# Patient Record
Sex: FEMALE | Race: WHITE | NOT HISPANIC OR LATINO | Employment: FULL TIME | URBAN - METROPOLITAN AREA
[De-identification: names, ages, dates, MRNs, and addresses within clinical notes are randomized per-mention and may not be internally consistent; named-entity substitution may affect disease eponyms.]

---

## 2017-01-06 DIAGNOSIS — I50.42 CHRONIC COMBINED SYSTOLIC AND DIASTOLIC CONGESTIVE HEART FAILURE: Chronic | ICD-10-CM

## 2017-01-06 DIAGNOSIS — I49.5 SICK SINUS SYNDROME: ICD-10-CM

## 2017-01-06 DIAGNOSIS — N18.30 CKD (CHRONIC KIDNEY DISEASE) STAGE 3, GFR 30-59 ML/MIN: Chronic | ICD-10-CM

## 2017-01-06 DIAGNOSIS — J43.8 OTHER EMPHYSEMA: Chronic | ICD-10-CM

## 2017-01-06 DIAGNOSIS — E66.2 OBESITY HYPOVENTILATION SYNDROME: Chronic | ICD-10-CM

## 2017-01-06 DIAGNOSIS — J43.9 PULMONARY EMPHYSEMA, UNSPECIFIED EMPHYSEMA TYPE: Chronic | ICD-10-CM

## 2017-01-06 DIAGNOSIS — Z98.61 CAD S/P PERCUTANEOUS CORONARY ANGIOPLASTY: Chronic | ICD-10-CM

## 2017-01-06 DIAGNOSIS — I25.5 ISCHEMIC CARDIOMYOPATHY: Chronic | ICD-10-CM

## 2017-01-06 DIAGNOSIS — I25.10 CAD S/P PERCUTANEOUS CORONARY ANGIOPLASTY: Chronic | ICD-10-CM

## 2017-01-06 DIAGNOSIS — I10 ESSENTIAL HYPERTENSION: Chronic | ICD-10-CM

## 2017-01-06 DIAGNOSIS — Z95.0 PACEMAKER: Chronic | ICD-10-CM

## 2017-01-06 DIAGNOSIS — Z95.810 AUTOMATIC IMPLANTABLE CARDIOVERTER-DEFIBRILLATOR IN SITU: Chronic | ICD-10-CM

## 2017-01-06 DIAGNOSIS — I50.43 ACUTE ON CHRONIC COMBINED SYSTOLIC AND DIASTOLIC CONGESTIVE HEART FAILURE: ICD-10-CM

## 2017-01-06 DIAGNOSIS — I48.91 ATRIAL FIBRILLATION STATUS POST CARDIOVERSION: Chronic | ICD-10-CM

## 2017-01-06 RX ORDER — LOSARTAN POTASSIUM 25 MG/1
12.5 TABLET ORAL DAILY
Qty: 45 TABLET | Refills: 3 | Status: ON HOLD | OUTPATIENT
Start: 2017-01-06 | End: 2017-02-19

## 2017-01-06 RX ORDER — FLUTICASONE FUROATE AND VILANTEROL 100; 25 UG/1; UG/1
1 POWDER RESPIRATORY (INHALATION) DAILY
Qty: 28 EACH | Refills: 0 | OUTPATIENT
Start: 2017-01-06

## 2017-01-10 ENCOUNTER — TELEPHONE (OUTPATIENT)
Dept: ELECTROPHYSIOLOGY | Facility: CLINIC | Age: 72
End: 2017-01-10

## 2017-01-10 NOTE — TELEPHONE ENCOUNTER
Contacted patient in relation to rescheduling her ICD home monitor device check.  Patient stated her daughter still has not brought her remote monitor to her.  Informed the patient of the importance of remote monitoring and clinical follow up as it relates to her ICD being under the [employer].  Asked patient about scheduling an in clinic appointment to assess her ICD as well as re-establish care with Dr. Spring as to it has been over 3 years since she was in the clinic.  Patient stated she would prefer going to the [facility] as to it is closer for her.  Dr. Spring's MA will contact patient to schedule an appointment for an ICD check and to see Dr. Spring on 1/30/17.  Patient verbalized understanding to all of the above.

## 2017-01-17 ENCOUNTER — OFFICE VISIT (OUTPATIENT)
Dept: INTERNAL MEDICINE | Facility: CLINIC | Age: 72
End: 2017-01-17
Payer: COMMERCIAL

## 2017-01-17 VITALS
HEIGHT: 70 IN | RESPIRATION RATE: 16 BRPM | BODY MASS INDEX: 35.44 KG/M2 | WEIGHT: 247.56 LBS | OXYGEN SATURATION: 85 % | HEART RATE: 70 BPM | TEMPERATURE: 97 F

## 2017-01-17 DIAGNOSIS — J43.9 PULMONARY EMPHYSEMA, UNSPECIFIED EMPHYSEMA TYPE: Chronic | ICD-10-CM

## 2017-01-17 DIAGNOSIS — Z12.31 ENCOUNTER FOR SCREENING MAMMOGRAM FOR BREAST CANCER: ICD-10-CM

## 2017-01-17 DIAGNOSIS — Z91.199 HISTORY OF NONADHERENCE TO MEDICAL TREATMENT: ICD-10-CM

## 2017-01-17 DIAGNOSIS — R06.2 WHEEZING: Primary | ICD-10-CM

## 2017-01-17 PROCEDURE — 1160F RVW MEDS BY RX/DR IN RCRD: CPT | Mod: S$GLB,,, | Performed by: INTERNAL MEDICINE

## 2017-01-17 PROCEDURE — 1126F AMNT PAIN NOTED NONE PRSNT: CPT | Mod: S$GLB,,, | Performed by: INTERNAL MEDICINE

## 2017-01-17 PROCEDURE — 99214 OFFICE O/P EST MOD 30 MIN: CPT | Mod: 25,S$GLB,, | Performed by: INTERNAL MEDICINE

## 2017-01-17 PROCEDURE — 1157F ADVNC CARE PLAN IN RCRD: CPT | Mod: S$GLB,,, | Performed by: INTERNAL MEDICINE

## 2017-01-17 PROCEDURE — 94640 AIRWAY INHALATION TREATMENT: CPT | Mod: S$GLB,,, | Performed by: INTERNAL MEDICINE

## 2017-01-17 PROCEDURE — 1159F MED LIST DOCD IN RCRD: CPT | Mod: S$GLB,,, | Performed by: INTERNAL MEDICINE

## 2017-01-17 RX ORDER — POLYMYXIN B SULFATE AND TRIMETHOPRIM 1; 10000 MG/ML; [USP'U]/ML
1 SOLUTION OPHTHALMIC DAILY
Refills: 1 | COMMUNITY
Start: 2016-12-30 | End: 2017-03-01

## 2017-01-17 RX ORDER — IPRATROPIUM BROMIDE AND ALBUTEROL SULFATE 2.5; .5 MG/3ML; MG/3ML
SOLUTION RESPIRATORY (INHALATION)
Refills: 2 | COMMUNITY
Start: 2016-12-30 | End: 2017-03-13 | Stop reason: SDUPTHER

## 2017-01-17 RX ORDER — NEPAFENAC 3 MG/ML
1 SUSPENSION/ DROPS OPHTHALMIC DAILY
Refills: 0 | COMMUNITY
Start: 2016-12-30 | End: 2017-03-01

## 2017-01-17 RX ORDER — IPRATROPIUM BROMIDE AND ALBUTEROL SULFATE 2.5; .5 MG/3ML; MG/3ML
3 SOLUTION RESPIRATORY (INHALATION)
Status: COMPLETED | OUTPATIENT
Start: 2017-01-17 | End: 2017-01-17

## 2017-01-17 RX ORDER — FLUTICASONE FUROATE AND VILANTEROL 100; 25 UG/1; UG/1
1 POWDER RESPIRATORY (INHALATION) DAILY
Qty: 28 EACH | Refills: 5 | Status: SHIPPED | OUTPATIENT
Start: 2017-01-17

## 2017-01-17 RX ORDER — DUREZOL 0.5 MG/ML
2 EMULSION OPHTHALMIC DAILY
Refills: 1 | COMMUNITY
Start: 2016-11-23 | End: 2017-03-01

## 2017-01-17 RX ADMIN — IPRATROPIUM BROMIDE AND ALBUTEROL SULFATE 3 ML: 2.5; .5 SOLUTION RESPIRATORY (INHALATION) at 03:01

## 2017-01-17 NOTE — PROGRESS NOTES
"Subjective:      Patient ID: Jennifer Prescott is a 71 y.o. female.    Chief Complaint: Follow-up and Shortness of Breath    HPI: 71y/oWF, stopped smoking 1/1/17.  Known: COPD, on O2  At home , but is not on it now.               Has not been using her duoneb,"I didn't think it was working".               Ran out of her Breo too.               Also, has not had her ICD checked, because her daughter has not brought the moniter to her.               Has lost 8# since I last saw her.  She does not have a pulmonologist and does not want another doctor.    Review of Systems   Constitutional: Positive for fatigue. Negative for diaphoresis and fever. Activity change: less.   HENT: Negative.    Eyes: Negative.    Respiratory: Positive for chest tightness, shortness of breath and wheezing. Negative for choking and stridor.    Cardiovascular: Positive for leg swelling. Negative for chest pain and palpitations.   Gastrointestinal: Negative.    Endocrine: Negative.    Genitourinary: Negative.    Musculoskeletal: Negative.    Allergic/Immunologic: Negative.    Neurological: Negative.    Hematological: Negative.    Psychiatric/Behavioral: Negative.        Objective:     Visit Vitals    Pulse 70    Temp 97.2 °F (36.2 °C) (Oral)    Resp 16    Ht 5' 10" (1.778 m)    Wt 112.3 kg (247 lb 9.2 oz)    LMP  (LMP Unknown)    SpO2 (!) 85%    BMI 35.52 kg/m2       Physical Exam   Constitutional: She is oriented to person, place, and time. She appears well-developed and well-nourished. No distress.   HENT:   Head: Normocephalic and atraumatic.   Nose: Nose normal.   Mouth/Throat: Oropharynx is clear and moist.   cyanotic lips   Eyes: EOM are normal. Pupils are equal, round, and reactive to light.   Neck: Normal range of motion. Neck supple.   Cardiovascular: Normal rate, regular rhythm and normal heart sounds.    Pulmonary/Chest: Effort normal. She has wheezes.   Abdominal: Soft. Bowel sounds are normal.   Musculoskeletal: Normal range " of motion.   Neurological: She is alert and oriented to person, place, and time.   Skin: Skin is warm and dry. She is not diaphoretic.   Psychiatric: She has a normal mood and affect. Her behavior is normal.   Nursing note and vitals reviewed.      Assessment:     1. Wheezing    2. Pulmonary emphysema, unspecified emphysema type    3. Encounter for screening mammogram for breast cancer    4. History of nonadherence to medical treatment      Plan:     Wheezing  -     albuterol-ipratropium 2.5mg-0.5mg/3mL nebulizer solution 3 mL; Take 3 mLs by nebulization one time.  -     fluticasone-vilanterol (BREO) 100-25 mcg/dose diskus inhaler; Inhale 1 puff into the lungs once daily.  Dispense: 28 each; Refill: 5    Pulmonary emphysema, unspecified emphysema type  -     fluticasone-vilanterol (BREO) 100-25 mcg/dose diskus inhaler; Inhale 1 puff into the lungs once daily.  Dispense: 28 each; Refill: 5    Encounter for screening mammogram for breast cancer  -     Mammo Digital Screening Bilat with CAD; Future; Expected date: 1/17/17    History of nonadherence to medical treatment

## 2017-01-21 DIAGNOSIS — I42.8 OTHER CARDIOMYOPATHY: Primary | ICD-10-CM

## 2017-01-26 ENCOUNTER — TELEPHONE (OUTPATIENT)
Dept: INTERNAL MEDICINE | Facility: CLINIC | Age: 72
End: 2017-01-26

## 2017-01-27 DIAGNOSIS — I42.0 CONGESTIVE CARDIOMYOPATHY: ICD-10-CM

## 2017-01-27 DIAGNOSIS — Z95.810 S/P ICD (INTERNAL CARDIAC DEFIBRILLATOR) PROCEDURE: ICD-10-CM

## 2017-01-27 DIAGNOSIS — I25.10 CORONARY ARTERY DISEASE INVOLVING NATIVE CORONARY ARTERY WITHOUT ANGINA PECTORIS, UNSPECIFIED WHETHER NATIVE OR TRANSPLANTED HEART: ICD-10-CM

## 2017-01-27 DIAGNOSIS — Z95.0 PACEMAKER: ICD-10-CM

## 2017-01-27 DIAGNOSIS — J44.9 CHRONIC OBSTRUCTIVE PULMONARY DISEASE, UNSPECIFIED COPD TYPE: ICD-10-CM

## 2017-01-27 DIAGNOSIS — I25.5 ISCHEMIC CARDIOMYOPATHY: ICD-10-CM

## 2017-01-27 DIAGNOSIS — I50.9 ACUTE ON CHRONIC CONGESTIVE HEART FAILURE, UNSPECIFIED CONGESTIVE HEART FAILURE TYPE: ICD-10-CM

## 2017-01-27 DIAGNOSIS — M10.9 ACUTE GOUT OF FOOT, UNSPECIFIED CAUSE, UNSPECIFIED LATERALITY: ICD-10-CM

## 2017-01-27 DIAGNOSIS — M62.838 MUSCLE SPASMS OF BOTH LOWER EXTREMITIES: ICD-10-CM

## 2017-01-27 NOTE — TELEPHONE ENCOUNTER
Pt requesting medication refill.allopurinol 300 mg, pravastatin 40 mg,cyclobenzaprine 10 mg,[facility]. Vf/ma

## 2017-01-30 ENCOUNTER — TELEPHONE (OUTPATIENT)
Dept: CARDIOLOGY | Facility: HOSPITAL | Age: 72
End: 2017-01-30

## 2017-01-30 ENCOUNTER — OFFICE VISIT (OUTPATIENT)
Dept: CARDIOLOGY | Facility: CLINIC | Age: 72
End: 2017-01-30
Payer: COMMERCIAL

## 2017-01-30 VITALS
DIASTOLIC BLOOD PRESSURE: 67 MMHG | SYSTOLIC BLOOD PRESSURE: 85 MMHG | HEART RATE: 70 BPM | HEIGHT: 70 IN | WEIGHT: 241 LBS | BODY MASS INDEX: 34.5 KG/M2

## 2017-01-30 DIAGNOSIS — I42.8 OTHER CARDIOMYOPATHY: ICD-10-CM

## 2017-01-30 DIAGNOSIS — J43.9 PULMONARY EMPHYSEMA, UNSPECIFIED EMPHYSEMA TYPE: Chronic | ICD-10-CM

## 2017-01-30 DIAGNOSIS — I49.5 SICK SINUS SYNDROME: ICD-10-CM

## 2017-01-30 DIAGNOSIS — I25.10 CAD S/P PERCUTANEOUS CORONARY ANGIOPLASTY: Chronic | ICD-10-CM

## 2017-01-30 DIAGNOSIS — I25.5 ISCHEMIC CARDIOMYOPATHY: Primary | Chronic | ICD-10-CM

## 2017-01-30 DIAGNOSIS — I48.91 ATRIAL FIBRILLATION STATUS POST CARDIOVERSION: Chronic | ICD-10-CM

## 2017-01-30 DIAGNOSIS — Z98.61 CAD S/P PERCUTANEOUS CORONARY ANGIOPLASTY: Chronic | ICD-10-CM

## 2017-01-30 PROCEDURE — 1160F RVW MEDS BY RX/DR IN RCRD: CPT | Mod: S$GLB,,, | Performed by: INTERNAL MEDICINE

## 2017-01-30 PROCEDURE — 1126F AMNT PAIN NOTED NONE PRSNT: CPT | Mod: S$GLB,,, | Performed by: INTERNAL MEDICINE

## 2017-01-30 PROCEDURE — 99999 PR PBB SHADOW E&M-EST. PATIENT-LVL III: CPT | Mod: PBBFAC,,, | Performed by: INTERNAL MEDICINE

## 2017-01-30 PROCEDURE — 99205 OFFICE O/P NEW HI 60 MIN: CPT | Mod: S$GLB,,, | Performed by: INTERNAL MEDICINE

## 2017-01-30 PROCEDURE — 3074F SYST BP LT 130 MM HG: CPT | Mod: S$GLB,,, | Performed by: INTERNAL MEDICINE

## 2017-01-30 PROCEDURE — 3078F DIAST BP <80 MM HG: CPT | Mod: S$GLB,,, | Performed by: INTERNAL MEDICINE

## 2017-01-30 PROCEDURE — 93000 ELECTROCARDIOGRAM COMPLETE: CPT | Mod: S$GLB,,, | Performed by: INTERNAL MEDICINE

## 2017-01-30 PROCEDURE — 1159F MED LIST DOCD IN RCRD: CPT | Mod: S$GLB,,, | Performed by: INTERNAL MEDICINE

## 2017-01-30 PROCEDURE — 93284 PRGRMG EVAL IMPLANTABLE DFB: CPT | Mod: S$GLB,,, | Performed by: INTERNAL MEDICINE

## 2017-01-30 PROCEDURE — 1157F ADVNC CARE PLAN IN RCRD: CPT | Mod: S$GLB,,, | Performed by: INTERNAL MEDICINE

## 2017-01-30 RX ORDER — ALLOPURINOL 300 MG/1
300 TABLET ORAL DAILY
Qty: 90 TABLET | Refills: 3 | Status: SHIPPED | OUTPATIENT
Start: 2017-01-30

## 2017-01-30 RX ORDER — CYCLOBENZAPRINE HCL 10 MG
10 TABLET ORAL 3 TIMES DAILY PRN
Qty: 90 TABLET | Refills: 1 | Status: ON HOLD | OUTPATIENT
Start: 2017-01-30 | End: 2017-02-19 | Stop reason: HOSPADM

## 2017-01-30 RX ORDER — PRAVASTATIN SODIUM 40 MG/1
40 TABLET ORAL DAILY
Qty: 90 TABLET | Refills: 3 | Status: SHIPPED | OUTPATIENT
Start: 2017-01-30

## 2017-01-30 NOTE — MR AVS SNAPSHOT
[place] [facility]  [address], [address]  [place] [address]  Phone: [phone]                  Jennifer Prescott   2017 8:40 AM   Office Visit    Description:  Female : 1945   Provider:  Tien Spring MD   Department:  [place] [facility]           Reason for Visit     Pacemaker Check           Diagnoses this Visit        Comments    Ischemic cardiomyopathy    -  Primary     Other cardiomyopathy         Sick sinus syndrome         Pulmonary emphysema, unspecified emphysema type         Atrial fibrillation status post cardioversion         CAD S/P percutaneous coronary angioplasty                To Do List           Future Appointments        Provider Department Dept Phone    2/15/2017 3:20 PM Lianna Mistry MD [facility] [phone]      Goals (5 Years of Data)     None      Follow-Up and Disposition     Return in about 1 year (around 2018).      [facility]     [facility] On Call Nurse Care Line - / Assistance  Registered nurses in the [facility] On Call Center provide clinical advisement, health education, appointment booking, and other advisory services.  Call for this free service at [phone].             Medications           Message regarding Medications     Verify the changes and/or additions to your medication regime listed below are the same as discussed with your clinician today.  If any of these changes or additions are incorrect, please notify your healthcare provider.        STOP taking these medications     amiodarone (PACERONE) 200 MG Tab Take 1 tablet (200 mg total) by mouth once daily.           Verify that the below list of medications is an accurate representation of the medications you are currently taking.  If none reported, the list may be blank. If incorrect, please contact your healthcare provider. Carry this list with you in case of emergency.           Current Medications     albuterol 90 mcg/actuation inhaler Inhale  2 puffs into the lungs every 6 (six) hours as needed for Wheezing.    albuterol-ipratropium 2.5mg-0.5mg/3mL (DUO-NEB) 0.5 mg-3 mg(2.5 mg base)/3 mL nebulizer solution USE 1 VIAL VIA NEBULIZER EVERY 6 HOURS AS NEEDED    allopurinol (ZYLOPRIM) 300 MG tablet Take 1 tablet (300 mg total) by mouth once daily.    aspirin 81 MG Chew Take 81 mg by mouth every evening.     bumetanide (BUMEX) 1 MG tablet Take 1 tablet (1 mg total) by mouth 2 (two) times daily.    carvedilol (COREG) 3.125 MG tablet Take 1 tablet (3.125 mg total) by mouth 2 (two) times daily with meals.    clopidogrel (PLAVIX) 75 mg tablet Take 1 tablet (75 mg total) by mouth every evening.    cyclobenzaprine (FLEXERIL) 10 MG tablet Take 1 tablet (10 mg total) by mouth 3 (three) times daily as needed for Muscle spasms.    digoxin (LANOXIN) 125 mcg tablet Take 1 tablet (0.125 mg total) by mouth once daily.    DUREZOL 0.05 % Drop ophthalmic solution Place 2 drops into both eyes Daily.    ferrous sulfate 325 (65 FE) MG EC tablet Take 1 tablet (325 mg total) by mouth every evening.    fluticasone-vilanterol (BREO) 100-25 mcg/dose diskus inhaler Inhale 1 puff into the lungs once daily.    ILEVRO 0.3 % DrpS Place 1 drop into both eyes Daily.    isosorbide mononitrate (IMDUR) 60 MG 24 hr tablet Take 1 tablet (60 mg total) by mouth once daily.    losartan (COZAAR) 25 MG tablet Take 0.5 tablets (12.5 mg total) by mouth once daily.    polymyxin B sulf-trimethoprim (POLYTRIM) 10,000 unit- 1 mg/mL Drop Place 1 drop into both eyes Daily.    pravastatin (PRAVACHOL) 40 MG tablet Take 1 tablet (40 mg total) by mouth once daily.    tramadol (ULTRAM) 50 mg tablet Take 1 tablet (50 mg total) by mouth every 6 (six) hours as needed for Pain.    trazodone (DESYREL) 150 MG tablet TAKE 1 TABLET BY MOUTH AT BEDTIME           Clinical Reference Information           Vital Signs - Last Recorded  Most recent update: 1/30/2017  9:00 AM by Shadia Blunt MA    BP Pulse Ht Wt LMP BMI    (!)  "85/67 70 5' 10" (1.778 m) 109.3 kg (241 lb) (LMP Unknown) 34.58 kg/m2      Blood Pressure          Most Recent Value    BP  (!)  85/67      Allergies as of 1/30/2017     No Known Allergies      Immunizations Administered on Date of Encounter - 1/30/2017     None      Orders Placed During Today's Visit      Normal Orders This Visit    ICD programming     Rhythm strip     Future Labs/Procedures Expected by Expires    2D echo with color flow doppler  1/5/2018 1/30/2018      Smoking Cessation     If you would like to quit smoking:   You may be eligible for free services if you are a [place] resident and started smoking cigarettes before September 1, 1988.  Call the [employer]oking [employer]essation [employer] ([employer]) toll free at (121) 737-9233 or [phone].   Call 6-793-QUIT-NOW if you do not meet the above criteria.            "

## 2017-01-30 NOTE — PROGRESS NOTES
Subjective:    Patient ID:  Jennifer Prescott is a 71 y.o. female who presents for follow-up of Pacemaker Check      HPI Comments:       Atrial Fibrillation   Symptoms include shortness of breath. Symptoms are negative for palpitations and weakness. Past medical history includes atrial fibrillation.   71 y.o. F CAD  HTN  Permanent AF  PPM/SSS -> BiV ICD 10/9/12 due to ICM -> LV lead revision thereafter  MARY KATE  PAD, L CEA  COPD, home O2    Continues to have NYHA III sx.   Quit cigs x ~1 year, with recent relapse and requit.  O2 at home.    Taken off a/c earlier this year due to GI bleeding. No source found. Cleared for a/c by GI.    echo: 20% LVEF. 3-4+ MR  cath 6/15: nonobstructive CAD  ICD: 98% V paced    My interpretation of today's ECG is biV paced AF. 71 bpm.    Review of Systems   Constitution: Positive for malaise/fatigue. Negative for weakness.   HENT: Negative.    Eyes: Negative for blurred vision.   Cardiovascular: Positive for dyspnea on exertion. Negative for irregular heartbeat, near-syncope and palpitations.   Respiratory: Positive for shortness of breath.    Endocrine: Negative.    Hematologic/Lymphatic: Does not bruise/bleed easily.   Skin: Negative.    Musculoskeletal: Negative.    Gastrointestinal: Negative.  Negative for abdominal pain.   Genitourinary: Negative for frequency.   Neurological: Negative.    Psychiatric/Behavioral: Negative.    Allergic/Immunologic: Negative for environmental allergies.        Objective:    Physical Exam   Constitutional: She is oriented to person, place, and time. Vital signs are normal. She appears well-developed and well-nourished. She is active and cooperative.   HENT:   Head: Normocephalic and atraumatic.   Eyes: Conjunctivae and EOM are normal.   Neck: Normal range of motion. Carotid bruit is not present. No tracheal deviation and no edema present. No thyroid mass and no thyromegaly present.   Cardiovascular: Normal rate, normal heart sounds, intact distal pulses  and normal pulses.  An irregularly irregular rhythm present.  No extrasystoles are present. PMI is not displaced.  Exam reveals no gallop and no friction rub.    No murmur heard.  Pulmonary/Chest: Effort normal and breath sounds normal. No respiratory distress. She has no wheezes. She has no rales.   Abdominal: Soft. Normal appearance. She exhibits no distension. There is no hepatosplenomegaly.   Musculoskeletal: Normal range of motion.   Neurological: She is alert and oriented to person, place, and time. Coordination normal.   Skin: Skin is warm and dry. No rash noted.   Psychiatric: She has a normal mood and affect. Her speech is normal and behavior is normal. Thought content normal. Cognition and memory are normal.   Nursing note and vitals reviewed.        Assessment:       1. Ischemic cardiomyopathy    2. Other cardiomyopathy    3. Sick sinus syndrome    4. Pulmonary emphysema, unspecified emphysema type    5. Atrial fibrillation status post cardioversion 3/06/15    6. CAD S/P percutaneous coronary angioplasty         Plan:       ICD  Stable device function.    AF  Permanent AF despite amio.   d/c amio.    High risk for CVA. Hx of DVT/PE.  D/w pt that Plavix is inadequate for mitigation of these risks. If at all possible, she should be fully anticoagulated. Did have GI bleed while under ASA/plavix/xarelto. No source found.  Will d/w Dr Braga the possibility of d/c plavix and replace with oral anticoagulant -- e.g., warfarin or eliquis (not Pradaxa due to its higher risk for GI bleeding).    CHF  defer med Rx for HF to Dr Braga. Limited by hypotension.    Continue f/u in device clinic.  Return in 1 year with echo, or earlier prn.

## 2017-01-30 NOTE — LETTER
January 30, 2017      Lianna Mistry MD  [address]  [address]  [place] [address]           [place] [facility]  [address], [address]  [place] [facility] [address]  Phone: [phone]          Patient: Jennifer Prescott   MR Number: [id number]   YOB: 1945   Date of Visit: 1/30/2017       Dear Dr. Lianna Mistry:    Thank you for referring Jennifer Prescott to me for evaluation. Attached you will find relevant portions of my assessment and plan of care.    If you have questions, please do not hesitate to call me. I look forward to following Jennifer Prescott along with you.    Sincerely,    Tien Spring MD    Enclosure  CC:  No Recipients    If you would like to receive this communication electronically, please contact [email or web address] or [phone] to request more information on [employer] access.    For providers and/or their staff who would like to refer a patient to [facility], please contact us through our one-stop-shop provider referral line, [facility], at [phone].    If you feel you have received this communication in error or would no longer like to receive these types of communications, please e-mail [email or web address]

## 2017-01-31 NOTE — TELEPHONE ENCOUNTER
Thanks Williams          I agree with stopping plavix        We can go with Eliquis 5 mg po bid        She was non compliant with coumadin in the past        She can stay on aspirin 81 mg daily        We will follow closely         Thanks        My medical assistant will call her         ZN

## 2017-01-31 NOTE — TELEPHONE ENCOUNTER
----- Message from Tien Spring MD sent at 1/30/2017 12:27 PM CST -----  Riley,  See my EPIC note from today. Can we replace plavix with either warfarin or eliquis?  Dan

## 2017-02-15 ENCOUNTER — HOSPITAL ENCOUNTER (INPATIENT)
Facility: HOSPITAL | Age: 72
LOS: 5 days | Discharge: HOME | DRG: 378 | End: 2017-02-20
Attending: EMERGENCY MEDICINE | Admitting: INTERNAL MEDICINE
Payer: COMMERCIAL

## 2017-02-15 DIAGNOSIS — Z72.0 TOBACCO ABUSE DISORDER: ICD-10-CM

## 2017-02-15 DIAGNOSIS — I50.42 CHRONIC COMBINED SYSTOLIC AND DIASTOLIC CONGESTIVE HEART FAILURE: Chronic | ICD-10-CM

## 2017-02-15 DIAGNOSIS — Z98.61 CAD S/P PERCUTANEOUS CORONARY ANGIOPLASTY: Chronic | ICD-10-CM

## 2017-02-15 DIAGNOSIS — I25.10 CAD S/P PERCUTANEOUS CORONARY ANGIOPLASTY: Chronic | ICD-10-CM

## 2017-02-15 DIAGNOSIS — Z79.01 LONG TERM (CURRENT) USE OF ANTICOAGULANTS: ICD-10-CM

## 2017-02-15 DIAGNOSIS — J43.8 OTHER EMPHYSEMA: Chronic | ICD-10-CM

## 2017-02-15 DIAGNOSIS — J96.22 ACUTE ON CHRONIC RESPIRATORY FAILURE WITH HYPOXIA AND HYPERCAPNIA: ICD-10-CM

## 2017-02-15 DIAGNOSIS — I49.5 SICK SINUS SYNDROME: ICD-10-CM

## 2017-02-15 DIAGNOSIS — E66.2 OBESITY HYPOVENTILATION SYNDROME: Chronic | ICD-10-CM

## 2017-02-15 DIAGNOSIS — J90 BILATERAL PLEURAL EFFUSION: ICD-10-CM

## 2017-02-15 DIAGNOSIS — G57.93 NEUROPATHIC PAIN, LEG, BILATERAL: Chronic | ICD-10-CM

## 2017-02-15 DIAGNOSIS — I77.9 BILATERAL CAROTID ARTERY DISEASE: Chronic | ICD-10-CM

## 2017-02-15 DIAGNOSIS — D50.0 IRON DEFICIENCY ANEMIA DUE TO CHRONIC BLOOD LOSS: Chronic | ICD-10-CM

## 2017-02-15 DIAGNOSIS — N18.30 CKD (CHRONIC KIDNEY DISEASE) STAGE 3, GFR 30-59 ML/MIN: Chronic | ICD-10-CM

## 2017-02-15 DIAGNOSIS — Z95.810 AUTOMATIC IMPLANTABLE CARDIOVERTER-DEFIBRILLATOR IN SITU: Chronic | ICD-10-CM

## 2017-02-15 DIAGNOSIS — I25.5 ISCHEMIC CARDIOMYOPATHY: Chronic | ICD-10-CM

## 2017-02-15 DIAGNOSIS — J43.9 PULMONARY EMPHYSEMA, UNSPECIFIED EMPHYSEMA TYPE: Chronic | ICD-10-CM

## 2017-02-15 DIAGNOSIS — K55.1 CHRONIC ISCHEMIC COLITIS: Chronic | ICD-10-CM

## 2017-02-15 DIAGNOSIS — I10 ESSENTIAL HYPERTENSION: ICD-10-CM

## 2017-02-15 DIAGNOSIS — Z91.199 HISTORY OF NONADHERENCE TO MEDICAL TREATMENT: ICD-10-CM

## 2017-02-15 DIAGNOSIS — R94.39 ABNORMAL CARDIOVASCULAR STRESS TEST: ICD-10-CM

## 2017-02-15 DIAGNOSIS — Z79.01 ANTICOAGULATED: ICD-10-CM

## 2017-02-15 DIAGNOSIS — N17.9 AKI (ACUTE KIDNEY INJURY): ICD-10-CM

## 2017-02-15 DIAGNOSIS — I50.43 ACUTE ON CHRONIC COMBINED SYSTOLIC AND DIASTOLIC CONGESTIVE HEART FAILURE: ICD-10-CM

## 2017-02-15 DIAGNOSIS — K59.01 SLOW TRANSIT CONSTIPATION: ICD-10-CM

## 2017-02-15 DIAGNOSIS — Z95.0 PACEMAKER: Chronic | ICD-10-CM

## 2017-02-15 DIAGNOSIS — K92.2 GASTROINTESTINAL HEMORRHAGE, UNSPECIFIED GASTROINTESTINAL HEMORRHAGE TYPE: Primary | ICD-10-CM

## 2017-02-15 DIAGNOSIS — I48.91 ATRIAL FIBRILLATION STATUS POST CARDIOVERSION: Chronic | ICD-10-CM

## 2017-02-15 DIAGNOSIS — E87.6 HYPOKALEMIA: ICD-10-CM

## 2017-02-15 DIAGNOSIS — J96.21 ACUTE ON CHRONIC RESPIRATORY FAILURE WITH HYPOXIA AND HYPERCAPNIA: ICD-10-CM

## 2017-02-15 PROBLEM — K92.1 GASTROINTESTINAL HEMORRHAGE WITH MELENA: Status: ACTIVE | Noted: 2017-02-15

## 2017-02-15 LAB
ABO + RH BLD: NORMAL
ALBUMIN SERPL BCP-MCNC: 3.1 G/DL
ALP SERPL-CCNC: 109 U/L
ALT SERPL W/O P-5'-P-CCNC: 119 U/L
ANION GAP SERPL CALC-SCNC: 15 MMOL/L
ANISOCYTOSIS BLD QL SMEAR: SLIGHT
APTT BLDCRRT: 25.3 SEC
AST SERPL-CCNC: 173 U/L
BACTERIA #/AREA URNS AUTO: ABNORMAL /HPF
BASO STIPL BLD QL SMEAR: ABNORMAL
BASOPHILS # BLD AUTO: 0.02 K/UL
BASOPHILS NFR BLD: 0.2 %
BILIRUB SERPL-MCNC: 1.1 MG/DL
BILIRUB UR QL STRIP: NEGATIVE
BILIRUB UR QL STRIP: NEGATIVE
BLD GP AB SCN CELLS X3 SERPL QL: NORMAL
BLOOD GROUP ANTIBODIES SERPL: NORMAL
BNP SERPL-MCNC: 1064 PG/ML
BNP SERPL-MCNC: 1576 PG/ML
BUN SERPL-MCNC: 74 MG/DL
CALCIUM SERPL-MCNC: 8.5 MG/DL
CHLORIDE SERPL-SCNC: 90 MMOL/L
CLARITY UR REFRACT.AUTO: ABNORMAL
CLARITY UR REFRACT.AUTO: CLEAR
CO2 SERPL-SCNC: 25 MMOL/L
COLOR UR AUTO: YELLOW
COLOR UR AUTO: YELLOW
CREAT SERPL-MCNC: 2 MG/DL
CREAT UR-MCNC: 109 MG/DL
DIFFERENTIAL METHOD: ABNORMAL
DIGOXIN SERPL-MCNC: 2.1 NG/ML
EOSINOPHIL # BLD AUTO: 0 K/UL
EOSINOPHIL NFR BLD: 0.1 %
ERYTHROCYTE [DISTWIDTH] IN BLOOD BY AUTOMATED COUNT: 20.2 %
EST. GFR  (AFRICAN AMERICAN): 28.3 ML/MIN/1.73 M^2
EST. GFR  (NON AFRICAN AMERICAN): 24.6 ML/MIN/1.73 M^2
GLUCOSE SERPL-MCNC: 109 MG/DL
GLUCOSE UR QL STRIP: NEGATIVE
GLUCOSE UR QL STRIP: NEGATIVE
HCT VFR BLD AUTO: 17.2 %
HGB BLD-MCNC: 5.1 G/DL
HGB UR QL STRIP: ABNORMAL
HGB UR QL STRIP: NEGATIVE
HYALINE CASTS UR QL AUTO: 68 /LPF
HYPOCHROMIA BLD QL SMEAR: ABNORMAL
INR PPP: 1.6
KETONES UR QL STRIP: NEGATIVE
KETONES UR QL STRIP: NEGATIVE
LACTATE SERPL-SCNC: 2.5 MMOL/L
LEUKOCYTE ESTERASE UR QL STRIP: ABNORMAL
LEUKOCYTE ESTERASE UR QL STRIP: NEGATIVE
LIPASE SERPL-CCNC: 6 U/L
LYMPHOCYTES # BLD AUTO: 1.3 K/UL
LYMPHOCYTES NFR BLD: 10.5 %
MCH RBC QN AUTO: 27.6 PG
MCHC RBC AUTO-ENTMCNC: 29.7 %
MCV RBC AUTO: 93 FL
MICROSCOPIC COMMENT: ABNORMAL
MONOCYTES # BLD AUTO: 1.2 K/UL
MONOCYTES NFR BLD: 9.9 %
NEUTROPHILS # BLD AUTO: 9.3 K/UL
NEUTROPHILS NFR BLD: 79.3 %
NITRITE UR QL STRIP: NEGATIVE
NITRITE UR QL STRIP: NEGATIVE
OSMOLALITY SERPL: 291 MOSM/KG
OSMOLALITY UR: 369 MOSM/KG
OVALOCYTES BLD QL SMEAR: ABNORMAL
PH UR STRIP: 5 [PH] (ref 5–8)
PH UR STRIP: 5 [PH] (ref 5–8)
PLATELET # BLD AUTO: 309 K/UL
PLATELET BLD QL SMEAR: ABNORMAL
PMV BLD AUTO: 10.4 FL
POIKILOCYTOSIS BLD QL SMEAR: SLIGHT
POTASSIUM SERPL-SCNC: 3.9 MMOL/L
PROT SERPL-MCNC: 6.9 G/DL
PROT UR QL STRIP: NEGATIVE
PROT UR QL STRIP: NEGATIVE
PROT UR-MCNC: 11 MG/DL
PROTHROMBIN TIME: 16.3 SEC
RBC # BLD AUTO: 1.85 M/UL
RBC #/AREA URNS AUTO: 49 /HPF (ref 0–4)
SODIUM SERPL-SCNC: 130 MMOL/L
SODIUM UR-SCNC: <20 MMOL/L
SP GR UR STRIP: 1.01 (ref 1–1.03)
SP GR UR STRIP: 1.01 (ref 1–1.03)
SQUAMOUS #/AREA URNS AUTO: 3 /HPF
URN SPEC COLLECT METH UR: ABNORMAL
URN SPEC COLLECT METH UR: NORMAL
UROBILINOGEN UR STRIP-ACNC: 2 EU/DL
UROBILINOGEN UR STRIP-ACNC: 2 EU/DL
WBC # BLD AUTO: 11.85 K/UL
WBC #/AREA URNS AUTO: 4 /HPF (ref 0–5)

## 2017-02-15 PROCEDURE — 96375 TX/PRO/DX INJ NEW DRUG ADDON: CPT

## 2017-02-15 PROCEDURE — 83690 ASSAY OF LIPASE: CPT

## 2017-02-15 PROCEDURE — 83880 ASSAY OF NATRIURETIC PEPTIDE: CPT | Mod: 91

## 2017-02-15 PROCEDURE — P9022 WASHED RED BLOOD CELLS UNIT: HCPCS

## 2017-02-15 PROCEDURE — 36430 TRANSFUSION BLD/BLD COMPNT: CPT

## 2017-02-15 PROCEDURE — 83605 ASSAY OF LACTIC ACID: CPT

## 2017-02-15 PROCEDURE — 94640 AIRWAY INHALATION TREATMENT: CPT

## 2017-02-15 PROCEDURE — 80162 ASSAY OF DIGOXIN TOTAL: CPT

## 2017-02-15 PROCEDURE — 80053 COMPREHEN METABOLIC PANEL: CPT

## 2017-02-15 PROCEDURE — 83880 ASSAY OF NATRIURETIC PEPTIDE: CPT

## 2017-02-15 PROCEDURE — 81003 URINALYSIS AUTO W/O SCOPE: CPT

## 2017-02-15 PROCEDURE — 81001 URINALYSIS AUTO W/SCOPE: CPT

## 2017-02-15 PROCEDURE — 86850 RBC ANTIBODY SCREEN: CPT

## 2017-02-15 PROCEDURE — 93005 ELECTROCARDIOGRAM TRACING: CPT

## 2017-02-15 PROCEDURE — 83930 ASSAY OF BLOOD OSMOLALITY: CPT

## 2017-02-15 PROCEDURE — 96366 THER/PROPH/DIAG IV INF ADDON: CPT

## 2017-02-15 PROCEDURE — 85610 PROTHROMBIN TIME: CPT

## 2017-02-15 PROCEDURE — 99291 CRITICAL CARE FIRST HOUR: CPT | Mod: 25

## 2017-02-15 PROCEDURE — 25000003 PHARM REV CODE 250: Performed by: EMERGENCY MEDICINE

## 2017-02-15 PROCEDURE — 83935 ASSAY OF URINE OSMOLALITY: CPT

## 2017-02-15 PROCEDURE — 86870 RBC ANTIBODY IDENTIFICATION: CPT

## 2017-02-15 PROCEDURE — 85025 COMPLETE CBC W/AUTO DIFF WBC: CPT

## 2017-02-15 PROCEDURE — 86900 BLOOD TYPING SEROLOGIC ABO: CPT

## 2017-02-15 PROCEDURE — C9113 INJ PANTOPRAZOLE SODIUM, VIA: HCPCS | Performed by: EMERGENCY MEDICINE

## 2017-02-15 PROCEDURE — 99291 CRITICAL CARE FIRST HOUR: CPT | Mod: ,,, | Performed by: EMERGENCY MEDICINE

## 2017-02-15 PROCEDURE — 25000003 PHARM REV CODE 250: Performed by: STUDENT IN AN ORGANIZED HEALTH CARE EDUCATION/TRAINING PROGRAM

## 2017-02-15 PROCEDURE — 51701 INSERT BLADDER CATHETER: CPT

## 2017-02-15 PROCEDURE — 93010 ELECTROCARDIOGRAM REPORT: CPT | Mod: ,,, | Performed by: INTERNAL MEDICINE

## 2017-02-15 PROCEDURE — 86922 COMPATIBILITY TEST ANTIGLOB: CPT

## 2017-02-15 PROCEDURE — 84156 ASSAY OF PROTEIN URINE: CPT

## 2017-02-15 PROCEDURE — 84300 ASSAY OF URINE SODIUM: CPT

## 2017-02-15 PROCEDURE — 82570 ASSAY OF URINE CREATININE: CPT

## 2017-02-15 PROCEDURE — 25000242 PHARM REV CODE 250 ALT 637 W/ HCPCS: Performed by: STUDENT IN AN ORGANIZED HEALTH CARE EDUCATION/TRAINING PROGRAM

## 2017-02-15 PROCEDURE — 85730 THROMBOPLASTIN TIME PARTIAL: CPT

## 2017-02-15 PROCEDURE — 96365 THER/PROPH/DIAG IV INF INIT: CPT

## 2017-02-15 PROCEDURE — 63600175 PHARM REV CODE 636 W HCPCS: Performed by: EMERGENCY MEDICINE

## 2017-02-15 PROCEDURE — 12000002 HC ACUTE/MED SURGE SEMI-PRIVATE ROOM

## 2017-02-15 RX ORDER — SODIUM CHLORIDE 0.9 % (FLUSH) 0.9 %
3 SYRINGE (ML) INJECTION EVERY 8 HOURS
Status: DISCONTINUED | OUTPATIENT
Start: 2017-02-15 | End: 2017-02-20 | Stop reason: HOSPADM

## 2017-02-15 RX ORDER — CYCLOBENZAPRINE HCL 10 MG
10 TABLET ORAL 3 TIMES DAILY PRN
Status: DISCONTINUED | OUTPATIENT
Start: 2017-02-15 | End: 2017-02-16

## 2017-02-15 RX ORDER — ONDANSETRON 2 MG/ML
4 INJECTION INTRAMUSCULAR; INTRAVENOUS EVERY 12 HOURS PRN
Status: DISCONTINUED | OUTPATIENT
Start: 2017-02-15 | End: 2017-02-20 | Stop reason: HOSPADM

## 2017-02-15 RX ORDER — FLUTICASONE FUROATE AND VILANTEROL 100; 25 UG/1; UG/1
1 POWDER RESPIRATORY (INHALATION) DAILY
Status: DISCONTINUED | OUTPATIENT
Start: 2017-02-16 | End: 2017-02-20 | Stop reason: HOSPADM

## 2017-02-15 RX ORDER — CLOPIDOGREL BISULFATE 75 MG/1
75 TABLET ORAL DAILY
Status: ON HOLD | COMMUNITY
End: 2017-02-19 | Stop reason: HOSPADM

## 2017-02-15 RX ORDER — IPRATROPIUM BROMIDE AND ALBUTEROL SULFATE 2.5; .5 MG/3ML; MG/3ML
3 SOLUTION RESPIRATORY (INHALATION) EVERY 4 HOURS
Status: DISCONTINUED | OUTPATIENT
Start: 2017-02-15 | End: 2017-02-17

## 2017-02-15 RX ORDER — PANTOPRAZOLE SODIUM 40 MG/10ML
80 INJECTION, POWDER, LYOPHILIZED, FOR SOLUTION INTRAVENOUS
Status: COMPLETED | OUTPATIENT
Start: 2017-02-15 | End: 2017-02-15

## 2017-02-15 RX ORDER — HYDROCODONE BITARTRATE AND ACETAMINOPHEN 500; 5 MG/1; MG/1
TABLET ORAL
Status: DISCONTINUED | OUTPATIENT
Start: 2017-02-15 | End: 2017-02-20 | Stop reason: HOSPADM

## 2017-02-15 RX ORDER — POLYMYXIN B SULFATE AND TRIMETHOPRIM 1; 10000 MG/ML; [USP'U]/ML
1 SOLUTION OPHTHALMIC DAILY
Status: DISCONTINUED | OUTPATIENT
Start: 2017-02-16 | End: 2017-02-20 | Stop reason: HOSPADM

## 2017-02-15 RX ORDER — DIFLUPREDNATE OPHTHALMIC 0.5 MG/ML
2 EMULSION OPHTHALMIC DAILY
Status: DISCONTINUED | OUTPATIENT
Start: 2017-02-16 | End: 2017-02-20 | Stop reason: HOSPADM

## 2017-02-15 RX ORDER — DIGOXIN 125 MCG
0.12 TABLET ORAL DAILY
Status: DISCONTINUED | OUTPATIENT
Start: 2017-02-16 | End: 2017-02-15

## 2017-02-15 RX ADMIN — PANTOPRAZOLE SODIUM 8 MG/HR: 40 INJECTION, POWDER, FOR SOLUTION INTRAVENOUS at 05:02

## 2017-02-15 RX ADMIN — IPRATROPIUM BROMIDE AND ALBUTEROL SULFATE 3 ML: .5; 3 SOLUTION RESPIRATORY (INHALATION) at 08:02

## 2017-02-15 RX ADMIN — PANTOPRAZOLE SODIUM 8 MG/HR: 40 INJECTION, POWDER, FOR SOLUTION INTRAVENOUS at 10:02

## 2017-02-15 RX ADMIN — PANTOPRAZOLE SODIUM 80 MG: 40 INJECTION, POWDER, FOR SOLUTION INTRAVENOUS at 05:02

## 2017-02-15 RX ADMIN — CYCLOBENZAPRINE HYDROCHLORIDE 10 MG: 10 TABLET, FILM COATED ORAL at 08:02

## 2017-02-15 NOTE — ED PROVIDER NOTES
Encounter Date: 2/15/2017    SCRIBE #1 NOTE: I, Gagan Simmons, am scribing for, and in the presence of,  Dav Davila MD. I have scribed the following portions of the note - Other sections scribed: HPI, ROS.       History     Chief Complaint   Patient presents with    Shortness of Breath     hx of COPD orignally sat at 83%     Dark stools     Review of patient's allergies indicates:  No Known Allergies  HPI Comments: Time seen by provider: 4:09 PM    This is a 71 y.o. female with PMHx of CAD, A-fib, HTN, CHF and COPD who presents with complaint of melanotic stools since yesterday. Pt reports similar sx approximately 7/8 months ago with admission and received 4 units of blood. Associated sx include generalized weakness, dizziness and limited appetite for approximately 1 week. Denies any fever, chills, N/V, abdominal pain, dysuria or hematuria. Pt reports 3 weeks ago she was taken off Coumadin and placed on other blood thinners. Pt also c/o productive cough which is chronic in nature, wheezing and SOB. Relative gave the pt 3 breathing treatments prior to EMS arrival. Denies any chest pain or change to chronic leg swelling. No further complaints or concerns at this time.       The history is provided by the patient, the EMS personnel, a relative and medical records.     Past Medical History   Diagnosis Date    *Atrial fibrillation     Anticoagulant long-term use     Arthritis     Cardiomyopathy     Carotid artery occlusion     CHF (congestive heart failure)     COPD (chronic obstructive pulmonary disease)     COPD exacerbation 2/19/2015    Coronary artery disease      RCA occluded with L to R collaterals and normal LAD and LCx    Hypertension     Internal bleeding     Sleep apnea      uses bipap     Past Medical History Pertinent Negatives   Diagnosis Date Noted    Asthma 12/13/2012    MARY KATE on CPAP 12/1/2016     Past Surgical History   Procedure Laterality Date    Tonsillectomy, adenoidectomy       Gallbladder surgery      Crt-d implantation      Abdominal surgery      Appendectomy      [facility]iac pacemaker placement  9/21/2010     [facility]    Tonsillectomy      Vascular surgery      Cardiac catheterization      Cardiac defibrillator placement  10/9/2012     bivent AICD placed 10/2012 and revised 12/2012    Coronary angioplasty with stent placement  1/27/2015     3 JAMAL to RCA    Cardioversion  3/6/2015    Colonoscopy N/A 7/11/2016     Procedure: COLONOSCOPY;  Surgeon: Rafael Pérez MD;  Location: [facility];  Service: Endoscopy;  Laterality: N/A;    Cholecystectomy      Carotid endarterectomy Left      Family History   Problem Relation Age of Onset    Breast cancer Mother     Cancer Father     Heart failure Father     Heart disease Father     Heart attack Father     Sudden death Neg Hx      Social History   Substance Use Topics    Smoking status: Current Some Day Smoker     Packs/day: 0.25     Years: 50.00     Types: Cigarettes     Start date: 1/15/1965    Smokeless tobacco: Never Used    Alcohol use No     Review of Systems   Constitutional: Positive for appetite change. Negative for chills and fever.   HENT: Negative for sore throat.    Eyes: Negative for visual disturbance.   Respiratory: Positive for cough (with production), shortness of breath and wheezing.    Cardiovascular: Negative for chest pain and leg swelling (no change in chronic).   Gastrointestinal: Negative for abdominal pain, nausea and vomiting.        (+) Melanotic stools   Genitourinary: Negative for dysuria and hematuria.   Musculoskeletal: Negative for neck pain.   Skin: Negative for rash.   Neurological: Positive for dizziness and weakness (generalized).       Physical Exam   Initial Vitals   BP Pulse Resp Temp SpO2   02/15/17 1356 02/15/17 1356 02/15/17 1356 02/15/17 1356 02/15/17 1356   109/68 70 20 97.7 °F (36.5 °C) 100 %     Physical Exam    Constitutional: She appears well-developed and well-nourished. She is not  diaphoretic. She appears distressed.   HENT:   Head: Normocephalic and atraumatic.   Right Ear: External ear normal.   Left Ear: External ear normal.   Mouth/Throat: Oropharynx is clear and moist.   Eyes: Conjunctivae are normal. Pupils are equal, round, and reactive to light. Right eye exhibits no discharge. Left eye exhibits no discharge. No scleral icterus.   Neck: Normal range of motion. Neck supple. No tracheal deviation present.   Cardiovascular: Normal rate, regular rhythm and intact distal pulses. Exam reveals no gallop.    Pulmonary/Chest: No stridor. She is in respiratory distress. She has wheezes. She has no rhonchi. She has no rales. She exhibits no tenderness.   Abdominal: Soft. Bowel sounds are normal. She exhibits no distension. There is no tenderness. There is no rebound and no guarding.   Genitourinary: Rectal exam shows guaiac positive stool (thick melenic stool, no bright red blood). Guaiac positive stool (thick melenic stool, no bright red blood). : Acceptable.  Musculoskeletal: She exhibits edema. She exhibits no tenderness.   Neurological: She is alert and oriented to person, place, and time.   Skin: Skin is warm and dry. No rash noted.   Chronic venous stasis changes BLE   Psychiatric: She has a normal mood and affect.         ED Course   Critical Care  Date/Time: 2/15/2017 7:28 PM  Performed by: BRYCE DUMONT.  Authorized by: BRYCE DUMONT.   Direct patient critical care time: 16 minutes  Additional history critical care time: 4 minutes  Ordering / reviewing critical care time: 8 minutes  Documentation critical care time: 5 minutes  Consulting other physicians critical care time: 4 minutes  Total critical care time (exclusive of procedural time) : 37 minutes  Critical care time was exclusive of separately billable procedures and treating other patients and teaching time.  Critical care was necessary to treat or prevent imminent or life-threatening deterioration of  the following conditions: acute gi bleed w/ severe anemia in pt on eliquis.  Critical care was time spent personally by me on the following activities: discussions with consultants, obtaining history from patient or surrogate, review of old charts, re-evaluation of patient's condition, examination of patient, evaluation of patient's response to treatment, ordering and performing treatments and interventions, pulse oximetry and ordering and review of laboratory studies.        Labs Reviewed   CBC W/ AUTO DIFFERENTIAL - Abnormal; Notable for the following:        Result Value    RBC 1.85 (*)     Hemoglobin 5.1 (*)     Hematocrit 17.2 (*)     MCHC 29.7 (*)     RDW 20.2 (*)     Gran # 9.3 (*)     Mono # 1.2 (*)     Gran% 79.3 (*)     Lymph% 10.5 (*)     All other components within normal limits    Narrative:        hct & hbg critical result(s) called and verbal readback obtained   from evelyn uriarte rn, 02/15/2017 17:38   COMPREHENSIVE METABOLIC PANEL - Abnormal; Notable for the following:     Sodium 130 (*)     Chloride 90 (*)     BUN, Bld 74 (*)     Creatinine 2.0 (*)     Calcium 8.5 (*)     Albumin 3.1 (*)     Total Bilirubin 1.1 (*)      (*)      (*)     eGFR if  28.3 (*)     eGFR if non  24.6 (*)     All other components within normal limits   PROTIME-INR - Abnormal; Notable for the following:     Prothrombin Time 16.3 (*)     INR 1.6 (*)     All other components within normal limits   URINALYSIS - Abnormal; Notable for the following:     Appearance, UA Hazy (*)     Occult Blood UA 2+ (*)     Leukocytes, UA 2+ (*)     All other components within normal limits   B-TYPE NATRIURETIC PEPTIDE - Abnormal; Notable for the following:     BNP 1064 (*)     All other components within normal limits   URINALYSIS MICROSCOPIC - Abnormal; Notable for the following:     RBC, UA 49 (*)     Bacteria, UA Few (*)     Hyaline Casts, UA 68 (*)     All other components within normal limits    APTT   URINALYSIS   OSMOLALITY   OSMOLALITY, URINE RANDOM   SODIUM, URINE, RANDOM   PROTEIN, QUANTITATIVE, URINE RANDOM   CREATININE, URINE, RANDOM   TYPE & SCREEN   PREPARE RBC SOFT     EKG Readings: (Independently Interpreted)   Ventricular paced similar to prior          Medical Decision Making:   History:   I obtained history from: someone other than patient.  Old Medical Records: I decided to obtain old medical records.  Old Records Summarized: records from previous admission(s).  Initial Assessment:   Imp:  Apparent recurrent ugib s/p recently restarting eliquis.  No abd pain, no brbpr (in fact, no bm at all since last night).  sxs progressively worsening over 4d, suspect d/t symptomatic anemia rather than acs, chf, pna, electrolyte d/o.  Pivx2, type/cross, labs, orthostats, ppi, monitor, re-eval.        7:57 PM pt admitted to MICU for further mgt.   Clinical Tests:   Lab Tests: Ordered and Reviewed  Radiological Study: Ordered and Reviewed  Medical Tests: Ordered and Reviewed  Other:   I have discussed this case with another health care provider.            Scribe Attestation:   Scribe #1: I performed the above scribed service and the documentation accurately describes the services I performed. I attest to the accuracy of the note.    Attending Attestation:           Physician Attestation for Scribe:  Physician Attestation Statement for Scribe #1: I, Dav Davila MD, reviewed documentation, as scribed by Gagan Simmons in my presence, and it is both accurate and complete.         Attending ED Notes:   6:42 PM: Hemoglobin 5.1 Creatinine 2.0. Pt consented for blood. Additional 18 pascale placed in left antecubital via US guidance. Case discussed with GI. In light of multiple medical problems, they concur with plan to admit pt to ICU for treatment and evaluation. Pt updated on this plan and agrees. No new complaints at this time.           ED Course     Clinical Impression:   The primary encounter diagnosis was  Gastrointestinal hemorrhage, unspecified gastrointestinal hemorrhage type. Diagnoses of Anticoagulated, Chronic combined systolic and diastolic congestive heart failure, and NAYA (acute kidney injury) were also pertinent to this visit.          Dav Davila MD  02/15/17 1957

## 2017-02-15 NOTE — ED TRIAGE NOTES
"Pt states she has been hallucinating, had pain in her feet, her stomach is "blowing up" and she could not breathe.   "

## 2017-02-15 NOTE — IP AVS SNAPSHOT
[facility]  [address]  [place] [address]  Phone: [phone]           Patient Discharge Instructions     Our goal is to set you up for success. This packet includes information on your condition, medications, and your home care. It will help you to care for yourself so you don't get sicker and need to go back to the hospital.     Please ask your nurse if you have any questions.        There are many details to remember when preparing to leave the hospital. Here is what you will need to do:    1. Take your medicine. If you are prescribed medications, review your Medication List in the following pages. You may have new medications to  at the pharmacy and others that you'll need to stop taking. Review the instructions for how and when to take your medications. Talk with your doctor or nurses if you are unsure of what to do.     2. Go to your follow-up appointments. Specific follow-up information is listed in the following pages. Your may be contacted by a transition nurse or clinical provider about future appointments. Be sure we have all of the phone numbers to reach you, if needed. Please contact your provider's office if you are unable to make an appointment.     3. Watch for warning signs. Your doctor or nurse will give you detailed warning signs to watch for and when to call for assistance. These instructions may also include educational information about your condition. If you experience any of warning signs to your health, call your doctor.               ** Verify the list of medication(s) below is accurate and up to date. Carry this with you in case of emergency. If your medications have changed, please notify your healthcare provider.             Medication List      START taking these medications        Additional Info                      pantoprazole 40 MG tablet   Commonly known as:  PROTONIX   Quantity:  30 tablet   Refills:  11   Dose:  40 mg    Last  time this was given:  40 mg on 2/20/2017  8:14 AM   Instructions:  Take 1 tablet (40 mg total) by mouth once daily.     Begin Date    AM    Noon    PM    Bedtime         CHANGE how you take these medications        Additional Info                      bumetanide 1 MG tablet   Commonly known as:  BUMEX   Quantity:  180 tablet   Refills:  3   Dose:  1 mg   What changed:  when to take this   Comments:  PLEASE FILL ON 12/16/15    Instructions:  Take 1 tablet (1 mg total) by mouth once daily.     Begin Date    AM    Noon    PM    Bedtime       losartan 25 MG tablet   Commonly known as:  COZAAR   Quantity:  45 tablet   Refills:  3   Dose:  12.5 mg   What changed:  additional instructions    Last time this was given:  12.5 mg on 2/18/2017 10:32 AM   Instructions:  Take 0.5 tablets (12.5 mg total) by mouth once daily. Please do not take until instructed to do so by your Primary Care doctor     Begin Date    AM    Noon    PM    Bedtime       trazodone 150 MG tablet   Commonly known as:  DESYREL   Quantity:  30 tablet   Refills:  3   What changed:  See the new instructions.    Instructions:  TAKE 1 TABLET BY MOUTH AT BEDTIME     Begin Date    AM    Noon    PM    Bedtime         CONTINUE taking these medications        Additional Info                      albuterol 90 mcg/actuation inhaler   Quantity:  18 g   Refills:  0   Dose:  2 puff    Instructions:  Inhale 2 puffs into the lungs every 6 (six) hours as needed for Wheezing.     Begin Date    AM    Noon    PM    Bedtime       albuterol-ipratropium 2.5mg-0.5mg/3mL 0.5 mg-3 mg(2.5 mg base)/3 mL nebulizer solution   Commonly known as:  DUO-NEB   Refills:  2    Last time this was given:  3 mLs on 2/17/2017  3:49 AM   Instructions:  USE 1 VIAL VIA NEBULIZER EVERY 6 HOURS AS NEEDED FOR WHEEZING     Begin Date    AM    Noon    PM    Bedtime       allopurinol 300 MG tablet   Commonly known as:  ZYLOPRIM   Quantity:  90 tablet   Refills:  3   Dose:  300 mg    Last time this was  given:  300 mg on 2/20/2017  8:14 AM   Instructions:  Take 1 tablet (300 mg total) by mouth once daily.     Begin Date    AM    Noon    PM    Bedtime       aspirin 81 MG Chew   Refills:  0   Dose:  81 mg    Instructions:  Take 81 mg by mouth every evening.     Begin Date    AM    Noon    PM    Bedtime       carvedilol 3.125 MG tablet   Commonly known as:  COREG   Quantity:  180 tablet   Refills:  3   Dose:  3.125 mg   Comments:  PLEASE FILL ON 12/16/15    Last time this was given:  3.125 mg on 2/19/2017  6:09 PM   Instructions:  Take 1 tablet (3.125 mg total) by mouth 2 (two) times daily with meals.     Begin Date    AM    Noon    PM    Bedtime       DUREZOL 0.05 % Drop ophthalmic solution   Refills:  1   Dose:  2 drop   Generic drug:  difluprednate    Last time this was given:  2 drops on 2/16/2017  6:00 PM   Instructions:  Place 2 drops into both eyes Daily.     Begin Date    AM    Noon    PM    Bedtime       ferrous sulfate 325 (65 FE) MG EC tablet   Refills:  0   Dose:  325 mg    Last time this was given:  325 mg on 2/19/2017  9:29 PM   Instructions:  Take 1 tablet (325 mg total) by mouth every evening.     Begin Date    AM    Noon    PM    Bedtime       fluticasone-vilanterol 100-25 mcg/dose diskus inhaler   Commonly known as:  BREO   Quantity:  28 each   Refills:  5   Dose:  1 puff    Last time this was given:  1 puff on 2/17/2017 10:30 AM   Instructions:  Inhale 1 puff into the lungs once daily.     Begin Date    AM    Noon    PM    Bedtime       ILEVRO 0.3 % Drps   Refills:  0   Dose:  1 drop   Generic drug:  nepafenac    Instructions:  Place 1 drop into both eyes Daily.     Begin Date    AM    Noon    PM    Bedtime       polymyxin B sulf-trimethoprim 10,000 unit- 1 mg/mL Drop   Commonly known as:  POLYTRIM   Refills:  1   Dose:  1 drop    Last time this was given:  1 drop on 2/17/2017 12:10 AM   Instructions:  Place 1 drop into both eyes Daily.     Begin Date    AM    Noon    PM    Bedtime       pravastatin  40 MG tablet   Commonly known as:  PRAVACHOL   Quantity:  90 tablet   Refills:  3   Dose:  40 mg    Instructions:  Take 1 tablet (40 mg total) by mouth once daily.     Begin Date    AM    Noon    PM    Bedtime         STOP taking these medications     apixaban 5 mg Tab   Commonly known as:  ELIQUIS       clopidogrel 75 mg tablet   Commonly known as:  PLAVIX       cyclobenzaprine 10 MG tablet   Commonly known as:  FLEXERIL       digoxin 125 mcg tablet   Commonly known as:  LANOXIN       isosorbide mononitrate 60 MG 24 hr tablet   Commonly known as:  IMDUR       tramadol 50 mg tablet   Commonly known as:  ULTRAM            Where to Get Your Medications      These medications were sent to [facility] - [place], [place] - [address]  [address] [facility] [address], [facility] [address]     Phone:  [phone]     bumetanide 1 MG tablet    losartan 25 MG tablet    pantoprazole 40 MG tablet                  Please bring to all follow up appointments:    1. A copy of your discharge instructions.  2. All medicines you are currently taking in their original bottles.  3. Identification and insurance card.    Please arrive 15 minutes ahead of scheduled appointment time.    Please call 24 hours in advance if you must reschedule your appointment and/or time.        Your Scheduled Appointments     Mar 08, 2017 10:00 AM CST   Consult with ESMER Muniz [facility] - Hepatology ([facility] )    [address]  [place] [address] [phone]            Apr 05, 2017  1:00 PM CDT   GASTROENTEROLOGY ESTABLISHED PATIENT with Jovani Urrutia MD   [facility] - Gastroenterology ([facility] )    [address]  [place] [address] [phone]              Follow-up Information     Follow up with Lianna Mistry MD In 1 week.    Specialties:  Internal Medicine, Infectious Diseases    Contact information:    [address] [place] [address]  [address]  [facility] [address] [phone]           "Follow up with Landon [facility] - Cardiology In 1 week.    Specialty:  Cardiology    Contact information:    [address]  [facility] [address] [phone]    Additional information:    3rd floor      Referrals     Future Orders    Ambulatory Referral to Cardiology         Discharge Instructions     Future Orders    Activity as tolerated     Diet Cardiac         Primary Diagnosis     Your primary diagnosis was:  Bleeding Of The Stomach And Intestines      Admission Information     Date & Time Provider Department CSN    2/15/2017  4:00 PM Regina Serrano MD [facility] [id number]      Care Providers     Provider Role Specialty Primary office phone    Regina Serrano MD Attending Provider Hospitalist [phone]    Clem Baker MD Consulting Physician  Gastroenterology [phone]    Giovanni Bronson MD Surgeon  Gastroenterology [phone]    Regina Serrano MD Team Attending  Hospitalist [phone]      Your Vitals Were     BP Pulse Temp Resp Height    106/54 (BP Location: Right arm, Patient Position: Sitting, BP Method: Automatic) 69 98.5 °F (36.9 °C) (Oral) 18 5' 10" (1.778 m)    Weight Last Period SpO2 BMI    108.4 kg (238 lb 15.7 oz) (LMP Unknown) 97% 34.29 kg/m2      Recent Lab Values        7/12/2012 1/30/2015                        1:00 PM  2:56 AM          A1C 5.8 6.5 (H)                     Pending Labs     Order Current Status    Prepare RBC 1 Unit Preliminary result    Prepare RBC 3 Units; emergency Preliminary result      Allergies as of 2/20/2017        Reactions    Gabapentin       [facility]     [facility] Care Line - 24/7 Assistance  Unless otherwise directed by your provider, please contact [facility], our nurse care line that is available for 24/7 assistance.     Registered nurses in the [facility] provide clinical advisement, health education, appointment booking, and other advisory services.  Call for this free " service at [phone].        Advance Directives     An advance directive is a document which, in the event you are no longer able to make decisions for yourself, tells your healthcare team what kind of treatment you do or do not want to receive, or who you would like to make those decisions for you.  If you do not currently have an advance directive, [facility] encourages you to create one.  For more information call:  [phone] ([phone]), [phone] ([phone]),  or log on to [email or web address].        Smoking Cessation     If you would like to quit smoking:   You may be eligible for free services if you are a [place] resident and started smoking cigarettes before September 1, 1988.  Call the Smoking Cessation Trust (SCT) toll free at (034) 412-5325 or (755) 444-7391.   Call 4-471-QUIT-NOW if you do not meet the above criteria.            Language Assistance Services     ATTENTION: Language assistance services are available, free of charge. Please call [phone].      ATENCIÓN: Si habla español, tiene a arriola disposición servicios gratuitos de asistencia lingüística. Llame al [phone].     CHÚ Ý: N?u b?n nói Ti?ng Vi?t, có các d?ch v? h? tr? ngôn ng? mi?n phí dành cho b?n. G?i s? [phone].        Blood Transfusion Reaction Signs and Symptoms     The blood you have received has been matched for you as carefully as possible. Most patients who receive a blood transfusion do not experience problems. However, there can be a delayed reaction that happens a few weeks after your blood transfusion. Contact your physician immediately if you experience any NEW SYMPTOMS listed below:     Fever greater than 100.4 degrees    Chills   Yellow color to your skin or eyes(Jaundice)   Back pain, chest pain, or pain at the infusion site   Weakness (more than usual)   Discomfort or uneasiness more than usual (Malaise)   Nausea or vomiting   Shortness of breath, wheezing, or  coughing   Higher or lower blood pressure than normal   Skin rash, itching, skin redness, or localized swelling (example: hands or feet)   Urinating less than normal   Urine appears reddish or orange and is darker than normal      Remember that some these signs may already exist for you--such as having chronic back pain or high blood pressure. You only need to look for and report to your doctor any new occurrences since your blood transfusion that are of concern.        Stroke Education              Heart Failure Education       Heart Failure: Being Active  You have a condition called heart failure. Being active doesnt mean that you have to wear yourself out. Even a little movement each day helps to strengthen your heart. If you cant get out to exercise, you can do simple stretching and strengthening exercises at home. These are good ways to keep you well-conditioned and prevent you and your heart from becoming excessively weak.    Ideas to get you started  · Add a little movement to things you do now. Walk to mail letters. Park your car at the far end of the parking lot and walk to the store. Walk up a flight of stairs instead of taking the elevator.  · Choose activities you enjoy. You might walk, swim, or ride an exercise bike. Things like gardening and washing the car count, too. Other possibilities include: washing dishes, walking the dog, walking around the mall, and doing aerobic activities with friends.  · Join a group exercise program at a [facility], a senior center, or a community center. Or look into a hospital cardiac rehabilitation program. Ask your doctor if you qualify.  Tips to keep you going  · Get up and get dressed each day. Go to a coffee shop and read a newspaper or go somewhere that you'll be in the presence of other active people. Youll feel more like being active.  · Make a plan. Choose one or more activities that you enjoy and that you can easily do. Then plan to do at least one  each day. You might write your plan on a calendar.  · Go with a friend or a group if you like company. This can help you feel supported and stay motivated, too.  · Plan social events that you enjoy. This will keep you mentally engaged as well as physically motivated to do things you find pleasure in.  For your safety  · Talk with your healthcare provider before starting an exercise program.  · Exercise indoors when its too hot or too cold outside, or when the air quality is poor. Try walking at a shopping mall.  · Wear socks and sturdy shoes to maintain your balance and prevent falls.  · Start slowly. Do a few minutes several times a day at first. Increase your time and speed little by little.  · Stop and rest whenever you feel tired or get short of breath.  · Dont push yourself on days when you dont feel well.  Date Last Reviewed: 3/20/2016  © 3305-2556 [employer]. [address] [place] [address]. All rights reserved. This information is not intended as a substitute for professional medical care. Always follow your healthcare professional's instructions.              Heart Failure: Evaluating Your Heart  You have a condition called heart failure. To evaluate your condition, your doctor will examine you, ask questions, and do some tests. Along with looking for signs of heart failure, the doctor looks for any other health problems that may have led to heart failure. The results of your evaluation will help your doctor form a treatment plan.  Health history and physical exam  Your visit will start with a health history. Tell the doctor about any symptoms youve noticed and about all medicines you take. Then youll have a physical exam. This includes listening to your heartbeat and breathing. Youll also be checked for swelling (edema) in your legs and neck. When you have fluid buildup or fluid in the lungs, it may be called congestive heart failure.  Diagnosing heart failure     During an  echocardiogram, sound waves bounce off the heart. These are converted into a picture on the screen.   The following may be done to help your doctor form a diagnosis:  · X-rays show the size and shape of your heart. These pictures can also show fluid in your lungs.  · An electrocardiogram (ECG or EKG) shows the pattern of your heartbeat. Small pads (electrodes) are placed on your chest, arms, and legs. Wires connect the pads to the ECG machine, which records your hearts electrical signals. This can give the doctor information about heart function.  · An echocardiogram uses ultrasound waves to show the structure and movement of your heart muscle. This shows how well the heart pumps. It also shows the thickness of the heart walls, and if the heart is enlarged. It is one of the most useful, non-invasive tests as it provides information about the heart's general function. This helps your doctor make treatment decisions.  · Lab tests evaluate small amounts of blood or urine for signs of problems. A BNP lab test can help diagnose and evaluate heart failure. BNP stands for B-type natriuretic peptide. The ventricles secrete more BNP when heart failure worsens. Lab tests can also provide information about metabolic dysfunction or heart dysfunction.  Your treatment plan  Based on the results of your evaluation and tests, your doctor will develop a treatment plan. This plan is designed to relieve some of your heart failure symptoms and help make you more comfortable. Your treatment plan may include:  · Medicine to help your heart work better and improve your quality of life  · Changes in what you eat and drink to help prevent fluid from backing up in your body  · Daily monitoring of your weight and heart failure symptoms to see how well your treatment plan is working  · Exercise to help you stay healthy  · Help with quitting smoking  · Emotional and psychological support to help adjust to the changes  · Referrals to other  specialists to make sure you are being treated comprehensively  Date Last Reviewed: 3/21/2016  © 3465-8805 [employer]. [address] [place] [address], [place] [address]. All rights reserved. This information is not intended as a substitute for professional medical care. Always follow your healthcare professional's instructions.              Heart Failure: Making Changes to Your Diet  You have a condition called heart failure. When you have heart failure, excess fluid is more likely to build up in your body because your heart isn't working well. This makes the heart work harder to pump blood. Fluid buildup causes symptoms such as shortness of breath and swelling (edema). This is often referred to as congestive heart failure or CHF. Controlling the amount of salt (sodium) you eat may help stop fluid from building up. Your doctor may also tell you to reduce the amount of fluid you drink.  Reading food labels    Your healthcare provider will tell you how much sodium you can eat each day. Read food labels to keep track. Keep in mind that certain foods are high in salt. These include canned, frozen, and processed foods. Check the amount of sodium in each serving. Watch out for high-sodium ingredients. These include MSG (monosodium glutamate), baking soda, and sodium phosphate.   Eating less salt  Give yourself time to get used to eating less salt. It may take a little while. Here are some tips to help:  · Take the saltshaker off the table. Replace it with salt-free herb mixes and spices.  · Eat fresh or plain frozen vegetables. These have much less salt than canned vegetables.  · Choose low-sodium snacks like sodium-free pretzels, crackers, or air-popped popcorn.  · Dont add salt to your food when youre cooking. Instead, season your foods with pepper, lemon, garlic, or onion.  · When you eat out, ask that your food be cooked without added salt.  · Avoid eating fried foods as these often have a great deal of  salt.  If youre told to limit fluids  You may need to limit how much fluid you have to help prevent swelling. This includes anything that is liquid at room temperature, such as ice cream and soup. If your doctor tells you to limit fluid, try these tips:  · Measure drinks in a measuring cup before you drink them. This will help you meet daily goals.  · Chill drinks to make them more refreshing.  · Suck on frozen lemon wedges to quench thirst.  · Only drink when youre thirsty.  · Chew sugarless gum or suck on hard candy to keep your mouth moist.  · Weigh yourself daily to know if your body's fluid content is rising.  My sodium goal  Your healthcare provider may give you a sodium goal to meet each day. This includes sodium found in food as well as salt that you add. My goal is to eat no more than ___________ mg of sodium per day.     When to call your doctor  Call your doctor right away if you have any symptoms of worsening heart failure. These can include:  · Sudden weight gain  · Increased swelling of your legs or ankles  · Trouble breathing when youre resting or at night  · Increase in the number of pillows you have to sleep on  · Chest pain, pressure, discomfort, or pain in the jaw, neck, or back   Date Last Reviewed: 3/21/2016  © 7750-6098 [employer]. [address] [place] [address], [place] [address]. All rights reserved. This information is not intended as a substitute for professional medical care. Always follow your healthcare professional's instructions.              Heart Failure: Medicines to Help Your Heart    You have a condition called heart failure (also known as congestive heart failure, or CHF). Your doctor will likely prescribe medicines for heart failure and any underlying health problems you have. Most heart failure patients take one or more types of medicinen. Your healthcare provider will work to find the combination of medicines that works best for you.  Heart failure medicines  Here  are the most common heart failure medicines:  · ACE inhibitors lower blood pressure and decrease strain on the heart. This makes it easier for the heart to pump. Angiotensin receptor blockers have similar effects. These are prescribed for some patients instead of ACE inhibitors.  · Beta-blockers relieve stress on the heart. They also improve symptoms. They may also improve the heart's pumping action over time.  · Diuretics (also called water pills) help rid your body of excess water. This can help rid your body of swelling (edema). Having less fluid to pump means your heart doesnt have to work as hard. Some diuretics make your body lose a mineral called potassium. Your doctor will tell you if you need to take supplements or eat more foods high in potassium.  · Digoxin helps your heart pump with more strength. This helps your heart pump more blood with each beat. So, more oxygen-rich blood travels to the rest of the body.  · Aldosterone antagonists help alter hormones and decrease strain on the heart.  · Hydralazine and nitrates are two separate medicines used together to treat heart failure. They may come in one combination pill. They lower blood pressure and decrease how hard the heart has to pump.  Medicines for related conditions  Controlling other heart problems helps keep heart failure under control, too. Depending on other heart problems you have, medicines may be prescribed to:  · Lower blood pressure (antihypertensives).  · Lower cholesterol levels (statins).  · Prevent blood clots (anticoagulants or aspirin).  · Keep the heartbeat steady (antiarrhythmics).  Date Last Reviewed: 3/5/2016  © 4347-7640 [employer]. [address] [place] [address], [place] [address]. All rights reserved. This information is not intended as a substitute for professional medical care. Always follow your healthcare professional's instructions.              Heart Failure: Procedures That May Help    The heart is a muscle  that pumps oxygen-rich blood to all parts of the body. When you have heart failure, the heart is not able to pump as well as it should. Blood and fluid may back up into the lungs (congestive heart failure), and some parts of the body dont get enough oxygen-rich blood to work normally. These problems lead to the symptoms of heart failure.     Certain procedures may help the heart pump better in some cases of heart failure. Some procedures are done to treat health problems that may have caused the heart failure such as coronary artery disease or heart rhythm problems. For more serious heart failure, other options are available.  Treating artery and valve problems  If you have coronary artery disease or valve disease, procedures may be done to improve blood flow. This helps the heart pump better, which can improve heart failure symptoms. First, your doctor may do a cardiac catheterization to help detect clogged blood vessels or valve damage. During this procedure, a  thin tube (catheter) in inserted into a blood vessel and guided to the heart. There a dye is injected and a special type of X-ray (angiogram) is taken of the blood vessels. Procedures to open a blocked artery or fix damaged valves can also be done using catheterization.  · Angioplasty uses a balloon-tipped instrument at the end of the catheter. The balloon is inflated to widen the narrowed artery. In many cases, a stent is expanded to further support the narrowed artery. A stent is a metal mesh tube.  · Valve surgery repairs or replacement of faulty valves can also be done during catheterization so blood can flow properly through the chambers of the heart.  Bypass surgery is another option to help treat blocked arteries. It uses a healthy blood vessel from elsewhere in the body. The healthy blood vessel is attached above and below the blocked area so that blood can flow around the blocked artery.  Treating heart rhythm problems  A device may be placed in  the chest to help a weak heart maintain a healthy, heartbeat so the heart can pump more effectively:  · Pacemaker. A pacemaker is an implanted device that regulates your heartbeat electronically. It monitors your heart's rhythm and generates a painless electric impulse that helps the heart beat in a regular rhythm. A pacemaker is programmed to meet your specific heart rhythm needs.  · Biventricular pacing/cardiac resynchronization therapy. A type of pacemaker that paces both pumping chambers of the heart at the same time to coordinate contractions and to improve the heart's function. Some people with heart failure are candidates for this therapy.  · Implantable cardioverter defibrillator. A device similar to a pacemaker that senses when the heart is beating too fast and delivers an electrical shock to convert the fast rhythm to a normal rhythm. This can be a life saving device.  In severe cases  In more serious cases of heart failure when other treatments no longer work, other options may include:  · Ventricular assist devices (VADs). These are mechanical devices used to take over the pumping function for one or both of the heart's ventricles, or pumping chambers. A VAD may be necessary when heart failure progresses to the point that medicines and other treatments no longer help. In some cases, a VAD may be used as a bridge to transplant.  · Heart transplant. This is replacing the diseased heart with a healthy one from a donor. This is an option for a few people who are very sick. A heart transplant is very serious and not an option for all patients. Your doctor can tell you more.  Date Last Reviewed: 3/20/2016  © 0920-2932 [employer], [employer] [address] [place] [address], [place] [address]. All rights reserved. This information is not intended as a substitute for professional medical care. Always follow your healthcare professional's instructions.              Heart Failure: Tracking Your Weight  You have a  condition called heart failure. When you have heart failure, a sudden weight gain or a steady rise in weight is a warning sign that your body is retaining too much water and salt. This could mean your heart failure is getting worse. If left untreated, it can cause problems for your lungs and result in shortness of breath. Weighing yourself each day is the best way to know if youre retaining water. If your weight goes up quickly, call your doctor. You will be given instructions on how to get rid of the excess water. You will likely need medicines and to avoid salt. This will help your heart work better.  Call your doctor if you gain more than 2 pounds in 1 day, more than 5 pounds in 1 week, or whatever weight gain you were told to report by your doctor. This is often a sign of worsening heart failure and needs to be evaluated and treated. Your doctor will tell you what to do next.   Tips for weighing yourself    · Weigh yourself at the same time each morning, wearing the same clothes. Weigh yourself after urinating and before eating.  · Use the same scale each day. Make sure the numbers are easy to read. Put the scale on a flat, hard surface -- not on a rug or carpet.  · Do not stop weighing yourself. If you forget one day, weigh again the next morning.  How to use your weight chart  · Keep your weight chart near the scale. Write your weight on the chart as soon as you get off the scale.  · Fill in the month and the start date on the chart. Then write down your weight each day. Your chart will look like this:    · If you miss a day, leave the space blank. Weigh yourself the next day and write your weight in the next space.  · Take your weight chart with you when you go to see your doctor.  Date Last Reviewed: 3/20/2016  © 3898-2269 [employer]. [address] [place] [address], [place] [address]. All rights reserved. This information is not intended as a substitute for professional medical care. Always follow  your healthcare professional's instructions.              Heart Failure: Warning Signs of a Flare-Up  You have a condition called heart failure. Once you have heart failure, flare-ups can happen. Below are signs that can mean your heart failure is getting worse. If you notice any of these warning signs, call your healthcare provider.  Swelling    · Your feet, ankles, or lower legs get puffier.  · You notice skin changes on your lower legs.  · Your shoes feel too tight.  · Your clothes are tighter in the waist.  · You have trouble getting rings on or off your fingers.  Shortness of breath  · You have to breathe harder even when youre doing your normal activities or when youre resting.  · You are short of breath walking up stairs or even short distances.  · You wake up at night short of breath or coughing.  · You need to use more pillows or sit up to sleep.  · You wake up tired or restless.  Other warning signs  · You feel weaker, dizzy, or more tired.  · You have chest pain or changes in your heartbeat.  · You have a cough that wont go away.  · You cant remember things or dont feel like eating.  Tracking your weight  Gaining weight is often the first warning sign that heart failure is getting worse. Gaining even a few pounds can be a sign that your body is retaining excess water and salt. Weighing yourself each day in the morning after you urinate and before you eat, is the best way to know if you're retaining water. Get a scale that is easy to read and make sure you wear the same clothes and use the same scale every time you weigh. Your healthcare provider will show you how to track your weight. Call your doctor if you gain more than 2 pounds in 1 day, 5 pounds in 1 week, or whatever weight gain you were told to report by your doctor. This is often a sign of worsening heart failure and needs to be evaluated and treated before it compromises your breathing. Your doctor will tell you what to do next.    Date Last  Reviewed: 3/15/2016  © 3415-8763 [employer], [employer]. [address] [place] [address], [place] [address]. All rights reserved. This information is not intended as a substitute for professional medical care. Always follow your healthcare professional's instructions.              Pneumonmia Discharge Instructions                Chronic Kindey Disease Education              [facility] complies with applicable Federal civil rights laws and does not discriminate on the basis of race, color, national origin, age, disability, or sex.

## 2017-02-16 ENCOUNTER — ANESTHESIA (OUTPATIENT)
Dept: ENDOSCOPY | Facility: HOSPITAL | Age: 72
DRG: 378 | End: 2017-02-16
Payer: COMMERCIAL

## 2017-02-16 ENCOUNTER — ANESTHESIA EVENT (OUTPATIENT)
Dept: ENDOSCOPY | Facility: HOSPITAL | Age: 72
DRG: 378 | End: 2017-02-16
Payer: COMMERCIAL

## 2017-02-16 PROBLEM — K92.2 GASTROINTESTINAL HEMORRHAGE: Status: ACTIVE | Noted: 2017-02-16

## 2017-02-16 LAB
ALBUMIN SERPL BCP-MCNC: 3.1 G/DL
ALLENS TEST: ABNORMAL
ALP SERPL-CCNC: 105 U/L
ALT SERPL W/O P-5'-P-CCNC: 134 U/L
ANION GAP SERPL CALC-SCNC: 12 MMOL/L
ANION GAP SERPL CALC-SCNC: 14 MMOL/L
ANISOCYTOSIS BLD QL SMEAR: ABNORMAL
ANISOCYTOSIS BLD QL SMEAR: SLIGHT
AST SERPL-CCNC: 173 U/L
BASO STIPL BLD QL SMEAR: ABNORMAL
BASO STIPL BLD QL SMEAR: ABNORMAL
BASOPHILS # BLD AUTO: 0.01 K/UL
BASOPHILS # BLD AUTO: 0.02 K/UL
BASOPHILS # BLD AUTO: 0.02 K/UL
BASOPHILS # BLD AUTO: 0.03 K/UL
BASOPHILS NFR BLD: 0.1 %
BASOPHILS NFR BLD: 0.1 %
BASOPHILS NFR BLD: 0.2 %
BASOPHILS NFR BLD: 0.2 %
BILIRUB SERPL-MCNC: 2.8 MG/DL
BLD PROD TYP BPU: NORMAL
BLOOD UNIT EXPIRATION DATE: NORMAL
BLOOD UNIT TYPE CODE: 9500
BLOOD UNIT TYPE: NORMAL
BUN SERPL-MCNC: 73 MG/DL
BUN SERPL-MCNC: 75 MG/DL
CALCIUM SERPL-MCNC: 8.6 MG/DL
CALCIUM SERPL-MCNC: 8.7 MG/DL
CHLORIDE SERPL-SCNC: 91 MMOL/L
CHLORIDE SERPL-SCNC: 91 MMOL/L
CO2 SERPL-SCNC: 25 MMOL/L
CO2 SERPL-SCNC: 27 MMOL/L
CODING SYSTEM: NORMAL
CREAT SERPL-MCNC: 1.7 MG/DL
CREAT SERPL-MCNC: 1.8 MG/DL
DIFFERENTIAL METHOD: ABNORMAL
DISPENSE STATUS: NORMAL
EOSINOPHIL # BLD AUTO: 0 K/UL
EOSINOPHIL # BLD AUTO: 0.1 K/UL
EOSINOPHIL NFR BLD: 0.2 %
EOSINOPHIL NFR BLD: 0.2 %
EOSINOPHIL NFR BLD: 0.3 %
EOSINOPHIL NFR BLD: 0.4 %
ERYTHROCYTE [DISTWIDTH] IN BLOOD BY AUTOMATED COUNT: 17.9 %
ERYTHROCYTE [DISTWIDTH] IN BLOOD BY AUTOMATED COUNT: 18.4 %
ERYTHROCYTE [DISTWIDTH] IN BLOOD BY AUTOMATED COUNT: 18.7 %
ERYTHROCYTE [DISTWIDTH] IN BLOOD BY AUTOMATED COUNT: 19.2 %
EST. GFR  (AFRICAN AMERICAN): 32.2 ML/MIN/1.73 M^2
EST. GFR  (AFRICAN AMERICAN): 34.5 ML/MIN/1.73 M^2
EST. GFR  (NON AFRICAN AMERICAN): 27.9 ML/MIN/1.73 M^2
EST. GFR  (NON AFRICAN AMERICAN): 29.9 ML/MIN/1.73 M^2
GLUCOSE SERPL-MCNC: 97 MG/DL
GLUCOSE SERPL-MCNC: 99 MG/DL
HCO3 UR-SCNC: 28.9 MMOL/L (ref 24–28)
HCT VFR BLD AUTO: 22.7 %
HCT VFR BLD AUTO: 23.4 %
HCT VFR BLD AUTO: 24.1 %
HCT VFR BLD AUTO: 25 %
HGB BLD-MCNC: 7.3 G/DL
HGB BLD-MCNC: 7.3 G/DL
HGB BLD-MCNC: 7.7 G/DL
HGB BLD-MCNC: 7.8 G/DL
HYPOCHROMIA BLD QL SMEAR: ABNORMAL
INR PPP: 1.4
INR PPP: 1.4
INR PPP: 1.5
LACTATE SERPL-SCNC: 1.8 MMOL/L
LACTATE SERPL-SCNC: 2.1 MMOL/L
LYMPHOCYTES # BLD AUTO: 0.6 K/UL
LYMPHOCYTES # BLD AUTO: 0.6 K/UL
LYMPHOCYTES # BLD AUTO: 0.9 K/UL
LYMPHOCYTES # BLD AUTO: 1.1 K/UL
LYMPHOCYTES NFR BLD: 4.3 %
LYMPHOCYTES NFR BLD: 6.8 %
LYMPHOCYTES NFR BLD: 7.9 %
LYMPHOCYTES NFR BLD: 8 %
MAGNESIUM SERPL-MCNC: 2.1 MG/DL
MAGNESIUM SERPL-MCNC: 2.1 MG/DL
MCH RBC QN AUTO: 28.5 PG
MCH RBC QN AUTO: 28.5 PG
MCH RBC QN AUTO: 28.6 PG
MCH RBC QN AUTO: 28.9 PG
MCHC RBC AUTO-ENTMCNC: 31.2 %
MCHC RBC AUTO-ENTMCNC: 31.2 %
MCHC RBC AUTO-ENTMCNC: 32 %
MCHC RBC AUTO-ENTMCNC: 32.2 %
MCV RBC AUTO: 89 FL
MCV RBC AUTO: 90 FL
MCV RBC AUTO: 91 FL
MCV RBC AUTO: 93 FL
MONOCYTES # BLD AUTO: 0.5 K/UL
MONOCYTES # BLD AUTO: 0.6 K/UL
MONOCYTES # BLD AUTO: 0.9 K/UL
MONOCYTES # BLD AUTO: 1.1 K/UL
MONOCYTES NFR BLD: 4.6 %
MONOCYTES NFR BLD: 5.8 %
MONOCYTES NFR BLD: 8.1 %
MONOCYTES NFR BLD: 8.1 %
NEUTROPHILS # BLD AUTO: 11.7 K/UL
NEUTROPHILS # BLD AUTO: 11.9 K/UL
NEUTROPHILS # BLD AUTO: 8.1 K/UL
NEUTROPHILS # BLD AUTO: 9.2 K/UL
NEUTROPHILS NFR BLD: 83.7 %
NEUTROPHILS NFR BLD: 86.9 %
NEUTROPHILS NFR BLD: 87 %
NEUTROPHILS NFR BLD: 87.1 %
NUM UNITS TRANS PACKED RBC: NORMAL
OVALOCYTES BLD QL SMEAR: ABNORMAL
OVALOCYTES BLD QL SMEAR: ABNORMAL
PCO2 BLDA: 49 MMHG (ref 35–45)
PH SMN: 7.38 [PH] (ref 7.35–7.45)
PHOSPHATE SERPL-MCNC: 5.2 MG/DL
PLATELET # BLD AUTO: 275 K/UL
PLATELET # BLD AUTO: 278 K/UL
PLATELET # BLD AUTO: 284 K/UL
PLATELET # BLD AUTO: 304 K/UL
PLATELET BLD QL SMEAR: ABNORMAL
PLATELET BLD QL SMEAR: ABNORMAL
PMV BLD AUTO: 10.2 FL
PMV BLD AUTO: 10.5 FL
PMV BLD AUTO: 10.6 FL
PMV BLD AUTO: 10.8 FL
PO2 BLDA: 18 MMHG (ref 40–60)
POC BE: 4 MMOL/L
POC SATURATED O2: 26 % (ref 95–100)
POC TCO2: 30 MMOL/L (ref 24–29)
POIKILOCYTOSIS BLD QL SMEAR: SLIGHT
POIKILOCYTOSIS BLD QL SMEAR: SLIGHT
POLYCHROMASIA BLD QL SMEAR: ABNORMAL
POLYCHROMASIA BLD QL SMEAR: ABNORMAL
POTASSIUM SERPL-SCNC: 3.6 MMOL/L
POTASSIUM SERPL-SCNC: 3.8 MMOL/L
PROT SERPL-MCNC: 6.7 G/DL
PROTHROMBIN TIME: 14.3 SEC
PROTHROMBIN TIME: 14.7 SEC
PROTHROMBIN TIME: 15 SEC
RBC # BLD AUTO: 2.56 M/UL
RBC # BLD AUTO: 2.56 M/UL
RBC # BLD AUTO: 2.69 M/UL
RBC # BLD AUTO: 2.7 M/UL
SAMPLE: ABNORMAL
SITE: ABNORMAL
SODIUM SERPL-SCNC: 130 MMOL/L
SODIUM SERPL-SCNC: 130 MMOL/L
TRANS ERYTHROCYTES VOL PATIENT: NORMAL ML
TRANS ERYTHROCYTES VOL PATIENT: NORMAL ML
TROPONIN I SERPL DL<=0.01 NG/ML-MCNC: 0.39 NG/ML
TROPONIN I SERPL DL<=0.01 NG/ML-MCNC: 0.41 NG/ML
WBC # BLD AUTO: 11.09 K/UL
WBC # BLD AUTO: 13.51 K/UL
WBC # BLD AUTO: 13.84 K/UL
WBC # BLD AUTO: 9.32 K/UL

## 2017-02-16 PROCEDURE — P9021 RED BLOOD CELLS UNIT: HCPCS

## 2017-02-16 PROCEDURE — 99291 CRITICAL CARE FIRST HOUR: CPT | Mod: ,,, | Performed by: INTERNAL MEDICINE

## 2017-02-16 PROCEDURE — D9220A PRA ANESTHESIA: Mod: CRNA,,, | Performed by: NURSE ANESTHETIST, CERTIFIED REGISTERED

## 2017-02-16 PROCEDURE — 0DJ08ZZ INSPECTION OF UPPER INTESTINAL TRACT, VIA NATURAL OR ARTIFICIAL OPENING ENDOSCOPIC: ICD-10-PCS | Performed by: INTERNAL MEDICINE

## 2017-02-16 PROCEDURE — 37000008 HC ANESTHESIA 1ST 15 MINUTES: Performed by: INTERNAL MEDICINE

## 2017-02-16 PROCEDURE — 37000009 HC ANESTHESIA EA ADD 15 MINS: Performed by: INTERNAL MEDICINE

## 2017-02-16 PROCEDURE — 85025 COMPLETE CBC W/AUTO DIFF WBC: CPT | Mod: 91

## 2017-02-16 PROCEDURE — 80053 COMPREHEN METABOLIC PANEL: CPT

## 2017-02-16 PROCEDURE — 85610 PROTHROMBIN TIME: CPT

## 2017-02-16 PROCEDURE — 97165 OT EVAL LOW COMPLEX 30 MIN: CPT

## 2017-02-16 PROCEDURE — 84484 ASSAY OF TROPONIN QUANT: CPT

## 2017-02-16 PROCEDURE — 83735 ASSAY OF MAGNESIUM: CPT

## 2017-02-16 PROCEDURE — 27000221 HC OXYGEN, UP TO 24 HOURS

## 2017-02-16 PROCEDURE — 94640 AIRWAY INHALATION TREATMENT: CPT

## 2017-02-16 PROCEDURE — 84100 ASSAY OF PHOSPHORUS: CPT

## 2017-02-16 PROCEDURE — 83605 ASSAY OF LACTIC ACID: CPT

## 2017-02-16 PROCEDURE — 25000242 PHARM REV CODE 250 ALT 637 W/ HCPCS: Performed by: STUDENT IN AN ORGANIZED HEALTH CARE EDUCATION/TRAINING PROGRAM

## 2017-02-16 PROCEDURE — 25000003 PHARM REV CODE 250: Performed by: EMERGENCY MEDICINE

## 2017-02-16 PROCEDURE — 84484 ASSAY OF TROPONIN QUANT: CPT | Mod: 91

## 2017-02-16 PROCEDURE — 83735 ASSAY OF MAGNESIUM: CPT | Mod: 91

## 2017-02-16 PROCEDURE — 80074 ACUTE HEPATITIS PANEL: CPT

## 2017-02-16 PROCEDURE — 63600175 PHARM REV CODE 636 W HCPCS: Performed by: NURSE ANESTHETIST, CERTIFIED REGISTERED

## 2017-02-16 PROCEDURE — 63600175 PHARM REV CODE 636 W HCPCS: Performed by: NURSE PRACTITIONER

## 2017-02-16 PROCEDURE — 25000003 PHARM REV CODE 250: Performed by: NURSE ANESTHETIST, CERTIFIED REGISTERED

## 2017-02-16 PROCEDURE — 93010 ELECTROCARDIOGRAM REPORT: CPT | Mod: ,,, | Performed by: INTERNAL MEDICINE

## 2017-02-16 PROCEDURE — 43235 EGD DIAGNOSTIC BRUSH WASH: CPT | Performed by: INTERNAL MEDICINE

## 2017-02-16 PROCEDURE — 99253 IP/OBS CNSLTJ NEW/EST LOW 45: CPT | Mod: ,,, | Performed by: INTERNAL MEDICINE

## 2017-02-16 PROCEDURE — 25000003 PHARM REV CODE 250: Performed by: STUDENT IN AN ORGANIZED HEALTH CARE EDUCATION/TRAINING PROGRAM

## 2017-02-16 PROCEDURE — 80048 BASIC METABOLIC PNL TOTAL CA: CPT

## 2017-02-16 PROCEDURE — 97162 PT EVAL MOD COMPLEX 30 MIN: CPT

## 2017-02-16 PROCEDURE — 43235 EGD DIAGNOSTIC BRUSH WASH: CPT | Mod: ,,, | Performed by: INTERNAL MEDICINE

## 2017-02-16 PROCEDURE — 25000003 PHARM REV CODE 250: Performed by: INTERNAL MEDICINE

## 2017-02-16 PROCEDURE — D9220A PRA ANESTHESIA: Mod: ANES,,, | Performed by: ANESTHESIOLOGY

## 2017-02-16 PROCEDURE — 85610 PROTHROMBIN TIME: CPT | Mod: 91

## 2017-02-16 PROCEDURE — 20000000 HC ICU ROOM

## 2017-02-16 PROCEDURE — 63600175 PHARM REV CODE 636 W HCPCS: Performed by: STUDENT IN AN ORGANIZED HEALTH CARE EDUCATION/TRAINING PROGRAM

## 2017-02-16 RX ORDER — FENTANYL CITRATE 50 UG/ML
12.5 INJECTION, SOLUTION INTRAMUSCULAR; INTRAVENOUS ONCE AS NEEDED
Status: ACTIVE | OUTPATIENT
Start: 2017-02-16 | End: 2017-02-16

## 2017-02-16 RX ORDER — FUROSEMIDE 10 MG/ML
60 INJECTION INTRAMUSCULAR; INTRAVENOUS ONCE
Status: COMPLETED | OUTPATIENT
Start: 2017-02-16 | End: 2017-02-16

## 2017-02-16 RX ORDER — TRAMADOL HYDROCHLORIDE 50 MG/1
50 TABLET ORAL ONCE
Status: COMPLETED | OUTPATIENT
Start: 2017-02-17 | End: 2017-02-17

## 2017-02-16 RX ORDER — POLYETHYLENE GLYCOL 3350, SODIUM SULFATE ANHYDROUS, SODIUM BICARBONATE, SODIUM CHLORIDE, POTASSIUM CHLORIDE 236; 22.74; 6.74; 5.86; 2.97 G/4L; G/4L; G/4L; G/4L; G/4L
4000 POWDER, FOR SOLUTION ORAL ONCE
Status: COMPLETED | OUTPATIENT
Start: 2017-02-16 | End: 2017-02-16

## 2017-02-16 RX ORDER — DEXMEDETOMIDINE HYDROCHLORIDE 100 UG/ML
INJECTION, SOLUTION INTRAVENOUS
Status: DISCONTINUED | OUTPATIENT
Start: 2017-02-16 | End: 2017-02-16

## 2017-02-16 RX ORDER — SODIUM CHLORIDE 9 MG/ML
INJECTION, SOLUTION INTRAVENOUS CONTINUOUS PRN
Status: DISCONTINUED | OUTPATIENT
Start: 2017-02-16 | End: 2017-02-16

## 2017-02-16 RX ORDER — MIDAZOLAM HYDROCHLORIDE 5 MG/ML
INJECTION INTRAMUSCULAR; INTRAVENOUS
Status: DISCONTINUED | OUTPATIENT
Start: 2017-02-16 | End: 2017-02-16

## 2017-02-16 RX ORDER — PROPOFOL 10 MG/ML
VIAL (ML) INTRAVENOUS
Status: DISCONTINUED | OUTPATIENT
Start: 2017-02-16 | End: 2017-02-16

## 2017-02-16 RX ADMIN — FUROSEMIDE 60 MG: 10 INJECTION, SOLUTION INTRAMUSCULAR; INTRAVENOUS at 08:02

## 2017-02-16 RX ADMIN — IPRATROPIUM BROMIDE AND ALBUTEROL SULFATE 3 ML: .5; 3 SOLUTION RESPIRATORY (INHALATION) at 12:02

## 2017-02-16 RX ADMIN — PROPOFOL 40 MG: 10 INJECTION, EMULSION INTRAVENOUS at 02:02

## 2017-02-16 RX ADMIN — DEXMEDETOMIDINE HYDROCHLORIDE 50 MCG/KG/HR: 100 INJECTION, SOLUTION, CONCENTRATE INTRAVENOUS at 02:02

## 2017-02-16 RX ADMIN — PANTOPRAZOLE SODIUM 8 MG/HR: 40 INJECTION, POWDER, FOR SOLUTION INTRAVENOUS at 10:02

## 2017-02-16 RX ADMIN — PANTOPRAZOLE SODIUM 8 MG/HR: 40 INJECTION, POWDER, FOR SOLUTION INTRAVENOUS at 06:02

## 2017-02-16 RX ADMIN — IPRATROPIUM BROMIDE AND ALBUTEROL SULFATE 3 ML: .5; 3 SOLUTION RESPIRATORY (INHALATION) at 03:02

## 2017-02-16 RX ADMIN — DEXMEDETOMIDINE HYDROCHLORIDE 50 MCG: 100 INJECTION, SOLUTION, CONCENTRATE INTRAVENOUS at 02:02

## 2017-02-16 RX ADMIN — IPRATROPIUM BROMIDE AND ALBUTEROL SULFATE 3 ML: .5; 3 SOLUTION RESPIRATORY (INHALATION) at 09:02

## 2017-02-16 RX ADMIN — SODIUM CHLORIDE: 0.9 INJECTION, SOLUTION INTRAVENOUS at 01:02

## 2017-02-16 RX ADMIN — CYCLOBENZAPRINE HYDROCHLORIDE 10 MG: 10 TABLET, FILM COATED ORAL at 05:02

## 2017-02-16 RX ADMIN — MIDAZOLAM HYDROCHLORIDE 1 MG: 5 INJECTION, SOLUTION INTRAMUSCULAR; INTRAVENOUS at 02:02

## 2017-02-16 RX ADMIN — DUREZOL 2 DROP: 0.5 EMULSION OPHTHALMIC at 06:02

## 2017-02-16 RX ADMIN — PANTOPRAZOLE SODIUM 8 MG/HR: 40 INJECTION, POWDER, FOR SOLUTION INTRAVENOUS at 01:02

## 2017-02-16 RX ADMIN — IPRATROPIUM BROMIDE AND ALBUTEROL SULFATE 3 ML: .5; 3 SOLUTION RESPIRATORY (INHALATION) at 11:02

## 2017-02-16 RX ADMIN — IPRATROPIUM BROMIDE AND ALBUTEROL SULFATE 3 ML: .5; 3 SOLUTION RESPIRATORY (INHALATION) at 08:02

## 2017-02-16 RX ADMIN — FUROSEMIDE 60 MG: 10 INJECTION, SOLUTION INTRAMUSCULAR; INTRAVENOUS at 12:02

## 2017-02-16 RX ADMIN — POLYETHYLENE GLYCOL 3350, SODIUM SULFATE ANHYDROUS, SODIUM BICARBONATE, SODIUM CHLORIDE, POTASSIUM CHLORIDE 4000 ML: 236; 22.74; 6.74; 5.86; 2.97 POWDER, FOR SOLUTION ORAL at 08:02

## 2017-02-16 NOTE — SUBJECTIVE & OBJECTIVE
Interval History/Significant Events: As detailed above. Plan for colonoscopy tomorrow. NPO. No acute signs of active bleeding. Will monitor closely.     Review of Systems   Constitutional: Negative for chills and fever.   HENT: Negative for congestion and rhinorrhea.    Respiratory: Positive for cough (baseline cough with COPD) and shortness of breath.    Cardiovascular: Positive for leg swelling. Negative for chest pain and palpitations.   Gastrointestinal: Positive for abdominal pain. Negative for constipation, diarrhea, nausea and vomiting.   Genitourinary: Negative for dysuria and hematuria.   Musculoskeletal: Positive for arthralgias. Negative for myalgias.   Skin: Negative for rash and wound.        Bruising over forearms   Neurological: Positive for weakness. Negative for tremors and numbness.   Hematological: Negative for adenopathy. Bruises/bleeds easily.   Psychiatric/Behavioral: Negative for agitation and confusion.     Objective:     Vital Signs (Most Recent):  Temp: 99.9 °F (37.7 °C) (02/16/17 1445)  Pulse: 69 (02/16/17 1530)  Resp: 12 (02/16/17 1530)  BP: 126/60 (02/16/17 1530)  SpO2: 100 % (02/16/17 1530) Vital Signs (24h Range):  Temp:  [97.6 °F (36.4 °C)-99.9 °F (37.7 °C)] 99.9 °F (37.7 °C)  Pulse:  [58-78] 69  Resp:  [12-30] 12  SpO2:  [76 %-100 %] 100 %  BP: ()/(49-85) 126/60   Weight: 109.8 kg (242 lb)  Body mass index is 34.72 kg/(m^2).      Intake/Output Summary (Last 24 hours) at 02/16/17 1758  Last data filed at 02/16/17 1442   Gross per 24 hour   Intake              300 ml   Output              775 ml   Net             -475 ml       Physical Exam   Constitutional: She is oriented to person, place, and time. She appears well-developed and well-nourished. No distress.   HENT:   Head: Normocephalic and atraumatic.   Eyes: EOM are normal. Pupils are equal, round, and reactive to light.   Neck: Normal range of motion. Neck supple. No JVD present. No thyromegaly present.   Cardiovascular:  Normal rate, normal heart sounds and intact distal pulses.  Exam reveals no gallop and no friction rub.    No murmur heard.  Pulmonary/Chest: Effort normal. No respiratory distress. She has no wheezes. She has rales. She exhibits no tenderness.   Abdominal: Soft. Bowel sounds are normal. She exhibits no distension and no mass. There is no tenderness. There is no rebound and no guarding. No hernia.   Musculoskeletal: She exhibits edema. She exhibits no tenderness.   Lymphadenopathy:     She has no cervical adenopathy.   Neurological: She is alert and oriented to person, place, and time. No cranial nerve deficit. Coordination normal.   Skin: Skin is warm and dry. No rash noted. She is not diaphoretic. No erythema. No pallor.   Psychiatric: She has a normal mood and affect. Her behavior is normal. Judgment and thought content normal.       Vents:     Lines/Drains/Airways     Peripheral Intravenous Line                 Peripheral IV - Single Lumen 02/15/17 1755 Left Forearm 1 day         Peripheral IV - Single Lumen 02/15/17 1837 Left Upper Arm less than 1 day              Significant Labs:    CBC/Anemia Profile:    Recent Labs  Lab 02/16/17  0735 02/16/17  1215 02/16/17  1641   WBC 13.51* 11.09 9.32   HGB 7.3* 7.3* 7.7*   HCT 22.7* 23.4* 24.1*    275 278   MCV 89 91 90   RDW 18.4* 18.7* 19.2*        Chemistries:    Recent Labs  Lab 02/15/17  1645 02/16/17  0315 02/16/17  0853   * 130* 130*   K 3.9 3.8 3.6   CL 90* 91* 91*   CO2 25 25 27   BUN 74* 75* 73*   CREATININE 2.0* 1.8* 1.7*   CALCIUM 8.5* 8.6* 8.7   ALBUMIN 3.1* 3.1*  --    PROT 6.9 6.7  --    BILITOT 1.1* 2.8*  --    ALKPHOS 109 105  --    * 134*  --    * 173*  --    MG  --  2.1 2.1   PHOS  --  5.2*  --        All pertinent labs within the past 24 hours have been reviewed.    Significant Imaging:  I have reviewed all pertinent imaging results/findings within the past 24 hours.

## 2017-02-16 NOTE — ASSESSMENT & PLAN NOTE
-Hold dual antiplts for now   -Will call Cardiology if holding antiplatelet meds tomorrow  -Not in A fib per telemetry   -Continue pravastatin 40 mg po qd

## 2017-02-16 NOTE — ANESTHESIA PREPROCEDURE EVALUATION
02/16/2017    Pre-operative evaluation for Procedure(s) (LRB):  COLONOSCOPY (N/A)    Jennifer Prescott is a 71 y.o. female with PMH significant for CAD (s/p stents on ASA, plavix), COPD (former smoker with 54 pkyr hx), HFrEF with AICD placed (09/2016 EF 20%), chronic A fib (previously on warfarin, switched to apixaban 01/30/17), and previous hospitalization for GI bleed (07/2016) who was admitted for suspected GI bleed. EGD performed 2/16 showed non-bleeding duodenal diverticulum and portal hypertensive gastropathy; unable to bx d/t Plavix and Eliquis. Now planning on colonoscopy to further evaluate.     LDA: L upper arm 18g, L forearm 20g    Prev airway: None on file    Drips: Protonix    Patient Active Problem List   Diagnosis    Atrial fibrillation status post cardioversion 3/06/15    Pacemaker 9/21/10    COPD (chronic obstructive pulmonary disease)    CAD S/P percutaneous coronary angioplasty    Ischemic cardiomyopathy    Automatic implantable cardioverter-defibrillator in situ    Obesity hypoventilation syndrome on BiPAP    Sick sinus syndrome    Essential hypertension    Abnormal cardiovascular stress test    CKD (chronic kidney disease) stage 3, GFR 30-59 ml/min    Tobacco abuse disorder    Pulmonary embolus    History of nonadherence to medical treatment    Iron deficiency anemia due to chronic blood loss    Bilateral pleural effusion    Slow transit constipation    Chronic combined systolic and diastolic congestive heart failure    Long term (current) use of anticoagulants [V58.61]    Insomnia    Neuropathic pain, leg, bilateral    Acute on chronic combined systolic and diastolic congestive heart failure    Bilateral carotid artery disease    Chronic ischemic colitis    Hypokalemia    Acute on chronic respiratory failure with hypoxia and hypercapnia    GI bleed    NAYA (acute  kidney injury)    Gastrointestinal hemorrhage       Review of patient's allergies indicates:  No Known Allergies     No current facility-administered medications on file prior to encounter.      Current Outpatient Prescriptions on File Prior to Encounter   Medication Sig Dispense Refill    albuterol 90 mcg/actuation inhaler Inhale 2 puffs into the lungs every 6 (six) hours as needed for Wheezing. 18 g 0    albuterol-ipratropium 2.5mg-0.5mg/3mL (DUO-NEB) 0.5 mg-3 mg(2.5 mg base)/3 mL nebulizer solution USE 1 VIAL VIA NEBULIZER EVERY 6 HOURS AS NEEDED  2    apixaban (ELIQUIS) 5 mg Tab Take 1 tablet (5 mg total) by mouth 2 (two) times daily. 60 tablet 11    aspirin 81 MG Chew Take 81 mg by mouth every evening.       bumetanide (BUMEX) 1 MG tablet Take 1 tablet (1 mg total) by mouth 2 (two) times daily. 180 tablet 3    carvedilol (COREG) 3.125 MG tablet Take 1 tablet (3.125 mg total) by mouth 2 (two) times daily with meals. 180 tablet 3    cyclobenzaprine (FLEXERIL) 10 MG tablet Take 1 tablet (10 mg total) by mouth 3 (three) times daily as needed for Muscle spasms. 90 tablet 1    digoxin (LANOXIN) 125 mcg tablet Take 1 tablet (0.125 mg total) by mouth once daily. 90 tablet 3    ferrous sulfate 325 (65 FE) MG EC tablet Take 1 tablet (325 mg total) by mouth every evening.  0    fluticasone-vilanterol (BREO) 100-25 mcg/dose diskus inhaler Inhale 1 puff into the lungs once daily. 28 each 5    isosorbide mononitrate (IMDUR) 60 MG 24 hr tablet Take 1 tablet (60 mg total) by mouth once daily. 90 tablet 3    losartan (COZAAR) 25 MG tablet Take 0.5 tablets (12.5 mg total) by mouth once daily. 45 tablet 3    polymyxin B sulf-trimethoprim (POLYTRIM) 10,000 unit- 1 mg/mL Drop Place 1 drop into both eyes Daily.  1    pravastatin (PRAVACHOL) 40 MG tablet Take 1 tablet (40 mg total) by mouth once daily. 90 tablet 3    tramadol (ULTRAM) 50 mg tablet Take 1 tablet (50 mg total) by mouth every 6 (six) hours as needed for  Pain. 60 tablet 3    allopurinol (ZYLOPRIM) 300 MG tablet Take 1 tablet (300 mg total) by mouth once daily. 90 tablet 3    DUREZOL 0.05 % Drop ophthalmic solution Place 2 drops into both eyes Daily.  1    ILEVRO 0.3 % DrpS Place 1 drop into both eyes Daily.  0    trazodone (DESYREL) 150 MG tablet TAKE 1 TABLET BY MOUTH AT BEDTIME 30 tablet 3    [DISCONTINUED] zolpidem (AMBIEN) 5 MG Tab take 1 on the night of the study; if you still can't sleep for another 60-90 min after the pill - take another pill. 2 tablet 0       Past Surgical History   Procedure Laterality Date    Tonsillectomy, adenoidectomy      Gallbladder surgery      Crt-d implantation      Abdominal surgery      Appendectomy      [facility]iac pacemaker placement  9/21/2010     [facility]    Tonsillectomy      Vascular surgery      Cardiac catheterization      Cardiac defibrillator placement  10/9/2012     bivent AICD placed 10/2012 and revised 12/2012    Coronary angioplasty with stent placement  1/27/2015     3 JAMAL to RCA    Cardioversion  3/6/2015    Colonoscopy N/A 7/11/2016     Procedure: COLONOSCOPY;  Surgeon: Rafael Pérez MD;  [facility];  Service: Endoscopy;  Laterality: N/A;    Cholecystectomy      Carotid endarterectomy Left        Social History     Social History    Marital status:      Spouse name: N/A    Number of children: N/A    Years of education: N/A     Occupational History    Not on file.     Social History Main Topics    Smoking status: Former Smoker     Packs/day: 0.25     Years: 50.00     Types: Cigarettes     Start date: 1/15/1965    Smokeless tobacco: Never Used    Alcohol use No    Drug use: No    Sexual activity: No     Other Topics Concern    Not on file     Social History Narrative         Vital Signs Range (Last 24H):  Temp:  [36.4 °C (97.6 °F)-37.7 °C (99.9 °F)]   Pulse:  [58-78]   Resp:  [14-30]   BP: ()/(49-85)   SpO2:  [76 %-100 %]       CBC:   Recent Labs      02/16/17    0735  02/16/17   1215   WBC  13.51*  11.09   RBC  2.56*  2.56*   HGB  7.3*  7.3*   HCT  22.7*  23.4*   PLT  304  275   MCV  89  91   MCH  28.5  28.5   MCHC  32.2  31.2*       CMP:   Recent Labs      02/15/17   1645  02/16/17   0315  02/16/17   0853   NA  130*  130*  130*   K  3.9  3.8  3.6   CL  90*  91*  91*   CO2  25  25  27   BUN  74*  75*  73*   CREATININE  2.0*  1.8*  1.7*   GLU  109  99  97   MG   --   2.1  2.1   PHOS   --   5.2*   --    CALCIUM  8.5*  8.6*  8.7   ALBUMIN  3.1*  3.1*   --    PROT  6.9  6.7   --    ALKPHOS  109  105   --    ALT  119*  134*   --    AST  173*  173*   --    BILITOT  1.1*  2.8*   --        INR  Recent Labs      02/15/17   1645  02/16/17 0315  02/16/17   0735   INR  1.6*  1.5*  1.4*   APTT  25.3   --    --      Diagnostic Studies:  CXR: Cardiac size remains large enlarged and a AICD unit is present.  There may be some pleural fluid and loss of volume of both lung bases.  The appearance is similar to an earlier film it may be less vascular congestion.    EKG 1/30/17:  Ventricular-paced rhythm  Biventricular pacemaker detected  Abnormal ECG  When compared with ECG of 02-NOV-2016 09:17,  No significant change was found    2D Echo 9/24/16:  CONCLUSIONS     1 - Severely depressed left ventricular systolic function (EF 20-25%).     2 - Increased central venous pressure.       [facility] Anesthesia Evaluation    I have reviewed the Patient Summary Reports.    I have reviewed the Nursing Notes.   I have reviewed the Medications.     Review of Systems  Anesthesia Hx:  No problems with previous Anesthesia Denies Hx of Anesthetic complications  History of prior surgery of interest to airway management or planning: Denies Family Hx of Anesthesia complications.   Denies Personal Hx of Anesthesia complications.   Social:  No Alcohol Use, Former Smoker    Hematology/Oncology:     Oncology Normal    -- Anemia:   Cardiovascular:   Pacemaker Hypertension, well controlled Past MI (1997) CAD  CABG/stent  Dysrhythmias atrial fibrillation  Denies Angina. CHF    Pulmonary:   COPD, moderate Asthma mild Sleep Apnea, CPAP Inhaler 5 times a month. Walks around office at work and gets SOB if she goes too fast.  Oxygen at night   Renal/:   Chronic Renal Disease, CRI, ARF    Hepatic/GI:   PUD, Denies Liver Disease. EGD reviewed   Neurological:   Denies TIA. Denies CVA. Denies Seizures.    Endocrine:   Denies Diabetes.        Physical Exam  General:  Obesity    Airway/Jaw/Neck:  Airway Findings: Mouth Opening: Normal Tongue: Normal  General Airway Assessment: Adult, Average  Mallampati: III  Improves to II with phonation.  TM Distance: Normal, at least 6 cm  Jaw/Neck Findings:  Neck ROM: Normal ROM      Dental:  Dental Findings: Edentulous   Chest/Lungs:  Chest/Lungs Findings: Clear to auscultation, Normal Respiratory Rate     Heart/Vascular:  Heart Findings: Rate: Normal  Rhythm: Regular Rhythm  Sounds: Normal        Mental Status:  Mental Status Findings:  Cooperative, Alert and Oriented         Anesthesia Plan  Type of Anesthesia, risks & benefits discussed:  Anesthesia Type:  general, MAC  Patient's Preference: General/MAC  Intra-op Monitoring Plan:   Intra-op Monitoring Plan Comments:   Post Op Pain Control Plan:   Post Op Pain Control Plan Comments: IV meds as needed  Induction:   IV  Beta Blocker:  Patient is not currently on a Beta-Blocker (No further documentation required).       Informed Consent: Patient understands risks and agrees with Anesthesia plan.  Questions answered. Anesthesia consent signed with patient.  ASA Score: 4     Day of Surgery Review of History & Physical:    H&P update referred to the provider.     Anesthesia Plan Notes: Discussed anesthetic options, pt understands and agrees with plan        Ready For Surgery From Anesthesia Perspective.

## 2017-02-16 NOTE — ASSESSMENT & PLAN NOTE
-Hgb 5.1 with approx 4 d hx of melena and PMHx of GI bleed, last BM early this am; stable VS  -Patient takes antiplt and anticoag meds, recently took 2 Aleve 2/2 MSk pain  -3 U PRBCs transfusing, CBCs a3n--ysieprfdo for transfusion  -Pantoprazole drip at 8 mL/hr started, 2 large bore IVs placed  -Holding antiplt/anticoag meds until am, diet NPO  -Holding DVT ppx 2/2 GI bleed  -GI consulted, appreciate recs--likely EGD 02/16/17

## 2017-02-16 NOTE — ASSESSMENT & PLAN NOTE
-NAYA likely pre-renal  -Repeat CMP in am after IVFs, transfusions  -Avoid contrast, NSAIDs, nephrotoxic drugs  -Strict I&Os

## 2017-02-16 NOTE — CONSULTS
[facility]  Critical Care Medicine  Consult Note    Patient Name: Jennifer Prescott  MRN: [id number]  Admission Date: 2/15/2017  Hospital Length of Stay: 0 days  Code Status: Full Code  Attending Physician: Dav Davila MD   Primary Care Provider: Lianna Mistry MD   Principal Problem: GI bleed    Inpatient consult to Critical Care Medicine  Consult performed by: YAN RODRIGUEZ  Consult ordered by: PRUDENCIO PRAKASH        Subjective:     HPI:  72 yo F with PMHx CAD (s/p stents on ASA, plavix), COPD (former smoker with 54 pkyr hx), HFrEF with AICD placed (09/2016 EF 20%), chronic A fib (previously on warfarin, switched to apixaban 01/30/17), and previous hospitalization for GI bleed (07/2016) presents to ED 02/15/17 with 4 d hx of fatigue associated with SOB, generalized weakness.  She noticed stools became dark and tarry.  Denies BRBPR, hematemesis, fever/chills, n/v/c/d.  Tried 3 albuterol nebs at home but came to hospital due to no improvement in SOB.  Of note, no etiology of previous GI bleed was found on previous hospitalization with EGD and colonoscopy.  Notes that she did use 2 Aleve 2 d prior to admission but generally does avoid NSAIDs.  Quit smoking approx 1 year ago, no EtOH use.    Hospital/ICU Course:  02/15/17:  Hgb 5.1 on admission.  VS stable with BP ranging 103-116/52-79.  Receiving 3 U PRBCs in ED, on pantoprazole drip.  GI consulted, plan for EGD in am.    Past Medical History   Diagnosis Date    *Atrial fibrillation     Anticoagulant long-term use     Arthritis     Cardiomyopathy     Carotid artery occlusion     CHF (congestive heart failure)     COPD (chronic obstructive pulmonary disease)     COPD exacerbation 2/19/2015    Coronary artery disease      RCA occluded with L to R collaterals and normal LAD and LCx    Hypertension     Internal bleeding     Sleep apnea      uses bipap       Past Surgical History   Procedure Laterality Date     Tonsillectomy, adenoidectomy      Gallbladder surgery      Crt-d implantation      Abdominal surgery      Appendectomy      Cardiac pacemaker placement  9/21/2010     [facility]    Tonsillectomy      Vascular surgery      Cardiac catheterization      Cardiac defibrillator placement  10/9/2012     bivent AICD placed 10/2012 and revised 12/2012    Coronary angioplasty with stent placement  1/27/2015     3 JAMAL to RCA    Cardioversion  3/6/2015    Colonoscopy N/A 7/11/2016     Procedure: COLONOSCOPY;  Surgeon: Rafael Pérez MD;  Location: [facility];  Service: Endoscopy;  Laterality: N/A;    Cholecystectomy      Carotid endarterectomy Left        Review of patient's allergies indicates:  No Known Allergies    Family History     Problem Relation (Age of Onset)    Breast cancer Mother    Cancer Father    Heart attack Father    Heart disease Father    Heart failure Father        Social History Main Topics    Smoking status: Former Smoker     Packs/day: 0.25     Years: 50.00     Types: Cigarettes     Start date: 1/15/1965    Smokeless tobacco: Never Used    Alcohol use No    Drug use: No    Sexual activity: No      Review of Systems   Constitutional: Negative for chills and fever.   HENT: Negative for congestion and rhinorrhea.    Respiratory: Positive for cough (baseline cough with COPD) and shortness of breath.    Cardiovascular: Positive for leg swelling. Negative for chest pain and palpitations.   Gastrointestinal: Positive for abdominal pain. Negative for constipation, diarrhea, nausea and vomiting.   Genitourinary: Negative for dysuria and hematuria.   Musculoskeletal: Positive for arthralgias. Negative for myalgias.   Skin: Negative for rash and wound.        Bruising over forearms   Neurological: Positive for weakness. Negative for tremors and numbness.   Hematological: Negative for adenopathy. Bruises/bleeds easily.   Psychiatric/Behavioral: Negative for agitation and confusion.     Objective:      Vital Signs (Most Recent):  Temp: 97.7 °F (36.5 °C) (02/15/17 1356)  Pulse: 68 (02/15/17 1825)  Resp: 18 (02/15/17 1825)  BP: (!) 106/52 (02/15/17 1825)  SpO2: (!) 76 % (02/15/17 1825) Vital Signs (24h Range):  Temp:  [97.7 °F (36.5 °C)] 97.7 °F (36.5 °C)  Pulse:  [64-70] 68  Resp:  [18-27] 18  SpO2:  [76 %-100 %] 76 %  BP: (103-116)/(52-79) 106/52   Weight: 109.8 kg (242 lb)  Body mass index is 34.72 kg/(m^2).    No intake or output data in the 24 hours ending 02/15/17 1932    Physical Exam  General:  Well-developed, well-nourished, nad  HEENT:  NCAT, PERRL, EOMI, oropharyngeal membranes non-erythematous/without exudate  Neck:  Supple, no JVD, no lad, no carotid bruits  Resp:  Symmetric expansion, no increased wob, CTAB  CVS:  RRR, no m/r/g.  BLE pitting edema, symmetric.  Peripheral pulses 2+ (radial, dorsalis pedis)  GI:  Abd soft, non-distended, diffuse mild tenderness to palpation, +BS, no HSM  Neuro:  CNs intact, symmetric.  Strength 4+/5.  No tremor.  Psych:  AAOx3.  Pleasant, cooperative with exam.  Speech and thought content appropriate.    Vents:     Lines/Drains/Airways     Peripheral Intravenous Line                 Peripheral IV - Single Lumen 02/15/17 1750 Right Hand less than 1 day         Peripheral IV - Single Lumen 02/15/17 1755 Left Forearm less than 1 day         Peripheral IV - Single Lumen 02/15/17 1837 Left Upper Arm less than 1 day              Significant Labs:    CBC/Anemia Profile:    Recent Labs  Lab 02/15/17  1645   WBC 11.85   HGB 5.1*   HCT 17.2*      MCV 93   RDW 20.2*        Chemistries:    Recent Labs  Lab 02/15/17  1645   *   K 3.9   CL 90*   CO2 25   BUN 74*   CREATININE 2.0*   CALCIUM 8.5*   ALBUMIN 3.1*   PROT 6.9   BILITOT 1.1*   ALKPHOS 109   *   *       Recent Labs  Lab 02/15/17  1645   INR 1.6*   APTT 25.3       Significant Imaging:   Imaging Results         X-Ray Chest AP Portable (Final result) Result time:  02/15/17 16:48:16     No  detrimental change since 11/2/16.      Narrative:    XR CHEST AP PORTABLE.  Pacemaker remains. The cardiac silhouette is moderately enlarged.  Pulmonary vascularity is mildly increased.  The lungs are similar to 11/2/16 with mild diffusely increased interstitial opacification and more focal opacification, most likely due to combination of pleural fluid and atelectasis, the lung bases..  There is no  pneumothorax.  Visualized osseous structures are stable. There are atherosclerotic calcifications of the aorta.  Surgical clips remain overlying the left neck.     Assessment/Plan:     Pulmonary  COPD (chronic obstructive pulmonary disease)  -Continue duonebs  -On 3 L O2 NC, satting well  -Quit smoking within past year, no nicotine patch needed  -Pt states she stopped taking Breo, restart while inpatient    Cardiac  Atrial fibrillation status post cardioversion 3/06/15  -Continue digoxin 0.125 mg po qd, digoxin level pending  -Hold anticoagulation for today due to GI bleed    CAD S/P percutaneous coronary angioplasty  -Hold dual antiplts for now   -Will call Cardiology if holding antiplatelet meds tomorrow  -Not in A fib per telemetry   -Continue pravastatin 40 mg po qd     Essential hypertension  -Hold home meds for now in setting of GI bleed    Chronic combined systolic and diastolic congestive heart failure  -BNP >1000, CXR in ED with pulmonary edema  -Will hold diuretics for now 2/2 GI bleed with relative hypotension in ED  -Compensating well, on 3 L O2 NC satting well, no resp distress  -Restart isosorbide mononitrate, bumetanide pending stable BPs     Renal  NAYA (acute kidney injury)  -NAYA likely pre-renal, Cr 2.0  -Repeat CMP in am after IVFs, transfusions  -Avoid contrast, NSAIDs, nephrotoxic drugs  -Strict I&Os    Fluids/Electrolytes/Nutrition/GI  * GI bleed  -Hgb 5.1 with approx 4 d hx of melena and PMHx of GI bleed, last BM early this am; stable VS  -Patient takes antiplt and anticoag meds, recently took 2  Aleve 2/2 MSk pain  -3 U PRBCs transfusing, CBCs g1h--zuvlosrle for transfusion  -Pantoprazole drip at 8 mL/hr started, 2 large bore IVs placed  -Holding antiplt/anticoag meds until am, diet NPO  -Holding DVT ppx 2/2 GI bleed  -GI consulted, appreciate recs--likely EGD 02/16/17    Obesity hypoventilation syndrome on BiPAP  -Restart BiPap o/n on home settings    Other  Automatic implantable cardioverter-defibrillator in situ  -Sees Dr. Spring outpatient, has appt for ICD interrogation scheduled      Thank you for your consult.  We will admit this patient.     Regina Warner MD  Critical Care Medicine  [facility]

## 2017-02-16 NOTE — PLAN OF CARE
Problem: Occupational Therapy Goal  Goal: Occupational Therapy Goal  Goals to be met by: 2/23/2017     Patient will increase functional independence with ADLs by performing:    UE Dressing with Supervision.  LE Dressing with Minimal Assistance.  Grooming while standing with Minimal Assistance.  Toileting from toilet with Moderate Assistance for hygiene and clothing management.   Toilet transfer to toilet with Minimal Assistance.  Continue OT POC    Comments:   Garfield Diop OTR/L  2/16/2017

## 2017-02-16 NOTE — H&P
Please see consult note dated 02/15/17.  Call CCU team with any questions or concerns.    Thank you,  PETER Warner MD  PGY1  Pager:  [phone]

## 2017-02-16 NOTE — PROGRESS NOTES
Rounding conducted, with no safety concerns identified.  Call bell within reach.  Bed locked in low position.  SR up x 2.  Care plan reviewed with patient and family.  Opportunities provided to ask questions, with answers to meet their needs. Patient pain reassessed.  Bathroom needs addressed.  No new complaints or noted concerns. Pt remains on 2L NC, O2 sats %.

## 2017-02-16 NOTE — CONSULTS
[facility]  GI    SUBJECTIVE:     Reason for Consult: GI bleed on Eliquis     History of Present Illness:  Patient is a 71 y.o. female with PMH of CAD s/p stenting 6/2014 on ASA and Plavix, former smoker with 54 pack years, COPD, systolic and diastolic HF EF 20% with AICD placement, chronic A Fib on Apixaban (started in 1/2017) who presented with fatigue, SOB, weakness and dark tarry stool. The dark stool began 2 days ago. Of note, she presented with similar complaints in 7/2016 and underwent a EGD and Colonoscopy which was normal. She admits to having taken 2 aleve about a week ago.        Review of patient's allergies indicates:  No Known Allergies    Past Medical History   Diagnosis Date    *Atrial fibrillation     Anticoagulant long-term use     Arthritis     Cardiomyopathy     Carotid artery occlusion     CHF (congestive heart failure)     COPD (chronic obstructive pulmonary disease)     COPD exacerbation 2/19/2015    Coronary artery disease      RCA occluded with L to R collaterals and normal LAD and LCx    Hypertension     Internal bleeding     Sleep apnea      uses bipap     Past Surgical History   Procedure Laterality Date    Tonsillectomy, adenoidectomy      Gallbladder surgery      Crt-d implantation      Abdominal surgery      Appendectomy      Cardiac pacemaker placement  9/21/2010     [facility]    Tonsillectomy      Vascular surgery      Cardiac catheterization      Cardiac defibrillator placement  10/9/2012     bivent AICD placed 10/2012 and revised 12/2012    Coronary angioplasty with stent placement  1/27/2015     3 JAMAL to RCA    Cardioversion  3/6/2015    Colonoscopy N/A 7/11/2016     Procedure: COLONOSCOPY;  Surgeon: Rafael Pérez MD;  Location: [facility];  Service: Endoscopy;  Laterality: N/A;    Cholecystectomy      Carotid endarterectomy Left      Family History   Problem Relation Age of Onset    Breast cancer Mother     Cancer Father     Heart  failure Father     Heart disease Father     Heart attack Father     Sudden death Neg Hx      Social History   Substance Use Topics    Smoking status: Former Smoker     Packs/day: 0.25     Years: 50.00     Types: Cigarettes     Start date: 1/15/1965    Smokeless tobacco: Never Used    Alcohol use No        Review of Systems:  Constitutional: no fever or chills, positive for fatigue  Respiratory: positive for SOB  Cardiovascular: no chest pain or palpitations  Gastrointestinal: positive for melena  Musculoskeletal: no arthralgias or myalgias  Behavioral/Psych: Unremarkable     OBJECTIVE:     Vital Signs (Most Recent):  Temp: 97.7 °F (36.5 °C) (02/16/17 0638)  Pulse: 68 (02/16/17 0900)  Resp: (!) 22 (02/16/17 0900)  BP: 127/62 (02/16/17 0900)  SpO2: 100 % (02/16/17 0900)    Physical Exam:  General: well developed, well nourished, mild distress, AO x4  HEENT: Eyes non icteric. Oral Cavity edentulous with upper dentures.  Cardiac: S1, S2, no murmurs   Lungs:  clear to auscultation bilaterally and normal respiratory effort  Abdomen/Rectal: Abdomen: Soft, no guarding no rebound and no tender, obese.  Musculoskeletal:No major join deformity  Psych/Behavioral:  Normal. and Alert and oriented, appropriate affect.  SKIN: No petechia or rash on exposed skin areas.  LYMPH: No cervical or supraclavicular adenopathy.  ENDO: No thyroid megally    Laboratory:  CBC:   Recent Labs  Lab 02/16/17  0735   WBC 13.51*   RBC 2.56*   HGB 7.3*   HCT 22.7*      MCV 89   MCH 28.5   MCHC 32.2     CMP:   Recent Labs  Lab 02/16/17  0315   GLU 99   CALCIUM 8.6*   ALBUMIN 3.1*   PROT 6.7   *   K 3.8   CO2 25   CL 91*   BUN 75*   CREATININE 1.8*   ALKPHOS 105   *   *   BILITOT 2.8*         ASSESSMENT/PLAN:     71 year old female on anticoagulation with as above with melena:    -Rectal exam suggests thick melanotic stool with positive Guaiac test   -Keep Pt NPO  -Will Perform EGD today  -Continue PPI drip        Harpreet  SHILPA Ansari MD PGY3  Internal Medicine  [phone]  I was present with the resident and Dr. Jovani Urrutia GI fellow during the above evaluation, including history and exam.  I discussed the case with the resident and fellow and agree with the findings and plan as documented in the Resident's note.

## 2017-02-16 NOTE — PROGRESS NOTES
Assumed care of patient. Patient resting comfortably. Patient appears to be in no acute distress, she complains of generalized 5/10 pain. Bed placed in low/locked position, side rails up x 2, call light within reach, plan of care provided to patient, alarms set and audible. Awaiting additional orders and/or results; will continue to monitor.

## 2017-02-16 NOTE — ANESTHESIA PREPROCEDURE EVALUATION
02/16/2017  Jennifer Prescott is a 71 y.o., female.  Pre-operative evaluation for Procedure(s) (LRB):  ESOPHAGOGASTRODUODENOSCOPY (EGD) (N/A)    Review of patient's allergies indicates:  No Known Allergies    No current facility-administered medications on file prior to encounter.      Current Outpatient Prescriptions on File Prior to Encounter   Medication Sig Dispense Refill    albuterol 90 mcg/actuation inhaler Inhale 2 puffs into the lungs every 6 (six) hours as needed for Wheezing. 18 g 0    albuterol-ipratropium 2.5mg-0.5mg/3mL (DUO-NEB) 0.5 mg-3 mg(2.5 mg base)/3 mL nebulizer solution USE 1 VIAL VIA NEBULIZER EVERY 6 HOURS AS NEEDED  2    apixaban (ELIQUIS) 5 mg Tab Take 1 tablet (5 mg total) by mouth 2 (two) times daily. 60 tablet 11    aspirin 81 MG Chew Take 81 mg by mouth every evening.       bumetanide (BUMEX) 1 MG tablet Take 1 tablet (1 mg total) by mouth 2 (two) times daily. 180 tablet 3    carvedilol (COREG) 3.125 MG tablet Take 1 tablet (3.125 mg total) by mouth 2 (two) times daily with meals. 180 tablet 3    cyclobenzaprine (FLEXERIL) 10 MG tablet Take 1 tablet (10 mg total) by mouth 3 (three) times daily as needed for Muscle spasms. 90 tablet 1    digoxin (LANOXIN) 125 mcg tablet Take 1 tablet (0.125 mg total) by mouth once daily. 90 tablet 3    ferrous sulfate 325 (65 FE) MG EC tablet Take 1 tablet (325 mg total) by mouth every evening.  0    fluticasone-vilanterol (BREO) 100-25 mcg/dose diskus inhaler Inhale 1 puff into the lungs once daily. 28 each 5    isosorbide mononitrate (IMDUR) 60 MG 24 hr tablet Take 1 tablet (60 mg total) by mouth once daily. 90 tablet 3    losartan (COZAAR) 25 MG tablet Take 0.5 tablets (12.5 mg total) by mouth once daily. 45 tablet 3    polymyxin B sulf-trimethoprim (POLYTRIM) 10,000 unit- 1 mg/mL Drop Place 1 drop into both eyes Daily.  1     pravastatin (PRAVACHOL) 40 MG tablet Take 1 tablet (40 mg total) by mouth once daily. 90 tablet 3    tramadol (ULTRAM) 50 mg tablet Take 1 tablet (50 mg total) by mouth every 6 (six) hours as needed for Pain. 60 tablet 3    allopurinol (ZYLOPRIM) 300 MG tablet Take 1 tablet (300 mg total) by mouth once daily. 90 tablet 3    DUREZOL 0.05 % Drop ophthalmic solution Place 2 drops into both eyes Daily.  1    ILEVRO 0.3 % DrpS Place 1 drop into both eyes Daily.  0    trazodone (DESYREL) 150 MG tablet TAKE 1 TABLET BY MOUTH AT BEDTIME 30 tablet 3    [DISCONTINUED] zolpidem (AMBIEN) 5 MG Tab take 1 on the night of the study; if you still can't sleep for another 60-90 min after the pill - take another pill. 2 tablet 0       Patient Active Problem List   Diagnosis    Atrial fibrillation status post cardioversion 3/06/15    Pacemaker 9/21/10    COPD (chronic obstructive pulmonary disease)    CAD S/P percutaneous coronary angioplasty    Ischemic cardiomyopathy    Automatic implantable cardioverter-defibrillator in situ    Obesity hypoventilation syndrome on BiPAP    Sick sinus syndrome    Essential hypertension    Abnormal cardiovascular stress test    CKD (chronic kidney disease) stage 3, GFR 30-59 ml/min    Tobacco abuse disorder    Pulmonary embolus    History of nonadherence to medical treatment    Iron deficiency anemia due to chronic blood loss    Bilateral pleural effusion    Slow transit constipation    Chronic combined systolic and diastolic congestive heart failure    Long term (current) use of anticoagulants [V58.61]    Insomnia    Neuropathic pain, leg, bilateral    Acute on chronic combined systolic and diastolic congestive heart failure    Bilateral carotid artery disease    Chronic ischemic colitis    Hypokalemia    Acute on chronic respiratory failure with hypoxia and hypercapnia    GI bleed    NAYA (acute kidney injury)    Gastrointestinal hemorrhage       Past Surgical  History   Procedure Laterality Date    Tonsillectomy, adenoidectomy      Gallbladder surgery      Crt-d implantation      Abdominal surgery      Appendectomy      Cardiac pacemaker placement  9/21/2010     [facility]    Tonsillectomy      Vascular surgery      Cardiac catheterization      Cardiac defibrillator placement  10/9/2012     bivent AICD placed 10/2012 and revised 12/2012    Coronary angioplasty with stent placement  1/27/2015     3 JAMAL to RCA    Cardioversion  3/6/2015    Colonoscopy N/A 7/11/2016     Procedure: COLONOSCOPY;  Surgeon: Rafael Pérez MD;  Location: [facility];  Service: Endoscopy;  Laterality: N/A;    Cholecystectomy      Carotid endarterectomy Left            Recent Labs      02/16/17   1215   HCT  23.4*     Recent Labs      02/16/17   1215   PLT  275     Recent Labs      02/16/17   0853   K  3.6     Recent Labs      02/16/17   0853   CREATININE  1.7*     Recent Labs      02/16/17   0853   GLU  97     Invalid input(s): PT          Ref Range & Units 4mo ago  (9/24/16) 1yr ago  (3/6/15) 1yr ago  (2/19/15) 2yr ago  (2/6/15)    EF 55 - 65 20 (A) 20 (A) 15 (A) 15 (A)    Mitral Valve Regurgitation  MODERATE TO SEVERE (A) SEVERE (A) MILD MILD    Diastolic Dysfunction  Yes (A)  Yes (A) Yes (A)    Est. PA Systolic Pressure  38.04  49.81 (A) 20.2    Tricuspid Valve Regurgitation  MILD MILD TO MODERATE TRIVIAL TO MILD    Resulting Agency  CVIS CVIS CVIS CVIS                     [facility] Anesthesia Evaluation         Review of Systems  Anesthesia Hx:  No problems with previous Anesthesia   Social:  Non-Smoker, No Alcohol Use    Hematology/Oncology:     Oncology Normal    -- Anemia:   Cardiovascular:   Hypertension, well controlled Past MI (1997)   Denies Angina. CHF    Pulmonary:   COPD, moderate Inhaler 5 times a month. Walks around office at work and gets SOB if she goes too fast.  Oxygen at night   Hepatic/GI:   Denies Liver Disease.    Neurological:   Denies TIA. Denies CVA. Denies  Seizures.    Endocrine:   Denies Diabetes.        Physical Exam  General:  Well nourished    Airway/Jaw/Neck:  Airway Findings: Mouth Opening: Normal Tongue: Normal  General Airway Assessment: Adult, Average  Mallampati: II  TM Distance: Normal, at least 6 cm  Jaw/Neck Findings:  Neck ROM: Normal ROM       Chest/Lungs:  Chest/Lungs Findings: Clear to auscultation     Heart/Vascular:  Heart Findings: Rate: Normal  Rhythm: Regular Rhythm  Sounds: Normal        Mental Status:  Mental Status Findings:  Cooperative, Alert and Oriented         Anesthesia Plan  Type of Anesthesia, risks & benefits discussed:  Anesthesia Type:  general  Patient's Preference:   Intra-op Monitoring Plan:   Intra-op Monitoring Plan Comments:   Post Op Pain Control Plan:   Post Op Pain Control Plan Comments: As per surgeon's plan  Induction:   IV  Beta Blocker:  Patient is not currently on a Beta-Blocker (No further documentation required).       Informed Consent: Patient understands risks and agrees with Anesthesia plan.  Questions answered. Anesthesia consent signed with patient.  ASA Score: 3     Day of Surgery Review of History & Physical:    H&P update referred to the surgeon.         Ready For Surgery From Anesthesia Perspective.

## 2017-02-16 NOTE — ASSESSMENT & PLAN NOTE
-Continue duonebs  -On 3 L O2 NC, satting well  -Quit smoking within past year, no nicotine patch needed  -Pt states she stopped taking Breo, may restart as inpatient

## 2017-02-16 NOTE — PLAN OF CARE
Problem: Physical Therapy Goal  Goal: Physical Therapy Goal  Goals to be met by: 2017     Patient will increase functional independence with mobility by performin. Supine to sit with Contact Guard Assistance  2. Sit to supine with Contact Guard Assistance  3. Sit to stand transfer with Contact Guard Assistance  4. Bed to chair transfer with Stand-by Assistance using no device/ appropriate device  5. Gait x 50 feet with Stand-by Assistance using no device/ appropriate device  6. Stand for 5 minutes with Stand-by Assistance using no device/ appropriate device with O2 sats >/= 88%  7. Lower extremity exercise program x 20 reps per handout, with assistance as needed  Outcome: Ongoing (interventions implemented as appropriate)  PT initial evaluation complete. Goals set.     Bessy Rivas PT, DPT  2017  Pager: [phone]

## 2017-02-16 NOTE — PROGRESS NOTES
GI On-call Note    Called by ER regarding patient.  72 y/o female COPD AFib on eliquis, CHF CAD who presenting with 4 days of melena and symptomatic anemia. Had workup recently with EGD and colon 7/2016, colon was poor prep but no obvious blood and EGD wihtout source.    ER provider states appearing in no distress.    Vitals reviewed.  Not tachycardic (although on Betablocker). BP 100s systolic  Hgb is 5, from baseline approx 9  Plts wnl, INR 1.6  BUN very elevated in 70s, Cr 2.0      Plan:  Protonix 80mg IV bolus x 1 then gtt at 8mg/hr  Intravascular resuscitation/support with IVFs   Serial H/H's and pRBCs transfusion as indicated  Discontinue all NSAIDs and Heparin products  Please correct any coagulopathy with platelets and FFP to a goal of platelets >50K and INR <2.0  Maintain IV access with 2 large bore IVs  Clear liquid diet today and Keep NPO past midnight  Plan for EGD tomorrow pending clinical course   - or emergently if there is hemodynamic compromise in the setting of overt GI bleeding    Please notify GI team if there is significant change in patient's clinical status      Devonte Cook MD  Gastroenterology Fellow (PGY-IV)  Pager: [phone]

## 2017-02-16 NOTE — ANESTHESIA POSTPROCEDURE EVALUATION
"Anesthesia Post Evaluation    Patient: Jennifer Prescott    Procedure(s) Performed: Procedure(s) (LRB):  ESOPHAGOGASTRODUODENOSCOPY (EGD) (N/A)    Final Anesthesia Type: general  Patient location during evaluation: ICU  Patient participation: Yes- Able to Participate  Level of consciousness: awake and alert and oriented  Post-procedure vital signs: reviewed and stable  Pain management: adequate  Airway patency: patent  PONV status at discharge: No PONV  Anesthetic complications: no      Cardiovascular status: stable  Respiratory status: unassisted, spontaneous ventilation and nasal cannula  Hydration status: euvolemic  Follow-up not needed.        Visit Vitals    BP (!) 104/57    Pulse 73    Temp 37.7 °C (99.9 °F) (Oral)    Resp 19    Ht 5' 10" (1.778 m)    Wt 109.8 kg (242 lb)    LMP  (LMP Unknown)    SpO2 100%    BMI 34.72 kg/m2       Pain/Gurpreet Score: No Data Recorded      "

## 2017-02-16 NOTE — ASSESSMENT & PLAN NOTE
-BNP >1000, CXR in ED with pulmonary edema  -Will hold diuretics (bumex) for now 2/2 GI bleed  -Compensating well, on 3 L O2 NC satting well, no resp distress

## 2017-02-16 NOTE — PT/OT/SLP EVAL
Physical Therapy  Evaluation    Jennifer Prescott   MRN: [id number]   Admitting Diagnosis: GI bleed    PT Received On: 02/16/17  PT Start Time: 0830     PT Stop Time: 0848    PT Total Time (min): 18 min   (co-evaluation with OT)    Billable Minutes:  Evaluation 18    Diagnosis: GI bleed    Past Medical History   Diagnosis Date    *Atrial fibrillation     Anticoagulant long-term use     Arthritis     Cardiomyopathy     Carotid artery occlusion     CHF (congestive heart failure)     COPD (chronic obstructive pulmonary disease)     COPD exacerbation 2/19/2015    Coronary artery disease      RCA occluded with L to R collaterals and normal LAD and LCx    Hypertension     Internal bleeding     Sleep apnea      uses bipap      Past Surgical History   Procedure Laterality Date    Tonsillectomy, adenoidectomy      Gallbladder surgery      Crt-d implantation      Abdominal surgery      Appendectomy      [facility]  9/21/2010     [facility]    Tonsillectomy      Vascular surgery      Cardiac catheterization      Cardiac defibrillator placement  10/9/2012     bivent AICD placed 10/2012 and revised 12/2012    Coronary angioplasty with stent placement  1/27/2015     3 JAMAL to RCA    Cardioversion  3/6/2015    Colonoscopy N/A 7/11/2016     Procedure: COLONOSCOPY;  Surgeon: Rafael Pérez MD;  Location: [facility];  Service: Endoscopy;  Laterality: N/A;    Cholecystectomy      Carotid endarterectomy Left      Referring physician: RY Santos   Date referred to PT: 2/15/2017    General Precautions: Standard, fall  Orthopedic Precautions: N/A   Braces: N/A       Do you have any cultural, spiritual, Worship conflicts, given your current situation?: none stated    Patient History:  Lives With: child(vanita), adult, grandchild(vanita) (daughter and grand daughter )  Living Arrangements: house  Home Accessibility: other (see comments) (no concerns)  Home Layout: Able to live on 1st  "floor  Stair Railings at Home: none  Transportation Available: family or friend will provide, car  Living Environment Comment: Per patient, she lives with her daughter and grand daughter in single-level home, threshold to enter.  She has walk-in shower, built-in shower chair, and standard commode.  PTA, she reports being (I) with ADLs, ambulating household and limited community distances.  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     Equipment Currently Owned at Home: oxygen, power chair, cane, quad, rollator, wheelchair    Previous Level of Function:  Ambulation Skills: independent  Transfer Skills: independent  ADL Skills: independent  Work/Leisure Activity: independent    Subjective:  Communicated with RN prior to session.  Pt agreeable to PT initial evaluation.    "I was having trouble walking and getting short of breath."     Chief Complaint: generalized weakness  Patient goals: to return to PLOF    Pain Ratin/10   Pain Rating Post-Intervention: 0/10    Objective:   Patient found up in chair, RN and MD present, with: telemetry, oxygen, peripheral IV     Cognitive Exam:  Oriented to: Person, Place, Time and Situation    Follows Commands/attention: Follows multistep  commands  Communication: clear/fluent  Safety awareness/insight to disability: intact    Physical Exam:  Postural examination/scapula alignment: Rounded shoulder, Head forward and Posterior pelvic tilt    Skin integrity: Visible skin intact  Edema: no noted BLEs    Sensation:   Intact    Lower Extremity Range of Motion:  Right Lower Extremity: WFL  Left Lower Extremity: WFL    Lower Extremity Strength:  Right Lower Extremity: grossly 4/5 based on functional mobility   Left Lower Extremity: grossly 4/5 based on functional mobility      Fine motor coordination:  Intact    Gross motor coordination: WFL    Functional Mobility:  Bed Mobility:  Rolling/Turning to Left: Minimum assistance (x 1 trial)  Supine to " Sit:  (pt up in chair upon PT/OT entering room for initial evaluation)  Sit to Supine: Total Assistance (max A x 2, verbal cues for technique, tactile cues for trunk positioning and BLE management )    Transfers:  Sit <> Stand Assistance: Moderate Assistance (x 1 trial from bedside chair)  Sit <> Stand Assistive Device: No Assistive Device  Bed <> Chair Technique: Stand Pivot  Bed <> Chair Assistance: Minimum Assistance  Bed <> Chair Assistive Device: No Assistive Device    Gait:   Gait Distance: ~5 steps bedside chair to bed with min A and no AD. Unable to ambulate further 2/2 RN and MD requesting pt return to supine position.  Assistance 1: Minimum assistance  Gait Assistive Device: No device  Gait Pattern: reciprocal  Gait Deviation(s): decreased weight-shifting ability, decreased toe-to-floor clearance    Balance:   Static Sit: FAIR+: Able to take MINIMAL challenges from all directions  Dynamic Sit: FAIR+: Maintains balance through MINIMAL excursions of active trunk motion  Static Stand: FAIR: Maintains without assist but unable to take challenges  Dynamic stand: FAIR: Needs CONTACT GUARD during gait    Therapeutic Activities and Exercises:  PT initial evaluation complete. Pt educated on role of PT, safety with mobility, POC.     AM-PAC 6 CLICK MOBILITY  How much help from another person does this patient currently need?   1 = Unable, Total/Dependent Assistance  2 = A lot, Maximum/Moderate Assistance  3 = A little, Minimum/Contact Guard/Supervision  4 = None, Modified [place]/Independent    Turning over in bed (including adjusting bedclothes, sheets and blankets)?: 3  Sitting down on and standing up from a chair with arms (e.g., wheelchair, bedside commode, etc.): 2  Moving from lying on back to sitting on the side of the bed?: 2  Moving to and from a bed to a chair (including a wheelchair)?: 3  Need to walk in hospital room?: 3  Climbing 3-5 steps with a railing?: 1  Total Score: 14     AM-PAC Raw Score  CMS G-Code Modifier Level of Impairment Assistance   6 % Total / Unable   7 - 9 CM 80 - 100% Maximal Assist   10 - 14 CL 60 - 80% Moderate Assist   15 - 19 CK 40 - 60% Moderate Assist   20 - 22 CJ 20 - 40% Minimal Assist   23 CI 1-20% SBA / CGA   24 CH 0% Independent/ Mod I     Patient left HOB elevated with all lines intact, call button in reach and RN  present.    Assessment:   Jennifer Prescott is a 71 y.o. female with a medical diagnosis of GI bleed and presents with below impairments.  She currently requires total A - min A with functional mobility 2/2 generalized weakness and decreased endurance.  She will continue to benefit from acute PT intervention to address below impairments.  Upon discharge, she will benefit from short-stay SNF to progress towards functional (I) for safe return to home and work.     Rehab identified problem list/impairments: Rehab identified problem list/impairments: impaired endurance, impaired functional mobilty, weakness, impaired balance, impaired cardiopulmonary response to activity, gait instability, decreased lower extremity function    Rehab potential is good.    Activity tolerance: Good    Discharge recommendations: Discharge Facility/Level Of Care Needs: nursing facility, skilled (short-stay)     Barriers to discharge: Barriers to Discharge: Decreased caregiver support    Equipment recommendations: Equipment Needed After Discharge:  (pt has needed equipment)     GOALS:   Physical Therapy Goals        Problem: Physical Therapy Goal    Goal Priority Disciplines Outcome Goal Variances Interventions   Physical Therapy Goal     PT/OT, PT Ongoing (interventions implemented as appropriate)     Description:  Goals to be met by: 2017     Patient will increase functional independence with mobility by performin. Supine to sit with Contact Guard Assistance  2. Sit to supine with Contact Guard Assistance  3. Sit to stand transfer with Contact Guard Assistance  4. Bed to  chair transfer with Stand-by Assistance using no device/ appropriate device  5. Gait  x 50 feet with Stand-by Assistance using no device/ appropriate device  6. Stand for 5 minutes with Stand-by Assistance using no device/ appropriate device with O2 sats >/= 88%  7. Lower extremity exercise program x 20 reps per handout, with assistance as needed                PLAN:    Patient to be seen 4 x/week to address the above listed problems via gait training, therapeutic activities, therapeutic exercises  Plan of Care expires: 03/18/17  Plan of Care reviewed with: patient        Bessy Evelyn, PT, DPT  2/16/2017  Pager: [phone]

## 2017-02-16 NOTE — TRANSFER OF CARE
"Anesthesia Transfer of Care Note    Patient: Jennifer Prescott    Procedure(s) Performed: Procedure(s) (LRB):  ESOPHAGOGASTRODUODENOSCOPY (EGD) (N/A)    Patient location: ICU    Anesthesia Type: MAC    Transport from OR: Transported from OR on 2-3 L/min O2 by NC with adequate spontaneous ventilation. Continuous ECG monitoring in transport. Continuous SpO2 monitoring in transport    Post pain: adequate analgesia    Post assessment: no apparent anesthetic complications and tolerated procedure well    Post vital signs: stable    Level of consciousness: sedated and responds to stimulation    Nausea/Vomiting: no nausea/vomiting    Complications: none          Last vitals:   Visit Vitals    BP (!) 104/57    Pulse 73    Temp 37.7 °C (99.9 °F) (Oral)    Resp 19    Ht 5' 10" (1.778 m)    Wt 109.8 kg (242 lb)    LMP  (LMP Unknown)    SpO2 100%    BMI 34.72 kg/m2     "

## 2017-02-16 NOTE — SUBJECTIVE & OBJECTIVE
Past Medical History   Diagnosis Date    *Atrial fibrillation     Anticoagulant long-term use     Arthritis     Cardiomyopathy     Carotid artery occlusion     CHF (congestive heart failure)     COPD (chronic obstructive pulmonary disease)     COPD exacerbation 2/19/2015    Coronary artery disease      RCA occluded with L to R collaterals and normal LAD and LCx    Hypertension     Internal bleeding     Sleep apnea      uses bipap       Past Surgical History   Procedure Laterality Date    Tonsillectomy, adenoidectomy      Gallbladder surgery      Crt-d implantation      Abdominal surgery      Appendectomy      [facility]  9/21/2010     [facility]    Tonsillectomy      Vascular surgery      Cardiac catheterization      Cardiac defibrillator placement  10/9/2012     bivent AICD placed 10/2012 and revised 12/2012    Coronary angioplasty with stent placement  1/27/2015     3 JAMAL to RCA    Cardioversion  3/6/2015    Colonoscopy N/A 7/11/2016     Procedure: COLONOSCOPY;  Surgeon: Rafael Pérez MD;  Location: [facility];  Service: Endoscopy;  Laterality: N/A;    Cholecystectomy      Carotid endarterectomy Left        Review of patient's allergies indicates:  No Known Allergies    Family History     Problem Relation (Age of Onset)    Breast cancer Mother    Cancer Father    Heart attack Father    Heart disease Father    Heart failure Father        Social History Main Topics    Smoking status: Former Smoker     Packs/day: 0.25     Years: 50.00     Types: Cigarettes     Start date: 1/15/1965    Smokeless tobacco: Never Used    Alcohol use No    Drug use: No    Sexual activity: No      Review of Systems   Constitutional: Negative for chills and fever.   HENT: Negative for congestion and rhinorrhea.    Respiratory: Positive for cough (baseline cough with COPD) and shortness of breath.    Cardiovascular: Positive for leg swelling. Negative for chest pain and palpitations.    Gastrointestinal: Positive for abdominal pain. Negative for constipation, diarrhea, nausea and vomiting.   Genitourinary: Negative for dysuria and hematuria.   Musculoskeletal: Positive for arthralgias. Negative for myalgias.   Skin: Negative for rash and wound.        Bruising over forearms   Neurological: Positive for weakness. Negative for tremors and numbness.   Hematological: Negative for adenopathy. Bruises/bleeds easily.   Psychiatric/Behavioral: Negative for agitation and confusion.     Objective:     Vital Signs (Most Recent):  Temp: 97.7 °F (36.5 °C) (02/15/17 1356)  Pulse: 68 (02/15/17 1825)  Resp: 18 (02/15/17 1825)  BP: (!) 106/52 (02/15/17 1825)  SpO2: (!) 76 % (02/15/17 1825) Vital Signs (24h Range):  Temp:  [97.7 °F (36.5 °C)] 97.7 °F (36.5 °C)  Pulse:  [64-70] 68  Resp:  [18-27] 18  SpO2:  [76 %-100 %] 76 %  BP: (103-116)/(52-79) 106/52   Weight: 109.8 kg (242 lb)  Body mass index is 34.72 kg/(m^2).    No intake or output data in the 24 hours ending 02/15/17 1932    Physical Exam  General:  Well-developed, well-nourished, nad  HEENT:  NCAT, PERRL, EOMI, oropharyngeal membranes non-erythematous/without exudate  Neck:  Supple, no JVD, no lad, no carotid bruits  Resp:  Symmetric expansion, no increased wob, CTAB  CVS:  RRR, no m/r/g.  BLE pitting edema, symmetric.  Peripheral pulses 2+ (radial, dorsalis pedis)  GI:  Abd soft, non-distended, diffuse mild tenderness to palpation, +BS, no HSM  Neuro:  CNs intact, symmetric.  Strength 4+/5.  No tremor.  Psych:  AAOx3.  Pleasant, cooperative with exam.  Speech and thought content appropriate.    Vents:     Lines/Drains/Airways     Peripheral Intravenous Line                 Peripheral IV - Single Lumen 02/15/17 1750 Right Hand less than 1 day         Peripheral IV - Single Lumen 02/15/17 1755 Left Forearm less than 1 day         Peripheral IV - Single Lumen 02/15/17 5067 Left Upper Arm less than 1 day              Significant Labs:    CBC/Anemia  Profile:    Recent Labs  Lab 02/15/17  1645   WBC 11.85   HGB 5.1*   HCT 17.2*      MCV 93   RDW 20.2*        Chemistries:    Recent Labs  Lab 02/15/17  1645   *   K 3.9   CL 90*   CO2 25   BUN 74*   CREATININE 2.0*   CALCIUM 8.5*   ALBUMIN 3.1*   PROT 6.9   BILITOT 1.1*   ALKPHOS 109   *   *       Recent Labs  Lab 02/15/17  1645   INR 1.6*   APTT 25.3       Significant Imaging:   Imaging Results         X-Ray Chest AP Portable (Final result) Result time:  02/15/17 16:48:16     No detrimental change since 11/2/16.      Narrative:    XR CHEST AP PORTABLE.  Pacemaker remains. The cardiac silhouette is moderately enlarged.  Pulmonary vascularity is mildly increased.  The lungs are similar to 11/2/16 with mild diffusely increased interstitial opacification and more focal opacification, most likely due to combination of pleural fluid and atelectasis, the lung bases..  There is no  pneumothorax.  Visualized osseous structures are stable. There are atherosclerotic calcifications of the aorta.  Surgical clips remain overlying the left neck.

## 2017-02-16 NOTE — PT/OT/SLP EVAL
Occupational Therapy  Evaluation    Jennifer Prescott   MRN: [id number]   Admitting Diagnosis: GI bleed    OT Date of Treatment: 02/16/17   OT Start Time: 0830  OT Stop Time: 0845  OT Total Time (min): 15 min    Billable Minutes:  Evaluation 15 minutes    Diagnosis: GI bleed       Past Medical History   Diagnosis Date    *Atrial fibrillation     Anticoagulant long-term use     Arthritis     Cardiomyopathy     Carotid artery occlusion     CHF (congestive heart failure)     COPD (chronic obstructive pulmonary disease)     COPD exacerbation 2/19/2015    Coronary artery disease      RCA occluded with L to R collaterals and normal LAD and LCx    Hypertension     Internal bleeding     Sleep apnea      uses bipap      Past Surgical History   Procedure Laterality Date    Tonsillectomy, adenoidectomy      Gallbladder surgery      Crt-d implantation      Abdominal surgery      Appendectomy      Cardiac pac[facility] placement  9/21/2010     [facility]    Tonsillectomy      Vascular surgery      Cardiac catheterization      Cardiac defibrillator placement  10/9/2012     bivent AICD placed 10/2012 and revised 12/2012    Coronary angioplasty with stent placement  1/27/2015     3 JAMAL to RCA    Cardioversion  3/6/2015    Colonoscopy N/A 7/11/2016     Procedure: COLONOSCOPY;  Surgeon: Rafael Pérez MD;  Location: [facility];  Service: Endoscopy;  Laterality: N/A;    Cholecystectomy      Carotid endarterectomy Left        Referring physician: GILMAR Doshi  Date referred to OT: 2/15/2017    General Precautions: Standard, fall  Orthopedic Precautions: N/A  Braces: N/A    Patient History:  Living Environment  Lives With: child(vanita), adult, grandchild(vanita)  Living Arrangements: house  Home Accessibility: stairs to enter home  Number of Stairs to Enter Home: 1  Transportation Available: family or friend will provide  Living Environment Comment:  (Pt statres she lives in a [facility] w/ daughter and granddaughter. PLOF set-up  assist. Pt bathes in a walk in shower and has a shower chair. Daughter can provide assist. Pt used a scooter for community mobility)  Equipment Currently Used at Home: wheelchair, rollator, shower chair, power chair, oxygen, cane, quad    Prior level of function:   Bed Mobility/Transfers: independent  Grooming: independent  Bathing: independent  Upper Body Dressing: needs assist (set-up assist )  Lower Body Dressing: needs assist (set-up assist )  Toileting: independent  Home Management Skills: independent        Dominant hand: right    Subjective:  Communicated with nurse prior to session.  Pt agreeable to skilled OT services.  Chief Complaint: no complaints  Patient/Family stated goals: return home    Pain Ratin/10  Pain Rating Post-Intervention: 0/10    Objective:  Patient found with: telemetry, pulse ox (continuous), oxygen, blood pressure cuff  Pt received sitting in bed side chair talking to MD. MD stated she wanted to get the pt back to bed d/t pt's HR. Writing therapist t/f pt from bed side chair to bed. Writing therapist completed pt history part of evaluation in w/ pt supine in bed.     Cognitive Exam:  Oriented to: Person, Place, Time and Situation  Follows Commands/attention: Follows multistep  commands  Communication: clear/fluent  Memory:  No Deficits noted  Safety awareness/insight to disability: intact  Coping skills/emotional control: Appropriate to situation    Visual/perceptual:  Intact    Physical Exam:  Postural examination/scapula alignment: No postural abnormalities identified  Skin integrity: Visible skin intact  Edema: None noted     Sensation:   Intact    Upper Extremity Range of Motion:  Right Upper Extremity: WFL  Left Upper Extremity: WFL    Upper Extremity Strength:  Right Upper Extremity: WFL  Left Upper Extremity: WFL   Strength: WFL    Fine motor coordination:   Intact    Gross motor coordination: WFL    Functional Mobility:  Bed Mobility:  Sit to Supine: Maximum Assistance,  "With assist of 2    Transfers:  Sit <> Stand Assistance: Moderate Assistance  Sit <> Stand Assistive Device: No Assistive Device (hands on therapist )  Chair <>Mat Technique: Stand Pivot  Chair <> Mat Assistance: Minimum Assistance  Chair<> Mat Assistive Device: No Assistive Device (hands on therapist)    Functional Ambulation: Pt ambulated 4 steps during t/f from chair to bed.     Activities of Daily Living:  LE Dressing Level of Assistance: Total assistance (donned socks)      Balance:   Static Sit: FAIR+: Able to take MINIMAL challenges from all directions  Dynamic Sit: 0: N/A  Static Stand: FAIR+: Takes MINIMAL challenges from all directions  Dynamic stand: FAIR: Needs CONTACT GUARD during gait    Therapeutic Activities and Exercises:  Pt educated on POC.    AM-PAC 6 CLICK ADL  How much help from another person does this patient currently need?  1 = Unable, Total/Dependent Assistance  2 = A lot, Maximum/Moderate Assistance  3 = A little, Minimum/Contact Guard/Supervision  4 = None, Modified [place]/Independent    Putting on and taking off regular lower body clothing? : 2  Bathing (including washing, rinsing, drying)?: 2  Toileting, which includes using toilet, bedpan, or urinal? : 2  Putting on and taking off regular upper body clothing?: 3  Taking care of personal grooming such as brushing teeth?: 3  Eating meals?: 3  Total Score: 15    AM-PAC Raw Score CMS "G-Code Modifier Level of Impairment Assistance   6 % Total / Unable   7 - 9 CM 80 - 100% Maximal Assist   10 - 14 CL 60 - 80% Moderate Assist   15 - 19 CK 40 - 60% Moderate Assist   20 - 22 CJ 20 - 40% Minimal Assist   23 CI 1-20% SBA / CGA   24 CH 0% Independent/ Mod I       Patient left supine with MD and nurse present    Assessment:  Jennifer Prescott is a 71 y.o. female with a medical diagnosis of GI bleed and presents with impairments listed below. Pt would benefit from skilled OT services to improve independence and overall occupational " functioning.    Rehab identified problem list/impairments: Rehab identified problem list/impairments: weakness, impaired endurance, impaired self care skills, impaired functional mobilty, gait instability, impaired balance, decreased lower extremity function, impaired cardiopulmonary response to activity    Rehab potential is good.    Activity tolerance: Fair    Discharge recommendations: Discharge Facility/Level Of Care Needs: nursing facility, skilled     Barriers to discharge: Barriers to Discharge: Decreased caregiver support    Equipment recommendations: none     GOALS:   Occupational Therapy Goals        Problem: Occupational Therapy Goal    Goal Priority Disciplines Outcome Interventions   Occupational Therapy Goal     OT, PT/OT     Description:  Goals to be met by: 2/23/2017     Patient will increase functional independence with ADLs by performing:    UE Dressing with Supervision.  LE Dressing with Minimal Assistance.  Grooming while standing with Minimal Assistance.  Toileting from toilet with Moderate Assistance for hygiene and clothing management.   Toilet transfer to toilet with Minimal Assistance.                PLAN:  Patient to be seen 5 x/week to address the above listed problems via self-care/home management, therapeutic activities, therapeutic exercises  Plan of Care expires: 03/16/17  Plan of Care reviewed with: patient    Garfield Diop, OT  02/16/2017

## 2017-02-16 NOTE — ASSESSMENT & PLAN NOTE
-Continue digoxin 0.125 mg po qd, digoxin level pending  -Hold anticoagulation for today due to GI bleed

## 2017-02-17 ENCOUNTER — ANESTHESIA (OUTPATIENT)
Dept: ENDOSCOPY | Facility: HOSPITAL | Age: 72
DRG: 378 | End: 2017-02-17
Payer: COMMERCIAL

## 2017-02-17 ENCOUNTER — TELEPHONE (OUTPATIENT)
Dept: ENDOSCOPY | Facility: HOSPITAL | Age: 72
End: 2017-02-17

## 2017-02-17 DIAGNOSIS — R79.89 LFT ELEVATION: Primary | ICD-10-CM

## 2017-02-17 PROBLEM — K92.2 GASTROINTESTINAL HEMORRHAGE: Status: ACTIVE | Noted: 2017-02-17

## 2017-02-17 PROBLEM — K92.2 GASTROINTESTINAL HEMORRHAGE: Status: RESOLVED | Noted: 2017-02-16 | Resolved: 2017-02-17

## 2017-02-17 LAB
ALBUMIN SERPL BCP-MCNC: 3 G/DL
ALP SERPL-CCNC: 115 U/L
ALT SERPL W/O P-5'-P-CCNC: 125 U/L
ANION GAP SERPL CALC-SCNC: 12 MMOL/L
ANISOCYTOSIS BLD QL SMEAR: SLIGHT
ANISOCYTOSIS BLD QL SMEAR: SLIGHT
AST SERPL-CCNC: 121 U/L
BASO STIPL BLD QL SMEAR: ABNORMAL
BASOPHILS # BLD AUTO: 0.01 K/UL
BASOPHILS # BLD AUTO: 0.02 K/UL
BASOPHILS # BLD AUTO: 0.02 K/UL
BASOPHILS NFR BLD: 0.1 %
BASOPHILS NFR BLD: 0.2 %
BASOPHILS NFR BLD: 0.2 %
BILIRUB SERPL-MCNC: 1.9 MG/DL
BUN SERPL-MCNC: 65 MG/DL
CALCIUM SERPL-MCNC: 8.1 MG/DL
CHLORIDE SERPL-SCNC: 93 MMOL/L
CO2 SERPL-SCNC: 28 MMOL/L
CREAT SERPL-MCNC: 1.4 MG/DL
DIFFERENTIAL METHOD: ABNORMAL
EOSINOPHIL # BLD AUTO: 0 K/UL
EOSINOPHIL NFR BLD: 0.3 %
EOSINOPHIL NFR BLD: 0.4 %
EOSINOPHIL NFR BLD: 0.4 %
ERYTHROCYTE [DISTWIDTH] IN BLOOD BY AUTOMATED COUNT: 19.4 %
ERYTHROCYTE [DISTWIDTH] IN BLOOD BY AUTOMATED COUNT: 19.4 %
ERYTHROCYTE [DISTWIDTH] IN BLOOD BY AUTOMATED COUNT: 19.8 %
EST. GFR  (AFRICAN AMERICAN): 43.6 ML/MIN/1.73 M^2
EST. GFR  (NON AFRICAN AMERICAN): 37.8 ML/MIN/1.73 M^2
GIANT PLATELETS BLD QL SMEAR: PRESENT
GLUCOSE SERPL-MCNC: 94 MG/DL
HAV IGM SERPL QL IA: NEGATIVE
HBV CORE IGM SERPL QL IA: NEGATIVE
HBV SURFACE AG SERPL QL IA: NEGATIVE
HCT VFR BLD AUTO: 22.9 %
HCT VFR BLD AUTO: 23.3 %
HCT VFR BLD AUTO: 24.6 %
HCV AB SERPL QL IA: NEGATIVE
HGB BLD-MCNC: 7.4 G/DL
HGB BLD-MCNC: 7.4 G/DL
HGB BLD-MCNC: 7.6 G/DL
HYPOCHROMIA BLD QL SMEAR: ABNORMAL
HYPOCHROMIA BLD QL SMEAR: ABNORMAL
INR PPP: 1.4
LYMPHOCYTES # BLD AUTO: 0.6 K/UL
LYMPHOCYTES # BLD AUTO: 0.8 K/UL
LYMPHOCYTES # BLD AUTO: 1 K/UL
LYMPHOCYTES NFR BLD: 10.2 %
LYMPHOCYTES NFR BLD: 7.4 %
LYMPHOCYTES NFR BLD: 9.1 %
MAGNESIUM SERPL-MCNC: 1.9 MG/DL
MCH RBC QN AUTO: 28.5 PG
MCH RBC QN AUTO: 28.7 PG
MCH RBC QN AUTO: 29 PG
MCHC RBC AUTO-ENTMCNC: 30.9 %
MCHC RBC AUTO-ENTMCNC: 31.8 %
MCHC RBC AUTO-ENTMCNC: 32.3 %
MCV RBC AUTO: 90 FL
MCV RBC AUTO: 90 FL
MCV RBC AUTO: 92 FL
MONOCYTES # BLD AUTO: 0.6 K/UL
MONOCYTES # BLD AUTO: 0.9 K/UL
MONOCYTES # BLD AUTO: 1.1 K/UL
MONOCYTES NFR BLD: 11.2 %
MONOCYTES NFR BLD: 13.1 %
MONOCYTES NFR BLD: 5.8 %
NEUTROPHILS # BLD AUTO: 6.6 K/UL
NEUTROPHILS # BLD AUTO: 6.6 K/UL
NEUTROPHILS # BLD AUTO: 7.9 K/UL
NEUTROPHILS NFR BLD: 78.9 %
NEUTROPHILS NFR BLD: 79.2 %
NEUTROPHILS NFR BLD: 83.5 %
OVALOCYTES BLD QL SMEAR: ABNORMAL
OVALOCYTES BLD QL SMEAR: ABNORMAL
PHOSPHATE SERPL-MCNC: 3.9 MG/DL
PLATELET # BLD AUTO: 264 K/UL
PLATELET # BLD AUTO: 289 K/UL
PLATELET # BLD AUTO: 305 K/UL
PLATELET BLD QL SMEAR: ABNORMAL
PLATELET BLD QL SMEAR: ABNORMAL
PMV BLD AUTO: 10.3 FL
PMV BLD AUTO: 10.6 FL
PMV BLD AUTO: 10.8 FL
POIKILOCYTOSIS BLD QL SMEAR: SLIGHT
POIKILOCYTOSIS BLD QL SMEAR: SLIGHT
POLYCHROMASIA BLD QL SMEAR: ABNORMAL
POLYCHROMASIA BLD QL SMEAR: ABNORMAL
POTASSIUM SERPL-SCNC: 3.5 MMOL/L
PROT SERPL-MCNC: 6.5 G/DL
PROTHROMBIN TIME: 14.8 SEC
RBC # BLD AUTO: 2.55 M/UL
RBC # BLD AUTO: 2.58 M/UL
RBC # BLD AUTO: 2.67 M/UL
SODIUM SERPL-SCNC: 133 MMOL/L
WBC # BLD AUTO: 8.34 K/UL
WBC # BLD AUTO: 8.39 K/UL
WBC # BLD AUTO: 9.55 K/UL

## 2017-02-17 PROCEDURE — 85025 COMPLETE CBC W/AUTO DIFF WBC: CPT | Mod: 91

## 2017-02-17 PROCEDURE — 25000003 PHARM REV CODE 250: Performed by: STUDENT IN AN ORGANIZED HEALTH CARE EDUCATION/TRAINING PROGRAM

## 2017-02-17 PROCEDURE — 94640 AIRWAY INHALATION TREATMENT: CPT

## 2017-02-17 PROCEDURE — 25000003 PHARM REV CODE 250: Performed by: INTERNAL MEDICINE

## 2017-02-17 PROCEDURE — 25000003 PHARM REV CODE 250: Performed by: NURSE ANESTHETIST, CERTIFIED REGISTERED

## 2017-02-17 PROCEDURE — 45378 DIAGNOSTIC COLONOSCOPY: CPT | Mod: ,,, | Performed by: INTERNAL MEDICINE

## 2017-02-17 PROCEDURE — 94761 N-INVAS EAR/PLS OXIMETRY MLT: CPT

## 2017-02-17 PROCEDURE — 0DJD8ZZ INSPECTION OF LOWER INTESTINAL TRACT, VIA NATURAL OR ARTIFICIAL OPENING ENDOSCOPIC: ICD-10-PCS | Performed by: INTERNAL MEDICINE

## 2017-02-17 PROCEDURE — 94660 CPAP INITIATION&MGMT: CPT

## 2017-02-17 PROCEDURE — D9220A PRA ANESTHESIA: Mod: ANES,,, | Performed by: ANESTHESIOLOGY

## 2017-02-17 PROCEDURE — 45378 DIAGNOSTIC COLONOSCOPY: CPT | Performed by: INTERNAL MEDICINE

## 2017-02-17 PROCEDURE — 37000009 HC ANESTHESIA EA ADD 15 MINS: Performed by: INTERNAL MEDICINE

## 2017-02-17 PROCEDURE — 27000221 HC OXYGEN, UP TO 24 HOURS

## 2017-02-17 PROCEDURE — D9220A PRA ANESTHESIA: Mod: CRNA,,, | Performed by: NURSE ANESTHETIST, CERTIFIED REGISTERED

## 2017-02-17 PROCEDURE — 83735 ASSAY OF MAGNESIUM: CPT

## 2017-02-17 PROCEDURE — 36415 COLL VENOUS BLD VENIPUNCTURE: CPT

## 2017-02-17 PROCEDURE — 37000008 HC ANESTHESIA 1ST 15 MINUTES: Performed by: INTERNAL MEDICINE

## 2017-02-17 PROCEDURE — 80053 COMPREHEN METABOLIC PANEL: CPT

## 2017-02-17 PROCEDURE — 84100 ASSAY OF PHOSPHORUS: CPT

## 2017-02-17 PROCEDURE — 63600175 PHARM REV CODE 636 W HCPCS: Performed by: STUDENT IN AN ORGANIZED HEALTH CARE EDUCATION/TRAINING PROGRAM

## 2017-02-17 PROCEDURE — 27000190 HC CPAP FULL FACE MASK W/VALVE

## 2017-02-17 PROCEDURE — 63600175 PHARM REV CODE 636 W HCPCS: Performed by: NURSE PRACTITIONER

## 2017-02-17 PROCEDURE — 25000242 PHARM REV CODE 250 ALT 637 W/ HCPCS: Performed by: STUDENT IN AN ORGANIZED HEALTH CARE EDUCATION/TRAINING PROGRAM

## 2017-02-17 PROCEDURE — 25000003 PHARM REV CODE 250: Performed by: EMERGENCY MEDICINE

## 2017-02-17 PROCEDURE — 99233 SBSQ HOSP IP/OBS HIGH 50: CPT | Mod: ,,, | Performed by: GENERAL ACUTE CARE HOSPITAL

## 2017-02-17 PROCEDURE — 63600175 PHARM REV CODE 636 W HCPCS: Performed by: NURSE ANESTHETIST, CERTIFIED REGISTERED

## 2017-02-17 PROCEDURE — 11000001 HC ACUTE MED/SURG PRIVATE ROOM

## 2017-02-17 RX ORDER — IPRATROPIUM BROMIDE AND ALBUTEROL SULFATE 2.5; .5 MG/3ML; MG/3ML
3 SOLUTION RESPIRATORY (INHALATION) EVERY 4 HOURS PRN
Status: DISCONTINUED | OUTPATIENT
Start: 2017-02-17 | End: 2017-02-20 | Stop reason: HOSPADM

## 2017-02-17 RX ORDER — ETOMIDATE 2 MG/ML
INJECTION INTRAVENOUS
Status: DISCONTINUED | OUTPATIENT
Start: 2017-02-17 | End: 2017-02-17

## 2017-02-17 RX ORDER — POTASSIUM CHLORIDE 7.45 MG/ML
60 INJECTION INTRAVENOUS
Status: DISCONTINUED | OUTPATIENT
Start: 2017-02-17 | End: 2017-02-17

## 2017-02-17 RX ORDER — POTASSIUM CHLORIDE 7.45 MG/ML
40 INJECTION INTRAVENOUS
Status: DISCONTINUED | OUTPATIENT
Start: 2017-02-17 | End: 2017-02-17

## 2017-02-17 RX ORDER — TRAMADOL HYDROCHLORIDE 50 MG/1
50 TABLET ORAL EVERY 6 HOURS PRN
Status: DISCONTINUED | OUTPATIENT
Start: 2017-02-17 | End: 2017-02-20 | Stop reason: HOSPADM

## 2017-02-17 RX ORDER — MAGNESIUM SULFATE HEPTAHYDRATE 40 MG/ML
4 INJECTION, SOLUTION INTRAVENOUS
Status: DISCONTINUED | OUTPATIENT
Start: 2017-02-17 | End: 2017-02-17

## 2017-02-17 RX ORDER — MAGNESIUM SULFATE HEPTAHYDRATE 40 MG/ML
2 INJECTION, SOLUTION INTRAVENOUS
Status: DISCONTINUED | OUTPATIENT
Start: 2017-02-17 | End: 2017-02-17

## 2017-02-17 RX ORDER — SODIUM CHLORIDE 9 MG/ML
INJECTION, SOLUTION INTRAVENOUS CONTINUOUS PRN
Status: DISCONTINUED | OUTPATIENT
Start: 2017-02-17 | End: 2017-02-17

## 2017-02-17 RX ORDER — PROPOFOL 10 MG/ML
VIAL (ML) INTRAVENOUS
Status: DISCONTINUED | OUTPATIENT
Start: 2017-02-17 | End: 2017-02-17

## 2017-02-17 RX ORDER — POTASSIUM CHLORIDE 7.45 MG/ML
80 INJECTION INTRAVENOUS
Status: DISCONTINUED | OUTPATIENT
Start: 2017-02-17 | End: 2017-02-17

## 2017-02-17 RX ADMIN — TRAMADOL HYDROCHLORIDE 50 MG: 50 TABLET, FILM COATED ORAL at 12:02

## 2017-02-17 RX ADMIN — ETOMIDATE 4 MG: 2 INJECTION, SOLUTION INTRAVENOUS at 08:02

## 2017-02-17 RX ADMIN — ETOMIDATE 2 MG: 2 INJECTION, SOLUTION INTRAVENOUS at 07:02

## 2017-02-17 RX ADMIN — TRAMADOL HYDROCHLORIDE 50 MG: 50 TABLET, COATED ORAL at 05:02

## 2017-02-17 RX ADMIN — SODIUM CHLORIDE: 0.9 INJECTION, SOLUTION INTRAVENOUS at 07:02

## 2017-02-17 RX ADMIN — POLYMYXIN B SULFATE AND TRIMETHOPRIM SULFATE 1 DROP: 10000; 1 SOLUTION/ DROPS OPHTHALMIC at 12:02

## 2017-02-17 RX ADMIN — PROPOFOL 20 MG: 10 INJECTION, EMULSION INTRAVENOUS at 07:02

## 2017-02-17 RX ADMIN — Medication 3 ML: at 02:02

## 2017-02-17 RX ADMIN — TRAMADOL HYDROCHLORIDE 50 MG: 50 TABLET, COATED ORAL at 08:02

## 2017-02-17 RX ADMIN — Medication: at 10:02

## 2017-02-17 RX ADMIN — ETOMIDATE 4 MG: 2 INJECTION, SOLUTION INTRAVENOUS at 07:02

## 2017-02-17 RX ADMIN — POTASSIUM CHLORIDE 40 MEQ: 10 INJECTION, SOLUTION INTRAVENOUS at 01:02

## 2017-02-17 RX ADMIN — PANTOPRAZOLE SODIUM 8 MG/HR: 40 INJECTION, POWDER, FOR SOLUTION INTRAVENOUS at 03:02

## 2017-02-17 RX ADMIN — PROPOFOL 10 MG: 10 INJECTION, EMULSION INTRAVENOUS at 07:02

## 2017-02-17 RX ADMIN — FLUTICASONE FUROATE AND VILANTEROL TRIFENATATE 1 PUFF: 100; 25 POWDER RESPIRATORY (INHALATION) at 10:02

## 2017-02-17 RX ADMIN — PANTOPRAZOLE SODIUM 8 MG/HR: 40 INJECTION, POWDER, FOR SOLUTION INTRAVENOUS at 08:02

## 2017-02-17 RX ADMIN — IPRATROPIUM BROMIDE AND ALBUTEROL SULFATE 3 ML: .5; 3 SOLUTION RESPIRATORY (INHALATION) at 03:02

## 2017-02-17 RX ADMIN — Medication 3 ML: at 06:02

## 2017-02-17 RX ADMIN — PROPOFOL 10 MG: 10 INJECTION, EMULSION INTRAVENOUS at 08:02

## 2017-02-17 NOTE — PHYSICIAN QUERY
"PT Name: Jennifer Prescott  MR #: [id number]  Physician Query Form - Hematology Clarification    Reviewer Kristina Resendez RN  Ext [email or web address]    This form is a permanent document in the medical record.      Query Date: February 17, 2017    By submitting this query, we are merely seeking further clarification of documentation. Please utilize your independent clinical judgment when addressing the question(s) below.    (The Medical record reflects the following:)     Indicators    Supporting Clinical Findings Location in Medical Record    "Anemia" documented     x H & H = Hgb 5.1>>7.4  Hct 17.>>22.9 Lab 2/15-2/17    BP =      HR =     x "GI bleeding" documented GI bleed  - Hgb 5.1 on admission with approx 4 d hx of melena and PMHx of GI bleed, CC PN 2/16    Acute bleeding (Non GI site)     x Transfusion(s) 3 units pRBC Blood Bank   x Treatment: Patient is taking Plavix, aspirin, and apixiban. CC PN 2/16    Other:        Provider, please specify diagnosis or diagnoses associated with above clinical findings.    [  ] Acute blood loss anemia expected post-operatively     [ X  ] Acute blood loss anemia    [  ] Aplastic anemia    [  ] Iron deficiency anemia     [  ] Pernicious anemia     [  ] Precipitous drop in H&H    [  ] Anemia of chronic disease ( Specify chronic disease)      [  ] CKD (specify stage) ___________________________     [  ] Neoplastic disease      [  ] Due to Chemotherapy      [  ] Other (Specify) _______________________________     [  ] Clinically Undetermined     [  ] Other Hematological Diagnosis _________________________________    [  ] Clinically Undetermined       Please document in your progress notes daily for the duration of treatment, until resolved, and include in your discharge summary.                                                                                                      "

## 2017-02-17 NOTE — SUBJECTIVE & OBJECTIVE
Interval History/Significant Events: No significant events overnight.      Review of Systems   Constitutional: Negative for chills and diaphoresis.   HENT: Negative for postnasal drip.    Eyes: Negative for visual disturbance.   Respiratory: Negative for shortness of breath.    Cardiovascular: Negative for chest pain.   Gastrointestinal: Negative for abdominal pain and constipation.   Genitourinary: Negative for hematuria.   Musculoskeletal: Negative for myalgias.   Skin: Negative for rash.   Neurological: Negative for headaches.   Hematological: Negative for adenopathy.     Objective:     Vital Signs (Most Recent):  Temp: 98.3 °F (36.8 °C) (02/17/17 0700)  Pulse: 72 (02/17/17 0810)  Resp: (!) 23 (02/17/17 0810)  BP: 96/68 (02/17/17 0800)  SpO2: 97 % (02/17/17 0810) Vital Signs (24h Range):  Temp:  [97.6 °F (36.4 °C)-99.9 °F (37.7 °C)] 98.3 °F (36.8 °C)  Pulse:  [58-73] 72  Resp:  [12-38] 23  SpO2:  [87 %-100 %] 97 %  BP: ()/(47-85) 96/68   Weight: 108.4 kg (238 lb 15.7 oz)  Body mass index is 34.29 kg/(m^2).      Intake/Output Summary (Last 24 hours) at 02/17/17 0949  Last data filed at 02/17/17 0553   Gross per 24 hour   Intake             3525 ml   Output             1025 ml   Net             2500 ml       Physical Exam   Constitutional: She appears well-developed and well-nourished.   HENT:   Head: Normocephalic and atraumatic.   Mouth/Throat: Oropharynx is clear and moist.   Eyes: Conjunctivae are normal. Pupils are equal, round, and reactive to light. Right eye exhibits no discharge. Left eye exhibits no discharge. No scleral icterus.   Neck: Trachea normal, normal range of motion and full passive range of motion without pain. Neck supple. No JVD present. No tracheal deviation present. No thyromegaly present.   Cardiovascular: Normal rate, regular rhythm, S1 normal, S2 normal, normal heart sounds and intact distal pulses.  Exam reveals no gallop and no friction rub.    No murmur heard.  Pulmonary/Chest:  Effort normal and breath sounds normal. No respiratory distress. She has no wheezes. She has no rales. She exhibits no tenderness.   Abdominal: Soft. Bowel sounds are normal. She exhibits no distension and no mass. There is no tenderness. There is no rebound and no guarding.   Musculoskeletal: Normal range of motion. She exhibits no tenderness or deformity.   Lymphadenopathy:     She has no cervical adenopathy.   Neurological: She is alert. No cranial nerve deficit.   Skin: Skin is warm and dry. No abrasion and no bruising noted.   Psychiatric: She has a normal mood and affect.   Vitals reviewed.      Vents:  Oxygen Concentration (%): 28 (02/17/17 0810)  Lines/Drains/Airways     Peripheral Intravenous Line                 Peripheral IV - Single Lumen 02/15/17 1755 Left Forearm 1 day         Peripheral IV - Single Lumen 02/15/17 1837 Left Upper Arm 1 day              Significant Labs:    CBC/Anemia Profile:    Recent Labs  Lab 02/16/17  1215 02/16/17  1641 02/16/17  2349 02/17/17  0541   WBC 11.09 9.32 9.55  --    HGB 7.3* 7.7* 7.6* 7.4*   HCT 23.4* 24.1* 24.6* 22.9*    278 289 264   MCV 91 90 92 90   RDW 18.7* 19.2* 19.4* 19.4*        Chemistries:    Recent Labs  Lab 02/15/17  1645 02/16/17  0315 02/16/17  0853 02/17/17  0541   * 130* 130* 133*   K 3.9 3.8 3.6 3.5   CL 90* 91* 91* 93*   CO2 25 25 27 28   BUN 74* 75* 73* 65*   CREATININE 2.0* 1.8* 1.7* 1.4   CALCIUM 8.5* 8.6* 8.7 8.1*   ALBUMIN 3.1* 3.1*  --  3.0*   PROT 6.9 6.7  --  6.5   BILITOT 1.1* 2.8*  --  1.9*   ALKPHOS 109 105  --  115   * 134*  --  125*   * 173*  --  121*   MG  --  2.1 2.1 1.9   PHOS  --  5.2*  --  3.9     Significant Imaging:  I have reviewed and interpreted all pertinent imaging results/findings within the past 24 hours.

## 2017-02-17 NOTE — ASSESSMENT & PLAN NOTE
-ANYA likely pre-renal vs. Cardio renal   -Increased kidney function is trending down after resuscitation   -Patient is on Bumex 1 mg BID at home. Will start lasix and follow urine output.   -Avoid contrast, NSAIDs, nephrotoxic drugs  -Strict I&Os

## 2017-02-17 NOTE — ASSESSMENT & PLAN NOTE
- Hgb 5.1 on admission with approx 4 d hx of melena and PMHx of GI bleed, last BM morning prior to admission; stable VS  - Patient is taking Plavix, aspirin, and apixiban. In addition, recently took 2 Aleve 2/2 MSK pain. She has a history of previous GI bleed in July of 2016 while on anticoagulation. Upon chart review patient was instructed to discontinue Plavix, continued aspirin, and transition warfarin to apixiban. Unfortunately, the patient remained on Plavix 2/2 keeping her old bottle in her pill box. Instead of dual therapy she was on triple therapy. 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 However, this is her second episode of acute GI bleeding. Discussed with patient that she will need to discontinue plavix on discharge. She will likely have to be discharged on apixiban (previously been noncompliant on warfarin) and aspirin with close follow up.     -s/p 3 U PRBCs transfusing, CBCs t2q--[id number] for transfusion. Will transfuse if Hgb < 7 or she becomes hemodynamically unstable.   -Pantoprazole drip at 8 mL/hr, 2 large bore IVs placed  -Holding antiplt/anticoag meds until am, diet NPO for colonoscopy tomorrow  -Holding DVT ppx 2/2 GI bleed  -GI consulted, appreciate recs

## 2017-02-17 NOTE — ASSESSMENT & PLAN NOTE
-BNP >1000, CXR in ED with pulmonary edema. Repeat CXR w/ increasing pulmonary edema. Patient remains afebrile with normal WBC count.   -Will start 60 mg lasix. Will consider re dosing tonight pending UOP.   -Compensating well, on 3 L O2 NC satting well, no resp distress

## 2017-02-17 NOTE — PLAN OF CARE
Problem: Patient Care Overview  Goal: Plan of Care Review  Outcome: Ongoing (interventions implemented as appropriate)  VSS on shift with desats to 85% when sleeping/not on bipap- rebound to 90s with bipap/NC.  Bowel prep overnight, BM q 1-2hrs.   Lasix x1 overnight.  No other issues on shift.   Will continue with plan of care.     Addendum: c/o intermittent neuropathic pain in feet. Tramadol q6 ordered

## 2017-02-17 NOTE — PROGRESS NOTES
MD Ivey informed of potassium and mag levels this AM. Also informed MD that monitor will occasionally read asystole, vfib/vtach, and read a heart rate of 0 or 32 for approx 5 seconds. Rhythm remains uchanged- paced 100% with no discernible change in rate while reading rhythm. Leads changed and patient moving up/out of bed frequently due to bowel prep but continues to do so this AM while resting in bed.

## 2017-02-17 NOTE — PROGRESS NOTES
[facility]  Critical Care Medicine  Progress Note    Patient Name: Jennifer Prescott  MRN: [id number]  Admission Date: 2/15/2017  Hospital Length of Stay: 2 days  Code Status: Full Code  Attending Provider: Jani Doshi MD  Primary Care Provider: Lianna Mistry MD   Principal Problem: GI bleed    Subjective:     HPI:  70 yo F with PMHx CAD (s/p 3 JAMAL to RCA and  on 1/27/15, She is currently on ASA, plavix), COPD (former smoker with 54 pkyr hx), HFrEF with AICD placed (09/2016 EF 20%), chronic A fib (previously on warfarin, switched to apixaban 01/30/17), and previous hospitalization for GI bleed (07/2016) presents to ED 02/15/17 with 4 d hx of fatigue associated with SOB, generalized weakness.  She noticed stools became dark and tarry.  Denies BRBPR, hematemesis, fever/chills, n/v/c/d.  Tried 3 albuterol nebs at home but came to hospital due to no improvement in SOB.  Of note, no etiology of previous GI bleed was found on previous hospitalization with EGD and colonoscopy.  Notes that she did use 2 Aleve 2 d prior to admission but generally does avoid NSAIDs.       Hospital/ICU Course:  02/15/17:  Hgb 5.1 on admission.  VS stable with BP ranging 103-116/52-79.  Receiving 3 U PRBCs in ED, on pantoprazole drip.  GI consulted, plan for EGD in am.    02/16/17: EGD today  Found a non-bleeding duodenal diverticulum, portal hypertensive gastropathy (not biopsied 2/2 recent plavix and eliquis use), and a 1 cm hiatus hernia. Plan for colonoscopy tomorrow. Given EGD findings will order a abdominal ultrasound complete with venous doppler to evaluate for possible portal HTN and AAA. Her H&H, hemodynamics, and vitals remain stable. No evidence of acute active bleeding on physical exam.     2/17/17: No bleeding overnight.      Interval History/Significant Events: No significant events overnight.      Review of Systems   Constitutional: Negative for chills and diaphoresis.   HENT: Negative for postnasal  drip.    Eyes: Negative for visual disturbance.   Respiratory: Negative for shortness of breath.    Cardiovascular: Negative for chest pain.   Gastrointestinal: Negative for abdominal pain and constipation.   Genitourinary: Negative for hematuria.   Musculoskeletal: Negative for myalgias.   Skin: Negative for rash.   Neurological: Negative for headaches.   Hematological: Negative for adenopathy.     Objective:     Vital Signs (Most Recent):  Temp: 98.3 °F (36.8 °C) (02/17/17 0700)  Pulse: 72 (02/17/17 0810)  Resp: (!) 23 (02/17/17 0810)  BP: 96/68 (02/17/17 0800)  SpO2: 97 % (02/17/17 0810) Vital Signs (24h Range):  Temp:  [97.6 °F (36.4 °C)-99.9 °F (37.7 °C)] 98.3 °F (36.8 °C)  Pulse:  [58-73] 72  Resp:  [12-38] 23  SpO2:  [87 %-100 %] 97 %  BP: ()/(47-85) 96/68   Weight: 108.4 kg (238 lb 15.7 oz)  Body mass index is 34.29 kg/(m^2).      Intake/Output Summary (Last 24 hours) at 02/17/17 0949  Last data filed at 02/17/17 0553   Gross per 24 hour   Intake             3525 ml   Output             1025 ml   Net             2500 ml       Physical Exam   Constitutional: She appears well-developed and well-nourished.   HENT:   Head: Normocephalic and atraumatic.   Mouth/Throat: Oropharynx is clear and moist.   Eyes: Conjunctivae are normal. Pupils are equal, round, and reactive to light. Right eye exhibits no discharge. Left eye exhibits no discharge. No scleral icterus.   Neck: Trachea normal, normal range of motion and full passive range of motion without pain. Neck supple. No JVD present. No tracheal deviation present. No thyromegaly present.   Cardiovascular: Normal rate, regular rhythm, S1 normal, S2 normal, normal heart sounds and intact distal pulses.  Exam reveals no gallop and no friction rub.    No murmur heard.  Pulmonary/Chest: Effort normal and breath sounds normal. No respiratory distress. She has no wheezes. She has no rales. She exhibits no tenderness.   Abdominal: Soft. Bowel sounds are normal. She  exhibits no distension and no mass. There is no tenderness. There is no rebound and no guarding.   Musculoskeletal: Normal range of motion. She exhibits no tenderness or deformity.   Lymphadenopathy:     She has no cervical adenopathy.   Neurological: She is alert. No cranial nerve deficit.   Skin: Skin is warm and dry. No abrasion and no bruising noted.   Psychiatric: She has a normal mood and affect.   Vitals reviewed.      Vents:  Oxygen Concentration (%): 28 (02/17/17 0810)  Lines/Drains/Airways     Peripheral Intravenous Line                 Peripheral IV - Single Lumen 02/15/17 1755 Left Forearm 1 day         Peripheral IV - Single Lumen 02/15/17 1837 Left Upper Arm 1 day              Significant Labs:    CBC/Anemia Profile:    Recent Labs  Lab 02/16/17  1215 02/16/17  1641 02/16/17  2349 02/17/17  0541   WBC 11.09 9.32 9.55  --    HGB 7.3* 7.7* 7.6* 7.4*   HCT 23.4* 24.1* 24.6* 22.9*    278 289 264   MCV 91 90 92 90   RDW 18.7* 19.2* 19.4* 19.4*        Chemistries:    Recent Labs  Lab 02/15/17  1645 02/16/17  0315 02/16/17  0853 02/17/17  0541   * 130* 130* 133*   K 3.9 3.8 3.6 3.5   CL 90* 91* 91* 93*   CO2 25 25 27 28   BUN 74* 75* 73* 65*   CREATININE 2.0* 1.8* 1.7* 1.4   CALCIUM 8.5* 8.6* 8.7 8.1*   ALBUMIN 3.1* 3.1*  --  3.0*   PROT 6.9 6.7  --  6.5   BILITOT 1.1* 2.8*  --  1.9*   ALKPHOS 109 105  --  115   * 134*  --  125*   * 173*  --  121*   MG  --  2.1 2.1 1.9   PHOS  --  5.2*  --  3.9     Significant Imaging:  I have reviewed and interpreted all pertinent imaging results/findings within the past 24 hours.    Assessment/Plan:     Pulmonary  COPD (chronic obstructive pulmonary disease)  -Continue duonebs  -On 3 L O2 NC, satting well  -Quit smoking within past year, no nicotine patch needed  -Pt states she stopped taking Breo, may restart as inpatient    Cardiac  Atrial fibrillation status post cardioversion 3/06/15  -Continue digoxin 0.125 mg po qd, digoxin level  pending  -Hold anticoagulation for today due to GI bleed and planned colonoscopy today.       CAD S/P percutaneous coronary angioplasty  -Hold dual antiplts for now   -Not in A fib per telemetry   -Continue pravastatin 40 mg po qd   -See GI bleed for anticoagulation plan.     Ischemic cardiomyopathy  --Stop trending troponins.     Essential hypertension  --Hold home meds for now in setting of recent GI bleed.      Chronic combined systolic and diastolic congestive heart failure  --Continue furosemide.   --Avoid volume overload.     Renal  CKD (chronic kidney disease) stage 3, GFR 30-59 ml/min  - avoid nephrotoxic agents  - Strict Intake and Output  - Dose medications to current GFR  - Will continue to monitor urine output, electrolytes, and kidney function.     NAYA (acute kidney injury)  --NAYA likely pre-renal vs. Cardiorenal.   --Improving.     Fluids/Electrolytes/Nutrition/GI  * GI bleed  --No further melena.   --Colonoscopy today.     Obesity hypoventilation syndrome on BiPAP  --Bipap qhs.     I spent >35 minutes reviewing patient records, examining, and counseling the patient with greater than 50% of the time spent with direct patient care and coordination.     Dmitry Mercado PA-C  Critical Care Medicine  [facility]

## 2017-02-17 NOTE — PROGRESS NOTES
Patient transferred to room 1153. Patient's nurse, Rocio at bedside. Patient without compliants of pain or shortness of breath. Endoscopy department notified of patient's transfer to 1153.

## 2017-02-17 NOTE — PROGRESS NOTES
[facility]  Critical Care Medicine  Progress Note    Patient Name: Jennifer Prescott  MRN: [id number]  Admission Date: 2/15/2017  Hospital Length of Stay: 1 days  Code Status: Full Code  Attending Provider: Dakota Ash MD  Primary Care Provider: Lianna Mistry MD   Principal Problem: GI bleed    Subjective:     HPI:  70 yo F with PMHx CAD (s/p 3 JAMAL to RCA and  on 1/27/15, She is currently on ASA, plavix), COPD (former smoker with 54 pkyr hx), HFrEF with AICD placed (09/2016 EF 20%), chronic A fib (previously on warfarin, switched to apixaban 01/30/17), and previous hospitalization for GI bleed (07/2016) presents to ED 02/15/17 with 4 d hx of fatigue associated with SOB, generalized weakness.  She noticed stools became dark and tarry.  Denies BRBPR, hematemesis, fever/chills, n/v/c/d.  Tried 3 albuterol nebs at home but came to hospital due to no improvement in SOB.  Of note, no etiology of previous GI bleed was found on previous hospitalization with EGD and colonoscopy.  Notes that she did use 2 Aleve 2 d prior to admission but generally does avoid NSAIDs.       Hospital/ICU Course:  02/15/17:  Hgb 5.1 on admission.  VS stable with BP ranging 103-116/52-79.  Receiving 3 U PRBCs in ED, on pantoprazole drip.  GI consulted, plan for EGD in am.    02/16/17: EGD today  Found a non-bleeding duodenal diverticulum, portal hypertensive gastropathy (not biopsied 2/2 recent plavix and eliquis use), and a 1 cm hiatus hernia. Plan for colonoscopy tomorrow. Given EGD findings will order a abdominal ultrasound complete with venous doppler to evaluate for possible portal HTN and AAA. Her H&H, hemodynamics, and vitals remain stable. No evidence of acute active bleeding on physical exam.       Interval History/Significant Events: As detailed above. Plan for colonoscopy tomorrow. NPO. No acute signs of active bleeding. Will monitor closely.     Review of Systems   Constitutional: Negative for chills and  fever.   HENT: Negative for congestion and rhinorrhea.    Respiratory: Positive for cough (baseline cough with COPD) and shortness of breath.    Cardiovascular: Positive for leg swelling. Negative for chest pain and palpitations.   Gastrointestinal: Positive for abdominal pain. Negative for constipation, diarrhea, nausea and vomiting.   Genitourinary: Negative for dysuria and hematuria.   Musculoskeletal: Positive for arthralgias. Negative for myalgias.   Skin: Negative for rash and wound.        Bruising over forearms   Neurological: Positive for weakness. Negative for tremors and numbness.   Hematological: Negative for adenopathy. Bruises/bleeds easily.   Psychiatric/Behavioral: Negative for agitation and confusion.     Objective:     Vital Signs (Most Recent):  Temp: 99.9 °F (37.7 °C) (02/16/17 1445)  Pulse: 69 (02/16/17 1530)  Resp: 12 (02/16/17 1530)  BP: 126/60 (02/16/17 1530)  SpO2: 100 % (02/16/17 1530) Vital Signs (24h Range):  Temp:  [97.6 °F (36.4 °C)-99.9 °F (37.7 °C)] 99.9 °F (37.7 °C)  Pulse:  [58-78] 69  Resp:  [12-30] 12  SpO2:  [76 %-100 %] 100 %  BP: ()/(49-85) 126/60   Weight: 109.8 kg (242 lb)  Body mass index is 34.72 kg/(m^2).      Intake/Output Summary (Last 24 hours) at 02/16/17 1758  Last data filed at 02/16/17 1442   Gross per 24 hour   Intake              300 ml   Output              775 ml   Net             -475 ml       Physical Exam   Constitutional: She is oriented to person, place, and time. She appears well-developed and well-nourished. No distress.   HENT:   Head: Normocephalic and atraumatic.   Eyes: EOM are normal. Pupils are equal, round, and reactive to light.   Neck: Normal range of motion. Neck supple. No JVD present. No thyromegaly present.   Cardiovascular: Normal rate, normal heart sounds and intact distal pulses.  Exam reveals no gallop and no friction rub.    No murmur heard.  Pulmonary/Chest: Effort normal. No respiratory distress. She has no wheezes. She has  rales. She exhibits no tenderness.   Abdominal: Soft. Bowel sounds are normal. She exhibits no distension and no mass. There is no tenderness. There is no rebound and no guarding. No hernia.   Musculoskeletal: She exhibits edema. She exhibits no tenderness.   Lymphadenopathy:     She has no cervical adenopathy.   Neurological: She is alert and oriented to person, place, and time. No cranial nerve deficit. Coordination normal.   Skin: Skin is warm and dry. No rash noted. She is not diaphoretic. No erythema. No pallor.   Psychiatric: She has a normal mood and affect. Her behavior is normal. Judgment and thought content normal.       Vents:     Lines/Drains/Airways     Peripheral Intravenous Line                 Peripheral IV - Single Lumen 02/15/17 1755 Left Forearm 1 day         Peripheral IV - Single Lumen 02/15/17 1837 Left Upper Arm less than 1 day              Significant Labs:    CBC/Anemia Profile:    Recent Labs  Lab 02/16/17  0735 02/16/17  1215 02/16/17  1641   WBC 13.51* 11.09 9.32   HGB 7.3* 7.3* 7.7*   HCT 22.7* 23.4* 24.1*    275 278   MCV 89 91 90   RDW 18.4* 18.7* 19.2*        Chemistries:    Recent Labs  Lab 02/15/17  1645 02/16/17  0315 02/16/17  0853   * 130* 130*   K 3.9 3.8 3.6   CL 90* 91* 91*   CO2 25 25 27   BUN 74* 75* 73*   CREATININE 2.0* 1.8* 1.7*   CALCIUM 8.5* 8.6* 8.7   ALBUMIN 3.1* 3.1*  --    PROT 6.9 6.7  --    BILITOT 1.1* 2.8*  --    ALKPHOS 109 105  --    * 134*  --    * 173*  --    MG  --  2.1 2.1   PHOS  --  5.2*  --        All pertinent labs within the past 24 hours have been reviewed.    Significant Imaging:  I have reviewed all pertinent imaging results/findings within the past 24 hours.    Assessment/Plan:     Pulmonary  COPD (chronic obstructive pulmonary disease)  -Continue duonebs  -On 3 L O2 NC, satting well  -Quit smoking within past year, no nicotine patch needed  -Pt states she stopped taking Breo, may restart as inpatient  -Will continue to  monitor closely    Cardiac  Atrial fibrillation status post cardioversion 3/06/15  -Continue digoxin 0.125 mg po qd, digoxin level pending  -Hold anticoagulation for today due to GI bleed and planned colonoscopy tomorrow.       CAD S/P percutaneous coronary angioplasty  -Hold dual antiplts for now   -Not in A fib per telemetry   -Continue pravastatin 40 mg po qd   -See GI bleed for anticoagulation plan.     Ischemic cardiomyopathy  - treponema likely secondary to demand.   - Will trend and follow      Essential hypertension  -Hold home meds for now in setting of GI bleed      Chronic combined systolic and diastolic congestive heart failure  -BNP >1000, CXR in ED with pulmonary edema. Repeat CXR w/ increasing pulmonary edema. Patient remains afebrile with normal WBC count.   -Will start 60 mg lasix. Will consider re dosing tonight pending UOP.   -Compensating well, on 3 L O2 NC satting well, no resp distress    Renal  CKD (chronic kidney disease) stage 3, GFR 30-59 ml/min  - avoid nephrotoxic agents  - Strict Intake and Output  - Dose medications to current GFR  - Will continue to monitor urine output, electrolytes, and kidney function.     NAYA (acute kidney injury)  -NAYA likely pre-renal vs. Cardio renal   -Increased kidney function is trending down after resuscitation   -Patient is on Bumex 1 mg BID at home. Will start lasix and follow urine output.   -Avoid contrast, NSAIDs, nephrotoxic drugs  -Strict I&Os    Fluids/Electrolytes/Nutrition/GI  * GI bleed  - Hgb 5.1 on admission with approx 4 d hx of melena and PMHx of GI bleed, last BM morning prior to admission; stable VS  - Patient is taking Plavix, aspirin, and apixiban. In addition, recently took 2 Aleve 2/2 MSK pain. She has a history of previous GI bleed in July of 2016 while on anticoagulation. Upon chart review patient was instructed to discontinue Plavix, continued aspirin, and transition warfarin to apixiban. Unfortunately, the patient remained on Plavix  2/2 keeping her old bottle in her pill box. Instead of dual therapy she was on triple therapy. 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 However, this is her second episode of acute GI bleeding. Discussed with patient that she will need to discontinue plavix on discharge. She will likely have to be discharged on apixiban (previously been noncompliant on warfarin) and aspirin with close follow up.     -s/p 3 U PRBCs transfusing, CBCs [id number] for transfusion. Will transfuse if Hgb < 7 or she becomes hemodynamically unstable.   -Pantoprazole drip at 8 mL/hr, 2 large bore IVs placed  -Holding antiplt/anticoag meds until am, diet NPO for colonoscopy tomorrow  -Holding DVT ppx 2/2 GI bleed  -GI consulted, appreciate recs    Obesity hypoventilation syndrome on BiPAP  -Restart BiPap o/n    Other  Automatic implantable cardioverter-defibrillator in situ  -Sees Dr. Spring outpatient, has appt for ICD interrogation scheduled      Adrian Lawton MD  Critical Care Medicine  [facility]    Formal staff attestation to follow by Dr. Doshi

## 2017-02-17 NOTE — ASSESSMENT & PLAN NOTE
-Continue digoxin 0.125 mg po qd, digoxin level pending  -Hold anticoagulation for today due to GI bleed and planned colonoscopy tomorrow.

## 2017-02-17 NOTE — ASSESSMENT & PLAN NOTE
-Continue duonebs  -On 3 L O2 NC, satting well  -Quit smoking within past year, no nicotine patch needed  -Pt states she stopped taking Breo, may restart as inpatient  -Will continue to monitor closely

## 2017-02-17 NOTE — ASSESSMENT & PLAN NOTE
-Hold dual antiplts for now   -Not in A fib per telemetry   -Continue pravastatin 40 mg po qd   -See GI bleed for anticoagulation plan.

## 2017-02-17 NOTE — ASSESSMENT & PLAN NOTE
-Continue digoxin 0.125 mg po qd, digoxin level pending  -Hold anticoagulation for today due to GI bleed and planned colonoscopy today.

## 2017-02-17 NOTE — ASSESSMENT & PLAN NOTE
- avoid nephrotoxic agents  - Strict Intake and Output  - Dose medications to current GFR  - Will continue to monitor urine output, electrolytes, and kidney function.

## 2017-02-17 NOTE — PROGRESS NOTES
Notified Dr. Hager with critical care team of patient not able to tolerate potassium chloride IVPB. Patient complaining of pain on arm with peripheral IV. Asked Dr. Hager if PO potassium chloride would be an option, Dr. Hager does not want to replace potassium. Will continue to monitor patient.

## 2017-02-17 NOTE — PLAN OF CARE
[facility] JAYJAY Mistry MD  [address] / [place] [address]    [facility] [place] - [address]  [address]  [place] [address]  Phone: [phone] Fax: [phone]    Payor: BLUE CROSS BLUE SHIELD / Plan: [place] HMO / Product Type: HMO /     No future appointments.    Extended Emergency Contact Information  Primary Emergency Contact: Milagros Lara  Address: [address]           [place]  Mobile Phone: [phone]  Relation: Sister  Secondary Emergency Contact: Peri Prescott   [facility]  Home Phone: [phone]  Relation: Daughter     02/17/17 1616   Discharge Assessment   Assessment Type Discharge Planning Assessment   Confirmed/corrected address and phone number on facesheet? Yes   Assessment information obtained from? Patient   Expected Length of Stay (days) 3   Communicated expected length of stay with patient/caregiver no   Prior to hospitilization cognitive status: Alert/Oriented   Prior to hospitalization functional status: Independent   Current cognitive status: Alert/Oriented   Current Functional Status: Assistive Equipment;Needs Assistance   Arrived From home or self-care   Lives With child(vanita), adult;grandchild(vanita)   Able to Return to Prior Arrangements yes   Is patient able to care for self after discharge? Yes   How many people do you have in your home that can help with your care after discharge? 1   Who are your caregiver(s) and their phone number(s)? Peri Presctot Daughter [phone]    Patient's perception of discharge disposition home or selfcare   Readmission Within The Last 30 Days no previous admission in last 30 days   Patient currently being followed by outpatient case management? No   Does the patient currently use HME? Yes   Patient currently receives private duty nursing? N/A   Patient currently receives any other outside agency services? No   Equipment  Currently Used at Home cane, quad;oxygen;shower chair;power chair;wheelchair;rollator   Do you have any problems affording any of your prescribed medications? No   Is the patient taking medications as prescribed? yes   Do you have any financial concerns preventing you from receiving the healthcare you need? No   Does the patient have transportation to healthcare appointments? Yes   Transportation Available family or friend will provide   On Dialysis? No   Does the patient receive services at the Coumadin Clinic? No   Are there any open cases? No   Discharge Plan A Home Health   Discharge Plan B Home with family   Patient/Family In Agreement With Plan yes   Jeanine Rico RN, BSN  Case Management  [facility]  Ext. [phone]

## 2017-02-18 LAB
ALBUMIN SERPL BCP-MCNC: 2.9 G/DL
ALP SERPL-CCNC: 129 U/L
ALT SERPL W/O P-5'-P-CCNC: 124 U/L
ANION GAP SERPL CALC-SCNC: 12 MMOL/L
AST SERPL-CCNC: 109 U/L
BASOPHILS # BLD AUTO: 0.01 K/UL
BASOPHILS # BLD AUTO: 0.02 K/UL
BASOPHILS NFR BLD: 0.1 %
BASOPHILS NFR BLD: 0.3 %
BILIRUB SERPL-MCNC: 1.7 MG/DL
BUN SERPL-MCNC: 56 MG/DL
CALCIUM SERPL-MCNC: 8.3 MG/DL
CHLORIDE SERPL-SCNC: 93 MMOL/L
CO2 SERPL-SCNC: 28 MMOL/L
CREAT SERPL-MCNC: 1.4 MG/DL
DIFFERENTIAL METHOD: ABNORMAL
DIFFERENTIAL METHOD: ABNORMAL
EOSINOPHIL # BLD AUTO: 0 K/UL
EOSINOPHIL # BLD AUTO: 0 K/UL
EOSINOPHIL NFR BLD: 0.4 %
EOSINOPHIL NFR BLD: 0.4 %
ERYTHROCYTE [DISTWIDTH] IN BLOOD BY AUTOMATED COUNT: 19.4 %
ERYTHROCYTE [DISTWIDTH] IN BLOOD BY AUTOMATED COUNT: 19.6 %
EST. GFR  (AFRICAN AMERICAN): 43.6 ML/MIN/1.73 M^2
EST. GFR  (NON AFRICAN AMERICAN): 37.8 ML/MIN/1.73 M^2
GLUCOSE SERPL-MCNC: 117 MG/DL
HCT VFR BLD AUTO: 22.4 %
HCT VFR BLD AUTO: 23.1 %
HGB BLD-MCNC: 7.2 G/DL
HGB BLD-MCNC: 7.2 G/DL
INR PPP: 1.4
INR PPP: 1.5
LYMPHOCYTES # BLD AUTO: 0.6 K/UL
LYMPHOCYTES # BLD AUTO: 0.6 K/UL
LYMPHOCYTES NFR BLD: 7.4 %
LYMPHOCYTES NFR BLD: 9.5 %
MAGNESIUM SERPL-MCNC: 2.1 MG/DL
MCH RBC QN AUTO: 28.6 PG
MCH RBC QN AUTO: 29.1 PG
MCHC RBC AUTO-ENTMCNC: 31.2 %
MCHC RBC AUTO-ENTMCNC: 32.1 %
MCV RBC AUTO: 91 FL
MCV RBC AUTO: 92 FL
MONOCYTES # BLD AUTO: 1.1 K/UL
MONOCYTES # BLD AUTO: 1.1 K/UL
MONOCYTES NFR BLD: 15 %
MONOCYTES NFR BLD: 16.5 %
NEUTROPHILS # BLD AUTO: 4.9 K/UL
NEUTROPHILS # BLD AUTO: 5.7 K/UL
NEUTROPHILS NFR BLD: 73.3 %
NEUTROPHILS NFR BLD: 76.7 %
PHOSPHATE SERPL-MCNC: 3.7 MG/DL
PLATELET # BLD AUTO: 267 K/UL
PLATELET # BLD AUTO: 272 K/UL
PMV BLD AUTO: 10.2 FL
PMV BLD AUTO: 10.6 FL
POTASSIUM SERPL-SCNC: 3.1 MMOL/L
PROT SERPL-MCNC: 6.3 G/DL
PROTHROMBIN TIME: 14.6 SEC
PROTHROMBIN TIME: 15.1 SEC
RBC # BLD AUTO: 2.47 M/UL
RBC # BLD AUTO: 2.52 M/UL
SODIUM SERPL-SCNC: 133 MMOL/L
WBC # BLD AUTO: 6.73 K/UL
WBC # BLD AUTO: 7.42 K/UL

## 2017-02-18 PROCEDURE — 25000003 PHARM REV CODE 250: Performed by: STUDENT IN AN ORGANIZED HEALTH CARE EDUCATION/TRAINING PROGRAM

## 2017-02-18 PROCEDURE — 94660 CPAP INITIATION&MGMT: CPT

## 2017-02-18 PROCEDURE — 25000003 PHARM REV CODE 250: Performed by: HOSPITALIST

## 2017-02-18 PROCEDURE — 36415 COLL VENOUS BLD VENIPUNCTURE: CPT

## 2017-02-18 PROCEDURE — 25000003 PHARM REV CODE 250: Performed by: INTERNAL MEDICINE

## 2017-02-18 PROCEDURE — 83735 ASSAY OF MAGNESIUM: CPT

## 2017-02-18 PROCEDURE — 80053 COMPREHEN METABOLIC PANEL: CPT

## 2017-02-18 PROCEDURE — 11000001 HC ACUTE MED/SURG PRIVATE ROOM

## 2017-02-18 PROCEDURE — 85025 COMPLETE CBC W/AUTO DIFF WBC: CPT

## 2017-02-18 PROCEDURE — 84100 ASSAY OF PHOSPHORUS: CPT

## 2017-02-18 PROCEDURE — 85610 PROTHROMBIN TIME: CPT

## 2017-02-18 RX ORDER — PANTOPRAZOLE SODIUM 40 MG/1
40 TABLET, DELAYED RELEASE ORAL DAILY
Status: DISCONTINUED | OUTPATIENT
Start: 2017-02-18 | End: 2017-02-20 | Stop reason: HOSPADM

## 2017-02-18 RX ORDER — FERROUS SULFATE 325(65) MG
325 TABLET, DELAYED RELEASE (ENTERIC COATED) ORAL NIGHTLY
Status: DISCONTINUED | OUTPATIENT
Start: 2017-02-18 | End: 2017-02-20 | Stop reason: HOSPADM

## 2017-02-18 RX ORDER — ALBUTEROL SULFATE 90 UG/1
2 AEROSOL, METERED RESPIRATORY (INHALATION) EVERY 6 HOURS PRN
Status: DISCONTINUED | OUTPATIENT
Start: 2017-02-18 | End: 2017-02-20 | Stop reason: HOSPADM

## 2017-02-18 RX ORDER — CARVEDILOL 3.12 MG/1
3.12 TABLET ORAL 2 TIMES DAILY WITH MEALS
Status: DISCONTINUED | OUTPATIENT
Start: 2017-02-18 | End: 2017-02-20 | Stop reason: HOSPADM

## 2017-02-18 RX ORDER — ALLOPURINOL 100 MG/1
300 TABLET ORAL DAILY
Status: DISCONTINUED | OUTPATIENT
Start: 2017-02-18 | End: 2017-02-20 | Stop reason: HOSPADM

## 2017-02-18 RX ADMIN — FERROUS SULFATE TAB EC 325 MG (65 MG FE EQUIVALENT) 325 MG: 325 (65 FE) TABLET DELAYED RESPONSE at 08:02

## 2017-02-18 RX ADMIN — LOSARTAN POTASSIUM 12.5 MG: 25 TABLET, FILM COATED ORAL at 10:02

## 2017-02-18 RX ADMIN — CARVEDILOL 3.12 MG: 3.12 TABLET, FILM COATED ORAL at 10:02

## 2017-02-18 RX ADMIN — CARVEDILOL 3.12 MG: 3.12 TABLET, FILM COATED ORAL at 04:02

## 2017-02-18 RX ADMIN — SODIUM CHLORIDE 250 ML: 0.9 INJECTION, SOLUTION INTRAVENOUS at 08:02

## 2017-02-18 RX ADMIN — Medication 3 ML: at 03:02

## 2017-02-18 RX ADMIN — TRAMADOL HYDROCHLORIDE 50 MG: 50 TABLET, COATED ORAL at 09:02

## 2017-02-18 RX ADMIN — Medication 3 ML: at 09:02

## 2017-02-18 RX ADMIN — PANTOPRAZOLE SODIUM 40 MG: 40 TABLET, DELAYED RELEASE ORAL at 12:02

## 2017-02-18 RX ADMIN — ALLOPURINOL 300 MG: 100 TABLET ORAL at 10:02

## 2017-02-18 RX ADMIN — Medication: at 10:02

## 2017-02-18 RX ADMIN — Medication 3 ML: at 05:02

## 2017-02-18 NOTE — NURSING TRANSFER
Nursing Transfer Note      2/17/2017     Transfer To: Ultrasound from [facility] 28    Transfer via stretcher    Transfer with cardiac monitoring    Transported by RN    Chart send with patient: Yes    Notified: report called to Fara RIVAS prior to transporting to ultrasound; patient said she does not have family here with her.

## 2017-02-18 NOTE — ANESTHESIA RELEASE NOTE
"Anesthesia Release from PACU Note    Patient: Jennifer Prescott    Procedure(s) Performed: Procedure(s) (LRB):  COLONOSCOPY (N/A)    Anesthesia type: general    Post pain: Adequate analgesia    Post assessment: no apparent anesthetic complications    Last Vitals:   Visit Vitals    BP (!) 109/91    Pulse 68    Temp 36.6 °C (97.9 °F) (Temporal)    Resp 20    Ht 5' 10" (1.778 m)    Wt 108.4 kg (238 lb 15.7 oz)    LMP  (LMP Unknown)    SpO2 95%    Breastfeeding No    BMI 34.29 kg/m2       Post vital signs: stable    Level of consciousness: awake    Nausea/Vomiting: no nausea/no vomiting    Complications: none    Airway Patency: patent    Respiratory: unassisted    Cardiovascular: stable and blood pressure at baseline    Hydration: euvolemic  "

## 2017-02-18 NOTE — PROGRESS NOTES
[facility] Hospital Medicine  Progress Note    Patient Name: Jennifer Prescott  MRN: [id number]  Patient Class: IP- Inpatient   Admission Date: 2/15/2017  Length of Stay: 3 days  Attending Physician: Regina Serrano MD  Primary Care Provider: Lianna Mistry MD    [facility]: [facility] 3 Branden Saeed MD    Subjective:     Principal Problem:GI bleed    Interval History:   Patient went for scope last night and per ICU, no acute bleed found.  This morning patient requesting her home medications be restarted    Review of Systems   Constitutional: Negative for chills and fever.   Respiratory: Negative for cough and shortness of breath.    Cardiovascular: Negative for chest pain and palpitations.   Gastrointestinal: Negative for constipation, diarrhea, nausea and vomiting.     Objective:     Vital Signs (Most Recent):  Temp: 98.4 °F (36.9 °C) (02/18/17 0833)  Pulse: 62 (02/18/17 0900)  Resp: 18 (02/18/17 0833)  BP: (!) 112/56 (02/18/17 0833)  SpO2: 100 % (02/18/17 0833) Vital Signs (24h Range):  Temp:  [97.4 °F (36.3 °C)-98.9 °F (37.2 °C)] 98.4 °F (36.9 °C)  Pulse:  [62-91] 62  Resp:  [18-35] 18  SpO2:  [89 %-100 %] 100 %  BP: ()/(41-91) 112/56     Weight: 108.4 kg (238 lb 15.7 oz)  Body mass index is 34.29 kg/(m^2).    Intake/Output Summary (Last 24 hours) at 02/18/17 1023  Last data filed at 02/18/17 0500   Gross per 24 hour   Intake              730 ml   Output                0 ml   Net              730 ml      Physical Exam   Constitutional: She is oriented to person, place, and time. No distress.   Cardiovascular: Normal rate and regular rhythm.  Exam reveals no gallop and no friction rub.    No murmur heard.  Pulmonary/Chest: No respiratory distress. She has no wheezes. She has rales (Mild BL Lower lung fields).   Abdominal: Soft. Bowel sounds are normal. She exhibits no distension. There is no tenderness.   Musculoskeletal: She exhibits edema.   Neurological: She is  alert and oriented to person, place, and time.        Significant Labs:   CBC:   Recent Labs  Lab 02/17/17  1236 02/18/17  0032 02/18/17  0430   WBC 8.39 7.42 6.73   HGB 7.4* 7.2* 7.2*   HCT 23.3* 23.1* 22.4*    272 267     CMP:   Recent Labs  Lab 02/17/17  0541 02/18/17  0430   * 133*   K 3.5 3.1*   CL 93* 93*   CO2 28 28   GLU 94 117*   BUN 65* 56*   CREATININE 1.4 1.4   CALCIUM 8.1* 8.3*   PROT 6.5 6.3   ALBUMIN 3.0* 2.9*   BILITOT 1.9* 1.7*   ALKPHOS 115 129   * 109*   * 124*   ANIONGAP 12 12   EGFRNONAA 37.8* 37.8*     02/16/2017 EGD Impression:             - Non-bleeding duodenal diverticulum.   - Portal hypertensive gastropathy. NOT ABLE TO DO BX DUE TO PLAVIX AND ELIQUIS   - 1 cm hiatus hernia.   - No specimens collected.    02/17/2017 Colonoscopy Impression:   - The examined portion of the ileum was normal.   - Diverticulosis in the sigmoid colon, in the descending colon and in the transverse colon.   - The entire examined colon is normal.   - No specimens collected.    Assessment/Plan:      * GI bleed  Hgb stable over last 24 hours and scoping found nothing  -Monitor patient for another 24 hours and discharge if Hgb is stable      Atrial fibrillation status post cardioversion 3/06/15  -Resuming home Coreg 3.125 for rate control  -Holding home apixaban until 48 h out from procedure      COPD (chronic obstructive pulmonary disease)  -Resuming home Breo inhaler  -Duo-Nebs 14h PRN      CAD S/P percutaneous coronary angioplasty  -S/p LAD stent - remote history  -3 JAMAL to RCA  1/27/15 - 2.5 x 38, 3.0 x 38, and 3.5 x 32 mm Promus JAMAL stent      Obesity hypoventilation syndrome on BiPAP  -Continue BiPAP qhs      Essential hypertension  -Restart Losartan 12.5 qd  -Restart Coreg 3.125 BID      NAYA (acute kidney injury)  -Resolving  -Continue to trend tomorrow      VTE Risk Mitigation         Ordered     High Risk of VTE  Once      02/15/17 1937     Reason for No Pharmacological VTE  Prophylaxis  Once      02/15/17 1937     Place ALL hose  Until discontinued      02/15/17 1937     Place sequential compression device  Until discontinued      02/15/17 1937          Branden Saeed MD  Department of Hospital Medicine   [facility]

## 2017-02-18 NOTE — PLAN OF CARE
Problem: Patient Care Overview  Goal: Plan of Care Review  Outcome: Ongoing (interventions implemented as appropriate)  Pt to continue care on IMTA.     Safety and free from falls via upright bedrails x2, continual rounding, and call-light c/in reach.     VSS and afebrile during shift.     Pt to monitor BM for bleeding and alert RN. Pt on R diet.

## 2017-02-18 NOTE — PLAN OF CARE
Problem: Patient Care Overview  Goal: Plan of Care Review  Outcome: Ongoing (interventions implemented as appropriate)  Alert/oriented. severely bruised in multiple spots. No bm this shift so unable to know if still having blood in stools. Skin intact. Working w/therapy. Generally weak. On 02 or bipap as tolerated/requested per pt. Afebrile. No s/s infection. No falls/trauma this shift. H/h monitoring q 6. Still fairly low.

## 2017-02-18 NOTE — H&P
[facility]  History & Physical    Subjective:      Chief Complaint/Reason for Admission:     Colonoscopy    Jennifer Prescott is a 71 y.o. female.    Past Medical History   Diagnosis Date    *Atrial fibrillation     Anticoagulant long-term use     Arthritis     Cardiomyopathy     Carotid artery occlusion     CHF (congestive heart failure)     COPD (chronic obstructive pulmonary disease)     COPD exacerbation 2/19/2015    Coronary artery disease      RCA occluded with L to R collaterals and normal LAD and LCx    Hypertension     Internal bleeding     Sleep apnea      uses bipap     Past Surgical History   Procedure Laterality Date    Tonsillectomy, adenoidectomy      Gallbladder surgery      Crt-d implantation      Abdominal surgery      Appendectomy      Cardiac pacemaker placement  9/21/2010     [facility]    Tonsillectomy      Vascular surgery      Cardiac catheterization      Cardiac defibrillator placement  10/9/2012     bivent AICD placed 10/2012 and revised 12/2012    Coronary angioplasty with stent placement  1/27/2015     3 JAMAL to RCA    Cardioversion  3/6/2015    Colonoscopy N/A 7/11/2016     Procedure: COLONOSCOPY;  Surgeon: Rafael Pérez MD;  Location: [facility];  Service: Endoscopy;  Laterality: N/A;    Cholecystectomy      Carotid endarterectomy Left      Family History   Problem Relation Age of Onset    Breast cancer Mother     Cancer Father     Heart failure Father     Heart disease Father     Heart attack Father     Sudden death Neg Hx      Social History   Substance Use Topics    Smoking status: Former Smoker     Packs/day: 0.25     Years: 50.00     Types: Cigarettes     Start date: 1/15/1965    Smokeless tobacco: Never Used    Alcohol use No       PTA Medications   Medication Sig    albuterol 90 mcg/actuation inhaler Inhale 2 puffs into the lungs every 6 (six) hours as needed for Wheezing.    albuterol-ipratropium 2.5mg-0.5mg/3mL (DUO-NEB) 0.5  mg-3 mg(2.5 mg base)/3 mL nebulizer solution USE 1 VIAL VIA NEBULIZER EVERY 6 HOURS AS NEEDED    apixaban (ELIQUIS) 5 mg Tab Take 1 tablet (5 mg total) by mouth 2 (two) times daily.    aspirin 81 MG Chew Take 81 mg by mouth every evening.     bumetanide (BUMEX) 1 MG tablet Take 1 tablet (1 mg total) by mouth 2 (two) times daily.    carvedilol (COREG) 3.125 MG tablet Take 1 tablet (3.125 mg total) by mouth 2 (two) times daily with meals.    clopidogrel (PLAVIX) 75 mg tablet Take 75 mg by mouth once daily.    cyclobenzaprine (FLEXERIL) 10 MG tablet Take 1 tablet (10 mg total) by mouth 3 (three) times daily as needed for Muscle spasms.    digoxin (LANOXIN) 125 mcg tablet Take 1 tablet (0.125 mg total) by mouth once daily.    ferrous sulfate 325 (65 FE) MG EC tablet Take 1 tablet (325 mg total) by mouth every evening.    fluticasone-vilanterol (BREO) 100-25 mcg/dose diskus inhaler Inhale 1 puff into the lungs once daily.    isosorbide mononitrate (IMDUR) 60 MG 24 hr tablet Take 1 tablet (60 mg total) by mouth once daily.    losartan (COZAAR) 25 MG tablet Take 0.5 tablets (12.5 mg total) by mouth once daily.    polymyxin B sulf-trimethoprim (POLYTRIM) 10,000 unit- 1 mg/mL Drop Place 1 drop into both eyes Daily.    pravastatin (PRAVACHOL) 40 MG tablet Take 1 tablet (40 mg total) by mouth once daily.    tramadol (ULTRAM) 50 mg tablet Take 1 tablet (50 mg total) by mouth every 6 (six) hours as needed for Pain.    allopurinol (ZYLOPRIM) 300 MG tablet Take 1 tablet (300 mg total) by mouth once daily.    DUREZOL 0.05 % Drop ophthalmic solution Place 2 drops into both eyes Daily.    ILEVRO 0.3 % DrpS Place 1 drop into both eyes Daily.    trazodone (DESYREL) 150 MG tablet TAKE 1 TABLET BY MOUTH AT BEDTIME     Review of patient's allergies indicates:   Allergen Reactions    Gabapentin         Review of Systems   Constitutional: Negative for chills, fever and weight loss.   Respiratory: Negative for shortness of  breath and wheezing.    Cardiovascular: Negative for chest pain.   Gastrointestinal: Positive for melena. Negative for abdominal pain, blood in stool, constipation and diarrhea.       Objective:      Vital Signs (Most Recent)  Temp: 97.4 °F (36.3 °C) (02/17/17 1500)  Pulse: 69 (02/17/17 1600)  Resp: (!) 22 (02/17/17 1700)  BP: (!) 112/58 (02/17/17 1600)  SpO2: 96 % (02/17/17 1600)    Vital Signs Range (Last 24H):  Temp:  [97.4 °F (36.3 °C)-98.4 °F (36.9 °C)]   Pulse:  [58-74]   Resp:  [14-38]   BP: ()/(47-78)   SpO2:  [88 %-100 %]     Physical Exam   Constitutional: She is oriented to person, place, and time. She appears well-developed and well-nourished.   Cardiovascular: Normal rate.    Pulmonary/Chest: Effort normal. No respiratory distress.   Abdominal: Soft.   Neurological: She is alert and oriented to person, place, and time.   Skin: Skin is warm and dry.   Psychiatric: She has a normal mood and affect. Her behavior is normal. Judgment and thought content normal.           Assessment:      Active Hospital Problems    Diagnosis  POA    *GI bleed [K92.2]  Unknown    NAYA (acute kidney injury) [N17.9]  Unknown    Long term (current) use of anticoagulants [V58.61] [Z79.01]  Not Applicable         Stopped coumadin  Several massive GI bleed on triple therapy      Chronic combined systolic and diastolic congestive heart failure [I50.42]  Yes     Chronic    CKD (chronic kidney disease) stage 3, GFR 30-59 ml/min [N18.3]  Yes     Chronic    Essential hypertension [I10]  Yes    Obesity hypoventilation syndrome on BiPAP [E66.2]  Yes     Chronic    Ischemic cardiomyopathy [I25.5]  Yes     Chronic    Automatic implantable cardioverter-defibrillator in situ [Z95.810]  Yes     Chronic    CAD S/P percutaneous coronary angioplasty [I25.10, Z98.61]  Not Applicable     Chronic         S/p LAD stent    3 JAMAL to RCA  1/27/15 - 2.5 x 38, 3.0 x 38, and 3.5 x 32 mm Promus JAMAL stent        COPD (chronic obstructive  pulmonary disease) [J44.9]  Yes     Chronic     Erick Peters MD     9/2/2013 11:51 AM  Complete PFT with bronchodilator:     Mild-severe obstruction without air trapping or hyperinflation present. No significant response to bronchodilator, however, this does not preclude a clinical response. Moderate restriction. Severe decrease in diffusing capacity, not corrected for patient's Hb.    When compared to prior study, dated 3/4/13, there appears to be development of obstruction by GOLD criteria.  Overall however, there has been little change the patient's PFTs.        Atrial fibrillation status post cardioversion 3/06/15 [I48.91]  Yes     Chronic      Resolved Hospital Problems    Diagnosis Date Resolved POA    Gastrointestinal hemorrhage [K92.2] 02/17/2017 Yes       Plan:    Colonoscopy for melena.

## 2017-02-18 NOTE — PROGRESS NOTES
Returned from procedures. Stable. bsc placed at bedside. Called resp. For bipap machine. Called for diet.

## 2017-02-18 NOTE — ASSESSMENT & PLAN NOTE
-S/p LAD stent - remote history  -3 JAMAL to RCA  1/27/15 - 2.5 x 38, 3.0 x 38, and 3.5 x 32 mm Promus JAMAL stent

## 2017-02-18 NOTE — ANESTHESIA POSTPROCEDURE EVALUATION
"Anesthesia Post Evaluation    Patient: Jennifer Prescott    Procedure(s) Performed: Procedure(s) (LRB):  COLONOSCOPY (N/A)    Final Anesthesia Type: general  Patient location during evaluation: PACU  Patient participation: Yes- Able to Participate  Level of consciousness: awake and alert  Post-procedure vital signs: reviewed and stable  Pain management: adequate  Airway patency: patent  PONV status at discharge: No PONV  Anesthetic complications: no      Cardiovascular status: blood pressure returned to baseline  Respiratory status: unassisted  Hydration status: euvolemic  Follow-up not needed.        Visit Vitals    BP (!) 109/91    Pulse 68    Temp 36.6 °C (97.9 °F) (Temporal)    Resp 20    Ht 5' 10" (1.778 m)    Wt 108.4 kg (238 lb 15.7 oz)    LMP  (LMP Unknown)    SpO2 95%    Breastfeeding No    BMI 34.29 kg/m2       Pain/Gurpreet Score: Pain Assessment Performed: Yes (2/17/2017  8:20 PM)  Presence of Pain: denies (2/17/2017  8:20 PM)  Pain Rating Prior to Med Admin: 9 (2/17/2017  8:35 PM)  Gurpreet Score: 9 (2/17/2017  8:20 PM)      "

## 2017-02-18 NOTE — SUBJECTIVE & OBJECTIVE
Interval History:   Patient went for scope last night and per ICU, no acute bleed found.  This morning patient requesting her home medications be restarted    Review of Systems   Constitutional: Negative for chills and fever.   Respiratory: Negative for cough and shortness of breath.    Cardiovascular: Negative for chest pain and palpitations.   Gastrointestinal: Negative for constipation, diarrhea, nausea and vomiting.     Objective:     Vital Signs (Most Recent):  Temp: 98.4 °F (36.9 °C) (02/18/17 0833)  Pulse: 62 (02/18/17 0900)  Resp: 18 (02/18/17 0833)  BP: (!) 112/56 (02/18/17 0833)  SpO2: 100 % (02/18/17 0833) Vital Signs (24h Range):  Temp:  [97.4 °F (36.3 °C)-98.9 °F (37.2 °C)] 98.4 °F (36.9 °C)  Pulse:  [62-91] 62  Resp:  [18-35] 18  SpO2:  [89 %-100 %] 100 %  BP: ()/(41-91) 112/56     Weight: 108.4 kg (238 lb 15.7 oz)  Body mass index is 34.29 kg/(m^2).    Intake/Output Summary (Last 24 hours) at 02/18/17 1023  Last data filed at 02/18/17 0500   Gross per 24 hour   Intake              730 ml   Output                0 ml   Net              730 ml      Physical Exam   Constitutional: She is oriented to person, place, and time. No distress.   Cardiovascular: Normal rate and regular rhythm.  Exam reveals no gallop and no friction rub.    No murmur heard.  Pulmonary/Chest: No respiratory distress. She has no wheezes. She has rales (Mild BL Lower lung fields).   Abdominal: Soft. Bowel sounds are normal. She exhibits no distension. There is no tenderness.   Musculoskeletal: She exhibits edema.   Neurological: She is alert and oriented to person, place, and time.        Significant Labs:   CBC:   Recent Labs  Lab 02/17/17  1236 02/18/17  0032 02/18/17  0430   WBC 8.39 7.42 6.73   HGB 7.4* 7.2* 7.2*   HCT 23.3* 23.1* 22.4*    272 267     CMP:   Recent Labs  Lab 02/17/17  0541 02/18/17  0430   * 133*   K 3.5 3.1*   CL 93* 93*   CO2 28 28   GLU 94 117*   BUN 65* 56*   CREATININE 1.4 1.4   CALCIUM  8.1* 8.3*   PROT 6.5 6.3   ALBUMIN 3.0* 2.9*   BILITOT 1.9* 1.7*   ALKPHOS 115 129   * 109*   * 124*   ANIONGAP 12 12   EGFRNONAA 37.8* 37.8*     02/16/2017 EGD Impression:             - Non-bleeding duodenal diverticulum.   - Portal hypertensive gastropathy. NOT ABLE TO DO BX DUE TO PLAVIX AND ELIQUIS   - 1 cm hiatus hernia.   - No specimens collected.    02/17/2017 Colonoscopy Impression:   - The examined portion of the ileum was normal.   - Diverticulosis in the sigmoid colon, in the descending colon and in the transverse colon.   - The entire examined colon is normal.   - No specimens collected.

## 2017-02-18 NOTE — ASSESSMENT & PLAN NOTE
-Resuming home Coreg 3.125 for rate control  -Holding home apixaban until 48 h out from procedure

## 2017-02-18 NOTE — ASSESSMENT & PLAN NOTE
Hgb stable over last 24 hours and scoping found nothing  -Monitor patient for another 24 hours and discharge if Hgb is stable

## 2017-02-18 NOTE — PROGRESS NOTES
Pt off bipap-doesn't want to wear it anymore. placedback on o2 2l per nasal cannula. Dr muñoz notified that pt is refusing bipap/

## 2017-02-19 LAB
ABO + RH BLD: NORMAL
ALBUMIN SERPL BCP-MCNC: 2.8 G/DL
ALP SERPL-CCNC: 126 U/L
ALT SERPL W/O P-5'-P-CCNC: 93 U/L
ANION GAP SERPL CALC-SCNC: 9 MMOL/L
AST SERPL-CCNC: 67 U/L
BASOPHILS # BLD AUTO: 0.01 K/UL
BASOPHILS NFR BLD: 0.1 %
BILIRUB SERPL-MCNC: 1.2 MG/DL
BLD GP AB SCN CELLS X3 SERPL QL: NORMAL
BLD PROD TYP BPU: NORMAL
BLOOD UNIT EXPIRATION DATE: NORMAL
BLOOD UNIT TYPE CODE: 9500
BLOOD UNIT TYPE: NORMAL
BUN SERPL-MCNC: 51 MG/DL
CALCIUM SERPL-MCNC: 7.9 MG/DL
CHLORIDE SERPL-SCNC: 93 MMOL/L
CO2 SERPL-SCNC: 29 MMOL/L
CODING SYSTEM: NORMAL
CREAT SERPL-MCNC: 1.2 MG/DL
DIFFERENTIAL METHOD: ABNORMAL
DISPENSE STATUS: NORMAL
EOSINOPHIL # BLD AUTO: 0.1 K/UL
EOSINOPHIL NFR BLD: 1.1 %
ERYTHROCYTE [DISTWIDTH] IN BLOOD BY AUTOMATED COUNT: 19.4 %
EST. GFR  (AFRICAN AMERICAN): 52.5 ML/MIN/1.73 M^2
EST. GFR  (NON AFRICAN AMERICAN): 45.6 ML/MIN/1.73 M^2
GLUCOSE SERPL-MCNC: 104 MG/DL
HCT VFR BLD AUTO: 23.1 %
HGB BLD-MCNC: 7 G/DL
INR PPP: 1.3
LYMPHOCYTES # BLD AUTO: 0.9 K/UL
LYMPHOCYTES NFR BLD: 11.5 %
MAGNESIUM SERPL-MCNC: 2 MG/DL
MCH RBC QN AUTO: 28.3 PG
MCHC RBC AUTO-ENTMCNC: 30.3 %
MCV RBC AUTO: 94 FL
MONOCYTES # BLD AUTO: 0.9 K/UL
MONOCYTES NFR BLD: 11.8 %
NEUTROPHILS # BLD AUTO: 5.5 K/UL
NEUTROPHILS NFR BLD: 75 %
PHOSPHATE SERPL-MCNC: 3.1 MG/DL
PLATELET # BLD AUTO: 254 K/UL
PMV BLD AUTO: 10.3 FL
POCT GLUCOSE: 116 MG/DL (ref 70–110)
POTASSIUM SERPL-SCNC: 3.7 MMOL/L
PROT SERPL-MCNC: 6 G/DL
PROTHROMBIN TIME: 13 SEC
RBC # BLD AUTO: 2.47 M/UL
SODIUM SERPL-SCNC: 131 MMOL/L
TRANS ERYTHROCYTES VOL PATIENT: NORMAL ML
WBC # BLD AUTO: 7.39 K/UL

## 2017-02-19 PROCEDURE — 86850 RBC ANTIBODY SCREEN: CPT

## 2017-02-19 PROCEDURE — 25000003 PHARM REV CODE 250: Performed by: STUDENT IN AN ORGANIZED HEALTH CARE EDUCATION/TRAINING PROGRAM

## 2017-02-19 PROCEDURE — 27000221 HC OXYGEN, UP TO 24 HOURS

## 2017-02-19 PROCEDURE — P9021 RED BLOOD CELLS UNIT: HCPCS

## 2017-02-19 PROCEDURE — 25000003 PHARM REV CODE 250: Performed by: INTERNAL MEDICINE

## 2017-02-19 PROCEDURE — 11000001 HC ACUTE MED/SURG PRIVATE ROOM

## 2017-02-19 PROCEDURE — 36415 COLL VENOUS BLD VENIPUNCTURE: CPT

## 2017-02-19 PROCEDURE — 86900 BLOOD TYPING SEROLOGIC ABO: CPT

## 2017-02-19 PROCEDURE — 94760 N-INVAS EAR/PLS OXIMETRY 1: CPT

## 2017-02-19 PROCEDURE — 80053 COMPREHEN METABOLIC PANEL: CPT

## 2017-02-19 PROCEDURE — 85025 COMPLETE CBC W/AUTO DIFF WBC: CPT

## 2017-02-19 PROCEDURE — 83735 ASSAY OF MAGNESIUM: CPT

## 2017-02-19 PROCEDURE — 86922 COMPATIBILITY TEST ANTIGLOB: CPT

## 2017-02-19 PROCEDURE — 85610 PROTHROMBIN TIME: CPT

## 2017-02-19 PROCEDURE — 84100 ASSAY OF PHOSPHORUS: CPT

## 2017-02-19 PROCEDURE — 25000003 PHARM REV CODE 250: Performed by: HOSPITALIST

## 2017-02-19 PROCEDURE — 94660 CPAP INITIATION&MGMT: CPT

## 2017-02-19 RX ORDER — HYDROCODONE BITARTRATE AND ACETAMINOPHEN 500; 5 MG/1; MG/1
TABLET ORAL
Status: DISCONTINUED | OUTPATIENT
Start: 2017-02-19 | End: 2017-02-20 | Stop reason: HOSPADM

## 2017-02-19 RX ORDER — PANTOPRAZOLE SODIUM 40 MG/1
40 TABLET, DELAYED RELEASE ORAL DAILY
Qty: 30 TABLET | Refills: 11 | Status: SHIPPED | OUTPATIENT
Start: 2017-02-19 | End: 2018-02-19

## 2017-02-19 RX ORDER — LOSARTAN POTASSIUM 25 MG/1
12.5 TABLET ORAL DAILY
Qty: 45 TABLET | Refills: 3 | Status: ON HOLD | OUTPATIENT
Start: 2017-02-19 | End: 2017-02-24

## 2017-02-19 RX ORDER — BUMETANIDE 1 MG/1
1 TABLET ORAL DAILY
Qty: 180 TABLET | Refills: 3 | Status: ON HOLD | OUTPATIENT
Start: 2017-02-19 | End: 2017-02-24

## 2017-02-19 RX ADMIN — TRAMADOL HYDROCHLORIDE 50 MG: 50 TABLET, COATED ORAL at 01:02

## 2017-02-19 RX ADMIN — CARVEDILOL 3.12 MG: 3.12 TABLET, FILM COATED ORAL at 06:02

## 2017-02-19 RX ADMIN — FERROUS SULFATE TAB EC 325 MG (65 MG FE EQUIVALENT) 325 MG: 325 (65 FE) TABLET DELAYED RESPONSE at 09:02

## 2017-02-19 RX ADMIN — Medication 3 ML: at 05:02

## 2017-02-19 RX ADMIN — TRAMADOL HYDROCHLORIDE 50 MG: 50 TABLET, COATED ORAL at 02:02

## 2017-02-19 RX ADMIN — PANTOPRAZOLE SODIUM 40 MG: 40 TABLET, DELAYED RELEASE ORAL at 09:02

## 2017-02-19 RX ADMIN — TRAMADOL HYDROCHLORIDE 50 MG: 50 TABLET, COATED ORAL at 10:02

## 2017-02-19 RX ADMIN — ALLOPURINOL 300 MG: 100 TABLET ORAL at 09:02

## 2017-02-19 RX ADMIN — SODIUM CHLORIDE 250 ML: 0.9 INJECTION, SOLUTION INTRAVENOUS at 05:02

## 2017-02-19 RX ADMIN — Medication 3 ML: at 10:02

## 2017-02-19 RX ADMIN — POTASSIUM BICARBONATE 50 MEQ: 25 TABLET, EFFERVESCENT ORAL at 01:02

## 2017-02-19 NOTE — ASSESSMENT & PLAN NOTE
-Hgb stable over last 48 hours  -S/P EGD and Colonoscopy without interventions  -Continue to monitor CBC

## 2017-02-19 NOTE — PLAN OF CARE
Problem: Patient Care Overview  Goal: Plan of Care Review  Outcome: Ongoing (interventions implemented as appropriate)  Alert/oriented. Up w.assistance to/from bathroom. Working w/therapy. Skin intact although very bruised. Sits with legs dangling-increasing edema in feet/legs. Afebrile-no s/s infection noted. Pain in legs-reports neuropathy pain-some relief after 2nd tramadol. No falls/trauma this shift.

## 2017-02-19 NOTE — ASSESSMENT & PLAN NOTE
-Resuming home Coreg 3.125 for rate control  -Holding home apixaban until 48 h out from procedure, will restart on discharge

## 2017-02-19 NOTE — PROGRESS NOTES
Called IM 3 on call, regarding low b/p. Pt states no bm today. No dizziness. He is going to review chart and write orders.

## 2017-02-19 NOTE — ASSESSMENT & PLAN NOTE
-Restarted Losartan 12.5 qd, but pt with hypotension. Hold until seen by PCP in the outpt setting  -Continue Coreg 3.125 BID

## 2017-02-19 NOTE — PROGRESS NOTES
Responded to a core call. Pt. Was hypotensive, 70/30, NS bolus given per MD orders. /59, sats 100%, Paced on telemetry monitor. Pt. Is awake, in NAD, stayed on floor.

## 2017-02-19 NOTE — SUBJECTIVE & OBJECTIVE
Interval History: Restarted pt's home BP meds in anticipation of discharge. Pt with symptomatic hypotension requiring IVF. Received bolus NS and BP corrected. Home ACEI held.    Review of Systems   Constitutional: Negative for chills and fever.   Respiratory: Negative for cough and shortness of breath.    Cardiovascular: Negative for chest pain and palpitations.   Gastrointestinal: Negative for constipation, diarrhea, nausea and vomiting.     Objective:     Vital Signs (Most Recent):  Temp: 98.1 °F (36.7 °C) (02/19/17 0428)  Pulse: 69 (02/19/17 0500)  Resp: 16 (02/19/17 0455)  BP: 131/67 (02/19/17 0455)  SpO2: 98 % (02/19/17 0449) Vital Signs (24h Range):  Temp:  [97.8 °F (36.6 °C)-98.4 °F (36.9 °C)] 98.1 °F (36.7 °C)  Pulse:  [50-73] 69  Resp:  [16-18] 16  SpO2:  [98 %-100 %] 98 %  BP: ()/(36-67) 131/67     Weight: 108.4 kg (238 lb 15.7 oz)  Body mass index is 34.29 kg/(m^2).    Intake/Output Summary (Last 24 hours) at 02/19/17 0647  Last data filed at 02/19/17 0511   Gross per 24 hour   Intake             1300 ml   Output              150 ml   Net             1150 ml      Physical Exam   Constitutional: She is oriented to person, place, and time. No distress.   Cardiovascular: Normal rate and regular rhythm.  Exam reveals no gallop and no friction rub.    No murmur heard.  Pulmonary/Chest: No respiratory distress. She has no wheezes. She has rales (Mild BL Lower lung fields).   Abdominal: Soft. Bowel sounds are normal. She exhibits no distension. There is no tenderness.   Musculoskeletal: She exhibits edema.   Neurological: She is alert and oriented to person, place, and time.   Skin:   Multiple areas of discoloration and subcutaneous bleeding (old)       Significant Labs:   CBC:   Recent Labs  Lab 02/18/17  0032 02/18/17  0430 02/19/17  0514   WBC 7.42 6.73 7.39   HGB 7.2* 7.2* 7.0*   HCT 23.1* 22.4* 23.1*    267 254     CMP:   Recent Labs  Lab 02/18/17  0430   *   K 3.1*   CL 93*   CO2 28    *   BUN 56*   CREATININE 1.4   CALCIUM 8.3*   PROT 6.3   ALBUMIN 2.9*   BILITOT 1.7*   ALKPHOS 129   *   *   ANIONGAP 12   EGFRNONAA 37.8*       Significant Imaging: none

## 2017-02-19 NOTE — PROGRESS NOTES
[facility] Hospital Medicine  Progress Note    Patient Name: Jennifer Prescott  MRN: [id number]  Patient Class: IP- Inpatient   Admission Date: 2/15/2017  Length of Stay: 4 days  Attending Physician: Regina Serrano MD  Primary Care Provider: Lianna Mistry MD    Hospital Medicine Team: [facility] HOSP MED 3 Nara Conklin MD    Subjective:     Principal Problem:GI bleed    HPI:  70 yo F with PMHx CAD (s/p 3 JAMAL to RCA and  on 1/27/15, She is currently on ASA, plavix), COPD (former smoker with 54 pkyr hx), HFrEF with AICD placed (09/2016 EF 20%), chronic A fib (previously on warfarin, switched to apixaban 01/30/17), and previous hospitalization for GI bleed (07/2016) presents to ED 02/15/17 with 4 d hx of fatigue associated with SOB, generalized weakness.  She noticed stools became dark and tarry.  Denies BRBPR, hematemesis, fever/chills, n/v/c/d.  Tried 3 albuterol nebs at home but came to hospital due to no improvement in SOB.  Of note, no etiology of previous GI bleed was found on previous hospitalization with EGD and colonoscopy.  Notes that she did use 2 Aleve 2 d prior to admission but generally does avoid NSAIDs.       Hospital Course:  02/15/17:  Hgb 5.1 on admission.  VS stable with BP ranging 103-116/52-79.  Receiving 3 U PRBCs in ED, on pantoprazole drip.  GI consulted, plan for EGD in am.    02/16/17: EGD today  Found a non-bleeding duodenal diverticulum, portal hypertensive gastropathy (not biopsied 2/2 recent plavix and eliquis use), and a 1 cm hiatus hernia. Plan for colonoscopy tomorrow. Given EGD findings will order a abdominal ultrasound complete with venous doppler to evaluate for possible portal HTN and AAA. Her H&H, hemodynamics, and vitals remain stable. No evidence of acute active bleeding on physical exam.     2/17/17: No bleeding overnight.      Interval History: Restarted pt's home BP meds in anticipation of discharge. Pt with symptomatic hypotension requiring IVF.  Received bolus NS and BP corrected. Home ACEI held.    Review of Systems   Constitutional: Negative for chills and fever.   Respiratory: Negative for cough and shortness of breath.    Cardiovascular: Negative for chest pain and palpitations.   Gastrointestinal: Negative for constipation, diarrhea, nausea and vomiting.     Objective:     Vital Signs (Most Recent):  Temp: 98.1 °F (36.7 °C) (02/19/17 0428)  Pulse: 69 (02/19/17 0500)  Resp: 16 (02/19/17 0455)  BP: 131/67 (02/19/17 0455)  SpO2: 98 % (02/19/17 0449) Vital Signs (24h Range):  Temp:  [97.8 °F (36.6 °C)-98.4 °F (36.9 °C)] 98.1 °F (36.7 °C)  Pulse:  [50-73] 69  Resp:  [16-18] 16  SpO2:  [98 %-100 %] 98 %  BP: ()/(36-67) 131/67     Weight: 108.4 kg (238 lb 15.7 oz)  Body mass index is 34.29 kg/(m^2).    Intake/Output Summary (Last 24 hours) at 02/19/17 0647  Last data filed at 02/19/17 0511   Gross per 24 hour   Intake             1300 ml   Output              150 ml   Net             1150 ml      Physical Exam   Constitutional: She is oriented to person, place, and time. No distress.   Cardiovascular: Normal rate and regular rhythm.  Exam reveals no gallop and no friction rub.    No murmur heard.  Pulmonary/Chest: No respiratory distress. She has no wheezes. She has rales (Mild BL Lower lung fields).   Abdominal: Soft. Bowel sounds are normal. She exhibits no distension. There is no tenderness.   Musculoskeletal: She exhibits edema.   Neurological: She is alert and oriented to person, place, and time.   Skin:   Multiple areas of discoloration and subcutaneous bleeding (old)       Significant Labs:   CBC:   Recent Labs  Lab 02/18/17  0032 02/18/17  0430 02/19/17  0514   WBC 7.42 6.73 7.39   HGB 7.2* 7.2* 7.0*   HCT 23.1* 22.4* 23.1*    267 254     CMP:   Recent Labs  Lab 02/18/17  0430   *   K 3.1*   CL 93*   CO2 28   *   BUN 56*   CREATININE 1.4   CALCIUM 8.3*   PROT 6.3   ALBUMIN 2.9*   BILITOT 1.7*   ALKPHOS 129   *   ALT  124*   ANIONGAP 12   EGFRNONAA 37.8*       Significant Imaging: none    Assessment/Plan:      * GI bleed  -Hgb stable over last 48 hours  -S/P EGD and Colonoscopy without interventions  -Continue to monitor CBC      Atrial fibrillation status post cardioversion 3/06/15  -Resuming home Coreg 3.125 for rate control  -Holding home apixaban until 48 h out from procedure, will restart on discharge      COPD (chronic obstructive pulmonary disease)  -Resumed home Breo inhaler  -Duo-Nebs 14h PRN      CAD S/P percutaneous coronary angioplasty  -S/p LAD stent - remote history  -3 JAMAL to RCA  1/27/15 - 2.5 x 38, 3.0 x 38, and 3.5 x 32 mm Promus JAMAL stent      Obesity hypoventilation syndrome on BiPAP  -Continue BiPAP qhs      Essential hypertension  -Restarted Losartan 12.5 qd, but pt with hypotension. Hold until seen by PCP in the outpt setting  -Continue Coreg 3.125 BID      NAYA (acute kidney injury)  -Resolving  -Continue to monitor      VTE Risk Mitigation         Ordered     High Risk of VTE  Once      02/15/17 1937     Reason for No Pharmacological VTE Prophylaxis  Once      02/15/17 1937     Place ALL hose  Until discontinued      02/15/17 1937     Place sequential compression device  Until discontinued      02/15/17 1937          Nara Conklin MD  Department of Hospital Medicine   [facility]    Will discuss with Dr Serrano

## 2017-02-19 NOTE — PROGRESS NOTES
Awakened pt for v/s. Alert/oriented-easily arousable. V/s done-bp very low. Reported to dr mccray-ns 250 bolus ordered. Core call placed.

## 2017-02-19 NOTE — PROGRESS NOTES
MD paged: pt's R leg significantly swollen compared to L leg. Upon further assessment, R leg does have 2+ pitting edema. Not warm to touch, and is firm.    MD returned page and relayed information. MD stated that they will further assess during afternoon rounding.

## 2017-02-20 VITALS
SYSTOLIC BLOOD PRESSURE: 106 MMHG | TEMPERATURE: 99 F | HEIGHT: 70 IN | DIASTOLIC BLOOD PRESSURE: 54 MMHG | RESPIRATION RATE: 18 BRPM | WEIGHT: 239 LBS | HEART RATE: 69 BPM | BODY MASS INDEX: 34.22 KG/M2 | OXYGEN SATURATION: 97 %

## 2017-02-20 LAB
ALBUMIN SERPL BCP-MCNC: 3 G/DL
ALP SERPL-CCNC: 134 U/L
ALT SERPL W/O P-5'-P-CCNC: 78 U/L
ANION GAP SERPL CALC-SCNC: 8 MMOL/L
ANISOCYTOSIS BLD QL SMEAR: SLIGHT
AST SERPL-CCNC: 44 U/L
BASOPHILS # BLD AUTO: 0.02 K/UL
BASOPHILS NFR BLD: 0.2 %
BILIRUB SERPL-MCNC: 1.4 MG/DL
BUN SERPL-MCNC: 45 MG/DL
CALCIUM SERPL-MCNC: 8.3 MG/DL
CHLORIDE SERPL-SCNC: 93 MMOL/L
CO2 SERPL-SCNC: 31 MMOL/L
CREAT SERPL-MCNC: 1 MG/DL
DIFFERENTIAL METHOD: ABNORMAL
EOSINOPHIL # BLD AUTO: 0.2 K/UL
EOSINOPHIL NFR BLD: 1.7 %
ERYTHROCYTE [DISTWIDTH] IN BLOOD BY AUTOMATED COUNT: 18.9 %
EST. GFR  (AFRICAN AMERICAN): >60 ML/MIN/1.73 M^2
EST. GFR  (NON AFRICAN AMERICAN): 56.8 ML/MIN/1.73 M^2
GLUCOSE SERPL-MCNC: 105 MG/DL
HCT VFR BLD AUTO: 27.2 %
HGB BLD-MCNC: 8.1 G/DL
HYPOCHROMIA BLD QL SMEAR: ABNORMAL
INR PPP: 1.1
LYMPHOCYTES # BLD AUTO: 0.8 K/UL
LYMPHOCYTES NFR BLD: 8 %
MAGNESIUM SERPL-MCNC: 2.1 MG/DL
MCH RBC QN AUTO: 27.4 PG
MCHC RBC AUTO-ENTMCNC: 29.8 %
MCV RBC AUTO: 92 FL
MONOCYTES # BLD AUTO: 0.8 K/UL
MONOCYTES NFR BLD: 8.5 %
NEUTROPHILS # BLD AUTO: 7.7 K/UL
NEUTROPHILS NFR BLD: 81.6 %
OVALOCYTES BLD QL SMEAR: ABNORMAL
PHOSPHATE SERPL-MCNC: 3.1 MG/DL
PLATELET # BLD AUTO: 291 K/UL
PMV BLD AUTO: 10.4 FL
POIKILOCYTOSIS BLD QL SMEAR: SLIGHT
POLYCHROMASIA BLD QL SMEAR: ABNORMAL
POTASSIUM SERPL-SCNC: 3.9 MMOL/L
PROT SERPL-MCNC: 6.5 G/DL
PROTHROMBIN TIME: 11.8 SEC
RBC # BLD AUTO: 2.96 M/UL
SODIUM SERPL-SCNC: 132 MMOL/L
TARGETS BLD QL SMEAR: ABNORMAL
WBC # BLD AUTO: 9.4 K/UL

## 2017-02-20 PROCEDURE — 85025 COMPLETE CBC W/AUTO DIFF WBC: CPT

## 2017-02-20 PROCEDURE — 80053 COMPREHEN METABOLIC PANEL: CPT

## 2017-02-20 PROCEDURE — 25000003 PHARM REV CODE 250: Performed by: INTERNAL MEDICINE

## 2017-02-20 PROCEDURE — 25000003 PHARM REV CODE 250: Performed by: STUDENT IN AN ORGANIZED HEALTH CARE EDUCATION/TRAINING PROGRAM

## 2017-02-20 PROCEDURE — 84100 ASSAY OF PHOSPHORUS: CPT

## 2017-02-20 PROCEDURE — 83735 ASSAY OF MAGNESIUM: CPT

## 2017-02-20 PROCEDURE — 85610 PROTHROMBIN TIME: CPT

## 2017-02-20 PROCEDURE — 25000003 PHARM REV CODE 250: Performed by: HOSPITALIST

## 2017-02-20 PROCEDURE — 36415 COLL VENOUS BLD VENIPUNCTURE: CPT

## 2017-02-20 PROCEDURE — 99239 HOSP IP/OBS DSCHRG MGMT >30: CPT | Mod: ,,, | Performed by: HOSPITALIST

## 2017-02-20 RX ADMIN — ALLOPURINOL 300 MG: 100 TABLET ORAL at 08:02

## 2017-02-20 RX ADMIN — PANTOPRAZOLE SODIUM 40 MG: 40 TABLET, DELAYED RELEASE ORAL at 08:02

## 2017-02-20 RX ADMIN — Medication: at 08:02

## 2017-02-20 NOTE — SUBJECTIVE & OBJECTIVE
Interval History:  No acute events overnight.  Patient states she is ready to go home    Review of Systems   Constitutional: Negative for chills and fever.   Respiratory: Negative for cough and shortness of breath.    Cardiovascular: Negative for chest pain and palpitations.   Gastrointestinal: Negative for constipation, diarrhea, nausea and vomiting.     Objective:     Vital Signs (Most Recent):  Temp: 98.5 °F (36.9 °C) (02/20/17 1101)  Pulse: 69 (02/20/17 1101)  Resp: 18 (02/20/17 1101)  BP: (!) 106/54 (02/20/17 1101)  SpO2: 97 % (02/20/17 1101) Vital Signs (24h Range):  Temp:  [98.1 °F (36.7 °C)-98.9 °F (37.2 °C)] 98.5 °F (36.9 °C)  Pulse:  [69-76] 69  Resp:  [16-18] 18  SpO2:  [83 %-100 %] 97 %  BP: ()/(50-58) 106/54     Weight: 108.4 kg (238 lb 15.7 oz)  Body mass index is 34.29 kg/(m^2).    Intake/Output Summary (Last 24 hours) at 02/20/17 1245  Last data filed at 02/20/17 0500   Gross per 24 hour   Intake          1144.75 ml   Output                0 ml   Net          1144.75 ml      Physical Exam   Constitutional: She is oriented to person, place, and time. No distress.   Cardiovascular: Normal rate and regular rhythm.  Exam reveals no gallop and no friction rub.    No murmur heard.  Pulmonary/Chest: No respiratory distress. She has no wheezes. She has rales (Mild BL Lower lung fields).   Abdominal: Soft. Bowel sounds are normal. She exhibits no distension. There is no tenderness.   Musculoskeletal: She exhibits edema.   Neurological: She is alert and oriented to person, place, and time.   Skin:   Multiple areas of discoloration and subcutaneous bleeding (old)       Significant Labs:   CBC:   Recent Labs  Lab 02/19/17  0514 02/20/17  0607   WBC 7.39 9.40   HGB 7.0* 8.1*   HCT 23.1* 27.2*    291     CMP:   Recent Labs  Lab 02/19/17  0514 02/20/17  0607   * 132*   K 3.7 3.9   CL 93* 93*   CO2 29 31*    105   BUN 51* 45*   CREATININE 1.2 1.0   CALCIUM 7.9* 8.3*   PROT 6.0 6.5   ALBUMIN  2.8* 3.0*   BILITOT 1.2* 1.4*   ALKPHOS 126 134   AST 67* 44*   ALT 93* 78*   ANIONGAP 9 8   EGFRNONAA 45.6* 56.8*       Significant Imaging: I have reviewed all pertinent imaging results/findings within the past 24 hours.

## 2017-02-20 NOTE — PROGRESS NOTES
Dr. Hinojosa notified of patient's bp of 98/ 56 hr 69. Coreg held due to low bp per  Orders. Will continue to monitor.

## 2017-02-20 NOTE — PLAN OF CARE
Problem: Patient Care Overview  Goal: Plan of Care Review  Outcome: Ongoing (interventions implemented as appropriate)  Alert/oriented. Skin intact. Up to/from bathroom w/minimal assistance. Has pvd/neuropathy pain ble/feet. bp running low-md's aware. H/h still low-received 1 unit of blood yesterday. Afebrile. No s/s infection noted. No falls/trauma this shift.

## 2017-02-20 NOTE — ASSESSMENT & PLAN NOTE
-Resuming home Coreg 3.125 for rate control  -Holding home apixaban at discharge in setting of  Repeat need for PRBC last night and HAS-BLED and CHADS-Vasc BOTH = 4 - will defer to cardiology opt for final decision to start anticoagulation

## 2017-02-20 NOTE — ASSESSMENT & PLAN NOTE
-S/p LAD stent - remote history  -3 JAMAL to RCA  1/27/15 - 2.5 x 38, 3.0 x 38, and 3.5 x 32 mm Promus JAMAL stent  -Holding home plavix at discharge in setting of recent GIB and stent placed >1 yr ago - will defer to cardiology opt for final decision to start anticoagulation

## 2017-02-20 NOTE — PROGRESS NOTES
[facility] Hospital Medicine  Progress Note    Patient Name: Jennifer Prescott  MRN: [id number]  Patient Class: IP- Inpatient   Admission Date: 2/15/2017  Length of Stay: 5 days  Attending Physician: Regina Serrano MD  Primary Care Provider: Lianna Mistry MD    [facility] Medicine Team: [facility] [place] [place] HOSP MED 3 Branden Saeed MD    Subjective:     Principal Problem:GI bleed    Interval History:  No acute events overnight.  Patient states she is ready to go home    Review of Systems   Constitutional: Negative for chills and fever.   Respiratory: Negative for cough and shortness of breath.    Cardiovascular: Negative for chest pain and palpitations.   Gastrointestinal: Negative for constipation, diarrhea, nausea and vomiting.     Objective:     Vital Signs (Most Recent):  Temp: 98.5 °F (36.9 °C) (02/20/17 1101)  Pulse: 69 (02/20/17 1101)  Resp: 18 (02/20/17 1101)  BP: (!) 106/54 (02/20/17 1101)  SpO2: 97 % (02/20/17 1101) Vital Signs (24h Range):  Temp:  [98.1 °F (36.7 °C)-98.9 °F (37.2 °C)] 98.5 °F (36.9 °C)  Pulse:  [69-76] 69  Resp:  [16-18] 18  SpO2:  [83 %-100 %] 97 %  BP: ()/(50-58) 106/54     Weight: 108.4 kg (238 lb 15.7 oz)  Body mass index is 34.29 kg/(m^2).    Intake/Output Summary (Last 24 hours) at 02/20/17 1245  Last data filed at 02/20/17 0500   Gross per 24 hour   Intake          1144.75 ml   Output                0 ml   Net          1144.75 ml      Physical Exam   Constitutional: She is oriented to person, place, and time. No distress.   Cardiovascular: Normal rate and regular rhythm.  Exam reveals no gallop and no friction rub.    No murmur heard.  Pulmonary/Chest: No respiratory distress. She has no wheezes. She has rales (Mild BL Lower lung fields).   Abdominal: Soft. Bowel sounds are normal. She exhibits no distension. There is no tenderness.   Musculoskeletal: She exhibits edema.   Neurological: She is alert and oriented to person, place, and time.   Skin:   Multiple areas of  discoloration and subcutaneous bleeding (old)       Significant Labs:   CBC:   Recent Labs  Lab 02/19/17  0514 02/20/17  0607   WBC 7.39 9.40   HGB 7.0* 8.1*   HCT 23.1* 27.2*    291     CMP:   Recent Labs  Lab 02/19/17  0514 02/20/17  0607   * 132*   K 3.7 3.9   CL 93* 93*   CO2 29 31*    105   BUN 51* 45*   CREATININE 1.2 1.0   CALCIUM 7.9* 8.3*   PROT 6.0 6.5   ALBUMIN 2.8* 3.0*   BILITOT 1.2* 1.4*   ALKPHOS 126 134   AST 67* 44*   ALT 93* 78*   ANIONGAP 9 8   EGFRNONAA 45.6* 56.8*       Significant Imaging: I have reviewed all pertinent imaging results/findings within the past 24 hours.    Assessment/Plan:      * GI bleed  -Hgb stable over last 72 hours  -S/P EGD and Colonoscopy without interventions  -DC home today      Atrial fibrillation status post cardioversion 3/06/15  -Resuming home Coreg 3.125 for rate control  -Holding home apixaban at discharge in setting of  Repeat need for PRBC last night and HAS-BLED and CHADS-Vasc BOTH = 4 - will defer to cardiology opt for final decision to start anticoagulation      COPD (chronic obstructive pulmonary disease)  -Resumed home Breo inhaler  -Duo-Nebs q4h PRN      CAD S/P percutaneous coronary angioplasty  -S/p LAD stent - remote history  -3 JAMAL to RCA  1/27/15 - 2.5 x 38, 3.0 x 38, and 3.5 x 32 mm Promus JAMAL stent  -Holding home plavix at discharge in setting of recent GIB and stent placed >1 yr ago - will defer to cardiology opt for final decision to start anticoagulation      Obesity hypoventilation syndrome on BiPAP  -Continue BiPAP qhs      Essential hypertension  -Restarted Losartan 12.5 qd, but pt with hypotension. Hold until seen by PCP in the outpt setting  -Continue Coreg 3.125 BID      NAYA (acute kidney injury)  -Resolved  -Continue to monitor      VTE Risk Mitigation         Ordered     High Risk of VTE  Once      02/15/17 1937     Reason for No Pharmacological VTE Prophylaxis  Once      02/15/17 1937     Place ALL hose  Until  discontinued      02/15/17 1937     Place sequential compression device  Until discontinued      02/15/17 1937          Branden Saeed MD  Department of Hospital Medicine   [facility]

## 2017-02-20 NOTE — PROGRESS NOTES
Pt is discharged to home. Pt's vital signs are stable. Pt is AAOx4. Pt's iv was removed tip intact. No bleeding noted to the site. Dressing applied to the site. Discharge instructions reviewed in detail with the patient. Time allowed for questions, all questions answered. Pt left the unit in a wheelchair with transport.

## 2017-02-21 PROBLEM — N17.9 AKI (ACUTE KIDNEY INJURY): Status: RESOLVED | Noted: 2017-02-15 | Resolved: 2017-02-21

## 2017-02-21 NOTE — ASSESSMENT & PLAN NOTE
On admit her home plavix was held. Continued to hold at discharge in setting of recent GIB and stent placed >1 yr ago - will defer to cardiology opt for final decision to start anticoagulation

## 2017-02-21 NOTE — ASSESSMENT & PLAN NOTE
Patient was hypotensive on admit & her home HTN meds were initially held.  Prior to DC her BP was still running low in the high 90's - held losartan but restarted home Coreg 3.125 on DC

## 2017-02-21 NOTE — DISCHARGE SUMMARY
[facility] Hospital Medicine  Discharge Summary      Patient Name: Jennifer Prescott  MRN: [id number]  Admission Date: 2/15/2017  Hospital Length of Stay: 5 days  Discharge Date and Time: 02/20/2017 17:00  Attending Physician: No att. providers found   Discharging Provider: Branden Saeed MD  Primary Care Provider: Lianna Mistry MD  Hospital Medicine Team: [facility] [place] HOSP MED 3 Branden Saeed MD    HPI:   70 yo F with PMHx CAD (s/p 3 JAMAL to RCA and  on 1/27/15, She is currently on ASA, plavix), COPD (former smoker with 54 pkyr hx), HFrEF with AICD placed (09/2016 EF 20%), chronic A fib (previously on warfarin, switched to apixaban 01/30/17), and previous hospitalization for GI bleed (07/2016) presents to ED 02/15/17 with 4 d hx of fatigue associated with SOB, generalized weakness.  She noticed stools became dark and tarry.  Denies BRBPR, hematemesis, fever/chills, n/v/c/d.  Tried 3 albuterol nebs at home but came to hospital due to no improvement in SOB.  Of note, no etiology of previous GI bleed was found on previous hospitalization with EGD and colonoscopy.  Notes that she did use 2 Aleve 2 d prior to admission but generally does avoid NSAIDs.       Procedure(s) (LRB):  COLONOSCOPY (N/A)      Indwelling Lines/Drains at time of discharge:   Lines/Drains/Airways          No matching active lines, drains, or airways        Hospital Course:   Hgb 5.1 on admission.  VS stable with BP ranging 103-116/52-79.  Receiving 3 U PRBCs in ED, on pantoprazole drip.  Patient went for EGD on 02/16 & found a non-bleeding duodenal diverticulum, portal hypertensive gastropathy (not biopsied 2/2 recent plavix and eliquis use), and a 1 cm hiatus hernia.  Patient went for colonoscopy 02/17 that found no evidence of any lesion either.  Her H&H stayed stable over the 17th and she was stepped down to the floor on 2/18.  After stepping her down to floor on 2/18 her Hgb trended down to 7 on the evening before discharge  02/20.  She was given a blood transfusion, her anticoagulation was held and she was referred to cardiology for further evalaution of whether or not to start her anticoagularion 2/2 to his CHADS-Vasc and HAS-BLED both equal 4.         Consults:   Consults         Status Ordering Provider     Inpatient consult to Critical Care Medicine  Once     Provider:  (Not yet assigned)    Final result BRYCE DUMONT     Inpatient consult to Gastroenterology  Once     Provider:  (Not yet assigned)    Completed BRYCE DUMONT          Significant Diagnostic Studies: Labs:   CMP   Recent Labs  Lab 02/20/17  0607   *   K 3.9   CL 93*   CO2 31*      BUN 45*   CREATININE 1.0   CALCIUM 8.3*   PROT 6.5   ALBUMIN 3.0*   BILITOT 1.4*   ALKPHOS 134   AST 44*   ALT 78*   ANIONGAP 8   ESTGFRAFRICA >60.0   EGFRNONAA 56.8*    and CBC   Recent Labs  Lab 02/20/17  0607   WBC 9.40   HGB 8.1*   HCT 27.2*          Pending Diagnostic Studies:     None        Final Active Diagnoses:    Diagnosis Date Noted POA    PRINCIPAL PROBLEM:  GI bleed [K92.2] 02/15/2017 Yes    Chronic combined systolic and diastolic congestive heart failure [I50.42]  Yes     Chronic    CKD (chronic kidney disease) stage 3, GFR 30-59 ml/min [N18.3] 09/15/2014 Yes     Chronic    Essential hypertension [I10] 04/17/2013 Yes    Obesity hypoventilation syndrome on BiPAP [E66.2] 03/29/2013 Yes     Chronic    Ischemic cardiomyopathy [I25.5] 10/23/2012 Yes     Chronic    Automatic implantable cardioverter-defibrillator in situ [Z95.810] 10/23/2012 Yes     Chronic    CAD S/P percutaneous coronary angioplasty [I25.10, Z98.61] 08/03/2012 Not Applicable     Chronic    COPD (chronic obstructive pulmonary disease) [J44.9] 07/12/2012 Yes     Chronic    Atrial fibrillation status post cardioversion 3/06/15 [I48.91] 07/12/2012 Yes     Chronic      Problems Resolved During this Admission:    Diagnosis Date Noted Date Resolved POA    Gastrointestinal  hemorrhage [K92.2] 02/16/2017 02/17/2017 Yes    NAYA (acute kidney injury) [N17.9] 02/15/2017 02/21/2017 Yes      * GI bleed  Please see hospital course.      Atrial fibrillation status post cardioversion 3/06/15  After admission for GIB, her Apixban was held.  She was restarted on her home Coreg 3.125 at discharge, but as above, her anticoagulation was held pending further evaluation by cardiology as outpatient.      COPD (chronic obstructive pulmonary disease)  Home meds held on admit.  Restarted home Breo inhaler the day prior to her discharge.      CAD S/P percutaneous coronary angioplasty  On admit her home plavix was held. Continued to hold at discharge in setting of recent GIB and stent placed >1 yr ago - will defer to cardiology opt for final decision to start anticoagulation      Obesity hypoventilation syndrome on BiPAP  No acute events during admission.  Continued home BiPAP qhs.      Essential hypertension  Patient was hypotensive on admit & her home HTN meds were initially held.  Prior to DC her BP was still running low in the high 90's - held losartan & Imdur but restarted home Coreg 3.125 on DC      NAYA (acute kidney injury), resolved as of 2/21/2017  NAYA on presentation 2/2 to hypovolemia.  Resolved with IVF.        Discharged Condition: fair    Disposition: Home or Self Care    Follow Up:  Follow-up Information     Follow up with Lianna Mistry MD In 1 week.    Specialties:  Internal Medicine, Infectious Diseases    Contact information:    [address]  [address]  [facility] [facility] [address] [phone]          Follow up with Landon Ray - Cardiology In 1 week.    Specialty:  Cardiology    Contact information:    [address]  [facility] [address] [phone]    Additional information:    3rd floor        Patient Instructions:     Ambulatory Referral to Cardiology   Referral Priority: Urgent Referral Type: Consultation   Referral Reason: Specialty Services Required    Requested  Specialty: Cardiology    Number of Visits Requested: 1      Diet Cardiac     Activity as tolerated       Medications:  Reconciled Home Medications:   Discharge Medication List as of 2/20/2017 12:47 PM      START taking these medications    Details   pantoprazole (PROTONIX) 40 MG tablet Take 1 tablet (40 mg total) by mouth once daily., Starting 2/19/2017, Until Mon 2/19/18, Normal         CONTINUE these medications which have CHANGED    Details   bumetanide (BUMEX) 1 MG tablet Take 1 tablet (1 mg total) by mouth once daily., Starting 2/19/2017, Until Mon 2/19/18, Normal      losartan (COZAAR) 25 MG tablet Take 0.5 tablets (12.5 mg total) by mouth once daily. Please do not take until instructed to do so by your Primary Care doctor, Starting 2/19/2017, Until Mon 2/19/18, Normal         CONTINUE these medications which have NOT CHANGED    Details   albuterol 90 mcg/actuation inhaler Inhale 2 puffs into the lungs every 6 (six) hours as needed for Wheezing., Starting 11/4/2016, Until Discontinued, Normal      albuterol-ipratropium 2.5mg-0.5mg/3mL (DUO-NEB) 0.5 mg-3 mg(2.5 mg base)/3 mL nebulizer solution USE 1 VIAL VIA NEBULIZER EVERY 6 HOURS AS NEEDED FOR WHEEZING, Historical Med      allopurinol (ZYLOPRIM) 300 MG tablet Take 1 tablet (300 mg total) by mouth once daily., Starting 1/30/2017, Until Discontinued, Normal      aspirin 81 MG Chew Take 81 mg by mouth every evening. , Until Discontinued, Historical Med      carvedilol (COREG) 3.125 MG tablet Take 1 tablet (3.125 mg total) by mouth 2 (two) times daily with meals., Starting 10/13/2016, Until Fri 10/13/17, Normal      DUREZOL 0.05 % Drop ophthalmic solution Place 2 drops into both eyes Daily., Starting 11/23/2016, Until Discontinued, Historical Med      ferrous sulfate 325 (65 FE) MG EC tablet Take 1 tablet (325 mg total) by mouth every evening., Starting 7/13/2016, Until Discontinued, OTC      fluticasone-vilanterol (BREO) 100-25 mcg/dose diskus inhaler Inhale 1  puff into the lungs once daily., Starting 1/17/2017, Until Discontinued, Normal      ILEVRO 0.3 % DrpS Place 1 drop into both eyes Daily., Starting 12/30/2016, Until Discontinued, Historical Med      polymyxin B sulf-trimethoprim (POLYTRIM) 10,000 unit- 1 mg/mL Drop Place 1 drop into both eyes Daily., Starting 12/30/2016, Until Discontinued, Historical Med      pravastatin (PRAVACHOL) 40 MG tablet Take 1 tablet (40 mg total) by mouth once daily., Starting 1/30/2017, Until Discontinued, Normal      trazodone (DESYREL) 150 MG tablet TAKE 1 TABLET BY MOUTH AT BEDTIME, Print         STOP taking these medications       apixaban (ELIQUIS) 5 mg Tab Comments:   Reason for Stopping:         clopidogrel (PLAVIX) 75 mg tablet Comments:   Reason for Stopping:         cyclobenzaprine (FLEXERIL) 10 MG tablet Comments:   Reason for Stopping:         digoxin (LANOXIN) 125 mcg tablet Comments:   Reason for Stopping:         isosorbide mononitrate (IMDUR) 60 MG 24 hr tablet Comments:   Reason for Stopping:         tramadol (ULTRAM) 50 mg tablet Comments:   Reason for Stopping:             Time spent on the discharge of patient: 45 minutes    Branden Saeed MD  Department of Hospital Medicine  [facility]

## 2017-02-21 NOTE — ASSESSMENT & PLAN NOTE
After admission for GIB, her Apixban was held.  She was restarted on her home Coreg 3.125 at discharge, but as above, her anticoagulation was held pending further evaluation by cardiology as outpatient.  -Resuming home Coreg 3.125 for rate control  -Holding home apixaban at discharge in setting of  Repeat need for PRBC last night and HAS-BLED and CHADS-Vasc BOTH = 4 - will defer to cardiology opt for final decision to start anticoagulation

## 2017-02-22 ENCOUNTER — TELEPHONE (OUTPATIENT)
Dept: INTERNAL MEDICINE | Facility: CLINIC | Age: 72
End: 2017-02-22

## 2017-02-22 ENCOUNTER — HOSPITAL ENCOUNTER (INPATIENT)
Facility: HOSPITAL | Age: 72
LOS: 2 days | Discharge: HOME | DRG: 291 | End: 2017-02-24
Attending: EMERGENCY MEDICINE | Admitting: HOSPITALIST
Payer: COMMERCIAL

## 2017-02-22 ENCOUNTER — PATIENT OUTREACH (OUTPATIENT)
Dept: ADMINISTRATIVE | Facility: CLINIC | Age: 72
End: 2017-02-22
Payer: COMMERCIAL

## 2017-02-22 DIAGNOSIS — Z95.810 AUTOMATIC IMPLANTABLE CARDIOVERTER-DEFIBRILLATOR IN SITU: Chronic | ICD-10-CM

## 2017-02-22 DIAGNOSIS — I48.91 ATRIAL FIBRILLATION STATUS POST CARDIOVERSION: Chronic | ICD-10-CM

## 2017-02-22 DIAGNOSIS — I50.42 CHRONIC COMBINED SYSTOLIC AND DIASTOLIC CONGESTIVE HEART FAILURE: Chronic | ICD-10-CM

## 2017-02-22 DIAGNOSIS — D50.0 IRON DEFICIENCY ANEMIA DUE TO CHRONIC BLOOD LOSS: ICD-10-CM

## 2017-02-22 DIAGNOSIS — J43.9 PULMONARY EMPHYSEMA, UNSPECIFIED EMPHYSEMA TYPE: Chronic | ICD-10-CM

## 2017-02-22 DIAGNOSIS — J43.8 OTHER EMPHYSEMA: Chronic | ICD-10-CM

## 2017-02-22 DIAGNOSIS — E66.2 OBESITY HYPOVENTILATION SYNDROME: ICD-10-CM

## 2017-02-22 DIAGNOSIS — I50.43 ACUTE ON CHRONIC COMBINED SYSTOLIC AND DIASTOLIC CONGESTIVE HEART FAILURE: Primary | ICD-10-CM

## 2017-02-22 DIAGNOSIS — Z95.0 PACEMAKER: Chronic | ICD-10-CM

## 2017-02-22 DIAGNOSIS — I10 ESSENTIAL HYPERTENSION: ICD-10-CM

## 2017-02-22 DIAGNOSIS — I25.10 CAD S/P PERCUTANEOUS CORONARY ANGIOPLASTY: Chronic | ICD-10-CM

## 2017-02-22 DIAGNOSIS — N18.30 CKD (CHRONIC KIDNEY DISEASE) STAGE 3, GFR 30-59 ML/MIN: Chronic | ICD-10-CM

## 2017-02-22 DIAGNOSIS — I25.5 ISCHEMIC CARDIOMYOPATHY: Chronic | ICD-10-CM

## 2017-02-22 DIAGNOSIS — I49.5 SICK SINUS SYNDROME: ICD-10-CM

## 2017-02-22 DIAGNOSIS — Z98.61 CAD S/P PERCUTANEOUS CORONARY ANGIOPLASTY: Chronic | ICD-10-CM

## 2017-02-22 PROBLEM — J96.11 CHRONIC HYPOXEMIC RESPIRATORY FAILURE: Chronic | Status: ACTIVE | Noted: 2017-02-22

## 2017-02-22 LAB
ALBUMIN SERPL BCP-MCNC: 3 G/DL
ALLENS TEST: ABNORMAL
ALP SERPL-CCNC: 134 U/L
ALT SERPL W/O P-5'-P-CCNC: 46 U/L
ANION GAP SERPL CALC-SCNC: 12 MMOL/L
AST SERPL-CCNC: 24 U/L
BASOPHILS # BLD AUTO: 0.02 K/UL
BASOPHILS NFR BLD: 0.2 %
BILIRUB SERPL-MCNC: 0.9 MG/DL
BNP SERPL-MCNC: 1856 PG/ML
BUN SERPL-MCNC: 36 MG/DL
CALCIUM SERPL-MCNC: 8.3 MG/DL
CHLORIDE SERPL-SCNC: 96 MMOL/L
CK SERPL-CCNC: 20 U/L
CO2 SERPL-SCNC: 28 MMOL/L
CREAT SERPL-MCNC: 1.3 MG/DL
DIFFERENTIAL METHOD: ABNORMAL
EOSINOPHIL # BLD AUTO: 0.2 K/UL
EOSINOPHIL NFR BLD: 2.3 %
ERYTHROCYTE [DISTWIDTH] IN BLOOD BY AUTOMATED COUNT: 18.3 %
EST. GFR  (AFRICAN AMERICAN): 48 ML/MIN/1.73 M^2
EST. GFR  (NON AFRICAN AMERICAN): 41 ML/MIN/1.73 M^2
GLUCOSE SERPL-MCNC: 106 MG/DL
HCO3 UR-SCNC: 30.2 MMOL/L (ref 24–28)
HCT VFR BLD AUTO: 28 %
HGB BLD-MCNC: 8.2 G/DL
LYMPHOCYTES # BLD AUTO: 0.9 K/UL
LYMPHOCYTES NFR BLD: 10.5 %
MAGNESIUM SERPL-MCNC: 2.1 MG/DL
MCH RBC QN AUTO: 27.8 PG
MCHC RBC AUTO-ENTMCNC: 29.3 %
MCV RBC AUTO: 95 FL
MONOCYTES # BLD AUTO: 0.9 K/UL
MONOCYTES NFR BLD: 10.4 %
NEUTROPHILS # BLD AUTO: 6.6 K/UL
NEUTROPHILS NFR BLD: 76.4 %
PCO2 BLDA: 38.8 MMHG (ref 35–45)
PH SMN: 7.5 [PH] (ref 7.35–7.45)
PHOSPHATE SERPL-MCNC: 3.4 MG/DL
PLATELET # BLD AUTO: 260 K/UL
PMV BLD AUTO: 10.4 FL
PO2 BLDA: 110 MMHG (ref 80–100)
POC BE: 7 MMOL/L
POC SATURATED O2: 99 % (ref 95–100)
POC TCO2: 31 MMOL/L (ref 23–27)
POTASSIUM SERPL-SCNC: 4.1 MMOL/L
PROT SERPL-MCNC: 6.6 G/DL
RBC # BLD AUTO: 2.95 M/UL
SAMPLE: ABNORMAL
SITE: ABNORMAL
SODIUM SERPL-SCNC: 136 MMOL/L
TROPONIN I SERPL DL<=0.01 NG/ML-MCNC: 0.05 NG/ML
WBC # BLD AUTO: 8.63 K/UL

## 2017-02-22 PROCEDURE — 80053 COMPREHEN METABOLIC PANEL: CPT

## 2017-02-22 PROCEDURE — 96375 TX/PRO/DX INJ NEW DRUG ADDON: CPT

## 2017-02-22 PROCEDURE — 84484 ASSAY OF TROPONIN QUANT: CPT

## 2017-02-22 PROCEDURE — 25000242 PHARM REV CODE 250 ALT 637 W/ HCPCS: Performed by: EMERGENCY MEDICINE

## 2017-02-22 PROCEDURE — 11000001 HC ACUTE MED/SURG PRIVATE ROOM

## 2017-02-22 PROCEDURE — 99285 EMERGENCY DEPT VISIT HI MDM: CPT

## 2017-02-22 PROCEDURE — 94640 AIRWAY INHALATION TREATMENT: CPT

## 2017-02-22 PROCEDURE — 25000003 PHARM REV CODE 250: Performed by: HOSPITALIST

## 2017-02-22 PROCEDURE — 36600 WITHDRAWAL OF ARTERIAL BLOOD: CPT

## 2017-02-22 PROCEDURE — 93005 ELECTROCARDIOGRAM TRACING: CPT

## 2017-02-22 PROCEDURE — 82550 ASSAY OF CK (CPK): CPT

## 2017-02-22 PROCEDURE — 84100 ASSAY OF PHOSPHORUS: CPT

## 2017-02-22 PROCEDURE — 83880 ASSAY OF NATRIURETIC PEPTIDE: CPT

## 2017-02-22 PROCEDURE — 85025 COMPLETE CBC W/AUTO DIFF WBC: CPT

## 2017-02-22 PROCEDURE — 63600175 PHARM REV CODE 636 W HCPCS: Performed by: EMERGENCY MEDICINE

## 2017-02-22 PROCEDURE — 93010 ELECTROCARDIOGRAM REPORT: CPT | Mod: ,,, | Performed by: INTERNAL MEDICINE

## 2017-02-22 PROCEDURE — 96374 THER/PROPH/DIAG INJ IV PUSH: CPT

## 2017-02-22 PROCEDURE — 83735 ASSAY OF MAGNESIUM: CPT

## 2017-02-22 PROCEDURE — 82803 BLOOD GASES ANY COMBINATION: CPT

## 2017-02-22 RX ORDER — BUMETANIDE 0.25 MG/ML
1 INJECTION INTRAMUSCULAR; INTRAVENOUS 2 TIMES DAILY
Status: DISCONTINUED | OUTPATIENT
Start: 2017-02-22 | End: 2017-02-23

## 2017-02-22 RX ORDER — ACETAMINOPHEN 325 MG/1
650 TABLET ORAL EVERY 6 HOURS PRN
Status: DISCONTINUED | OUTPATIENT
Start: 2017-02-22 | End: 2017-02-24 | Stop reason: HOSPADM

## 2017-02-22 RX ORDER — SODIUM CHLORIDE 0.9 % (FLUSH) 0.9 %
3 SYRINGE (ML) INJECTION EVERY 8 HOURS
Status: DISCONTINUED | OUTPATIENT
Start: 2017-02-22 | End: 2017-02-24 | Stop reason: HOSPADM

## 2017-02-22 RX ORDER — IPRATROPIUM BROMIDE AND ALBUTEROL SULFATE 2.5; .5 MG/3ML; MG/3ML
3 SOLUTION RESPIRATORY (INHALATION)
Status: COMPLETED | OUTPATIENT
Start: 2017-02-22 | End: 2017-02-22

## 2017-02-22 RX ORDER — IPRATROPIUM BROMIDE AND ALBUTEROL SULFATE 2.5; .5 MG/3ML; MG/3ML
3 SOLUTION RESPIRATORY (INHALATION) EVERY 4 HOURS PRN
Status: DISCONTINUED | OUTPATIENT
Start: 2017-02-22 | End: 2017-02-24 | Stop reason: HOSPADM

## 2017-02-22 RX ORDER — FUROSEMIDE 10 MG/ML
80 INJECTION INTRAMUSCULAR; INTRAVENOUS
Status: COMPLETED | OUTPATIENT
Start: 2017-02-22 | End: 2017-02-22

## 2017-02-22 RX ORDER — TRAZODONE HYDROCHLORIDE 50 MG/1
150 TABLET ORAL NIGHTLY PRN
Status: DISCONTINUED | OUTPATIENT
Start: 2017-02-22 | End: 2017-02-24 | Stop reason: HOSPADM

## 2017-02-22 RX ORDER — NAPROXEN SODIUM 220 MG/1
81 TABLET, FILM COATED ORAL NIGHTLY
Status: DISCONTINUED | OUTPATIENT
Start: 2017-02-22 | End: 2017-02-24 | Stop reason: HOSPADM

## 2017-02-22 RX ORDER — ALLOPURINOL 100 MG/1
300 TABLET ORAL DAILY
Status: DISCONTINUED | OUTPATIENT
Start: 2017-02-23 | End: 2017-02-24 | Stop reason: HOSPADM

## 2017-02-22 RX ORDER — ISOSORBIDE MONONITRATE 60 MG/1
60 TABLET, EXTENDED RELEASE ORAL DAILY
Status: DISCONTINUED | OUTPATIENT
Start: 2017-02-23 | End: 2017-02-24 | Stop reason: HOSPADM

## 2017-02-22 RX ORDER — FLUTICASONE FUROATE AND VILANTEROL 100; 25 UG/1; UG/1
1 POWDER RESPIRATORY (INHALATION) DAILY
Status: DISCONTINUED | OUTPATIENT
Start: 2017-02-23 | End: 2017-02-24 | Stop reason: HOSPADM

## 2017-02-22 RX ORDER — CARVEDILOL 3.12 MG/1
3.12 TABLET ORAL 2 TIMES DAILY WITH MEALS
Status: DISCONTINUED | OUTPATIENT
Start: 2017-02-23 | End: 2017-02-24 | Stop reason: HOSPADM

## 2017-02-22 RX ORDER — ONDANSETRON 8 MG/1
8 TABLET, ORALLY DISINTEGRATING ORAL EVERY 8 HOURS PRN
Status: DISCONTINUED | OUTPATIENT
Start: 2017-02-22 | End: 2017-02-24 | Stop reason: HOSPADM

## 2017-02-22 RX ORDER — PRAVASTATIN SODIUM 40 MG/1
40 TABLET ORAL DAILY
Status: DISCONTINUED | OUTPATIENT
Start: 2017-02-23 | End: 2017-02-24 | Stop reason: HOSPADM

## 2017-02-22 RX ORDER — FERROUS SULFATE 325(65) MG
325 TABLET, DELAYED RELEASE (ENTERIC COATED) ORAL NIGHTLY
Status: DISCONTINUED | OUTPATIENT
Start: 2017-02-22 | End: 2017-02-24 | Stop reason: HOSPADM

## 2017-02-22 RX ORDER — PANTOPRAZOLE SODIUM 40 MG/1
40 TABLET, DELAYED RELEASE ORAL DAILY
Status: DISCONTINUED | OUTPATIENT
Start: 2017-02-23 | End: 2017-02-24 | Stop reason: HOSPADM

## 2017-02-22 RX ADMIN — IPRATROPIUM BROMIDE AND ALBUTEROL SULFATE 3 ML: .5; 3 SOLUTION RESPIRATORY (INHALATION) at 03:02

## 2017-02-22 RX ADMIN — ASPIRIN 81 MG CHEWABLE TABLET 81 MG: 81 TABLET CHEWABLE at 09:02

## 2017-02-22 RX ADMIN — BUMETANIDE 1 MG: 0.25 INJECTION, SOLUTION INTRAMUSCULAR; INTRAVENOUS at 06:02

## 2017-02-22 RX ADMIN — FUROSEMIDE 80 MG: 10 INJECTION, SOLUTION INTRAMUSCULAR; INTRAVENOUS at 03:02

## 2017-02-22 RX ADMIN — FERROUS SULFATE TAB EC 325 MG (65 MG FE EQUIVALENT) 325 MG: 325 (65 FE) TABLET DELAYED RESPONSE at 09:02

## 2017-02-22 NOTE — ED NOTES
Reports mild improvement following resp treatment. Pt sitting on side of bed talking on phone, no acute distress.

## 2017-02-22 NOTE — PT/OT/SLP DISCHARGE
Physical Therapy Discharge Summary    Jennifer Prescott  MRN: [id number]   GI bleed   Patient Discharged from acute Physical Therapy on 17.  Please refer to prior PT noted date on 17 for functional status.     Assessment:   Patient was discharge unexpectedly.  Information required to complete and accurate discharge summary is unknown.  Refer to therapy initial evaluation and last progress note for initial and most recent functional status and goal achievement.  Recommendations made may be found in medical record.  GOALS:   Physical Therapy Goals        Problem: Physical Therapy Goal    Goal Priority Disciplines Outcome Goal Variances Interventions   Physical Therapy Goal     PT/OT, PT Ongoing (interventions implemented as appropriate)     Description:  Goals to be met by: 2017     Patient will increase functional independence with mobility by performin. Supine to sit with Contact Guard Assistance  2. Sit to supine with Contact Guard Assistance  3. Sit to stand transfer with Contact Guard Assistance  4. Bed to chair transfer with Stand-by Assistance using no device/ appropriate device  5. Gait  x 50 feet with Stand-by Assistance using no device/ appropriate device  6. Stand for 5 minutes with Stand-by Assistance using no device/ appropriate device with O2 sats >/= 88%  7. Lower extremity exercise program x 20 reps per handout, with assistance as needed              Reasons for Discontinuation of Therapy Services  Transfer to alternate level of care.      Plan:  Patient Discharged to: PT recommending SNF. Pt discharged home.    Bessy Rivas PT, DPT  2017  Pager: [phone]

## 2017-02-22 NOTE — PROGRESS NOTES
C3 nurse attempted to contact patient. No answer. The following message was left for the patient to return the call:  Good afternoon I am a nurse calling on behalf of [facility] from the Care Coordination [facility].  This is a Transitional Care Call for Jennifer. When you have a moment please contact us at [phone] or [phone] Monday through Friday, between the hours of 8 am to 4 pm. We look forward to speaking with you. On behalf of [facility] have a nice day.    The patient does not have a scheduled HOSFU appointment within 7-14 days post hospital discharge date 02/20/17. Message sent to Physician staff to assist with HOSFU appointment scheduling.

## 2017-02-22 NOTE — TELEPHONE ENCOUNTER
Pt daughter called stating her mother had problem with left arm swelling. She also stated her mother was discharged from [facility] for breathing problems. I called and staked with pt daughter Lori, who stated pt in is in the ambulance as we speak complain of shortness of breath and severe swelling in left arm which started last night. I as Lori to call me tomorrow to update me on her mother condition. Vf//ma

## 2017-02-22 NOTE — ED NOTES
Pt presents to ed via ems with c/o shortness of breath starting this morning. EMS reports pt was 90% O2 sat on room air, came to 100 on 3 l. Pt wears 3 L O2 at home. Hx of CHF, pt recently hospitalized for GI bleed and states that her Lasix was not re started. Unable to lay flat, needs to sit straight up with feet on floor in order to comfortably breathe. Faint expiratory wheezes noted in bilateral lung fields. FLORENCIO diminished. 3+ edema noted to l hand/wrist. Pt aaox4, neuro intact, family at bedside. Pt placed on cont cardiac monitor.

## 2017-02-22 NOTE — ED PROVIDER NOTES
Encounter Date: 2/22/2017       History     Chief Complaint   Patient presents with    Shortness of Breath     pt arrived per [facility] c/o sob onset today, D/Seferino from hospital 2 days ago     Review of patient's allergies indicates:   Allergen Reactions    Gabapentin      HPI     72 y/o WF with history of Chronic CHF, COPD, CAD, HTN, Cardiomyopathy AICD/pacer, recent GI bleeding (just discharged 2 days ago), presents with increasing SOB that got much worse last night. She had to sit up on the side of the bed to breath. She was taken off her Bumex when she was discharged apparently due to low BP. He noticed a marked increase in her bilateral LE edema as well as the SOB the past few days. She took a Bumex early this morning and 2 nebulizer treatments at home but has not improved. She denies fever, chest pain, vomiting, black or tarry stools.   Past Medical History   Diagnosis Date    *Atrial fibrillation     Anticoagulant long-term use     Arthritis     Cardiomyopathy     Carotid artery occlusion     CHF (congestive heart failure)     COPD (chronic obstructive pulmonary disease)     COPD exacerbation 2/19/2015    Coronary artery disease      RCA occluded with L to R collaterals and normal LAD and LCx    Hypertension     Internal bleeding     Sleep apnea      uses bipap     Past Medical History Pertinent Negatives   Diagnosis Date Noted    Asthma 12/13/2012    MARY KATE on CPAP 12/1/2016     Past Surgical History   Procedure Laterality Date    Tonsillectomy, adenoidectomy      Gallbladder surgery      Crt-d implantation      Abdominal surgery      Appendectomy      Cardiac pacemaker placement  9/21/2010     [facility]    Tonsillectomy      Vascular surgery      Cardiac catheterization      Cardiac defibrillator placement  10/9/2012     bivent AICD placed 10/2012 and revised 12/2012    Coronary angioplasty with stent placement  1/27/2015     3 JAMAL to RCA    Cardioversion  3/6/2015     Colonoscopy N/A 7/11/2016     Procedure: COLONOSCOPY;  Surgeon: Rafael Pérez MD;  Location: [facility] END[facility];  Service: Endoscopy;  Laterality: N/A;    Cholecystectomy      Carotid endarterectomy Left     Esophagogastroduodenoscopy      Colonoscopy N/A 2/17/2017     Procedure: COLONOSCOPY;  Surgeon: Giovanni Bronson MD;  Location: [facility] ENDO ([address] [place] [address]);  Service: Endoscopy;  Laterality: N/A;     Family History   Problem Relation Age of Onset    Breast cancer Mother     Cancer Father     Heart failure Father     Heart disease Father     Heart attack Father     Sudden death Neg Hx      Social History   Substance Use Topics    Smoking status: Former Smoker     Packs/day: 0.25     Years: 50.00     Types: Cigarettes     Start date: 1/15/1965    Smokeless tobacco: Never Used    Alcohol use No     Review of Systems   Constitutional: Negative for fever.   HENT: Negative for sore throat.    Eyes: Negative for pain.   Respiratory: Positive for shortness of breath. Negative for cough.    Cardiovascular: Positive for leg swelling. Negative for chest pain and palpitations.   Gastrointestinal: Negative for abdominal pain, diarrhea, nausea and vomiting.   Genitourinary: Negative for dysuria and hematuria.   Musculoskeletal: Negative for arthralgias, back pain and neck pain.   Skin: Negative for rash.   Neurological: Negative.  Negative for dizziness, seizures and headaches.   Hematological:        No bleeding   Psychiatric/Behavioral: Negative.  Negative for confusion.   All other systems reviewed and are negative.      Physical Exam   Initial Vitals   BP Pulse Resp Temp SpO2   02/22/17 1514 02/22/17 1514 02/22/17 1517 02/22/17 1517 02/22/17 1514   106/66 71 20 97.9 °F (36.6 °C) 98 %     Physical Exam    Nursing note and vitals reviewed.  Constitutional: She appears well-developed. She is not diaphoretic. She appears distressed.   Obese; respiratory distress   HENT:   Head: Normocephalic and atraumatic.   Right Ear:  External ear normal.   Left Ear: External ear normal.   Nose: Nose normal.   Mouth/Throat: Oropharynx is clear and moist.   Eyes: Conjunctivae and EOM are normal. Pupils are equal, round, and reactive to light.   Neck: Normal range of motion. No JVD present.   Cardiovascular: Normal rate, regular rhythm and normal heart sounds. Exam reveals no friction rub.    No murmur heard.  Pulmonary/Chest: She has no wheezes. She has rales. She exhibits no tenderness.   Decreased BS at bases; generally decreased air movement ; few scattered wheezes    AICD upper chest wall   Abdominal: Soft. Bowel sounds are normal. There is no tenderness. There is no rebound and no guarding.   Musculoskeletal: Normal range of motion. She exhibits edema (4+ bilateral pitting pedal and pretibial edema).   Neurological: She is alert and oriented to person, place, and time. She has normal strength.   Skin: Skin is warm and dry. No rash and no abscess noted.   Psychiatric: She has a normal mood and affect.         ED Course   Procedures  Labs Reviewed   CBC W/ AUTO DIFFERENTIAL - Abnormal; Notable for the following:        Result Value    RBC 2.95 (*)     Hemoglobin 8.2 (*)     Hematocrit 28.0 (*)     MCHC 29.3 (*)     RDW 18.3 (*)     Lymph # 0.9 (*)     Gran% 76.4 (*)     Lymph% 10.5 (*)     All other components within normal limits   COMPREHENSIVE METABOLIC PANEL - Abnormal; Notable for the following:     BUN, Bld 36 (*)     Calcium 8.3 (*)     Albumin 3.0 (*)     ALT 46 (*)     eGFR if  48 (*)     eGFR if non  41 (*)     All other components within normal limits   TROPONIN I - Abnormal; Notable for the following:     Troponin I 0.046 (*)     All other components within normal limits   B-TYPE NATRIURETIC PEPTIDE - Abnormal; Notable for the following:     BNP 1856 (*)     All other components within normal limits   ISTAT PROCEDURE - Abnormal; Notable for the following:     POC PH 7.499 (*)     POC PO2 110 (*)      POC HCO3 30.2 (*)     POC TCO2 31 (*)     All other components within normal limits   CK   MAGNESIUM   PHOSPHORUS     EKG Readings: (Independently Interpreted)   Initial Reading: No STEMI. Previous EKG: Compared with most recent EKG Previous EKG Date: 2/16/17. Rhythm: Paced Rhythm. Heart Rate: 70. Ectopy: No Ectopy. Clinical Impression: Paced Rhythm Other Impression: wide QRS, ventricula paced rhythm; baseline artifact; no significant change from 2/16/17          Medical Decision Making:   Initial Assessment:     72 y/o WF with history of Chronic CHF, COPD, CAD, HTN, Cardiomyopathy AICD/pacer, recent GI bleeding (just discharged 2 days ago), presents with increasing SOB that got much worse last night. She had to sit up on the side of the bed to breath. She was taken off her Bumex when she was discharged apparently due to low BP. He noticed a marked increase in her bilateral LE edema as well as the SOB the past few days. She took a Bumex early this morning and 2 nebulizer treatments at home but has not improved. She denies fever, chest pain, vomiting, black or tarry stools.    Dyspnea; decreased BS at bases; no JVD sitting and patient cannot lay supine at this time; 4+ pitting pedal and pretibial edema bilaterally  Differential Diagnosis:   CHF exacerbation; COPD exacerbation; anemia; pneumonia; pneumothorax; PE  Clinical Tests:   Lab Tests: Ordered and Reviewed  The following lab test(s) were unremarkable: CBC, CMP, Troponin and BNP       <> Summary of Lab: Hgb 8.2  CMP: Bun 36 ; Cr 1.3 Alb 3.0  BNP 1856  Trop 0.046  CXR: poor inspiration; pulmonary vascular congestion; bibasilar opacities concerning for pleural effusion with atelectasis greater on right  Radiological Study: Ordered and Reviewed  Medical Tests: Ordered and Reviewed  ED Management:  Exam; labs; CXR, EKG  IV Lasix and Duoneb tx    Some improvement  Other:   I have discussed this case with another health care provider.       <> Summary of the  Discussion: Dr. Waterman, [facility], will admit  Critical care: 40 minutes                   ED Course     Clinical Impression:   The primary encounter diagnosis was Acute on chronic combined systolic and diastolic congestive heart failure. Diagnoses of Pulmonary emphysema, unspecified emphysema type, Iron deficiency anemia due to chronic blood loss, CKD (chronic kidney disease) stage 3, GFR 30-59 ml/min, and Obesity hypoventilation syndrome were also pertinent to this visit.          Bill Valentin Jr., MD  02/22/17 1737       Bill Valentin Jr., MD  02/22/17 0040

## 2017-02-22 NOTE — IP AVS SNAPSHOT
[facility]  [address]  [place] [address]  Phone: [phone]           Patient Discharge Instructions     Our goal is to set you up for success. This packet includes information on your condition, medications, and your home care. It will help you to care for yourself so you don't get sicker and need to go back to the hospital.     Please ask your nurse if you have any questions.        There are many details to remember when preparing to leave the hospital. Here is what you will need to do:    1. Take your medicine. If you are prescribed medications, review your Medication List in the following pages. You may have new medications to  at the pharmacy and others that you'll need to stop taking. Review the instructions for how and when to take your medications. Talk with your doctor or nurses if you are unsure of what to do.     2. Go to your follow-up appointments. Specific follow-up information is listed in the following pages. Your may be contacted by a transition nurse or clinical provider about future appointments. Be sure we have all of the phone numbers to reach you, if needed. Please contact your provider's office if you are unable to make an appointment.     3. Watch for warning signs. Your doctor or nurse will give you detailed warning signs to watch for and when to call for assistance. These instructions may also include educational information about your condition. If you experience any of warning signs to your health, call your doctor.               [facility]  Unless otherwise directed by your provider, please contact [facility], our nurse care line that is available for 24/7 assistance.     [phone] (toll-free)    Registered nurses in the [facility] provide clinical advisement, health education, appointment booking, and other advisory services.                    ** Verify the list of medication(s) below is accurate and up to date. Carry this  with you in case of emergency. If your medications have changed, please notify your healthcare provider.             Medication List      START taking these medications        Additional Info                      isosorbide mononitrate 60 MG 24 hr tablet   Commonly known as:  IMDUR   Refills:  0   Dose:  60 mg    Last time this was given:  60 mg on 2/24/2017  8:33 AM   Instructions:  Take 1 tablet (60 mg total) by mouth once daily.     Begin Date    AM    Noon    PM    Bedtime         CHANGE how you take these medications        Additional Info                      bumetanide 1 MG tablet   Commonly known as:  BUMEX   Quantity:  180 tablet   Refills:  3   Dose:  1 mg   What changed:    - when to take this  - additional instructions   Comments:  PLEASE FILL ON 12/16/15    Instructions:  Take 1 tablet (1 mg total) by mouth 2 (two) times daily. Take 2 tablets 2 times daily for the first 3 days.     Begin Date    AM    Noon    PM    Bedtime       losartan 25 MG tablet   Commonly known as:  COZAAR   Quantity:  45 tablet   Refills:  3   Dose:  12.5 mg   What changed:  additional instructions    Last time this was given:  12.5 mg on 2/24/2017  9:40 AM   Instructions:  Take 0.5 tablets (12.5 mg total) by mouth once daily.     Begin Date    AM    Noon    PM    Bedtime       trazodone 150 MG tablet   Commonly known as:  DESYREL   Quantity:  30 tablet   Refills:  3   What changed:  See the new instructions.    Last time this was given:  150 mg on 2/23/2017  2:03 AM   Instructions:  TAKE 1 TABLET BY MOUTH AT BEDTIME     Begin Date    AM    Noon    PM    Bedtime         CONTINUE taking these medications        Additional Info                      albuterol 90 mcg/actuation inhaler   Quantity:  18 g   Refills:  0   Dose:  2 puff    Instructions:  Inhale 2 puffs into the lungs every 6 (six) hours as needed for Wheezing.     Begin Date    AM    Noon    PM    Bedtime       albuterol-ipratropium 2.5mg-0.5mg/3mL 0.5 mg-3 mg(2.5 mg  base)/3 mL nebulizer solution   Commonly known as:  DUO-NEB   Refills:  2    Last time this was given:  3 mLs on 2/23/2017 12:12 PM   Instructions:  USE 1 VIAL VIA NEBULIZER EVERY 6 HOURS AS NEEDED FOR WHEEZING     Begin Date    AM    Noon    PM    Bedtime       allopurinol 300 MG tablet   Commonly known as:  ZYLOPRIM   Quantity:  90 tablet   Refills:  3   Dose:  300 mg    Last time this was given:  300 mg on 2/24/2017  8:33 AM   Instructions:  Take 1 tablet (300 mg total) by mouth once daily.     Begin Date    AM    Noon    PM    Bedtime       aspirin 81 MG Chew   Refills:  0   Dose:  81 mg    Last time this was given:  81 mg on 2/23/2017  9:13 PM   Instructions:  Take 81 mg by mouth every evening.     Begin Date    AM    Noon    PM    Bedtime       carvedilol 3.125 MG tablet   Commonly known as:  COREG   Quantity:  180 tablet   Refills:  3   Dose:  3.125 mg   Comments:  PLEASE FILL ON 12/16/15    Last time this was given:  3.125 mg on 2/24/2017  8:33 AM   Instructions:  Take 1 tablet (3.125 mg total) by mouth 2 (two) times daily with meals.     Begin Date    AM    Noon    PM    Bedtime       DUREZOL 0.05 % Drop ophthalmic solution   Refills:  1   Dose:  2 drop   Generic drug:  difluprednate    Instructions:  Place 2 drops into both eyes Daily.     Begin Date    AM    Noon    PM    Bedtime       ferrous sulfate 325 (65 FE) MG EC tablet   Refills:  0   Dose:  325 mg    Last time this was given:  325 mg on 2/23/2017  9:13 PM   Instructions:  Take 1 tablet (325 mg total) by mouth every evening.     Begin Date    AM    Noon    PM    Bedtime       fluticasone-vilanterol 100-25 mcg/dose diskus inhaler   Commonly known as:  BREO   Quantity:  28 each   Refills:  5   Dose:  1 puff    Instructions:  Inhale 1 puff into the lungs once daily.     Begin Date    AM    Noon    PM    Bedtime       ILEVRO 0.3 % Drps   Refills:  0   Dose:  1 drop   Generic drug:  nepafenac    Instructions:  Place 1 drop into both eyes Daily.      Begin Date    AM    Noon    PM    Bedtime       pantoprazole 40 MG tablet   Commonly known as:  PROTONIX   Quantity:  30 tablet   Refills:  11   Dose:  40 mg    Last time this was given:  40 mg on 2/24/2017  8:33 AM   Instructions:  Take 1 tablet (40 mg total) by mouth once daily.     Begin Date    AM    Noon    PM    Bedtime       polymyxin B sulf-trimethoprim 10,000 unit- 1 mg/mL Drop   Commonly known as:  POLYTRIM   Refills:  1   Dose:  1 drop    Instructions:  Place 1 drop into both eyes Daily.     Begin Date    AM    Noon    PM    Bedtime       pravastatin 40 MG tablet   Commonly known as:  PRAVACHOL   Quantity:  90 tablet   Refills:  3   Dose:  40 mg    Last time this was given:  40 mg on 2/24/2017  8:33 AM   Instructions:  Take 1 tablet (40 mg total) by mouth once daily.     Begin Date    AM    Noon    PM    Bedtime            Where to Get Your Medications      Information about where to get these medications is not yet available     ! Ask your nurse or doctor about these medications     bumetanide 1 MG tablet    isosorbide mononitrate 60 MG 24 hr tablet    losartan 25 MG tablet                  Please bring to all follow up appointments:    1. A copy of your discharge instructions.  2. All medicines you are currently taking in their original bottles.  3. Identification and insurance card.    Please arrive 15 minutes ahead of scheduled appointment time.    Please call 24 hours in advance if you must reschedule your appointment and/or time.        Your Scheduled Appointments     Mar 02, 2017  9:20 AM CST   Established Patient Visit with Lianna Mistry MD   [facility] - Internal Medicine ([place])    [address],  [address]  [facility] [address] [phone]            Mar 08, 2017 10:00 AM CST   Consult with ESMER Muniz - Hepatology ([address] )    [address]  [facility] [address] [phone]            Apr 05, 2017  1:00 PM CDT   GASTROENTEROLOGY  ESTABLISHED PATIENT with MD Landon Millan - Gastroenterology ([address] )    [address]  [facility] [address] [phone]              Follow-up Information     Follow up with Lianna Mistry MD On 3/2/2017.    Specialties:  Internal Medicine, Infectious Diseases    Why:  time: 9:20    Contact information:    Jamee FARRIS [facility]  [address] [place] [address] [phone]          Discharge Instructions     Future Orders    Activity as tolerated     Call MD for:  difficulty breathing or increased cough     Call MD for:  persistent dizziness, light-headedness, or visual disturbances     Diet general     Questions:    Total calories:      Fat restriction, if any:  60g Fat    Protein restriction, if any:      Na restriction, if any:  2gNa    Fluid restriction:      Additional restrictions:        Discharge References/Attachments     HEART FAILURE, DISCHARGE INSTRUCTIONS FOR (ENGLISH)    HEART FAILURE, WHAT IS (ENGLISH)    HEART FAILURE: MAKING CHANGES TO YOUR DIET (ENGLISH)    HEART FAILURE: TRACKING YOUR WEIGHT (ENGLISH)    HEART FAILURE: WARNING SIGNS OF A FLARE-UP (ENGLISH)    ISOSORBIDE MONONITRATE EXTENDED-RELEASE TABLETS (ENGLISH)        Primary Diagnosis     Your primary diagnosis was:  Heart Failure      Admission Information     Date & Time Provider Department CSN    2/22/2017  2:56 PM Freddie Waterman MD [facility] [id number]      Care Providers     Provider Role Specialty Primary office phone    Freddie Waterman MD Attending Provider Hospitalist [phone]    Freddie Waterman MD Physician  Hospitalist  [phone]      Important Medicare Message          Most Recent Value    Important Message from Medicare Regarding Discharge Appeal Rights  Given to patient/caregiver, Explained to patient/caregiver, Signed/date by patient/caregiver yes 02/24/2017 1101      Your Vitals Were     BP                   119/62 (BP Location: Left arm, Patient Position: Lying, BP  Method: Automatic)           Recent Lab Values        7/12/2012 1/30/2015                        1:00 PM  2:56 AM          A1C 5.8 6.5 (H)                     Allergies as of 2/24/2017        Reactions    Gabapentin       Advance Directives     An advance directive is a document which, in the event you are no longer able to make decisions for yourself, tells your healthcare team what kind of treatment you do or do not want to receive, or who you would like to make those decisions for you.  If you do not currently have an advance directive, [facility] encourages you to create one.  For more information call:  [phone]), [phone] ([phone]),  or log on to [email or web address].        Smoking Cessation     If you would like to quit smoking:   You may be eligible for free services if you are a [place] resident and started smoking cigarettes before September 1, 1988.  Call the Smoking Cessation Trust ([facility]) toll free at [phone]) [phone] or (336) [phone].   Call 8-911-QUIT-NOW if you do not meet the above criteria.            Language Assistance Services     ATTENTION: Language assistance services are available, free of charge. Please call [phone].      ATENCIÓN: Si habla español, tiene a arriola disposición servicios gratuitos de asistencia lingüística. Llame al [phone].     [facility] Ý: N?u b?n nói Ti?ng Vi?t, có các d?ch v? h? tr? ngôn ng? mi?n phí dành cho b?n. G?i s? [phone].        Stroke Education              Heart Failure Education       Heart Failure: Being Active  You have a condition called heart failure. Being active doesnt mean that you have to wear yourself out. Even a little movement each day helps to strengthen your heart. If you cant get out to exercise, you can do simple stretching and strengthening exercises at home. These are good ways to keep you well-conditioned and prevent you and your heart from becoming excessively weak.    Ideas to get you started  · Add  a little movement to things you do now. Walk to mail letters. Park your car at the far end of the parking lot and walk to the store. Walk up a flight of stairs instead of taking the elevator.  · Choose activities you enjoy. You might walk, swim, or ride an exercise bike. Things like gardening and washing the car count, too. Other possibilities include: washing dishes, walking the dog, walking around the mall, and doing aerobic activities with friends.  · Join a group exercise program at a [facility], a senior center, or a community center. Or look into a hospital cardiac rehabilitation program. Ask your doctor if you qualify.  Tips to keep you going  · Get up and get dressed each day. Go to a coffee shop and read a newspaper or go somewhere that you'll be in the presence of other active people. Youll feel more like being active.  · Make a plan. Choose one or more activities that you enjoy and that you can easily do. Then plan to do at least one each day. You might write your plan on a calendar.  · Go with a friend or a group if you like company. This can help you feel supported and stay motivated, too.  · Plan social events that you enjoy. This will keep you mentally engaged as well as physically motivated to do things you find pleasure in.  For your safety  · Talk with your healthcare provider before starting an exercise program.  · Exercise indoors when its too hot or too cold outside, or when the air quality is poor. Try walking at a shopping mall.  · Wear socks and sturdy shoes to maintain your balance and prevent falls.  · Start slowly. Do a few minutes several times a day at first. Increase your time and speed little by little.  · Stop and rest whenever you feel tired or get short of breath.  · Dont push yourself on days when you dont feel well.  Date Last Reviewed: 3/20/2016  © 3148-3175 [employer]. [address] [place] [address], [place] [address]. All rights reserved. This information is not  intended as a substitute for professional medical care. Always follow your healthcare professional's instructions.              Heart Failure: Evaluating Your Heart  You have a condition called heart failure. To evaluate your condition, your doctor will examine you, ask questions, and do some tests. Along with looking for signs of heart failure, the doctor looks for any other health problems that may have led to heart failure. The results of your evaluation will help your doctor form a treatment plan.  Health history and physical exam  Your visit will start with a health history. Tell the doctor about any symptoms youve noticed and about all medicines you take. Then youll have a physical exam. This includes listening to your heartbeat and breathing. Youll also be checked for swelling (edema) in your legs and neck. When you have fluid buildup or fluid in the lungs, it may be called congestive heart failure.  Diagnosing heart failure     During an echocardiogram, sound waves bounce off the heart. These are converted into a picture on the screen.   The following may be done to help your doctor form a diagnosis:  · X-rays show the size and shape of your heart. These pictures can also show fluid in your lungs.  · An electrocardiogram (ECG or EKG) shows the pattern of your heartbeat. Small pads (electrodes) are placed on your chest, arms, and legs. Wires connect the pads to the ECG machine, which records your hearts electrical signals. This can give the doctor information about heart function.  · An echocardiogram uses ultrasound waves to show the structure and movement of your heart muscle. This shows how well the heart pumps. It also shows the thickness of the heart walls, and if the heart is enlarged. It is one of the most useful, non-invasive tests as it provides information about the heart's general function. This helps your doctor make treatment decisions.  · Lab tests evaluate small amounts of blood or urine  for signs of problems. A BNP lab test can help diagnose and evaluate heart failure. BNP stands for B-type natriuretic peptide. The ventricles secrete more BNP when heart failure worsens. Lab tests can also provide information about metabolic dysfunction or heart dysfunction.  Your treatment plan  Based on the results of your evaluation and tests, your doctor will develop a treatment plan. This plan is designed to relieve some of your heart failure symptoms and help make you more comfortable. Your treatment plan may include:  · Medicine to help your heart work better and improve your quality of life  · Changes in what you eat and drink to help prevent fluid from backing up in your body  · Daily monitoring of your weight and heart failure symptoms to see how well your treatment plan is working  · Exercise to help you stay healthy  · Help with quitting smoking  · Emotional and psychological support to help adjust to the changes  · Referrals to other specialists to make sure you are being treated comprehensively  Date Last Reviewed: 3/21/2016  © 8996-4101 [employer]. [address] [place] [address]. All rights reserved. This information is not intended as a substitute for professional medical care. Always follow your healthcare professional's instructions.              Heart Failure: Making Changes to Your Diet  You have a condition called heart failure. When you have heart failure, excess fluid is more likely to build up in your body because your heart isn't working well. This makes the heart work harder to pump blood. Fluid buildup causes symptoms such as shortness of breath and swelling (edema). This is often referred to as congestive heart failure or CHF. Controlling the amount of salt (sodium) you eat may help stop fluid from building up. Your doctor may also tell you to reduce the amount of fluid you drink.  Reading food labels    Your healthcare provider will tell you how much sodium you  can eat each day. Read food labels to keep track. Keep in mind that certain foods are high in salt. These include canned, frozen, and processed foods. Check the amount of sodium in each serving. Watch out for high-sodium ingredients. These include MSG (monosodium glutamate), baking soda, and sodium phosphate.   Eating less salt  Give yourself time to get used to eating less salt. It may take a little while. Here are some tips to help:  · Take the saltshaker off the table. Replace it with salt-free herb mixes and spices.  · Eat fresh or plain frozen vegetables. These have much less salt than canned vegetables.  · Choose low-sodium snacks like sodium-free pretzels, crackers, or air-popped popcorn.  · Dont add salt to your food when youre cooking. Instead, season your foods with pepper, lemon, garlic, or onion.  · When you eat out, ask that your food be cooked without added salt.  · Avoid eating fried foods as these often have a great deal of salt.  If youre told to limit fluids  You may need to limit how much fluid you have to help prevent swelling. This includes anything that is liquid at room temperature, such as ice cream and soup. If your doctor tells you to limit fluid, try these tips:  · Measure drinks in a measuring cup before you drink them. This will help you meet daily goals.  · Chill drinks to make them more refreshing.  · Suck on frozen lemon wedges to quench thirst.  · Only drink when youre thirsty.  · Chew sugarless gum or suck on hard candy to keep your mouth moist.  · Weigh yourself daily to know if your body's fluid content is rising.  My sodium goal  Your healthcare provider may give you a sodium goal to meet each day. This includes sodium found in food as well as salt that you add. My goal is to eat no more than ___________ mg of sodium per day.     When to call your doctor  Call your doctor right away if you have any symptoms of worsening heart failure. These can include:  · Sudden weight  gain  · Increased swelling of your legs or ankles  · Trouble breathing when youre resting or at night  · Increase in the number of pillows you have to sleep on  · Chest pain, pressure, discomfort, or pain in the jaw, neck, or back   Date Last Reviewed: 3/21/2016  © 1285-0833 [employer]. [address] [place] [address]. All rights reserved. This information is not intended as a substitute for professional medical care. Always follow your healthcare professional's instructions.              Heart Failure: Medicines to Help Your Heart    You have a condition called heart failure (also known as congestive heart failure, or CHF). Your doctor will likely prescribe medicines for heart failure and any underlying health problems you have. Most heart failure patients take one or more types of medicinen. Your healthcare provider will work to find the combination of medicines that works best for you.  Heart failure medicines  Here are the most common heart failure medicines:  · ACE inhibitors lower blood pressure and decrease strain on the heart. This makes it easier for the heart to pump. Angiotensin receptor blockers have similar effects. These are prescribed for some patients instead of ACE inhibitors.  · Beta-blockers relieve stress on the heart. They also improve symptoms. They may also improve the heart's pumping action over time.  · Diuretics (also called water pills) help rid your body of excess water. This can help rid your body of swelling (edema). Having less fluid to pump means your heart doesnt have to work as hard. Some diuretics make your body lose a mineral called potassium. Your doctor will tell you if you need to take supplements or eat more foods high in potassium.  · Digoxin helps your heart pump with more strength. This helps your heart pump more blood with each beat. So, more oxygen-rich blood travels to the rest of the body.  · Aldosterone antagonists help alter hormones and  decrease strain on the heart.  · Hydralazine and nitrates are two separate medicines used together to treat heart failure. They may come in one combination pill. They lower blood pressure and decrease how hard the heart has to pump.  Medicines for related conditions  Controlling other heart problems helps keep heart failure under control, too. Depending on other heart problems you have, medicines may be prescribed to:  · Lower blood pressure (antihypertensives).  · Lower cholesterol levels (statins).  · Prevent blood clots (anticoagulants or aspirin).  · Keep the heartbeat steady (antiarrhythmics).  Date Last Reviewed: 3/5/2016  © 0926-2502 [employer]. [address] [place] [address], [place] [address]. All rights reserved. This information is not intended as a substitute for professional medical care. Always follow your healthcare professional's instructions.              Heart Failure: Procedures That May Help    The heart is a muscle that pumps oxygen-rich blood to all parts of the body. When you have heart failure, the heart is not able to pump as well as it should. Blood and fluid may back up into the lungs (congestive heart failure), and some parts of the body dont get enough oxygen-rich blood to work normally. These problems lead to the symptoms of heart failure.     Certain procedures may help the heart pump better in some cases of heart failure. Some procedures are done to treat health problems that may have caused the heart failure such as coronary artery disease or heart rhythm problems. For more serious heart failure, other options are available.  Treating artery and valve problems  If you have coronary artery disease or valve disease, procedures may be done to improve blood flow. This helps the heart pump better, which can improve heart failure symptoms. First, your doctor may do a cardiac catheterization to help detect clogged blood vessels or valve damage. During this procedure, a  thin tube  (catheter) in inserted into a blood vessel and guided to the heart. There a dye is injected and a special type of X-ray (angiogram) is taken of the blood vessels. Procedures to open a blocked artery or fix damaged valves can also be done using catheterization.  · Angioplasty uses a balloon-tipped instrument at the end of the catheter. The balloon is inflated to widen the narrowed artery. In many cases, a stent is expanded to further support the narrowed artery. A stent is a metal mesh tube.  · Valve surgery repairs or replacement of faulty valves can also be done during catheterization so blood can flow properly through the chambers of the heart.  Bypass surgery is another option to help treat blocked arteries. It uses a healthy blood vessel from elsewhere in the body. The healthy blood vessel is attached above and below the blocked area so that blood can flow around the blocked artery.  Treating heart rhythm problems  A device may be placed in the chest to help a weak heart maintain a healthy, heartbeat so the heart can pump more effectively:  · Pacemaker. A pacemaker is an implanted device that regulates your heartbeat electronically. It monitors your heart's rhythm and generates a painless electric impulse that helps the heart beat in a regular rhythm. A pacemaker is programmed to meet your specific heart rhythm needs.  · Biventricular pacing/cardiac resynchronization therapy. A type of pacemaker that paces both pumping chambers of the heart at the same time to coordinate contractions and to improve the heart's function. Some people with heart failure are candidates for this therapy.  · Implantable cardioverter defibrillator. A device similar to a pacemaker that senses when the heart is beating too fast and delivers an electrical shock to convert the fast rhythm to a normal rhythm. This can be a life saving device.  In severe cases  In more serious cases of heart failure when other treatments no longer work,  other options may include:  · Ventricular assist devices (VADs). These are mechanical devices used to take over the pumping function for one or both of the heart's ventricles, or pumping chambers. A VAD may be necessary when heart failure progresses to the point that medicines and other treatments no longer help. In some cases, a VAD may be used as a bridge to transplant.  · Heart transplant. This is replacing the diseased heart with a healthy one from a donor. This is an option for a few people who are very sick. A heart transplant is very serious and not an option for all patients. Your doctor can tell you more.  Date Last Reviewed: 3/20/2016  © 2581-3843 [employer]. [address] [place] [address], [place] [address]. All rights reserved. This information is not intended as a substitute for professional medical care. Always follow your healthcare professional's instructions.              Heart Failure: Tracking Your Weight  You have a condition called heart failure. When you have heart failure, a sudden weight gain or a steady rise in weight is a warning sign that your body is retaining too much water and salt. This could mean your heart failure is getting worse. If left untreated, it can cause problems for your lungs and result in shortness of breath. Weighing yourself each day is the best way to know if youre retaining water. If your weight goes up quickly, call your doctor. You will be given instructions on how to get rid of the excess water. You will likely need medicines and to avoid salt. This will help your heart work better.  Call your doctor if you gain more than 2 pounds in 1 day, more than 5 pounds in 1 week, or whatever weight gain you were told to report by your doctor. This is often a sign of worsening heart failure and needs to be evaluated and treated. Your doctor will tell you what to do next.   Tips for weighing yourself    · Weigh yourself at the same time each morning, wearing the same  clothes. Weigh yourself after urinating and before eating.  · Use the same scale each day. Make sure the numbers are easy to read. Put the scale on a flat, hard surface -- not on a rug or carpet.  · Do not stop weighing yourself. If you forget one day, weigh again the next morning.  How to use your weight chart  · Keep your weight chart near the scale. Write your weight on the chart as soon as you get off the scale.  · Fill in the month and the start date on the chart. Then write down your weight each day. Your chart will look like this:    · If you miss a day, leave the space blank. Weigh yourself the next day and write your weight in the next space.  · Take your weight chart with you when you go to see your doctor.  Date Last Reviewed: 3/20/2016  © 0975-3782 [employer]. [address] [place] [address]. All rights reserved. This information is not intended as a substitute for professional medical care. Always follow your healthcare professional's instructions.              Heart Failure: Warning Signs of a Flare-Up  You have a condition called heart failure. Once you have heart failure, flare-ups can happen. Below are signs that can mean your heart failure is getting worse. If you notice any of these warning signs, call your healthcare provider.  Swelling    · Your feet, ankles, or lower legs get puffier.  · You notice skin changes on your lower legs.  · Your shoes feel too tight.  · Your clothes are tighter in the waist.  · You have trouble getting rings on or off your fingers.  Shortness of breath  · You have to breathe harder even when youre doing your normal activities or when youre resting.  · You are short of breath walking up stairs or even short distances.  · You wake up at night short of breath or coughing.  · You need to use more pillows or sit up to sleep.  · You wake up tired or restless.  Other warning signs  · You feel weaker, dizzy, or more tired.  · You have chest pain or  changes in your heartbeat.  · You have a cough that wont go away.  · You cant remember things or dont feel like eating.  Tracking your weight  Gaining weight is often the first warning sign that heart failure is getting worse. Gaining even a few pounds can be a sign that your body is retaining excess water and salt. Weighing yourself each day in the morning after you urinate and before you eat, is the best way to know if you're retaining water. Get a scale that is easy to read and make sure you wear the same clothes and use the same scale every time you weigh. Your healthcare provider will show you how to track your weight. Call your doctor if you gain more than 2 pounds in 1 day, 5 pounds in 1 week, or whatever weight gain you were told to report by your doctor. This is often a sign of worsening heart failure and needs to be evaluated and treated before it compromises your breathing. Your doctor will tell you what to do next.    Date Last Reviewed: 3/15/2016  © 4486-8894 [employer]. [address] [place] [address], [place] [address]. All rights reserved. This information is not intended as a substitute for professional medical care. Always follow your healthcare professional's instructions.              Pneumonmia Discharge Instructions                Chronic [place] Disease Education              [facility] complies with applicable Federal civil rights laws and does not discriminate on the basis of race, color, national origin, age, disability, or sex.

## 2017-02-22 NOTE — PATIENT INSTRUCTIONS
When You Have Gastrointestinal (GI) Bleeding    Blood in your vomit or stool can be a sign of gastrointestinal (GI) bleeding. GI bleeding can be scary. But the cause may not be serious. You should always see a doctor if GI bleeding occurs.  The GI tract  The GI tract is the path through which food travels in the body. Food passes from the mouth down the esophagus (the tube from the mouth to the stomach). Food begins to break down in the stomach. It then moves through the duodenum, the first part of the small intestine. Nutrients are absorbed as food travels through the small intestine. What is left passes into the colon (large intestine) as waste. The colon removes water from the waste. Waste continues from the colon to the rectum (where stool is stored). Waste then leaves the body through the anus.  Causes of GI bleeding  GI bleeding can be caused by many different problems. Some of the more common causes include:  · Swollen veins in the anus (hemorrhoids)  · Swollen veins in the esophagus (varices)  · Sore on the lining of the GI tract (ulcer)  · Cuts or scrapes in the mouth or throat  · Infection caused by germs such as bacteria or parasites  · Food allergies, such as milk allergy in young children  · Medicines  · Inflammation of the GI tract (gastritis or esophagitis)  · Colitis (Crohn's disease or ulcerative colitis  · Cancer (tumors or polyps)  · Abnormal pouches in the colon (diverticula)  · Tears in the esophagus or anus  · Nosebleed  · Abnormal blood vessels in the GI tract (angiodysplasia)  Diagnosing the cause of blood in stool  If blood is coming out in your stool, you may have a lower GI tract problem or a very fast upper GI tract bleed. Bleeding from the GI tract can be bright red. Or it may look dark and tarry. Tests may also find blood in your stool that cant be seen with the eye (occult blood). To find out the cause, tests that may be ordered include:  · Blood tests. A blood sample is taken and  sent to a lab for exam.  · Hemoccult test. Checks a stool sample for blood.  · Stool culture. Checks a stool sample for bacteria or parasites.  · X-ray, ultrasound, or CT scan. Imaging tests that take pictures of the digestive tract.  · Colonoscopy or sigmoidoscopy. This test uses a flexible tube with a tiny camera. The tube is inserted through your anus into your rectum to see the inside of your colon. Your provider can also take a tiny tissue sample (biopsy) and treat a bleeding source  Diagnosing the cause of blood in vomit  If you are vomiting blood or something that looks like coffee grounds, you may have an upper GI tract problem. To find the cause, tests that may be done include:  · Upper Endoscopy. A flexible tube with a tiny camera is inserted through your mouth and throat to see inside your upper GI tract. This lets your provider take a tiny tissue sample (biopsy) and treat a bleeding source.  · Nasogastric lavage. This can tell if you have upper GI or lower GI bleeding.  · X-ray, ultrasound, or CT scan. Imaging tests that take pictures of your digestive tract.  · Upper GI series. X-rays of the upper part of your GI tract taken from inside your body.  · Enteroscopy. This sends a flexible tube or a small, swallowed capsule camera into your small intestine.  When to call your healthcare provider  Call your healthcare provider right away if you have any of the following:  · Bleeding from your mouth or anus that can't be stopped  · Fever of 100.4°F (38.0°) or higher  · Bleeding along with feeling lightheaded or dizzy  · Signs of fluid loss (dehydration). These include a dry, sticky mouth, decreased urine output; and very dark urine.  · Belly (abdominal) pain   Date Last Reviewed: 7/1/2016  © 6159-9408 [employer]. [address] [place] [address], [place] [address]. All rights reserved. This information is not intended as a substitute for professional medical care. Always follow your healthcare  professional's instructions.

## 2017-02-23 LAB
ANION GAP SERPL CALC-SCNC: 12 MMOL/L
ANION GAP SERPL CALC-SCNC: 13 MMOL/L
BUN SERPL-MCNC: 41 MG/DL
BUN SERPL-MCNC: 45 MG/DL
CALCIUM SERPL-MCNC: 8.6 MG/DL
CALCIUM SERPL-MCNC: 8.7 MG/DL
CHLORIDE SERPL-SCNC: 94 MMOL/L
CHLORIDE SERPL-SCNC: 95 MMOL/L
CO2 SERPL-SCNC: 26 MMOL/L
CO2 SERPL-SCNC: 28 MMOL/L
CREAT SERPL-MCNC: 1.5 MG/DL
CREAT SERPL-MCNC: 1.6 MG/DL
EST. GFR  (AFRICAN AMERICAN): 37 ML/MIN/1.73 M^2
EST. GFR  (AFRICAN AMERICAN): 40 ML/MIN/1.73 M^2
EST. GFR  (NON AFRICAN AMERICAN): 32 ML/MIN/1.73 M^2
EST. GFR  (NON AFRICAN AMERICAN): 35 ML/MIN/1.73 M^2
GLUCOSE SERPL-MCNC: 137 MG/DL
GLUCOSE SERPL-MCNC: 152 MG/DL
MAGNESIUM SERPL-MCNC: 2.1 MG/DL
MAGNESIUM SERPL-MCNC: 2.2 MG/DL
POTASSIUM SERPL-SCNC: 3.8 MMOL/L
POTASSIUM SERPL-SCNC: 4.1 MMOL/L
SODIUM SERPL-SCNC: 134 MMOL/L
SODIUM SERPL-SCNC: 134 MMOL/L

## 2017-02-23 PROCEDURE — 25000242 PHARM REV CODE 250 ALT 637 W/ HCPCS: Performed by: HOSPITALIST

## 2017-02-23 PROCEDURE — 27000190 HC CPAP FULL FACE MASK W/VALVE

## 2017-02-23 PROCEDURE — 63600175 PHARM REV CODE 636 W HCPCS: Performed by: HOSPITALIST

## 2017-02-23 PROCEDURE — 94640 AIRWAY INHALATION TREATMENT: CPT

## 2017-02-23 PROCEDURE — 83735 ASSAY OF MAGNESIUM: CPT

## 2017-02-23 PROCEDURE — 36415 COLL VENOUS BLD VENIPUNCTURE: CPT

## 2017-02-23 PROCEDURE — 80048 BASIC METABOLIC PNL TOTAL CA: CPT | Mod: 91

## 2017-02-23 PROCEDURE — 25000003 PHARM REV CODE 250: Performed by: HOSPITALIST

## 2017-02-23 PROCEDURE — 94761 N-INVAS EAR/PLS OXIMETRY MLT: CPT

## 2017-02-23 PROCEDURE — 99900035 HC TECH TIME PER 15 MIN (STAT)

## 2017-02-23 PROCEDURE — 94660 CPAP INITIATION&MGMT: CPT

## 2017-02-23 PROCEDURE — 11000001 HC ACUTE MED/SURG PRIVATE ROOM

## 2017-02-23 PROCEDURE — 27000221 HC OXYGEN, UP TO 24 HOURS

## 2017-02-23 RX ORDER — BUMETANIDE 0.25 MG/ML
2 INJECTION INTRAMUSCULAR; INTRAVENOUS 3 TIMES DAILY
Status: DISCONTINUED | OUTPATIENT
Start: 2017-02-23 | End: 2017-02-24 | Stop reason: HOSPADM

## 2017-02-23 RX ADMIN — LOSARTAN POTASSIUM 12.5 MG: 25 TABLET, FILM COATED ORAL at 10:02

## 2017-02-23 RX ADMIN — ISOSORBIDE MONONITRATE 60 MG: 60 TABLET, EXTENDED RELEASE ORAL at 09:02

## 2017-02-23 RX ADMIN — PANTOPRAZOLE SODIUM 40 MG: 40 TABLET, DELAYED RELEASE ORAL at 09:02

## 2017-02-23 RX ADMIN — ASPIRIN 81 MG CHEWABLE TABLET 81 MG: 81 TABLET CHEWABLE at 09:02

## 2017-02-23 RX ADMIN — BUMETANIDE 2 MG: 0.25 INJECTION, SOLUTION INTRAMUSCULAR; INTRAVENOUS at 11:02

## 2017-02-23 RX ADMIN — SODIUM CHLORIDE, PRESERVATIVE FREE 3 ML: 5 INJECTION INTRAVENOUS at 06:02

## 2017-02-23 RX ADMIN — BUMETANIDE 2 MG: 0.25 INJECTION, SOLUTION INTRAMUSCULAR; INTRAVENOUS at 01:02

## 2017-02-23 RX ADMIN — CHLOROTHIAZIDE SODIUM 250 MG: 500 INJECTION, POWDER, LYOPHILIZED, FOR SOLUTION INTRAVENOUS at 10:02

## 2017-02-23 RX ADMIN — IPRATROPIUM BROMIDE AND ALBUTEROL SULFATE 3 ML: .5; 3 SOLUTION RESPIRATORY (INHALATION) at 12:02

## 2017-02-23 RX ADMIN — FERROUS SULFATE TAB EC 325 MG (65 MG FE EQUIVALENT) 325 MG: 325 (65 FE) TABLET DELAYED RESPONSE at 09:02

## 2017-02-23 RX ADMIN — ACETAMINOPHEN 650 MG: 325 TABLET ORAL at 02:02

## 2017-02-23 RX ADMIN — BUMETANIDE 2 MG: 0.25 INJECTION, SOLUTION INTRAMUSCULAR; INTRAVENOUS at 09:02

## 2017-02-23 RX ADMIN — CARVEDILOL 3.12 MG: 3.12 TABLET, FILM COATED ORAL at 05:02

## 2017-02-23 RX ADMIN — SODIUM CHLORIDE, PRESERVATIVE FREE 3 ML: 5 INJECTION INTRAVENOUS at 01:02

## 2017-02-23 RX ADMIN — ALLOPURINOL 300 MG: 100 TABLET ORAL at 09:02

## 2017-02-23 RX ADMIN — PRAVASTATIN SODIUM 40 MG: 40 TABLET ORAL at 09:02

## 2017-02-23 RX ADMIN — SODIUM CHLORIDE, PRESERVATIVE FREE 3 ML: 5 INJECTION INTRAVENOUS at 09:02

## 2017-02-23 RX ADMIN — CARVEDILOL 3.12 MG: 3.12 TABLET, FILM COATED ORAL at 09:02

## 2017-02-23 RX ADMIN — TRAZODONE HYDROCHLORIDE 150 MG: 50 TABLET ORAL at 02:02

## 2017-02-23 NOTE — PROGRESS NOTES
[facility] Hospital Medicine  Progress Note    Patient Name: Jennifer Prescott  MRN: [id number]  Patient Class: IP- Inpatient   Admission Date: 2/22/2017  Length of Stay: 1 days  Attending Physician: Freddie Waterman MD  Primary Care Provider: Lianna Mistry MD        Subjective:     Principal Problem:Acute on chronic combined systolic and diastolic congestive heart failure    HPI:  Jennifer Prescott is a 71 y.o.  woman with coronary artery disease (s/p 3 JAMAL to RCA 1/27/15), chronic systolic diastolic congestive heart failure (EF=20% 9/24/16), chronic hypotension, s/p pacemaker 9/21/10, s/p biventricular AICD 10/09/12, permanent atrial fibrillation (had cardioversion 3/06/15), bilateral carotid artery disease, left lower lobe pulmonary embolism (2/12/15), COPD with 40 pack year smoking history, obesity hypoventilation syndrome (on BiPAP), chronic hypoxic respiratory failure, ischemic colitis with recurrent GI bleeding, iron deficiency anemia due to chronic gastrointestinal blood loss, and chronic kidney disease stage 3. She lives in [place], [place]. She works in a [employer] center. Her primary care physician is Dr. Lianna Mistry at [facility]. Her cardiologist is Dr. Riley Braga.    She was started on apixaban 5 mg BID on 1/30/17, as she had been off anticoagulation since her last GI bleed. She was hospitalized at [facility] from 2/15/17-2/20/17 for another GI bleed and anemia episode. She was transfused a total of 4 units of packed RBCs. She was advised to stop apixaban, clopidrogel, losartan 12.5 mg daily, and isosorbide mononitrate 60 mg daily. According to Ms. Prescott, she was told to stop taking her bumetanide, and asked why she should stop despite worsening peripheral edema after her blood transfusions. She reportedly was told that it was because of her chronic hypotension. According to the medication reconciliation on the discharge  summary, her bumetanide was decreased from 1 mg BID to daily.    Regardless, she stopped taking bumetanide, and presented to [facility] 2 days later (2/22/17) with significant orthopnea. She was found to have pulmonary crackles and expiratory wheeze. Chest x-ray showed pulmonary edema. BNP was 1856, increased from 1576 on 2/15/17. She was given furosemide 80 mg IV and admitted to [facility].      Hospital Course:  She was given bumetanide 1 mg IV without much effect.     Interval History: Could not sleep due to orthopnea and leg pain.    Review of Systems   Respiratory: Positive for shortness of breath. Negative for cough.    Cardiovascular: Positive for leg swelling. Negative for chest pain.     Objective:     Vital Signs (Most Recent):  Temp: 96.1 °F (35.6 °C) (02/23/17 0837)  Pulse: 70 (02/23/17 0837)  Resp: 18 (02/23/17 0837)  BP: (!) 131/58 (02/23/17 0837)  SpO2: 98 % (02/23/17 0710) Vital Signs (24h Range):  Temp:  [96.1 °F (35.6 °C)-98.7 °F (37.1 °C)] 96.1 °F (35.6 °C)  Pulse:  [61-72] 70  Resp:  [18-20] 18  SpO2:  [97 %-100 %] 98 %  BP: ()/(45-78) 131/58     Weight: 112 kg (246 lb 14.6 oz)  Body mass index is 34.44 kg/(m^2).    Intake/Output Summary (Last 24 hours) at 02/23/17 0912  Last data filed at 02/23/17 0800   Gross per 24 hour   Intake              360 ml   Output              880 ml   Net             -520 ml      Physical Exam   Constitutional: She is oriented to person, place, and time. She appears well-developed. No distress.   Cardiovascular: Normal rate.  An irregularly irregular rhythm present.   Pulmonary/Chest: No respiratory distress. She has rales.   Musculoskeletal: She exhibits edema. She exhibits no tenderness.   Neurological: She is alert and oriented to person, place, and time.   Psychiatric: She has a normal mood and affect.       Significant Labs:   CMP:   Recent Labs  Lab 02/22/17  1531 02/23/17  0453    134*   K 4.1 4.1   CL 96 95   CO2  28 26    137*   BUN 36* 41*   CREATININE 1.3 1.6*   CALCIUM 8.3* 8.6*   PROT 6.6  --    ALBUMIN 3.0*  --    BILITOT 0.9  --    ALKPHOS 134  --    AST 24  --    ALT 46*  --    ANIONGAP 12 13   EGFRNONAA 41* 32*       Significant Imaging: I have reviewed all pertinent imaging results/findings within the past 24 hours.    Assessment/Plan:      * Acute on chronic combined systolic and diastolic congestive heart failure  Automatic implantable cardioverter-defibrillator in situ  Restarted losartan 12.5 mg daily. Give chlorothiazide 250 mg then bumetanide 2 mg IV TID. Monitor intake and output.       Permanent atrial fibrillation  Pacemaker 9/21/10  Continue carvedilol 3.125 mg BID. Continue holding anticoagulation for now due to recurrent GI bleeds on anticoagulation.      COPD (chronic obstructive pulmonary disease)  Continue fluticasone-vilanterol 100-25 mcg daily, albuterol-ipratropium nebulizer PRN.      CAD S/P percutaneous coronary angioplasty  Continue aspirin 81 mg daily, pravastatin 40 mg daily. Restarted isosorbide mononitrate 60 mg daily.      Obesity hypoventilation syndrome on BiPAP  Continue BiPAP nightly.      CKD (chronic kidney disease) stage 3, GFR 30-59 ml/min  Monitor.      Iron deficiency anemia due to chronic blood loss  Continue ferrous sulfate.      Neuropathic pain, leg, bilateral  Continue gabapentin.      Chronic hypoxemic respiratory failure  Continue supplemental oxygen as needed.      VTE Risk Mitigation         Ordered     Medium Risk of VTE  Once      02/22/17 2120        Disposition: Home when diuresed well enough.    Freddie Waterman MD  Department of Hospital Medicine   [facility]

## 2017-02-23 NOTE — SUBJECTIVE & OBJECTIVE
Interval History: Could not sleep due to orthopnea and leg pain.    Review of Systems   Respiratory: Positive for shortness of breath. Negative for cough.    Cardiovascular: Positive for leg swelling. Negative for chest pain.     Objective:     Vital Signs (Most Recent):  Temp: 96.1 °F (35.6 °C) (02/23/17 0837)  Pulse: 70 (02/23/17 0837)  Resp: 18 (02/23/17 0837)  BP: (!) 131/58 (02/23/17 0837)  SpO2: 98 % (02/23/17 0710) Vital Signs (24h Range):  Temp:  [96.1 °F (35.6 °C)-98.7 °F (37.1 °C)] 96.1 °F (35.6 °C)  Pulse:  [61-72] 70  Resp:  [18-20] 18  SpO2:  [97 %-100 %] 98 %  BP: ()/(45-78) 131/58     Weight: 112 kg (246 lb 14.6 oz)  Body mass index is 34.44 kg/(m^2).    Intake/Output Summary (Last 24 hours) at 02/23/17 0912  Last data filed at 02/23/17 0800   Gross per 24 hour   Intake              360 ml   Output              880 ml   Net             -520 ml      Physical Exam   Constitutional: She is oriented to person, place, and time. She appears well-developed. No distress.   Cardiovascular: Normal rate.  An irregularly irregular rhythm present.   Pulmonary/Chest: No respiratory distress. She has rales.   Musculoskeletal: She exhibits edema. She exhibits no tenderness.   Neurological: She is alert and oriented to person, place, and time.   Psychiatric: She has a normal mood and affect.       Significant Labs:   CMP:   Recent Labs  Lab 02/22/17  1531 02/23/17  0453    134*   K 4.1 4.1   CL 96 95   CO2 28 26    137*   BUN 36* 41*   CREATININE 1.3 1.6*   CALCIUM 8.3* 8.6*   PROT 6.6  --    ALBUMIN 3.0*  --    BILITOT 0.9  --    ALKPHOS 134  --    AST 24  --    ALT 46*  --    ANIONGAP 12 13   EGFRNONAA 41* 32*       Significant Imaging: I have reviewed all pertinent imaging results/findings within the past 24 hours.

## 2017-02-23 NOTE — ASSESSMENT & PLAN NOTE
Continue aspirin 81 mg daily, pravastatin 40 mg daily. Restart isosorbide mononitrate 60 mg daily.

## 2017-02-23 NOTE — PLAN OF CARE
Patient refuse to sit in bed with legs elevated. Insists on lower extremities down causing more pain  And edema.instructed pateint on importance of elevating lower extremities. Refused to wear bipap also. Refused bed alarm.

## 2017-02-23 NOTE — ASSESSMENT & PLAN NOTE
Continue aspirin 81 mg daily, pravastatin 40 mg daily. Restarted isosorbide mononitrate 60 mg daily.

## 2017-02-23 NOTE — SUBJECTIVE & OBJECTIVE
Past Medical History   Diagnosis Date    *Atrial fibrillation     Anticoagulant long-term use     Arthritis     Cardiomyopathy     Carotid artery occlusion     CHF (congestive heart failure)     COPD (chronic obstructive pulmonary disease)     COPD exacerbation 2/19/2015    Coronary artery disease      RCA occluded with L to R collaterals and normal LAD and LCx    Hypertension     Internal bleeding     Sleep apnea      uses bipap       Past Surgical History   Procedure Laterality Date    Tonsillectomy, adenoidectomy      Gallbladder surgery      Crt-d implantation      Abdominal surgery      Appendectomy      [facility]iac pacemaker placement  9/21/2010     [facility]    Tonsillectomy      Vascular surgery      Cardiac catheterization      Cardiac defibrillator placement  10/9/2012     bivent AICD placed 10/2012 and revised 12/2012    Coronary angioplasty with stent placement  1/27/2015     3 JAMAL to RCA    Cardioversion  3/6/2015    Colonoscopy N/A 7/11/2016     Procedure: COLONOSCOPY;  Surgeon: Rafael Pérez MD;  Location: [facility] ENDO;  Service: Endoscopy;  Laterality: N/A;    Cholecystectomy      Carotid endarterectomy Left     Esophagogastroduodenoscopy      Colonoscopy N/A 2/17/2017     Procedure: COLONOSCOPY;  Surgeon: Giovanni Bronson MD;  Location: [facility] ([address] [place] [address]);  Service: Endoscopy;  Laterality: N/A;       Review of patient's allergies indicates:   Allergen Reactions    Gabapentin        No current facility-administered medications on file prior to encounter.      Current Outpatient Prescriptions on File Prior to Encounter   Medication Sig    albuterol 90 mcg/actuation inhaler Inhale 2 puffs into the lungs every 6 (six) hours as needed for Wheezing.    albuterol-ipratropium 2.5mg-0.5mg/3mL (DUO-NEB) 0.5 mg-3 mg(2.5 mg base)/3 mL nebulizer solution USE 1 VIAL VIA NEBULIZER EVERY 6 HOURS AS NEEDED FOR WHEEZING    allopurinol (ZYLOPRIM) 300 MG tablet Take 1 tablet (300 mg total)  by mouth once daily.    aspirin 81 MG Chew Take 81 mg by mouth every evening.     bumetanide (BUMEX) 1 MG tablet Take 1 tablet (1 mg total) by mouth once daily.    carvedilol (COREG) 3.125 MG tablet Take 1 tablet (3.125 mg total) by mouth 2 (two) times daily with meals.    DUREZOL 0.05 % Drop ophthalmic solution Place 2 drops into both eyes Daily.    ferrous sulfate 325 (65 FE) MG EC tablet Take 1 tablet (325 mg total) by mouth every evening.    fluticasone-vilanterol (BREO) 100-25 mcg/dose diskus inhaler Inhale 1 puff into the lungs once daily.    ILEVRO 0.3 % DrpS Place 1 drop into both eyes Daily.    losartan (COZAAR) 25 MG tablet Take 0.5 tablets (12.5 mg total) by mouth once daily. Please do not take until instructed to do so by your Primary Care doctor    pantoprazole (PROTONIX) 40 MG tablet Take 1 tablet (40 mg total) by mouth once daily.    polymyxin B sulf-trimethoprim (POLYTRIM) 10,000 unit- 1 mg/mL Drop Place 1 drop into both eyes Daily.    pravastatin (PRAVACHOL) 40 MG tablet Take 1 tablet (40 mg total) by mouth once daily.    trazodone (DESYREL) 150 MG tablet TAKE 1 TABLET BY MOUTH AT BEDTIME (Patient taking differently: TAKE 1 TABLET BY MOUTH AT BEDTIME AS NEEDED FOR SLEEP)    [DISCONTINUED] zolpidem (AMBIEN) 5 MG Tab take 1 on the night of the study; if you still can't sleep for another 60-90 min after the pill - take another pill.     Family History     Problem Relation (Age of Onset)    Breast cancer Mother    Cancer Father    Heart attack Father    Heart disease Father    Heart failure Father        Social History Main Topics    Smoking status: Former Smoker     Packs/day: 0.25     Years: 50.00     Types: Cigarettes     Start date: 1/15/1965    Smokeless tobacco: Never Used    Alcohol use No    Drug use: No    Sexual activity: No     Review of Systems   Constitutional: Negative for fatigue and fever.   HENT: Negative for hearing loss and rhinorrhea.    Eyes: Negative for visual  disturbance.   Respiratory: Positive for shortness of breath. Negative for cough.    Cardiovascular: Positive for leg swelling. Negative for chest pain and palpitations.   Gastrointestinal: Negative for abdominal pain, nausea and vomiting.   Genitourinary: Negative for dysuria, flank pain and frequency.   Musculoskeletal: Negative for arthralgias.   Skin: Negative for rash.   Neurological: Negative for numbness and headaches.     Objective:     Vital Signs (Most Recent):  Temp: 97.9 °F (36.6 °C) (02/22/17 1517)  Pulse: 70 (02/22/17 1700)  Resp: 18 (02/22/17 1548)  BP: (!) 87/70 (02/22/17 1621)  SpO2: 99 % (02/22/17 1700) Vital Signs (24h Range):  Temp:  [97.9 °F (36.6 °C)] 97.9 °F (36.6 °C)  Pulse:  [61-71] 70  Resp:  [18-20] 18  SpO2:  [98 %-100 %] 99 %  BP: ()/(66-78) 87/70        There is no height or weight on file to calculate BMI.    Physical Exam   Constitutional: She is oriented to person, place, and time. She appears well-developed. No distress.   HENT:   Head: Normocephalic.   Mouth/Throat: Oropharynx is clear and moist.   Eyes: Conjunctivae are normal. Pupils are equal, round, and reactive to light.   Neck: Neck supple. No tracheal deviation present.   Cardiovascular: Normal rate.  An irregularly irregular rhythm present.   Pulmonary/Chest: Effort normal. She has wheezes. She has rales.   Abdominal: Soft. There is no tenderness. There is no guarding.   Musculoskeletal: She exhibits edema.   Neurological: She is alert and oriented to person, place, and time.   Skin: Skin is warm and dry.   Psychiatric: She has a normal mood and affect.        Significant Labs: Hgb 8.2, Hct 28.0. BUN 36, creatinine 1.3. Troponin 0.046. BNP 1856.    Significant Imaging:   X-Ray Chest 1 View 2/22/17: Smaller lung volumes from prior likely due to poor inspiratory effort.  There is stable 4-lead left-sided pacer device.  Continued bibasilar opacities greater on the right concerning for effusion with atelectasis.   Superimposed airspace process not excluded.  There is fullness of the hilar vasculature concerning for vascular congestion and edema likely accentuated by the small lung volumes.  No large pneumothorax.  Clinical correlation and followup advised.

## 2017-02-23 NOTE — PLAN OF CARE
Called pharmacy to inquire about losartan and diuril. Request for med put in at 936am. Pharmacy making diuril and will tube up with losartan as soon as they have blue tube

## 2017-02-23 NOTE — PROGRESS NOTES
Informed by RN on duty that pt took off her BiPAP mask at refused to put back on. Upon arrival to , Sats 100% on 3L NC; pt visibly SOB sitting on edge of bed; BBS diminished with fine crackles at bases. Repeatedly asked pt to put BiPAP mask on again--pt still refused; will continue to monitor.

## 2017-02-23 NOTE — ASSESSMENT & PLAN NOTE
Continue carvedilol 3.125 mg BID. Continue holding anticoagulation for now due to recurrent GI bleeds on anticoagulation.

## 2017-02-23 NOTE — ASSESSMENT & PLAN NOTE
Automatic implantable cardioverter-defibrillator in situ  Restarted losartan 12.5 mg daily. Give chlorothiazide 250 mg then bumetanide 2 mg IV TID. Monitor intake and output.

## 2017-02-23 NOTE — H&P
[facility]  History & Physical    Patient Name: Jennifer Prescott  MRN: [id number]  Admission Date: 2/22/2017  Attending Physician: Freddie Waterman MD  Primary Care Provider: Lianna Mistry MD         Patient information was obtained from patient, past medical records and ER records.     Subjective:     Principal Problem:Acute on chronic combined systolic and diastolic congestive heart failure    Chief Complaint:   Chief Complaint   Patient presents with    Shortness of Breath     pt arrived per [facility] c/o sob onset today, D/Seferino from hospital 2 days ago        HPI: Jennifer Prescott is a 71 y.o.  woman with coronary artery disease (s/p 3 JAMAL to RCA 1/27/15), chronic systolic diastolic congestive heart failure (EF=20% 9/24/16), chronic hypotension, s/p pacemaker 9/21/10, s/p biventricular AICD 10/09/12, permanent atrial fibrillation (had cardioversion 3/06/15), bilateral carotid artery disease, left lower lobe pulmonary embolism (2/12/15), COPD with 40 pack year smoking history, obesity hypoventilation syndrome (on BiPAP), chronic hypoxic respiratory failure, ischemic colitis with recurrent GI bleeding, iron deficiency anemia due to chronic gastrointestinal blood loss, and chronic kidney disease stage 3. She lives in [place], [place]. She works in a [employer] center. Her primary care physician is Dr. Lianna Mistry at [facility]. Her cardiologist is Dr. Riley Braga.    She was started on apixaban 5 mg BID on 1/30/17, as she had been off anticoagulation since her last GI bleed. She was hospitalized at [facility] from 2/15/17-2/20/17 for another GI bleed and anemia episode. She was transfused a total of 4 units of packed RBCs. She was advised to stop apixaban, clopidrogel, losartan 12.5 mg daily, and isosorbide mononitrate 60 mg daily. According to Ms. Prescott, she was told to stop taking her bumetanide, and asked why she  should stop despite worsening peripheral edema after her blood transfusions. She reportedly was told that it was because of her chronic hypotension. According to the medication reconciliation on the discharge summary, her bumetanide was decreased from 1 mg BID to daily.    Regardless, she stopped taking bumetanide, and presented to [facility] 2 days later (2/22/17) with significant orthopnea. She was found to have pulmonary crackles and expiratory wheeze. Chest x-ray showed pulmonary edema. BNP was 1856, increased from 1576 on 2/15/17. She was given furosemide 80 mg IV and admitted to [facility].      Past Medical History   Diagnosis Date    *Atrial fibrillation     Anticoagulant long-term use     Arthritis     Cardiomyopathy     Carotid artery occlusion     CHF (congestive heart failure)     COPD (chronic obstructive pulmonary disease)     COPD exacerbation 2/19/2015    Coronary artery disease      RCA occluded with L to R collaterals and normal LAD and LCx    Hypertension     Internal bleeding     Sleep apnea      uses bipap       Past Surgical History   Procedure Laterality Date    Tonsillectomy, adenoidectomy      Gallbladder surgery      Crt-d implantation      Abdominal surgery      Appendectomy      Cardiac pac[facility] placement  9/21/2010     [facility]    Tonsillectomy      Vascular surgery      Cardiac catheterization      Cardiac defibrillator placement  10/9/2012     bivent AICD placed 10/2012 and revised 12/2012    Coronary angioplasty with stent placement  1/27/2015     3 JAMAL to RCA    Cardioversion  3/6/2015    Colonoscopy N/A 7/11/2016     Procedure: COLONOSCOPY;  Surgeon: Rafael Pérez MD;  Location: [facility];  Service: Endoscopy;  Laterality: N/A;    Cholecystectomy      Carotid endarterectomy Left     Esophagogastroduodenoscopy      Colonoscopy N/A 2/17/2017     Procedure: COLONOSCOPY;  Surgeon: Giovanni Bronson MD;  Location: [facility]  ANUPAM (2ND FLR);  Service: Endoscopy;  Laterality: N/A;       Review of patient's allergies indicates:   Allergen Reactions    Gabapentin        No current facility-administered medications on file prior to encounter.      Current Outpatient Prescriptions on File Prior to Encounter   Medication Sig    albuterol 90 mcg/actuation inhaler Inhale 2 puffs into the lungs every 6 (six) hours as needed for Wheezing.    albuterol-ipratropium 2.5mg-0.5mg/3mL (DUO-NEB) 0.5 mg-3 mg(2.5 mg base)/3 mL nebulizer solution USE 1 VIAL VIA NEBULIZER EVERY 6 HOURS AS NEEDED FOR WHEEZING    allopurinol (ZYLOPRIM) 300 MG tablet Take 1 tablet (300 mg total) by mouth once daily.    aspirin 81 MG Chew Take 81 mg by mouth every evening.     bumetanide (BUMEX) 1 MG tablet Take 1 tablet (1 mg total) by mouth once daily.    carvedilol (COREG) 3.125 MG tablet Take 1 tablet (3.125 mg total) by mouth 2 (two) times daily with meals.    DUREZOL 0.05 % Drop ophthalmic solution Place 2 drops into both eyes Daily.    ferrous sulfate 325 (65 FE) MG EC tablet Take 1 tablet (325 mg total) by mouth every evening.    fluticasone-vilanterol (BREO) 100-25 mcg/dose diskus inhaler Inhale 1 puff into the lungs once daily.    ILEVRO 0.3 % DrpS Place 1 drop into both eyes Daily.    losartan (COZAAR) 25 MG tablet Take 0.5 tablets (12.5 mg total) by mouth once daily. Please do not take until instructed to do so by your Primary Care doctor    pantoprazole (PROTONIX) 40 MG tablet Take 1 tablet (40 mg total) by mouth once daily.    polymyxin B sulf-trimethoprim (POLYTRIM) 10,000 unit- 1 mg/mL Drop Place 1 drop into both eyes Daily.    pravastatin (PRAVACHOL) 40 MG tablet Take 1 tablet (40 mg total) by mouth once daily.    trazodone (DESYREL) 150 MG tablet TAKE 1 TABLET BY MOUTH AT BEDTIME (Patient taking differently: TAKE 1 TABLET BY MOUTH AT BEDTIME AS NEEDED FOR SLEEP)    [DISCONTINUED] zolpidem (AMBIEN) 5 MG Tab take 1 on the night of the study; if  you still can't sleep for another 60-90 min after the pill - take another pill.     Family History     Problem Relation (Age of Onset)    Breast cancer Mother    Cancer Father    Heart attack Father    Heart disease Father    Heart failure Father        Social History Main Topics    Smoking status: Former Smoker     Packs/day: 0.25     Years: 50.00     Types: Cigarettes     Start date: 1/15/1965    Smokeless tobacco: Never Used    Alcohol use No    Drug use: No    Sexual activity: No     Review of Systems   Constitutional: Negative for fatigue and fever.   HENT: Negative for hearing loss and rhinorrhea.    Eyes: Negative for visual disturbance.   Respiratory: Positive for shortness of breath. Negative for cough.    Cardiovascular: Positive for leg swelling. Negative for chest pain and palpitations.   Gastrointestinal: Negative for abdominal pain, nausea and vomiting.   Genitourinary: Negative for dysuria, flank pain and frequency.   Musculoskeletal: Negative for arthralgias.   Skin: Negative for rash.   Neurological: Negative for numbness and headaches.     Objective:     Vital Signs (Most Recent):  Temp: 97.9 °F (36.6 °C) (02/22/17 1517)  Pulse: 70 (02/22/17 1700)  Resp: 18 (02/22/17 1548)  BP: (!) 87/70 (02/22/17 1621)  SpO2: 99 % (02/22/17 1700) Vital Signs (24h Range):  Temp:  [97.9 °F (36.6 °C)] 97.9 °F (36.6 °C)  Pulse:  [61-71] 70  Resp:  [18-20] 18  SpO2:  [98 %-100 %] 99 %  BP: ()/(66-78) 87/70        There is no height or weight on file to calculate BMI.    Physical Exam   Constitutional: She is oriented to person, place, and time. She appears well-developed. No distress.   HENT:   Head: Normocephalic.   Mouth/Throat: Oropharynx is clear and moist.   Eyes: Conjunctivae are normal. Pupils are equal, round, and reactive to light.   Neck: Neck supple. No tracheal deviation present.   Cardiovascular: Normal rate.  An irregularly irregular rhythm present.   Pulmonary/Chest: Effort normal. She has  wheezes. She has rales.   Abdominal: Soft. There is no tenderness. There is no guarding.   Musculoskeletal: She exhibits edema.   Neurological: She is alert and oriented to person, place, and time.   Skin: Skin is warm and dry.   Psychiatric: She has a normal mood and affect.        Significant Labs: Hgb 8.2, Hct 28.0. BUN 36, creatinine 1.3. Troponin 0.046. BNP 1856.    Significant Imaging:   X-Ray Chest 1 View 2/22/17: Smaller lung volumes from prior likely due to poor inspiratory effort.  There is stable 4-lead left-sided pacer device.  Continued bibasilar opacities greater on the right concerning for effusion with atelectasis.  Superimposed airspace process not excluded.  There is fullness of the hilar vasculature concerning for vascular congestion and edema likely accentuated by the small lung volumes.  No large pneumothorax.  Clinical correlation and followup advised.    Assessment/Plan:     * Acute on chronic combined systolic and diastolic congestive heart failure  Restart losartan 12.5 mg daily. Give bumetanide 1 mg IV BID. Monitor intake and output.       Atrial fibrillation status post cardioversion 3/06/15  Continue carvedilol 3.125 mg BID. Continue holding anticoagulation for now due to recurrent GI bleeds on anticoagulation.      COPD (chronic obstructive pulmonary disease)  Continue fluticasone-vilanterol 100-25 mcg daily, albuterol-ipratropium nebulizer PRN.      CAD S/P percutaneous coronary angioplasty  Continue aspirin 81 mg daily, pravastatin 40 mg daily. Restart isosorbide mononitrate 60 mg daily.      Obesity hypoventilation syndrome on BiPAP  Continue BiPAP nightly.      CKD (chronic kidney disease) stage 3, GFR 30-59 ml/min  Monitor.      Iron deficiency anemia due to chronic blood loss  Continue ferrous sulfate.      Neuropathic pain, leg, bilateral  Continue gabapentin.      Chronic hypoxemic respiratory failure  Continue supplemental oxygen as needed.      VTE Risk Mitigation     None         Freddie Waterman MD  Department of Hospital Medicine   [facility]

## 2017-02-23 NOTE — PLAN OF CARE
Problem: Patient Care Overview  Goal: Plan of Care Review  Outcome: Ongoing (interventions implemented as appropriate)  Pt's SpO2 100% on 3 lpm NC. Pt refused MDI due to Pt will not be discharged with medicine. No respiratory distress noted. Will continue to monitor SpO2.

## 2017-02-23 NOTE — ASSESSMENT & PLAN NOTE
Pacemaker 9/21/10  Continue carvedilol 3.125 mg BID. Continue holding anticoagulation for now due to recurrent GI bleeds on anticoagulation.

## 2017-02-23 NOTE — PLAN OF CARE
"   02/23/17 0943   Discharge Assessment   Assessment Type Discharge Planning Assessment   Confirmed/corrected address and phone number on facesheet? Yes   Assessment information obtained from? Patient   Communicated expected length of stay with patient/caregiver yes   Prior to hospitilization cognitive status: Alert/Oriented   Prior to hospitalization functional status: Independent   Current cognitive status: Alert/Oriented   Current Functional Status: Independent   Arrived From home or self-care   Lives With child(vanita), adult;grandchild(vanita)   Is patient able to care for self after discharge? No   Does the patient have family/friends to help with healtcare needs after discharge? yes   How many people do you have in your home that can help with your care after discharge? 1   Who are your caregiver(s) and their phone number(s)? Noris;theo (daughter) [phone]   Patient's perception of discharge disposition home or selfcare   Readmission Within The Last 30 Days previous discharge plan unsuccessful   If yes, most recent facility name: [facility]   What reason does the patient think brought them back to the hospital? " can't breathe"   Patient currently being followed by outpatient case management? No   Patient currently receives home health services? No   Does the patient currently use HME? Yes   Patient currently receives private duty nursing? No   Patient currently receives any other outside agency services? No   Equipment Currently Used at Home shower chair;cane, quad;oxygen;power chair;wheelchair;rollator   Do you have any problems affording any of your prescribed medications? No   Is the patient taking medications as prescribed? yes   Do you have any financial concerns preventing you from receiving the healthcare you need? No   Does the patient have transportation to healthcare appointments? Yes   Transportation Available family or friend will provide   On Dialysis? No   Does the patient receive services at the " [facility]mad[facility] Clinic? No   Are there any open cases? No   Discharge Plan A Home   Discharge Plan B Home Health   Patient/Family In Agreement With Plan yes

## 2017-02-23 NOTE — PLAN OF CARE
Patient transported to room 231 per wheelchair. Alert, oriented x4. Reports shortness of breath when ambulating. o2 at 3 l nc. Oriented to room and plan of care. Verbalizes understanding of teaching.

## 2017-02-23 NOTE — PLAN OF CARE
Problem: Patient Care Overview  Goal: Plan of Care Review  Outcome: Ongoing (interventions implemented as appropriate)  Plan of care reviewed with patient. Patient refused ilsa hose and scd due to edema of lower extremity. Sits up in chair and not in bed. Left chest wall with aicd placement. Telemetry sinus arr. Paced also. No complaints of chest pains at this time. Encouraged patient to call for assistance. Verbalizes understanding of teaching.

## 2017-02-23 NOTE — PLAN OF CARE
Problem: Patient Care Overview  Goal: Plan of Care Review  Outcome: Ongoing (interventions implemented as appropriate)  Placed pt on BIPAP 12/6; will cont to monitor.

## 2017-02-24 VITALS
SYSTOLIC BLOOD PRESSURE: 119 MMHG | HEART RATE: 75 BPM | OXYGEN SATURATION: 100 % | RESPIRATION RATE: 20 BRPM | HEIGHT: 71 IN | BODY MASS INDEX: 35.94 KG/M2 | WEIGHT: 256.69 LBS | TEMPERATURE: 98 F | DIASTOLIC BLOOD PRESSURE: 62 MMHG

## 2017-02-24 LAB
ANION GAP SERPL CALC-SCNC: 11 MMOL/L
BUN SERPL-MCNC: 45 MG/DL
CALCIUM SERPL-MCNC: 8.7 MG/DL
CHLORIDE SERPL-SCNC: 92 MMOL/L
CO2 SERPL-SCNC: 31 MMOL/L
CREAT SERPL-MCNC: 1.3 MG/DL
EST. GFR  (AFRICAN AMERICAN): 48 ML/MIN/1.73 M^2
EST. GFR  (NON AFRICAN AMERICAN): 41 ML/MIN/1.73 M^2
GLUCOSE SERPL-MCNC: 92 MG/DL
MAGNESIUM SERPL-MCNC: 2.1 MG/DL
POTASSIUM SERPL-SCNC: 4.1 MMOL/L
SODIUM SERPL-SCNC: 134 MMOL/L

## 2017-02-24 PROCEDURE — 36415 COLL VENOUS BLD VENIPUNCTURE: CPT

## 2017-02-24 PROCEDURE — 27000221 HC OXYGEN, UP TO 24 HOURS

## 2017-02-24 PROCEDURE — 80048 BASIC METABOLIC PNL TOTAL CA: CPT

## 2017-02-24 PROCEDURE — 94761 N-INVAS EAR/PLS OXIMETRY MLT: CPT

## 2017-02-24 PROCEDURE — 83735 ASSAY OF MAGNESIUM: CPT

## 2017-02-24 PROCEDURE — 25000003 PHARM REV CODE 250: Performed by: HOSPITALIST

## 2017-02-24 RX ORDER — DIGOXIN 125 MCG
125 TABLET ORAL DAILY
COMMUNITY
End: 2017-03-01

## 2017-02-24 RX ORDER — TRAMADOL HYDROCHLORIDE 50 MG/1
50 TABLET ORAL EVERY 6 HOURS PRN
COMMUNITY
End: 2017-03-13 | Stop reason: SDUPTHER

## 2017-02-24 RX ORDER — CYCLOBENZAPRINE HCL 10 MG
10 TABLET ORAL 3 TIMES DAILY PRN
COMMUNITY

## 2017-02-24 RX ORDER — BUMETANIDE 1 MG/1
1 TABLET ORAL 2 TIMES DAILY
Qty: 180 TABLET | Refills: 3 | Status: ON HOLD
Start: 2017-02-24 | End: 2017-03-08

## 2017-02-24 RX ORDER — LOSARTAN POTASSIUM 25 MG/1
12.5 TABLET ORAL DAILY
Qty: 45 TABLET | Refills: 3
Start: 2017-02-24 | End: 2018-02-24

## 2017-02-24 RX ORDER — ISOSORBIDE MONONITRATE 60 MG/1
60 TABLET, EXTENDED RELEASE ORAL DAILY
Start: 2017-02-24 | End: 2018-02-24

## 2017-02-24 RX ORDER — AMIODARONE HYDROCHLORIDE 200 MG/1
200 TABLET ORAL DAILY
COMMUNITY

## 2017-02-24 RX ORDER — CLOPIDOGREL BISULFATE 75 MG/1
75 TABLET ORAL DAILY
COMMUNITY

## 2017-02-24 RX ORDER — ISOSORBIDE MONONITRATE 60 MG/1
60 TABLET, EXTENDED RELEASE ORAL DAILY
COMMUNITY
End: 2017-03-01 | Stop reason: SDUPTHER

## 2017-02-24 RX ADMIN — PRAVASTATIN SODIUM 40 MG: 40 TABLET ORAL at 08:02

## 2017-02-24 RX ADMIN — CARVEDILOL 3.12 MG: 3.12 TABLET, FILM COATED ORAL at 08:02

## 2017-02-24 RX ADMIN — BUMETANIDE 2 MG: 0.25 INJECTION, SOLUTION INTRAMUSCULAR; INTRAVENOUS at 02:02

## 2017-02-24 RX ADMIN — ISOSORBIDE MONONITRATE 60 MG: 60 TABLET, EXTENDED RELEASE ORAL at 08:02

## 2017-02-24 RX ADMIN — SODIUM CHLORIDE, PRESERVATIVE FREE 3 ML: 5 INJECTION INTRAVENOUS at 06:02

## 2017-02-24 RX ADMIN — PANTOPRAZOLE SODIUM 40 MG: 40 TABLET, DELAYED RELEASE ORAL at 08:02

## 2017-02-24 RX ADMIN — LOSARTAN POTASSIUM 12.5 MG: 25 TABLET, FILM COATED ORAL at 09:02

## 2017-02-24 RX ADMIN — BUMETANIDE 2 MG: 0.25 INJECTION, SOLUTION INTRAMUSCULAR; INTRAVENOUS at 06:02

## 2017-02-24 RX ADMIN — ALLOPURINOL 300 MG: 100 TABLET ORAL at 08:02

## 2017-02-24 NOTE — ASSESSMENT & PLAN NOTE
Automatic implantable cardioverter-defibrillator in situ  Restarted losartan 12.5 mg daily. Continue bumetanide 2 mg IV TID. Monitor intake and output. Will take bumetanide 2 mg PO BID for a few days at home before resuming 1 mg BID.

## 2017-02-24 NOTE — PLAN OF CARE
02/24/17 1035   Final Note   Assessment Type Final Discharge Note   Discharge Disposition Home   Discharge planning education complete? Yes   What phone number can be called within the next 1-3 days to see how you are doing after discharge? [phone]   Hospital Follow Up  Appt(s) scheduled? Yes   Offered [facility] Bed[facility] Delivery? Yes   Referral to Outpatient Case Management complete? n/a   Referral to / orders for Home Health Complete? n/a   30 day supply of medicines given at discharge, if documented non-compliance / non-adherence? n/a   Any social issues identified prior to discharge? No   Did you assess the readiness or willingness of the family or caregiver to support self management of care? Yes   Right Care Referral Info   Post Acute Recommendation No Care

## 2017-02-24 NOTE — PROGRESS NOTES
[facility]  Progress Note    Patient Name: Jennifer Prescott  MRN: [id number]  Patient Class: IP- Inpatient   Admission Date: 2/22/2017  Length of Stay: 2 days  Attending Physician: Freddie Waterman MD  Primary Care Provider: Lianna Mistry MD        Subjective:     Principal Problem:Acute on chronic combined systolic and diastolic congestive heart failure    HPI:  Jennifer Prescott is a 71 y.o.  woman with coronary artery disease (s/p 3 JAMAL to RCA 1/27/15), chronic systolic diastolic congestive heart failure (EF=20% 9/24/16), chronic hypotension, s/p pacemaker 9/21/10, s/p biventricular AICD 10/09/12, permanent atrial fibrillation (had cardioversion 3/06/15), bilateral carotid artery disease, left lower lobe pulmonary embolism (2/12/15), COPD with 40 pack year smoking history, obesity hypoventilation syndrome (on BiPAP), chronic hypoxic respiratory failure, ischemic colitis with recurrent GI bleeding, iron deficiency anemia due to chronic gastrointestinal blood loss, and chronic kidney disease stage 3. She lives in [place], [place]. She works in a [employer] center. Her primary care physician is Dr. Lianna Mistry at [facility]. Her cardiologist is Dr. Riley Braga.    She was started on apixaban 5 mg BID on 1/30/17, as she had been off anticoagulation since her last GI bleed. She was hospitalized at [facility] from 2/15/17-2/20/17 for another GI bleed and anemia episode. She was transfused a total of 4 units of packed RBCs. She was advised to stop apixaban, clopidrogel, losartan 12.5 mg daily, and isosorbide mononitrate 60 mg daily. According to Ms. Prescott, she was told to stop taking her bumetanide, and asked why she should stop despite worsening peripheral edema after her blood transfusions. She reportedly was told that it was because of her chronic hypotension. According to the medication reconciliation on the discharge  summary, her bumetanide was decreased from 1 mg BID to daily.    Regardless, she stopped taking bumetanide, and presented to [facility] 2 days later (2/22/17) with significant orthopnea. She was found to have pulmonary crackles and expiratory wheeze. Chest x-ray showed pulmonary edema. BNP was 1856, increased from 1576 on 2/15/17. She was given furosemide 80 mg IV and admitted to [facility].      Hospital Course:  She was given bumetanide 1 mg IV without much effect. The next day she was given chlorothiazide 250 mg IV then bumetanide 2 mg IV TID, with a better outcome, urinating 4 liters. Shortness of breath improved. She was advised to continue taking bumetanide 2 mg BID for a few more days after discharge before returning to her usual 1 mg BID.     Interval History: Could not sleep due to orthopnea and leg pain.    Review of Systems   Respiratory: Negative for cough and shortness of breath.    Cardiovascular: Positive for leg swelling. Negative for chest pain.     Objective:     Vital Signs (Most Recent):  Temp: 97.5 °F (36.4 °C) (02/24/17 0820)  Pulse: 71 (02/24/17 0820)  Resp: 20 (02/24/17 0820)  BP: (!) 106/51 (02/24/17 0820)  SpO2: 100 % (02/24/17 0159) Vital Signs (24h Range):  Temp:  [97.1 °F (36.2 °C)-97.9 °F (36.6 °C)] 97.5 °F (36.4 °C)  Pulse:  [69-72] 71  Resp:  [16-20] 20  SpO2:  [96 %-100 %] 100 %  BP: (106-153)/(51-75) 106/51     Weight: 116.4 kg (256 lb 11.2 oz)  Body mass index is 35.8 kg/(m^2).    Intake/Output Summary (Last 24 hours) at 02/24/17 0940  Last data filed at 02/24/17 0620   Gross per 24 hour   Intake             1045 ml   Output             3450 ml   Net            -2405 ml      Physical Exam   Constitutional: She is oriented to person, place, and time. She appears well-developed. No distress.   Cardiovascular: Normal rate.  An irregularly irregular rhythm present.   Pulmonary/Chest: Effort normal. No respiratory distress. She has rales.    Musculoskeletal: She exhibits edema. She exhibits no tenderness.   Neurological: She is alert and oriented to person, place, and time.   Psychiatric: She has a normal mood and affect.       Significant Labs:   CMP:     Recent Labs  Lab 02/22/17  1531 02/23/17  0453 02/23/17  1355 02/24/17  0359    134* 134* 134*   K 4.1 4.1 3.8 4.1   CL 96 95 94* 92*   CO2 28 26 28 31*    137* 152* 92   BUN 36* 41* 45* 45*   CREATININE 1.3 1.6* 1.5* 1.3   CALCIUM 8.3* 8.6* 8.7 8.7   PROT 6.6  --   --   --    ALBUMIN 3.0*  --   --   --    BILITOT 0.9  --   --   --    ALKPHOS 134  --   --   --    AST 24  --   --   --    ALT 46*  --   --   --    ANIONGAP 12 13 12 11   EGFRNONAA 41* 32* 35* 41*       Significant Imaging: I have reviewed all pertinent imaging results/findings within the past 24 hours.    Assessment/Plan:      * Acute on chronic combined systolic and diastolic congestive heart failure  Automatic implantable cardioverter-defibrillator in situ  Restarted losartan 12.5 mg daily. Continue bumetanide 2 mg IV TID. Monitor intake and output. Will take bumetanide 2 mg PO BID for a few days at home before resuming 1 mg BID.      Permanent atrial fibrillation  Pacemaker 9/21/10  Continue carvedilol 3.125 mg BID. Continue holding anticoagulation for now due to recurrent GI bleeds on anticoagulation.      COPD (chronic obstructive pulmonary disease)  Continue fluticasone-vilanterol 100-25 mcg daily, albuterol-ipratropium nebulizer PRN.      CAD S/P percutaneous coronary angioplasty  Continue aspirin 81 mg daily, pravastatin 40 mg daily. Restarted isosorbide mononitrate 60 mg daily.      Obesity hypoventilation syndrome on BiPAP  Continue BiPAP nightly.      CKD (chronic kidney disease) stage 3, GFR 30-59 ml/min  Monitor.      Chronic hypoxemic respiratory failure  Continue supplemental oxygen as needed.      VTE Risk Mitigation         Ordered     Medium Risk of VTE  Once      02/22/17 2120        Disposition: Home  this afternoon.    Freddie Waterman MD  Department of Hospital Medicine   [facility]

## 2017-02-24 NOTE — SUBJECTIVE & OBJECTIVE
Interval History: Could not sleep due to orthopnea and leg pain.    Review of Systems   Respiratory: Negative for cough and shortness of breath.    Cardiovascular: Positive for leg swelling. Negative for chest pain.     Objective:     Vital Signs (Most Recent):  Temp: 97.5 °F (36.4 °C) (02/24/17 0820)  Pulse: 71 (02/24/17 0820)  Resp: 20 (02/24/17 0820)  BP: (!) 106/51 (02/24/17 0820)  SpO2: 100 % (02/24/17 0159) Vital Signs (24h Range):  Temp:  [97.1 °F (36.2 °C)-97.9 °F (36.6 °C)] 97.5 °F (36.4 °C)  Pulse:  [69-72] 71  Resp:  [16-20] 20  SpO2:  [96 %-100 %] 100 %  BP: (106-153)/(51-75) 106/51     Weight: 116.4 kg (256 lb 11.2 oz)  Body mass index is 35.8 kg/(m^2).    Intake/Output Summary (Last 24 hours) at 02/24/17 0940  Last data filed at 02/24/17 0620   Gross per 24 hour   Intake             1045 ml   Output             3450 ml   Net            -2405 ml      Physical Exam   Constitutional: She is oriented to person, place, and time. She appears well-developed. No distress.   Cardiovascular: Normal rate.  An irregularly irregular rhythm present.   Pulmonary/Chest: Effort normal. No respiratory distress. She has rales.   Musculoskeletal: She exhibits edema. She exhibits no tenderness.   Neurological: She is alert and oriented to person, place, and time.   Psychiatric: She has a normal mood and affect.       Significant Labs:   CMP:     Recent Labs  Lab 02/22/17  1531 02/23/17  0453 02/23/17  1355 02/24/17  0359    134* 134* 134*   K 4.1 4.1 3.8 4.1   CL 96 95 94* 92*   CO2 28 26 28 31*    137* 152* 92   BUN 36* 41* 45* 45*   CREATININE 1.3 1.6* 1.5* 1.3   CALCIUM 8.3* 8.6* 8.7 8.7   PROT 6.6  --   --   --    ALBUMIN 3.0*  --   --   --    BILITOT 0.9  --   --   --    ALKPHOS 134  --   --   --    AST 24  --   --   --    ALT 46*  --   --   --    ANIONGAP 12 13 12 11   EGFRNONAA 41* 32* 35* 41*       Significant Imaging: I have reviewed all pertinent imaging results/findings within the past 24 hours.

## 2017-02-24 NOTE — DISCHARGE SUMMARY
[facility]  Discharge Summary      Patient Name: Jennifer Prescott  MRN: [id number]  Admission Date: 2/22/2017  Hospital Length of Stay: 2 days  Discharge Date and Time: 2/24/2017  4:03 PM  Attending Physician: Freddie Waterman MD   Discharging Provider: Freddie Waterman MD  Primary Care Provider: Lianna Mistry MD      HPI:   Jennifer Prescott is a 71 y.o.  woman with coronary artery disease (s/p 3 JAMAL to RCA 1/27/15), chronic systolic diastolic congestive heart failure (EF=20% 9/24/16), chronic hypotension, s/p pacemaker 9/21/10, s/p biventricular AICD 10/09/12, permanent atrial fibrillation (had cardioversion 3/06/15), bilateral carotid artery disease, left lower lobe pulmonary embolism (2/12/15), COPD with 40 pack year smoking history, obesity hypoventilation syndrome (on BiPAP), chronic hypoxic respiratory failure, ischemic colitis with recurrent GI bleeding, iron deficiency anemia due to chronic gastrointestinal blood loss, and chronic kidney disease stage 3. She lives in [place], [place]. She works in a [employer] center. Her primary care physician is Dr. Lianna Mistry at [facility]. Her cardiologist is Dr. Riley Braga.    She was started on apixaban 5 mg BID on 1/30/17, as she had been off anticoagulation since her last GI bleed. She was hospitalized at [facility] from 2/15/17-2/20/17 for another GI bleed and anemia episode. She was transfused a total of 4 units of packed RBCs. She was advised to stop apixaban, clopidrogel, losartan 12.5 mg daily, and isosorbide mononitrate 60 mg daily. According to Ms. Prescott, she was told to stop taking her bumetanide, and asked why she should stop despite worsening peripheral edema after her blood transfusions. She reportedly was told that it was because of her chronic hypotension. According to the medication reconciliation on the discharge summary, her bumetanide was  decreased from 1 mg BID to daily.    Regardless, she stopped taking bumetanide, and presented to [facility] 2 days later (2/22/17) with significant orthopnea. She was found to have pulmonary crackles and expiratory wheeze. Chest x-ray showed pulmonary edema. BNP was 1856, increased from 1576 on 2/15/17. She was given furosemide 80 mg IV and admitted to [facility].     Indwelling Lines/Drains at time of discharge: none    Hospital Course:   She was given bumetanide 1 mg IV without much effect. The next day she was given chlorothiazide 250 mg IV then bumetanide 2 mg IV TID, with a better outcome, urinating 4 liters. Shortness of breath improved. She was advised to continue taking bumetanide 2 mg BID for a few three days after discharge before returning to her usual 1 mg BID. She was discharged home on 2/24/17.     Consults:   Consults         Status Ordering Provider     IP consult to case management  Once     Provider:  (Not yet assigned)    Acknowledged ESTEPHANIA CASTILLO          Significant Diagnostic Studies:   X-Ray Chest 1 View 2/22/17: Smaller lung volumes from prior likely due to poor inspiratory effort.  There is stable 4-lead left-sided pacer device.  Continued bibasilar opacities greater on the right concerning for effusion with atelectasis.  Superimposed airspace process not excluded.  There is fullness of the hilar vasculature concerning for vascular congestion and edema likely accentuated by the small lung volumes.  No large pneumothorax.  Clinical correlation and followup advised.    Final Active Diagnoses:    Diagnosis Date Noted POA    PRINCIPAL PROBLEM:  Acute on chronic combined systolic and diastolic congestive heart failure [I50.43] 07/31/2015 Yes    Chronic hypoxemic respiratory failure [J96.11] 02/22/2017 Yes     Chronic    Chronic ischemic colitis [K55.1] 07/08/2016 Yes     Chronic    Bilateral carotid artery disease [I77.9] 04/12/2016 Yes     Chronic     Neuropathic pain, leg, bilateral [G57.91, G57.92] 07/23/2015 Yes     Chronic    Iron deficiency anemia due to chronic blood loss [D50.0]  Yes     Chronic    CKD (chronic kidney disease) stage 3, GFR 30-59 ml/min [N18.3] 09/15/2014 Yes     Chronic    Chronic hypotension [I95.89] 04/17/2013 Yes     Chronic    Obesity hypoventilation syndrome on BiPAP [E66.2] 03/29/2013 Yes     Chronic    Ischemic cardiomyopathy [I25.5] 10/23/2012 Yes     Chronic    Automatic implantable cardioverter-defibrillator in situ [Z95.810] 10/23/2012 Yes     Chronic    CAD S/P percutaneous coronary angioplasty [I25.10, Z98.61] 08/03/2012 Not Applicable     Chronic    Pacemaker 9/21/10 [Z95.0] 07/12/2012 Yes     Chronic    COPD (chronic obstructive pulmonary disease) [J44.9] 07/12/2012 Yes     Chronic    Permanent atrial fibrillation [I48.2] 07/12/2012 Yes     Chronic      Problems Resolved During this Admission:    Diagnosis Date Noted Date Resolved POA      Discharged Condition: good    Disposition: Home or Self Care    Follow Up:  Follow-up Information     Follow up with Lianna Mistry MD On 3/2/2017.    Specialties:  Internal Medicine, Infectious Diseases    Why:  time: 9:20    Contact information:    [address] [place] [id number] [phone]          Patient Instructions:     Diet general   Order Specific Question Answer Comments   Fat restriction, if any: 60g Fat    Na restriction, if any: 2gNa      Activity as tolerated     Call MD for:  persistent dizziness, light-headedness, or visual disturbances     Call MD for:  difficulty breathing or increased cough       Medications:  Reconciled Home Medications:   Current Discharge Medication List      START taking these medications    Details   isosorbide mononitrate (IMDUR) 60 MG 24 hr tablet Take 1 tablet (60 mg total) by mouth once daily.         CONTINUE these medications which have CHANGED    Details   bumetanide (BUMEX) 1 MG tablet Take 1 tablet (1 mg  total) by mouth 2 (two) times daily. Take 2 tablets 2 times daily for the first 3 days.  Qty: 180 tablet, Refills: 3    Comments: PLEASE FILL ON 12/16/15  Associated Diagnoses: Ischemic cardiomyopathy; Essential hypertension; Chronic combined systolic and diastolic congestive heart failure      losartan (COZAAR) 25 MG tablet Take 0.5 tablets (12.5 mg total) by mouth once daily.  Qty: 45 tablet, Refills: 3    Associated Diagnoses: Atrial fibrillation status post cardioversion; CAD S/P percutaneous coronary angioplasty; Ischemic cardiomyopathy; Essential hypertension; Chronic combined systolic and diastolic congestive heart failure; Sick sinus syndrome; Acute on chronic combined systolic and diastolic congestive heart failure; Other emphysema; CKD (chronic kidney disease) stage 3, GFR 30-59 ml/min; Pacemaker; Automatic implantable cardioverter-defibrillator in situ; Obesity hypoventilation syndrome         CONTINUE these medications which have NOT CHANGED    Details   albuterol 90 mcg/actuation inhaler Inhale 2 puffs into the lungs every 6 (six) hours as needed for Wheezing.  Qty: 18 g, Refills: 0    Associated Diagnoses: Pulmonary emphysema, unspecified emphysema type      albuterol-ipratropium 2.5mg-0.5mg/3mL (DUO-NEB) 0.5 mg-3 mg(2.5 mg base)/3 mL nebulizer solution USE 1 VIAL VIA NEBULIZER EVERY 6 HOURS AS NEEDED FOR WHEEZING  Refills: 2      allopurinol (ZYLOPRIM) 300 MG tablet Take 1 tablet (300 mg total) by mouth once daily.  Qty: 90 tablet, Refills: 3    Associated Diagnoses: Acute on chronic congestive heart failure, unspecified congestive heart failure type; Pacemaker; Chronic obstructive pulmonary disease, unspecified COPD type; Coronary artery disease involving native coronary artery without angina pectoris, unspecified whether native or transplanted heart; Congestive cardiomyopathy; Ischemic cardiomyopathy; Acute gout of foot, unspecified cause, unspecified laterality; S/P ICD (internal cardiac  defibrillator) procedure      aspirin 81 MG Chew Take 81 mg by mouth every evening.       carvedilol (COREG) 3.125 MG tablet Take 1 tablet (3.125 mg total) by mouth 2 (two) times daily with meals.  Qty: 180 tablet, Refills: 3    Comments: PLEASE FILL ON 12/16/15  Associated Diagnoses: Atrial fibrillation status post cardioversion; CAD S/P percutaneous coronary angioplasty; Ischemic cardiomyopathy; Essential hypertension; Chronic combined systolic and diastolic congestive heart failure; Sick sinus syndrome; Acute on chronic combined systolic and diastolic congestive heart failure; Other emphysema; CKD (chronic kidney disease) stage 3, GFR 30-59 ml/min; Pacemaker; Automatic implantable cardioverter-defibrillator in situ; Obesity hypoventilation syndrome      ferrous sulfate 325 (65 FE) MG EC tablet Take 1 tablet (325 mg total) by mouth every evening.  Refills: 0      fluticasone-vilanterol (BREO) 100-25 mcg/dose diskus inhaler Inhale 1 puff into the lungs once daily.  Qty: 28 each, Refills: 5    Associated Diagnoses: Pulmonary emphysema, unspecified emphysema type; Wheezing      pantoprazole (PROTONIX) 40 MG tablet Take 1 tablet (40 mg total) by mouth once daily.  Qty: 30 tablet, Refills: 11      DUREZOL 0.05 % Drop ophthalmic solution Place 2 drops into both eyes Daily.  Refills: 1      ILEVRO 0.3 % DrpS Place 1 drop into both eyes Daily.  Refills: 0      polymyxin B sulf-trimethoprim (POLYTRIM) 10,000 unit- 1 mg/mL Drop Place 1 drop into both eyes Daily.  Refills: 1      pravastatin (PRAVACHOL) 40 MG tablet Take 1 tablet (40 mg total) by mouth once daily.  Qty: 90 tablet, Refills: 3      trazodone (DESYREL) 150 MG tablet TAKE 1 TABLET BY MOUTH AT BEDTIME  Qty: 30 tablet, Refills: 3    Associated Diagnoses: Insomnia           Time spent on the discharge of patient: 35 minutes    Freddie Waterman MD  Department of Hospital Medicine  [facility]

## 2017-02-24 NOTE — PLAN OF CARE
Problem: Patient Care Overview  Goal: Plan of Care Review  Outcome: Ongoing (interventions implemented as appropriate)  Plan of care reviewed with pt including strict i and o's. Pt verbalizes understanding. Pt educated on fall risk. Refusing bed alarm. Bed in lowest position, side rails up times 2, wheels locked, nonslip socks on, and bsc at bedside. Call bell w/in reach. Instructed to call for needs/assist oob. No c/o pain, dizziness, or sob t/o night. Respirations even and unlabored. No distress noted. Pt on telemetry. afib with bbb noted. Occasional v pacing noted. Rate 60's- 70's. No true red alarms noted. Will continue to monitor.

## 2017-03-01 ENCOUNTER — HOSPITAL ENCOUNTER (INPATIENT)
Facility: HOSPITAL | Age: 72
LOS: 7 days | Discharge: HOME | DRG: 291 | End: 2017-03-08
Attending: EMERGENCY MEDICINE | Admitting: HOSPITALIST
Payer: COMMERCIAL

## 2017-03-01 DIAGNOSIS — I10 ESSENTIAL HYPERTENSION: ICD-10-CM

## 2017-03-01 DIAGNOSIS — Z98.61 CAD S/P PERCUTANEOUS CORONARY ANGIOPLASTY: Chronic | ICD-10-CM

## 2017-03-01 DIAGNOSIS — I50.42 CHRONIC COMBINED SYSTOLIC AND DIASTOLIC CONGESTIVE HEART FAILURE: Chronic | ICD-10-CM

## 2017-03-01 DIAGNOSIS — I50.43 ACUTE ON CHRONIC COMBINED SYSTOLIC AND DIASTOLIC CONGESTIVE HEART FAILURE: ICD-10-CM

## 2017-03-01 DIAGNOSIS — I25.5 ISCHEMIC CARDIOMYOPATHY: Primary | Chronic | ICD-10-CM

## 2017-03-01 DIAGNOSIS — R06.02 SOB (SHORTNESS OF BREATH): ICD-10-CM

## 2017-03-01 DIAGNOSIS — I48.21 PERMANENT ATRIAL FIBRILLATION: Chronic | ICD-10-CM

## 2017-03-01 DIAGNOSIS — I50.9 ACUTE ON CHRONIC HEART FAILURE: ICD-10-CM

## 2017-03-01 DIAGNOSIS — J90 PLEURAL EFFUSION, RIGHT: ICD-10-CM

## 2017-03-01 DIAGNOSIS — I25.10 CAD S/P PERCUTANEOUS CORONARY ANGIOPLASTY: Chronic | ICD-10-CM

## 2017-03-01 PROBLEM — Z99.81 ON HOME OXYGEN THERAPY: Chronic | Status: ACTIVE | Noted: 2017-03-01

## 2017-03-01 LAB
ABO + RH BLD: NORMAL
ALBUMIN SERPL BCP-MCNC: 3.5 G/DL
ALLENS TEST: ABNORMAL
ALP SERPL-CCNC: 162 U/L
ALT SERPL W/O P-5'-P-CCNC: 43 U/L
ANION GAP SERPL CALC-SCNC: 12 MMOL/L
APTT BLDCRRT: 27.1 SEC
AST SERPL-CCNC: 50 U/L
BACTERIA #/AREA URNS HPF: ABNORMAL /HPF
BASOPHILS # BLD AUTO: 0.04 K/UL
BASOPHILS NFR BLD: 0.3 %
BILIRUB SERPL-MCNC: 2.4 MG/DL
BILIRUB UR QL STRIP: ABNORMAL
BLD GP AB SCN CELLS X3 SERPL QL: NORMAL
BLOOD GROUP ANTIBODIES SERPL: NORMAL
BNP SERPL-MCNC: 3474 PG/ML
BUN SERPL-MCNC: 44 MG/DL
CALCIUM SERPL-MCNC: 9.3 MG/DL
CHLORIDE SERPL-SCNC: 90 MMOL/L
CLARITY UR: ABNORMAL
CO2 SERPL-SCNC: 32 MMOL/L
COLOR UR: YELLOW
CREAT SERPL-MCNC: 1.5 MG/DL
DAT IGG-SP REAG RBC-IMP: NORMAL
DIASTOLIC DYSFUNCTION: YES
DIFFERENTIAL METHOD: ABNORMAL
DIGOXIN SERPL-MCNC: 0.2 NG/ML
EOSINOPHIL # BLD AUTO: 0 K/UL
EOSINOPHIL NFR BLD: 0.1 %
ERYTHROCYTE [DISTWIDTH] IN BLOOD BY AUTOMATED COUNT: 17.3 %
EST. GFR  (AFRICAN AMERICAN): 40 ML/MIN/1.73 M^2
EST. GFR  (NON AFRICAN AMERICAN): 35 ML/MIN/1.73 M^2
ESTIMATED PA SYSTOLIC PRESSURE: 50.28
FIBRINOGEN PPP-MCNC: 304 MG/DL
FLUAV AG SPEC QL IA: NEGATIVE
FLUBV AG SPEC QL IA: NEGATIVE
GLOBAL PERICARDIAL EFFUSION: ABNORMAL
GLUCOSE SERPL-MCNC: 82 MG/DL
GLUCOSE UR QL STRIP: NEGATIVE
HCO3 UR-SCNC: 29.9 MMOL/L (ref 24–28)
HCT VFR BLD AUTO: 32.1 %
HGB BLD-MCNC: 9.5 G/DL
HGB UR QL STRIP: ABNORMAL
HYALINE CASTS #/AREA URNS LPF: 0 /LPF
INR PPP: 1.6
KETONES UR QL STRIP: NEGATIVE
LEUKOCYTE ESTERASE UR QL STRIP: ABNORMAL
LYMPHOCYTES # BLD AUTO: 0.7 K/UL
LYMPHOCYTES NFR BLD: 4.3 %
MAGNESIUM SERPL-MCNC: 1.9 MG/DL
MCH RBC QN AUTO: 27.4 PG
MCHC RBC AUTO-ENTMCNC: 29.6 %
MCV RBC AUTO: 93 FL
MICROSCOPIC COMMENT: ABNORMAL
MITRAL VALVE REGURGITATION: ABNORMAL
MONOCYTES # BLD AUTO: 1.1 K/UL
MONOCYTES NFR BLD: 7.1 %
NEUTROPHILS # BLD AUTO: 13.6 K/UL
NEUTROPHILS NFR BLD: 88.2 %
NITRITE UR QL STRIP: NEGATIVE
OB PNL STL: POSITIVE
PCO2 BLDA: 40.4 MMHG (ref 35–45)
PH SMN: 7.48 [PH] (ref 7.35–7.45)
PH UR STRIP: 6 [PH] (ref 5–8)
PLATELET # BLD AUTO: 367 K/UL
PMV BLD AUTO: 10.2 FL
PO2 BLDA: 94 MMHG (ref 80–100)
POC BE: 6 MMOL/L
POC SATURATED O2: 98 % (ref 95–100)
POC TCO2: 31 MMOL/L (ref 23–27)
POTASSIUM SERPL-SCNC: 4.6 MMOL/L
PROT SERPL-MCNC: 7.4 G/DL
PROT UR QL STRIP: ABNORMAL
PROTHROMBIN TIME: 16.4 SEC
RBC # BLD AUTO: 3.47 M/UL
RBC #/AREA URNS HPF: 10 /HPF (ref 0–4)
RETIRED EF AND QEF - SEE NOTES: 25 (ref 55–65)
SAMPLE: ABNORMAL
SITE: ABNORMAL
SODIUM SERPL-SCNC: 134 MMOL/L
SP GR UR STRIP: 1.02 (ref 1–1.03)
SPECIMEN SOURCE: NORMAL
SQUAMOUS #/AREA URNS HPF: 5 /HPF
TRICUSPID VALVE REGURGITATION: ABNORMAL
TROPONIN I SERPL DL<=0.01 NG/ML-MCNC: 0.03 NG/ML
TROPONIN I SERPL DL<=0.01 NG/ML-MCNC: 0.04 NG/ML
URN SPEC COLLECT METH UR: ABNORMAL
UROBILINOGEN UR STRIP-ACNC: ABNORMAL EU/DL
WBC # BLD AUTO: 15.45 K/UL
WBC #/AREA URNS HPF: >100 /HPF (ref 0–5)

## 2017-03-01 PROCEDURE — 36415 COLL VENOUS BLD VENIPUNCTURE: CPT

## 2017-03-01 PROCEDURE — 82272 OCCULT BLD FECES 1-3 TESTS: CPT

## 2017-03-01 PROCEDURE — 11000001 HC ACUTE MED/SURG PRIVATE ROOM

## 2017-03-01 PROCEDURE — 83880 ASSAY OF NATRIURETIC PEPTIDE: CPT

## 2017-03-01 PROCEDURE — 86901 BLOOD TYPING SEROLOGIC RH(D): CPT

## 2017-03-01 PROCEDURE — 85025 COMPLETE CBC W/AUTO DIFF WBC: CPT

## 2017-03-01 PROCEDURE — 86870 RBC ANTIBODY IDENTIFICATION: CPT

## 2017-03-01 PROCEDURE — 93005 ELECTROCARDIOGRAM TRACING: CPT

## 2017-03-01 PROCEDURE — 85610 PROTHROMBIN TIME: CPT

## 2017-03-01 PROCEDURE — 25000242 PHARM REV CODE 250 ALT 637 W/ HCPCS: Performed by: NURSE PRACTITIONER

## 2017-03-01 PROCEDURE — 93306 TTE W/DOPPLER COMPLETE: CPT | Mod: 26,,, | Performed by: INTERNAL MEDICINE

## 2017-03-01 PROCEDURE — 80053 COMPREHEN METABOLIC PANEL: CPT

## 2017-03-01 PROCEDURE — 82803 BLOOD GASES ANY COMBINATION: CPT

## 2017-03-01 PROCEDURE — 80162 ASSAY OF DIGOXIN TOTAL: CPT

## 2017-03-01 PROCEDURE — 83735 ASSAY OF MAGNESIUM: CPT

## 2017-03-01 PROCEDURE — 87040 BLOOD CULTURE FOR BACTERIA: CPT

## 2017-03-01 PROCEDURE — 86880 COOMBS TEST DIRECT: CPT

## 2017-03-01 PROCEDURE — 87086 URINE CULTURE/COLONY COUNT: CPT

## 2017-03-01 PROCEDURE — 81000 URINALYSIS NONAUTO W/SCOPE: CPT

## 2017-03-01 PROCEDURE — 84484 ASSAY OF TROPONIN QUANT: CPT | Mod: 91

## 2017-03-01 PROCEDURE — 85730 THROMBOPLASTIN TIME PARTIAL: CPT

## 2017-03-01 PROCEDURE — 99285 EMERGENCY DEPT VISIT HI MDM: CPT | Mod: 25

## 2017-03-01 PROCEDURE — 27000221 HC OXYGEN, UP TO 24 HOURS

## 2017-03-01 PROCEDURE — 87400 INFLUENZA A/B EACH AG IA: CPT | Mod: 59

## 2017-03-01 PROCEDURE — 84484 ASSAY OF TROPONIN QUANT: CPT

## 2017-03-01 PROCEDURE — 87186 SC STD MICRODIL/AGAR DIL: CPT

## 2017-03-01 PROCEDURE — 51702 INSERT TEMP BLADDER CATH: CPT

## 2017-03-01 PROCEDURE — 87077 CULTURE AEROBIC IDENTIFY: CPT

## 2017-03-01 PROCEDURE — 63600175 PHARM REV CODE 636 W HCPCS: Performed by: NURSE PRACTITIONER

## 2017-03-01 PROCEDURE — 94640 AIRWAY INHALATION TREATMENT: CPT

## 2017-03-01 PROCEDURE — 25000003 PHARM REV CODE 250: Performed by: NURSE PRACTITIONER

## 2017-03-01 PROCEDURE — 96375 TX/PRO/DX INJ NEW DRUG ADDON: CPT

## 2017-03-01 PROCEDURE — 87088 URINE BACTERIA CULTURE: CPT

## 2017-03-01 PROCEDURE — 36600 WITHDRAWAL OF ARTERIAL BLOOD: CPT

## 2017-03-01 PROCEDURE — 86900 BLOOD TYPING SEROLOGIC ABO: CPT

## 2017-03-01 PROCEDURE — 96365 THER/PROPH/DIAG IV INF INIT: CPT

## 2017-03-01 PROCEDURE — 85384 FIBRINOGEN ACTIVITY: CPT

## 2017-03-01 PROCEDURE — 63600175 PHARM REV CODE 636 W HCPCS: Performed by: EMERGENCY MEDICINE

## 2017-03-01 PROCEDURE — 93306 TTE W/DOPPLER COMPLETE: CPT

## 2017-03-01 RX ORDER — CIPROFLOXACIN 2 MG/ML
400 INJECTION, SOLUTION INTRAVENOUS
Status: COMPLETED | OUTPATIENT
Start: 2017-03-01 | End: 2017-03-01

## 2017-03-01 RX ORDER — FUROSEMIDE 10 MG/ML
60 INJECTION INTRAMUSCULAR; INTRAVENOUS
Status: COMPLETED | OUTPATIENT
Start: 2017-03-01 | End: 2017-03-01

## 2017-03-01 RX ORDER — CLOPIDOGREL BISULFATE 75 MG/1
75 TABLET ORAL DAILY
Status: DISCONTINUED | OUTPATIENT
Start: 2017-03-01 | End: 2017-03-08 | Stop reason: HOSPADM

## 2017-03-01 RX ORDER — RAMELTEON 8 MG/1
8 TABLET ORAL NIGHTLY PRN
Status: DISCONTINUED | OUTPATIENT
Start: 2017-03-01 | End: 2017-03-08 | Stop reason: HOSPADM

## 2017-03-01 RX ORDER — POLYETHYLENE GLYCOL 3350 17 G/17G
17 POWDER, FOR SOLUTION ORAL 2 TIMES DAILY PRN
Status: DISCONTINUED | OUTPATIENT
Start: 2017-03-01 | End: 2017-03-08 | Stop reason: HOSPADM

## 2017-03-01 RX ORDER — ONDANSETRON 8 MG/1
8 TABLET, ORALLY DISINTEGRATING ORAL EVERY 8 HOURS PRN
Status: DISCONTINUED | OUTPATIENT
Start: 2017-03-01 | End: 2017-03-08 | Stop reason: HOSPADM

## 2017-03-01 RX ORDER — ACETAMINOPHEN 325 MG/1
650 TABLET ORAL EVERY 6 HOURS PRN
Status: DISCONTINUED | OUTPATIENT
Start: 2017-03-01 | End: 2017-03-08 | Stop reason: HOSPADM

## 2017-03-01 RX ORDER — TRAMADOL HYDROCHLORIDE 50 MG/1
50 TABLET ORAL EVERY 6 HOURS PRN
Status: DISCONTINUED | OUTPATIENT
Start: 2017-03-01 | End: 2017-03-08 | Stop reason: HOSPADM

## 2017-03-01 RX ORDER — CARVEDILOL 3.12 MG/1
3.12 TABLET ORAL 2 TIMES DAILY WITH MEALS
Status: DISCONTINUED | OUTPATIENT
Start: 2017-03-01 | End: 2017-03-08 | Stop reason: HOSPADM

## 2017-03-01 RX ORDER — FENTANYL CITRATE 50 UG/ML
50 INJECTION, SOLUTION INTRAMUSCULAR; INTRAVENOUS
Status: COMPLETED | OUTPATIENT
Start: 2017-03-01 | End: 2017-03-01

## 2017-03-01 RX ORDER — HYDROCODONE BITARTRATE AND ACETAMINOPHEN 10; 325 MG/1; MG/1
1 TABLET ORAL EVERY 4 HOURS PRN
Status: DISCONTINUED | OUTPATIENT
Start: 2017-03-01 | End: 2017-03-08 | Stop reason: HOSPADM

## 2017-03-01 RX ORDER — FLUTICASONE FUROATE AND VILANTEROL 100; 25 UG/1; UG/1
1 POWDER RESPIRATORY (INHALATION) DAILY
Status: DISCONTINUED | OUTPATIENT
Start: 2017-03-01 | End: 2017-03-02

## 2017-03-01 RX ORDER — PANTOPRAZOLE SODIUM 40 MG/1
40 TABLET, DELAYED RELEASE ORAL DAILY
Status: DISCONTINUED | OUTPATIENT
Start: 2017-03-01 | End: 2017-03-08 | Stop reason: HOSPADM

## 2017-03-01 RX ORDER — ONDANSETRON 2 MG/ML
4 INJECTION INTRAMUSCULAR; INTRAVENOUS EVERY 12 HOURS PRN
Status: DISCONTINUED | OUTPATIENT
Start: 2017-03-01 | End: 2017-03-08 | Stop reason: HOSPADM

## 2017-03-01 RX ORDER — FUROSEMIDE 10 MG/ML
40 INJECTION INTRAMUSCULAR; INTRAVENOUS 2 TIMES DAILY
Status: DISCONTINUED | OUTPATIENT
Start: 2017-03-01 | End: 2017-03-02

## 2017-03-01 RX ORDER — PRAVASTATIN SODIUM 40 MG/1
40 TABLET ORAL DAILY
Status: DISCONTINUED | OUTPATIENT
Start: 2017-03-01 | End: 2017-03-08 | Stop reason: HOSPADM

## 2017-03-01 RX ORDER — IPRATROPIUM BROMIDE AND ALBUTEROL SULFATE 2.5; .5 MG/3ML; MG/3ML
3 SOLUTION RESPIRATORY (INHALATION)
Status: DISCONTINUED | OUTPATIENT
Start: 2017-03-01 | End: 2017-03-08 | Stop reason: HOSPADM

## 2017-03-01 RX ORDER — ISOSORBIDE MONONITRATE 60 MG/1
60 TABLET, EXTENDED RELEASE ORAL DAILY
Status: DISCONTINUED | OUTPATIENT
Start: 2017-03-01 | End: 2017-03-05

## 2017-03-01 RX ORDER — IPRATROPIUM BROMIDE AND ALBUTEROL SULFATE 2.5; .5 MG/3ML; MG/3ML
3 SOLUTION RESPIRATORY (INHALATION)
Status: COMPLETED | OUTPATIENT
Start: 2017-03-01 | End: 2017-03-01

## 2017-03-01 RX ORDER — NAPROXEN SODIUM 220 MG/1
81 TABLET, FILM COATED ORAL NIGHTLY
Status: DISCONTINUED | OUTPATIENT
Start: 2017-03-01 | End: 2017-03-08 | Stop reason: HOSPADM

## 2017-03-01 RX ORDER — FERROUS SULFATE 325(65) MG
325 TABLET, DELAYED RELEASE (ENTERIC COATED) ORAL NIGHTLY
Status: DISCONTINUED | OUTPATIENT
Start: 2017-03-01 | End: 2017-03-08 | Stop reason: HOSPADM

## 2017-03-01 RX ORDER — AMIODARONE HYDROCHLORIDE 200 MG/1
200 TABLET ORAL DAILY
Status: DISCONTINUED | OUTPATIENT
Start: 2017-03-01 | End: 2017-03-08 | Stop reason: HOSPADM

## 2017-03-01 RX ADMIN — IPRATROPIUM BROMIDE AND ALBUTEROL SULFATE 3 ML: .5; 3 SOLUTION RESPIRATORY (INHALATION) at 08:03

## 2017-03-01 RX ADMIN — PANTOPRAZOLE SODIUM 40 MG: 40 TABLET, DELAYED RELEASE ORAL at 05:03

## 2017-03-01 RX ADMIN — PRAVASTATIN SODIUM 40 MG: 40 TABLET ORAL at 05:03

## 2017-03-01 RX ADMIN — FENTANYL CITRATE 50 MCG: 50 INJECTION, SOLUTION INTRAMUSCULAR; INTRAVENOUS at 12:03

## 2017-03-01 RX ADMIN — FUROSEMIDE 40 MG: 10 INJECTION, SOLUTION INTRAMUSCULAR; INTRAVENOUS at 05:03

## 2017-03-01 RX ADMIN — IPRATROPIUM BROMIDE AND ALBUTEROL SULFATE 3 ML: .5; 3 SOLUTION RESPIRATORY (INHALATION) at 09:03

## 2017-03-01 RX ADMIN — FERROUS SULFATE TAB EC 325 MG (65 MG FE EQUIVALENT) 325 MG: 325 (65 FE) TABLET DELAYED RESPONSE at 08:03

## 2017-03-01 RX ADMIN — ASPIRIN 81 MG 81 MG: 81 TABLET ORAL at 08:03

## 2017-03-01 RX ADMIN — AMIODARONE HYDROCHLORIDE 200 MG: 200 TABLET ORAL at 05:03

## 2017-03-01 RX ADMIN — ISOSORBIDE MONONITRATE 60 MG: 60 TABLET, EXTENDED RELEASE ORAL at 05:03

## 2017-03-01 RX ADMIN — CIPROFLOXACIN 400 MG: 2 INJECTION, SOLUTION INTRAVENOUS at 02:03

## 2017-03-01 RX ADMIN — LOSARTAN POTASSIUM 12.5 MG: 25 TABLET, FILM COATED ORAL at 05:03

## 2017-03-01 RX ADMIN — TRAZODONE HYDROCHLORIDE 150 MG: 100 TABLET ORAL at 08:03

## 2017-03-01 RX ADMIN — CARVEDILOL 3.12 MG: 3.12 TABLET, FILM COATED ORAL at 05:03

## 2017-03-01 RX ADMIN — FUROSEMIDE 60 MG: 10 INJECTION, SOLUTION INTRAMUSCULAR; INTRAVENOUS at 11:03

## 2017-03-01 RX ADMIN — CLOPIDOGREL BISULFATE 75 MG: 75 TABLET ORAL at 05:03

## 2017-03-01 RX ADMIN — HYDROCODONE BITARTRATE AND ACETAMINOPHEN 1 TABLET: 10; 325 TABLET ORAL at 05:03

## 2017-03-01 RX ADMIN — TRAMADOL HYDROCHLORIDE 50 MG: 50 TABLET, COATED ORAL at 08:03

## 2017-03-01 RX ADMIN — PIPERACILLIN SODIUM AND TAZOBACTAM SODIUM 4.5 G: 4; .5 INJECTION, POWDER, FOR SOLUTION INTRAVENOUS at 05:03

## 2017-03-01 NOTE — ASSESSMENT & PLAN NOTE
Chronic Atrial Fibrillation  12 Lead ECG shows Wide Complex Regular Rhythm with no P waves or discernable Pacer Spikes. Rate 73 BPM.  -Patient was on digoxin, but this was stopped at a prior hospitalization  -Continue Amiodarone 200 mg and Carvedilol 3.25 mg BID  -Repeat 12 Lead ECG  - Telemetry Monitoring

## 2017-03-01 NOTE — ED PROVIDER NOTES
Encounter Date: 3/1/2017       History     Chief Complaint   Patient presents with    Shortness of Breath     accompanied by swelling in all extremeties; wears o2 at home on 3L; BAE[facility];      Review of patient's allergies indicates:   Allergen Reactions    Gabapentin      HPI Comments: 72yo female with PMHx of CHF, s/p pacemaker, s/p AICD, chronic hypoxic respiratory failure, COPD, CAD, HTN, PE, a-fib s/p cardioversion, chronic ischemic colitis, and GI bleed is here for recurrent SOB, progressively worse over the past three days.  Pt was DC'd from this hospital on 2/22/17 for CHF exacerbation.  Pt reports she has not been taking her anticoagulants because she was told to stop them after her last GI bleed.  Pt denies fever, abd pain, n/v/d.  She reports productive cough with tan and white sputum.  She has been wearing her home O2 (3L) but still feels SOB.  She denies hemoptysis, rectal bleeding, abd pain, CP, vomiting, diarrhea. She reports that her urine has decreased.     Patient is a 71 y.o. female presenting with the following complaint: shortness of breath. The history is provided by the patient and medical records.   Shortness of Breath   This is a recurrent problem. The problem occurs continuously.The current episode started more than 2 days ago. The problem has been gradually worsening. Associated symptoms include cough, sputum production, wheezing, orthopnea and leg swelling. Pertinent negatives include no fever, no rhinorrhea, no sore throat, no ear pain, no neck pain, no hemoptysis, no chest pain, no syncope, no vomiting and no abdominal pain. Risk factors include recent prolonged sitting and smoking. Treatments tried: neb treatments. The treatment provided no relief. She has had prior hospitalizations. She has had prior ED visits. Associated medical issues include COPD, pneumonia, chronic lung disease, PE, CAD, heart failure, past MI and DVT. Associated medical issues do not include asthma or recent  surgery.     Past Medical History:   Diagnosis Date    *Atrial fibrillation     Anticoagulant long-term use     Arthritis     Cardiomyopathy     Carotid artery occlusion     CHF (congestive heart failure)     COPD (chronic obstructive pulmonary disease)     COPD exacerbation 2/19/2015    Coronary artery disease     RCA occluded with L to R collaterals and normal LAD and LCx    Hypertension     Internal bleeding     Sleep apnea     uses bipap     Past Surgical History:   Procedure Laterality Date    ABDOMINAL SURGERY      APPENDECTOMY      CARDIAC CATHETERIZATION      CARDIAC DEFIBRILLATOR PLACEMENT  10/9/2012    bivent AICD placed 10/2012 and revised 12/2012    CARDIAC PACEMAKER PLACEMENT  9/21/2010    [facility]    CARDIOVERSION  3/6/2015    CAROTID ENDARTERECTOMY Left     CHOLECYSTECTOMY      COLONOSCOPY N/A 7/11/2016    Procedure: COLONOSCOPY;  Surgeon: Rafael Pérez MD;  Location: [facility] ENDO;  Service: Endoscopy;  Laterality: N/A;    COLONOSCOPY N/A 2/17/2017    Procedure: COLONOSCOPY;  Surgeon: Giovanni Bronson MD;  Location: [facility] ENDO ([address] [place] [address]);  Service: Endoscopy;  Laterality: N/A;    CORONARY ANGIOPLASTY WITH STENT PLACEMENT  1/27/2015    3 JAMAL to RCA    CRT-D implantation      ESOPHAGOGASTRODUODENOSCOPY      GALLBLADDER SURGERY      TONSILLECTOMY      TONSILLECTOMY, ADENOIDECTOMY      VASCULAR SURGERY       Family History   Problem Relation Age of Onset    Breast cancer Mother     Cancer Father     Heart failure Father     Heart disease Father     Heart attack Father     Sudden death Neg Hx      Social History   Substance Use Topics    Smoking status: Former Smoker     Packs/day: 0.25     Years: 50.00     Types: Cigarettes     Start date: 1/15/1965    Smokeless tobacco: Never Used    Alcohol use No     Review of Systems   Constitutional: Positive for activity change, appetite change and fatigue. Negative for chills and fever.   HENT: Negative for congestion, ear pain,  rhinorrhea, sore throat and trouble swallowing.    Respiratory: Positive for cough, sputum production, shortness of breath and wheezing. Negative for hemoptysis and chest tightness.    Cardiovascular: Positive for orthopnea and leg swelling. Negative for chest pain, palpitations and syncope.   Gastrointestinal: Negative for abdominal pain, anal bleeding, blood in stool, constipation, nausea and vomiting.   Musculoskeletal: Positive for arthralgias and back pain. Negative for gait problem and neck pain.   Skin: Positive for pallor. Negative for wound.   Neurological: Positive for weakness (generalized). Negative for dizziness.   Hematological: Does not bruise/bleed easily.   Psychiatric/Behavioral: Negative for confusion.       Physical Exam   Initial Vitals   BP Pulse Resp Temp SpO2   03/01/17 0853 03/01/17 0853 03/01/17 0853 03/01/17 0907 03/01/17 0853   107/69 89 22 98.8 °F (37.1 °C) 100 %     Physical Exam    Nursing note and vitals reviewed.  Constitutional: She appears well-developed and well-nourished. She is Obese . She is active and cooperative.  Non-toxic appearance. She has a sickly appearance. She appears ill. She appears distressed. Nasal cannula in place.   HENT:   Head: Normocephalic and atraumatic.   Nose: Nose normal.   Mouth/Throat: Uvula is midline and oropharynx is clear and moist. Mucous membranes are pale, not dry and not cyanotic.   Eyes: Conjunctivae and EOM are normal.   Neck: Normal range of motion. Neck supple.   Cardiovascular: Normal rate. An irregularly irregular rhythm present.   Murmur heard.  Pulmonary/Chest: Accessory muscle usage present. She is in respiratory distress. She has decreased breath sounds (diffuse). She has wheezes (end expiratory bilaterally). Rhonchi: diffuse. She has no rales.   Pt in tripod position.  Refuses recumbent position.    Abdominal: Soft. Normal appearance and bowel sounds are normal. She exhibits no distension. There is no tenderness. There is no  rigidity, no rebound, no guarding and no CVA tenderness.   Genitourinary: Rectum normal. Rectal exam shows no external hemorrhoid, no internal hemorrhoid, no mass, no tenderness and anal tone normal. : Acceptable.  Genitourinary Comments: Stool green and firm.  No BRB.  No fissure or hemorrhoid.    Musculoskeletal:        Cervical back: Normal.        Left upper arm: She exhibits swelling and edema. She exhibits no tenderness, no bony tenderness, no deformity and no laceration.        Right lower leg: She exhibits swelling and edema. She exhibits no tenderness, no bony tenderness, no deformity and no laceration.        Left lower leg: She exhibits swelling and edema. She exhibits no tenderness, no bony tenderness, no deformity and no laceration.        Right foot: There is swelling.        Left foot: There is swelling.   Neurological: She is alert and oriented to person, place, and time. She has normal strength. GCS eye subscore is 4. GCS verbal subscore is 5. GCS motor subscore is 6.   Skin: Skin is warm, dry and intact. No bruising and no rash noted. There is pallor.   Psychiatric: She has a normal mood and affect. Her speech is normal and behavior is normal. Judgment and thought content normal. Cognition and memory are normal.         ED Course   Procedures  Labs Reviewed   CBC W/ AUTO DIFFERENTIAL - Abnormal; Notable for the following:        Result Value    WBC 15.45 (*)     RBC 3.47 (*)     Hemoglobin 9.5 (*)     Hematocrit 32.1 (*)     MCHC 29.6 (*)     RDW 17.3 (*)     Platelets 367 (*)     Gran # 13.6 (*)     Lymph # 0.7 (*)     Mono # 1.1 (*)     Gran% 88.2 (*)     Lymph% 4.3 (*)     All other components within normal limits   COMPREHENSIVE METABOLIC PANEL - Abnormal; Notable for the following:     Sodium 134 (*)     Chloride 90 (*)     CO2 32 (*)     BUN, Bld 44 (*)     Creatinine 1.5 (*)     Total Bilirubin 2.4 (*)     Alkaline Phosphatase 162 (*)     AST 50 (*)     eGFR if   American 40 (*)     eGFR if non  35 (*)     All other components within normal limits   PROTIME-INR - Abnormal; Notable for the following:     Prothrombin Time 16.4 (*)     INR 1.6 (*)     All other components within normal limits   TROPONIN I - Abnormal; Notable for the following:     Troponin I 0.034 (*)     All other components within normal limits   URINALYSIS - Abnormal; Notable for the following:     Appearance, UA Cloudy (*)     Protein, UA 1+ (*)     Bilirubin (UA) 1+ (*)     Occult Blood UA 3+ (*)     Urobilinogen, UA 4.0-6.0 (*)     Leukocytes, UA 3+ (*)     All other components within normal limits   DIGOXIN LEVEL - Abnormal; Notable for the following:     Digoxin Lvl 0.2 (*)     All other components within normal limits   OCCULT BLOOD X 1, STOOL - Abnormal; Notable for the following:     Occult Blood Positive (*)     All other components within normal limits   URINALYSIS MICROSCOPIC - Abnormal; Notable for the following:     RBC, UA 10 (*)     WBC, UA >100 (*)     Bacteria, UA Many (*)     All other components within normal limits   ISTAT PROCEDURE - Abnormal; Notable for the following:     POC PH 7.477 (*)     POC HCO3 29.9 (*)     POC TCO2 31 (*)     All other components within normal limits   APTT   MAGNESIUM   INFLUENZA A AND B ANTIGEN   B-TYPE NATRIURETIC PEPTIDE   TYPE & SCREEN              Imaging Results         US Upper Extremity Veins Left (Final result) Result time:  03/01/17 13:40:01    Final result by Ezequiel Gonzalez MD (03/01/17 13:40:01)    Impression:     No venous thrombosis of left upper extremity veins.      Electronically signed by: KECIA GONZALEZ MD  Date:     03/01/17  Time:    13:40     Narrative:    Color Doppler, duplex, gray scale imaging evaluation of left upper extremity veins.  The jugular, subclavian, axillary, brachial, basilic and cephalic veins are patent and normal.            X-Ray Chest 1 View (Final result) Result time:  03/01/17 10:34:24     Final result by Dakota Greene MD (03/01/17 10:34:24)    Impression:       No significant change when compared to prior exam.  Stable right pleural effusion with atelectasis/consolidation of the right lower lung zone.    Mildly prominent interstitial lung markings which may be seen with mild pulmonary edema or infectious/inflammatory process similar to prior exam.          Electronically signed by: DAKOTA GREENE MD  Date:     03/01/17  Time:    10:34     Narrative:    [id number]  Accession # [id number]      Study:  XR CHEST 1 VIEW    Indication: Shortness of breath.    Comparison: Chest radiograph from 02/22/2017.    Findings:     XR CHEST 1 VIEW.    No significant change compared to prior exam.    Stable cardiomegaly.  Stable AICD. Calcification of the aortic arch.  Prominence of the pulmonary vasculature.  Stable right pleural effusion with atelectasis/consolidation of the right lower lung zone.  Mildly prominent interstitial lung markings which may be seen with mild pulmonary edema or infectious process, similar to prior exam.  Osseous structures are unremarkable.              Medical Decision Making:   History:   I obtained history from: someone other than patient.       <> Summary of History: Pt friend with her  Old Medical Records: I decided to obtain old medical records.  Initial Assessment:   72yo female with respiratory distress.  Recent admission for CHF exacerbation.  History of PE, not currently on anticoagulants. Pt appears ill, distressed.  On 3LNC, tripodding. Pale.  HR Irregularly irregular. Lungs with diffuse decreased breath sounds and diffuse rhonchi.  Few end expiratory wheezes in bases bilaterally.  3+ pitting edema BLE.  LUE with edema.  Radial and DP2+ by palpation.    Differential Diagnosis:   CHF exacerbation, COPD exacerbation, STEMI/NSTEMI, arrhythmia, pneumonia, pneumothorax, sepsis, hypoxic respiratory failure, DVT, PE, GI bleed, anemia, UTI  Clinical Tests:   Lab Tests: Ordered and  Reviewed  Radiological Study: Ordered and Reviewed  Medical Tests: Ordered and Reviewed  ED Management:  Labs, ABG, Duoneb, CXR, EKG, US LUE  Other:   I have discussed this case with another health care provider.       <> Summary of the Discussion: 12:24 PM case discussed with Dr thurman who will admit  Pt was turned over to  at 1000.                    ED Course     Clinical Impression:   Diagnoses of SOB (shortness of breath) and Acute on chronic heart failure were pertinent to this visit.          DAVID Berry  03/01/17 1754       Bárbara Real MD  03/01/17 2005

## 2017-03-01 NOTE — IP AVS SNAPSHOT
[facility]  [address]  [place] [address]  Phone: [phone]           Patient Discharge Instructions     Our goal is to set you up for success. This packet includes information on your condition, medications, and your home care. It will help you to care for yourself so you don't get sicker and need to go back to the hospital.     Please ask your nurse if you have any questions.        There are many details to remember when preparing to leave the hospital. Here is what you will need to do:    1. Take your medicine. If you are prescribed medications, review your Medication List in the following pages. You may have new medications to  at the pharmacy and others that you'll need to stop taking. Review the instructions for how and when to take your medications. Talk with your doctor or nurses if you are unsure of what to do.     2. Go to your follow-up appointments. Specific follow-up information is listed in the following pages. Your may be contacted by a transition nurse or clinical provider about future appointments. Be sure we have all of the phone numbers to reach you, if needed. Please contact your provider's office if you are unable to make an appointment.     3. Watch for warning signs. Your doctor or nurse will give you detailed warning signs to watch for and when to call for assistance. These instructions may also include educational information about your condition. If you experience any of warning signs to your health, call your doctor.               [facility]  Unless otherwise directed by your provider, please contact [facility], our nurse care line that is available for 24/7 assistance.     [phone] (toll-free)    Registered nurses in the [facility] provide clinical advisement, health education, appointment booking, and other advisory services.                    ** Verify the list of medication(s) below is accurate and up to date. Carry this  with you in case of emergency. If your medications have changed, please notify your healthcare provider.             Medication List      CHANGE how you take these medications        Additional Info                      bumetanide 1 MG tablet   Commonly known as:  BUMEX   Quantity:  180 tablet   Refills:  3   Dose:  3 mg   What changed:    - how much to take  - additional instructions   Comments:  PLEASE FILL ON 12/16/15    Instructions:  Take 3 tablets (3 mg total) by mouth 2 (two) times daily.     Begin Date    AM    Noon    PM    Bedtime       fluticasone-vilanterol 100-25 mcg/dose diskus inhaler   Commonly known as:  BREO   Quantity:  28 each   Refills:  5   Dose:  1 puff   What changed:    - when to take this  - reasons to take this    Instructions:  Inhale 1 puff into the lungs once daily.     Begin Date    AM    Noon    PM    Bedtime       trazodone 150 MG tablet   Commonly known as:  DESYREL   Quantity:  30 tablet   Refills:  3   What changed:  See the new instructions.    Last time this was given:  150 mg on 3/7/2017  8:10 PM   Instructions:  TAKE 1 TABLET BY MOUTH AT BEDTIME     Begin Date    AM    Noon    PM    Bedtime         CONTINUE taking these medications        Additional Info                      albuterol 90 mcg/actuation inhaler   Quantity:  18 g   Refills:  0   Dose:  2 puff    Instructions:  Inhale 2 puffs into the lungs every 6 (six) hours as needed for Wheezing.     Begin Date    AM    Noon    PM    Bedtime       albuterol-ipratropium 2.5mg-0.5mg/3mL 0.5 mg-3 mg(2.5 mg base)/3 mL nebulizer solution   Commonly known as:  DUO-NEB   Refills:  2    Last time this was given:  3 mLs on 3/8/2017 12:31 PM   Instructions:  USE 1 VIAL VIA NEBULIZER EVERY 6 HOURS AS NEEDED FOR WHEEZING     Begin Date    AM    Noon    PM    Bedtime       allopurinol 300 MG tablet   Commonly known as:  ZYLOPRIM   Quantity:  90 tablet   Refills:  3   Dose:  300 mg    Instructions:  Take 1 tablet (300 mg total) by mouth  once daily.     Begin Date    AM    Noon    PM    Bedtime       amiodarone 200 MG Tab   Commonly known as:  PACERONE   Refills:  0   Dose:  200 mg    Last time this was given:  200 mg on 3/8/2017  9:59 AM   Instructions:  Take 200 mg by mouth once daily.     Begin Date    AM    Noon    PM    Bedtime       aspirin 81 MG Chew   Refills:  0   Dose:  81 mg    Last time this was given:  81 mg on 3/7/2017  8:10 PM   Instructions:  Take 81 mg by mouth every evening.     Begin Date    AM    Noon    PM    Bedtime       carvedilol 3.125 MG tablet   Commonly known as:  COREG   Quantity:  180 tablet   Refills:  3   Dose:  3.125 mg   Comments:  PLEASE FILL ON 12/16/15    Last time this was given:  3.125 mg on 3/8/2017  9:58 AM   Instructions:  Take 1 tablet (3.125 mg total) by mouth 2 (two) times daily with meals.     Begin Date    AM    Noon    PM    Bedtime       clopidogrel 75 mg tablet   Commonly known as:  PLAVIX   Refills:  0   Dose:  75 mg    Last time this was given:  75 mg on 3/8/2017  9:59 AM   Instructions:  Take 75 mg by mouth once daily.     Begin Date    AM    Noon    PM    Bedtime       cyclobenzaprine 10 MG tablet   Commonly known as:  FLEXERIL   Refills:  0   Dose:  10 mg    Instructions:  Take 10 mg by mouth 3 (three) times daily as needed for Muscle spasms.     Begin Date    AM    Noon    PM    Bedtime       ferrous sulfate 325 (65 FE) MG EC tablet   Refills:  0   Dose:  325 mg    Last time this was given:  325 mg on 3/7/2017  8:10 PM   Instructions:  Take 1 tablet (325 mg total) by mouth every evening.     Begin Date    AM    Noon    PM    Bedtime       isosorbide mononitrate 60 MG 24 hr tablet   Commonly known as:  IMDUR   Refills:  0   Dose:  60 mg    Last time this was given:  60 mg on 3/4/2017 10:17 AM   Instructions:  Take 1 tablet (60 mg total) by mouth once daily.     Begin Date    AM    Noon    PM    Bedtime       losartan 25 MG tablet   Commonly known as:  COZAAR   Quantity:  45 tablet   Refills:  3    Dose:  12.5 mg    Last time this was given:  12.5 mg on 3/4/2017 10:16 AM   Instructions:  Take 0.5 tablets (12.5 mg total) by mouth once daily.     Begin Date    AM    Noon    PM    Bedtime       pantoprazole 40 MG tablet   Commonly known as:  PROTONIX   Quantity:  30 tablet   Refills:  11   Dose:  40 mg    Last time this was given:  40 mg on 3/8/2017  9:59 AM   Instructions:  Take 1 tablet (40 mg total) by mouth once daily.     Begin Date    AM    Noon    PM    Bedtime       pravastatin 40 MG tablet   Commonly known as:  PRAVACHOL   Quantity:  90 tablet   Refills:  3   Dose:  40 mg    Last time this was given:  40 mg on 3/8/2017  9:00 AM   Instructions:  Take 1 tablet (40 mg total) by mouth once daily.     Begin Date    AM    Noon    PM    Bedtime       tramadol 50 mg tablet   Commonly known as:  ULTRAM   Refills:  0   Dose:  50 mg    Last time this was given:  50 mg on 3/4/2017 12:42 AM   Instructions:  Take 50 mg by mouth every 6 (six) hours as needed for Pain.     Begin Date    AM    Noon    PM    Bedtime            Where to Get Your Medications      These medications were sent to [facility] - [address] [facility]  [address], [place] [address]     Phone:  [phone]     bumetanide 1 MG tablet                  Please bring to all follow up appointments:    1. A copy of your discharge instructions.  2. All medicines you are currently taking in their original bottles.  3. Identification and insurance card.    Please arrive 15 minutes ahead of scheduled appointment time.    Please call 24 hours in advance if you must reschedule your appointment and/or time.        Your Scheduled Appointments     Mar 10, 2017  2:00 PM [facility] Follow Up with MD [place] Kulkarni - Internal Medicine Priority ([place])    [address]  [place] [address] [phone]            Apr 05, 2017  1:00 PM CDT   GASTROENTEROLOGY  ESTABLISHED PATIENT with MD Landon Millan - Gastroenterology ([address] )    [address]  [facility] [address] [phone]              Follow-up Information     Follow up with Zuleyka Becerra MD On 3/10/2017.    Specialty:  Hospitalist    Why:  2:00 PM    Contact information:    [address]  [address]  [place] [id number] [phone]          Discharge Instructions     Future Orders    Activity as tolerated     Call MD for:  difficulty breathing or increased cough     Call MD for:  persistent dizziness, light-headedness, or visual disturbances     Diet Cardiac         Discharge Instructions       ANEMIA (ENGLISH) View Edit Remove   ATRIAL FIBRILLATION, UNDERSTANDING (ENGLISH) View Edit Remove   HYPERTENSION, ESTABLISHED (ENGLISH) View Edit Remove   HEART FAILURE: MAKING CHANGES TO YOUR DIET (ENGLISH) View Edit Remove   HEART FAILURE: BEING ACTIVE (ENGLISH) View Edit Remove   HEART FAILURE: TRACKING YOUR WEIGHT (ENGLISH) View Edit Remove   CORONARY ARTERY DISEASE (CAD), UNDERSTANDING (ENGLISH) View Edit Remove   PACEMAKER, LIVING WITH (ENGLISH) View Edit Remove   GASTROINTESTINAL (GI) BLEEDING, WHEN YOU HAVE (ENGLISH) View Edit Remove   BLADDER INFECTION, FEMALE (ADULT) (ENGLISH) View Edit Remove   BUMETANIDE TABLETS (ENGLISH) View Edit Remove         Discharge References/Attachments     ANEMIA (ENGLISH)    ATRIAL FIBRILLATION, UNDERSTANDING (ENGLISH)    HYPERTENSION, ESTABLISHED (ENGLISH)    HEART FAILURE: MAKING CHANGES TO YOUR DIET (ENGLISH)    HEART FAILURE: BEING ACTIVE (ENGLISH)    HEART FAILURE: TRACKING YOUR WEIGHT (ENGLISH)    CORONARY ARTERY DISEASE (CAD), UNDERSTANDING (ENGLISH)    PACEMAKER, LIVING WITH (ENGLISH)    GASTROINTESTINAL (GI) BLEEDING, WHEN YOU HAVE (ENGLISH)    BLADDER INFECTION, FEMALE (ADULT) (ENGLISH)    BUMETANIDE TABLETS (ENGLISH)        Primary Diagnosis     Your primary diagnosis was:  Heart Failure      Admission Information     Date & Time  "Provider Department CSN    3/1/2017  8:59 AM Freddie Waterman MD [facility] [id number]      Care Providers     Provider Role Specialty Primary office phone    Freddie Waterman MD Attending Provider Hospitalist [phone]    Anderson Bdaillo MD Physician  Hospitalist  [phone]    Riley Braga MD Consulting Physician  Cardiology [phone]      Important Medicare Message          Most Recent Value    Important Message from Medicare Regarding Discharge Appeal Rights  Given to patient/caregiver, Explained to patient/caregiver, Signed/date by patient/caregiver, Other (comments) [patient very exhausted. Placed call to patient's sister verbally agreed.] yes 03/07/2017 1527      Your Vitals Were     BP Pulse Temp Resp Height Weight    113/60 (BP Location: Right arm, Patient Position: Sitting, BP Method: Automatic) 86 97.6 °F (36.4 °C) (Oral) 18 5' 10" (1.778 m) 122 kg (268 lb 14.4 oz)    Last Period SpO2 BMI          (LMP Unknown) 96% 38.58 kg/m2        Recent Lab Values        7/12/2012 1/30/2015                        1:00 PM  2:56 AM          A1C 5.8 6.5 (H)                     Pending Labs     Order Current Status    Blood culture In process      Allergies as of 3/8/2017        Reactions    Gabapentin       Advance Directives     An advance directive is a document which, in the event you are no longer able to make decisions for yourself, tells your healthcare team what kind of treatment you do or do not want to receive, or who you would like to make those decisions for you.  If you do not currently have an advance directive, [facility] encourages you to create one.  For more information call:  [phone]), [phone] ([phone]),  or log on to [email or web address].        Smoking Cessation     If you would like to quit smoking:   You may be eligible for free services if you are a [place] resident and started smoking cigarettes before September 1, 1988.  Call the " Smoking Cessation Trust ([facility]) toll free at (148) 411-2032 or (113) 817-5544.   Call 1-800-QUIT-NOW if you do not meet the above criteria.            Language Assistance Services     ATTENTION: Language assistance services are available, free of charge. Please call [phone].      ATENCIÓN: Si mai egan, tiene a arriola disposición servicios gratuitos de asistencia lingüística. Llame al [phone].     CHÚ Ý: N?u b?n nói Ti?ng Vi?t, có các d?ch v? h? tr? ngôn ng? mi?n phí dành cho b?n. G?i s? [phone].        Stroke Education              Heart Failure Education       Heart Failure: Being Active  You have a condition called heart failure. Being active doesnt mean that you have to wear yourself out. Even a little movement each day helps to strengthen your heart. If you cant get out to exercise, you can do simple stretching and strengthening exercises at home. These are good ways to keep you well-conditioned and prevent you and your heart from becoming excessively weak.    Ideas to get you started  · Add a little movement to things you do now. Walk to mail letters. Park your car at the far end of the parking lot and walk to the store. Walk up a flight of stairs instead of taking the elevator.  · Choose activities you enjoy. You might walk, swim, or ride an exercise bike. Things like gardening and washing the car count, too. Other possibilities include: washing dishes, walking the dog, walking around the mall, and doing aerobic activities with friends.  · Join a group exercise program at a [facility], a senior center, or a community center. Or look into a hospital cardiac rehabilitation program. Ask your doctor if you qualify.  Tips to keep you going  · Get up and get dressed each day. Go to a coffee shop and read a newspaper or go somewhere that you'll be in the presence of other active people. Youll feel more like being active.  · Make a plan. Choose one or more activities that you enjoy and that  you can easily do. Then plan to do at least one each day. You might write your plan on a calendar.  · Go with a friend or a group if you like company. This can help you feel supported and stay motivated, too.  · Plan social events that you enjoy. This will keep you mentally engaged as well as physically motivated to do things you find pleasure in.  For your safety  · Talk with your healthcare provider before starting an exercise program.  · Exercise indoors when its too hot or too cold outside, or when the air quality is poor. Try walking at a shopping mall.  · Wear socks and sturdy shoes to maintain your balance and prevent falls.  · Start slowly. Do a few minutes several times a day at first. Increase your time and speed little by little.  · Stop and rest whenever you feel tired or get short of breath.  · Dont push yourself on days when you dont feel well.  Date Last Reviewed: 3/20/2016  © 4098-9170 [employer]. [address] [place] [address], [place] [address]. All rights reserved. This information is not intended as a substitute for professional medical care. Always follow your healthcare professional's instructions.              Heart Failure: Evaluating Your Heart  You have a condition called heart failure. To evaluate your condition, your doctor will examine you, ask questions, and do some tests. Along with looking for signs of heart failure, the doctor looks for any other health problems that may have led to heart failure. The results of your evaluation will help your doctor form a treatment plan.  Health history and physical exam  Your visit will start with a health history. Tell the doctor about any symptoms youve noticed and about all medicines you take. Then youll have a physical exam. This includes listening to your heartbeat and breathing. Youll also be checked for swelling (edema) in your legs and neck. When you have fluid buildup or fluid in the lungs, it may be called congestive heart  failure.  Diagnosing heart failure     During an echocardiogram, sound waves bounce off the heart. These are converted into a picture on the screen.   The following may be done to help your doctor form a diagnosis:  · X-rays show the size and shape of your heart. These pictures can also show fluid in your lungs.  · An electrocardiogram (ECG or EKG) shows the pattern of your heartbeat. Small pads (electrodes) are placed on your chest, arms, and legs. Wires connect the pads to the ECG machine, which records your hearts electrical signals. This can give the doctor information about heart function.  · An echocardiogram uses ultrasound waves to show the structure and movement of your heart muscle. This shows how well the heart pumps. It also shows the thickness of the heart walls, and if the heart is enlarged. It is one of the most useful, non-invasive tests as it provides information about the heart's general function. This helps your doctor make treatment decisions.  · Lab tests evaluate small amounts of blood or urine for signs of problems. A BNP lab test can help diagnose and evaluate heart failure. BNP stands for B-type natriuretic peptide. The ventricles secrete more BNP when heart failure worsens. Lab tests can also provide information about metabolic dysfunction or heart dysfunction.  Your treatment plan  Based on the results of your evaluation and tests, your doctor will develop a treatment plan. This plan is designed to relieve some of your heart failure symptoms and help make you more comfortable. Your treatment plan may include:  · Medicine to help your heart work better and improve your quality of life  · Changes in what you eat and drink to help prevent fluid from backing up in your body  · Daily monitoring of your weight and heart failure symptoms to see how well your treatment plan is working  · Exercise to help you stay healthy  · Help with quitting smoking  · Emotional and psychological support to help  adjust to the changes  · Referrals to other specialists to make sure you are being treated comprehensively  Date Last Reviewed: 3/21/2016  © 4704-9221 [employer]. [address] [place] [address], [place] [address]. All rights reserved. This information is not intended as a substitute for professional medical care. Always follow your healthcare professional's instructions.              Heart Failure: Making Changes to Your Diet  You have a condition called heart failure. When you have heart failure, excess fluid is more likely to build up in your body because your heart isn't working well. This makes the heart work harder to pump blood. Fluid buildup causes symptoms such as shortness of breath and swelling (edema). This is often referred to as congestive heart failure or CHF. Controlling the amount of salt (sodium) you eat may help stop fluid from building up. Your doctor may also tell you to reduce the amount of fluid you drink.  Reading food labels    Your healthcare provider will tell you how much sodium you can eat each day. Read food labels to keep track. Keep in mind that certain foods are high in salt. These include canned, frozen, and processed foods. Check the amount of sodium in each serving. Watch out for high-sodium ingredients. These include MSG (monosodium glutamate), baking soda, and sodium phosphate.   Eating less salt  Give yourself time to get used to eating less salt. It may take a little while. Here are some tips to help:  · Take the saltshaker off the table. Replace it with salt-free herb mixes and spices.  · Eat fresh or plain frozen vegetables. These have much less salt than canned vegetables.  · Choose low-sodium snacks like sodium-free pretzels, crackers, or air-popped popcorn.  · Dont add salt to your food when youre cooking. Instead, season your foods with pepper, lemon, garlic, or onion.  · When you eat out, ask that your food be cooked without added salt.  · Avoid eating fried  foods as these often have a great deal of salt.  If youre told to limit fluids  You may need to limit how much fluid you have to help prevent swelling. This includes anything that is liquid at room temperature, such as ice cream and soup. If your doctor tells you to limit fluid, try these tips:  · Measure drinks in a measuring cup before you drink them. This will help you meet daily goals.  · Chill drinks to make them more refreshing.  · Suck on frozen lemon wedges to quench thirst.  · Only drink when youre thirsty.  · Chew sugarless gum or suck on hard candy to keep your mouth moist.  · Weigh yourself daily to know if your body's fluid content is rising.  My sodium goal  Your healthcare provider may give you a sodium goal to meet each day. This includes sodium found in food as well as salt that you add. My goal is to eat no more than ___________ mg of sodium per day.     When to call your doctor  Call your doctor right away if you have any symptoms of worsening heart failure. These can include:  · Sudden weight gain  · Increased swelling of your legs or ankles  · Trouble breathing when youre resting or at night  · Increase in the number of pillows you have to sleep on  · Chest pain, pressure, discomfort, or pain in the jaw, neck, or back   Date Last Reviewed: 3/21/2016  © 1175-4977 [employer]. [address] [place] [address]. All rights reserved. This information is not intended as a substitute for professional medical care. Always follow your healthcare professional's instructions.              Heart Failure: Medicines to Help Your Heart    You have a condition called heart failure (also known as congestive heart failure, or CHF). Your doctor will likely prescribe medicines for heart failure and any underlying health problems you have. Most heart failure patients take one or more types of medicinen. Your healthcare provider will work to find the combination of medicines that works best  for you.  Heart failure medicines  Here are the most common heart failure medicines:  · ACE inhibitors lower blood pressure and decrease strain on the heart. This makes it easier for the heart to pump. Angiotensin receptor blockers have similar effects. These are prescribed for some patients instead of ACE inhibitors.  · Beta-blockers relieve stress on the heart. They also improve symptoms. They may also improve the heart's pumping action over time.  · Diuretics (also called water pills) help rid your body of excess water. This can help rid your body of swelling (edema). Having less fluid to pump means your heart doesnt have to work as hard. Some diuretics make your body lose a mineral called potassium. Your doctor will tell you if you need to take supplements or eat more foods high in potassium.  · Digoxin helps your heart pump with more strength. This helps your heart pump more blood with each beat. So, more oxygen-rich blood travels to the rest of the body.  · Aldosterone antagonists help alter hormones and decrease strain on the heart.  · Hydralazine and nitrates are two separate medicines used together to treat heart failure. They may come in one combination pill. They lower blood pressure and decrease how hard the heart has to pump.  Medicines for related conditions  Controlling other heart problems helps keep heart failure under control, too. Depending on other heart problems you have, medicines may be prescribed to:  · Lower blood pressure (antihypertensives).  · Lower cholesterol levels (statins).  · Prevent blood clots (anticoagulants or aspirin).  · Keep the heartbeat steady (antiarrhythmics).  Date Last Reviewed: 3/5/2016  © 2211-4196 [employer]. [address] [place] [address], [place] [address]. All rights reserved. This information is not intended as a substitute for professional medical care. Always follow your healthcare professional's instructions.              Heart Failure: Procedures  That May Help    The heart is a muscle that pumps oxygen-rich blood to all parts of the body. When you have heart failure, the heart is not able to pump as well as it should. Blood and fluid may back up into the lungs (congestive heart failure), and some parts of the body dont get enough oxygen-rich blood to work normally. These problems lead to the symptoms of heart failure.     Certain procedures may help the heart pump better in some cases of heart failure. Some procedures are done to treat health problems that may have caused the heart failure such as coronary artery disease or heart rhythm problems. For more serious heart failure, other options are available.  Treating artery and valve problems  If you have coronary artery disease or valve disease, procedures may be done to improve blood flow. This helps the heart pump better, which can improve heart failure symptoms. First, your doctor may do a cardiac catheterization to help detect clogged blood vessels or valve damage. During this procedure, a  thin tube (catheter) in inserted into a blood vessel and guided to the heart. There a dye is injected and a special type of X-ray (angiogram) is taken of the blood vessels. Procedures to open a blocked artery or fix damaged valves can also be done using catheterization.  · Angioplasty uses a balloon-tipped instrument at the end of the catheter. The balloon is inflated to widen the narrowed artery. In many cases, a stent is expanded to further support the narrowed artery. A stent is a metal mesh tube.  · Valve surgery repairs or replacement of faulty valves can also be done during catheterization so blood can flow properly through the chambers of the heart.  Bypass surgery is another option to help treat blocked arteries. It uses a healthy blood vessel from elsewhere in the body. The healthy blood vessel is attached above and below the blocked area so that blood can flow around the blocked artery.  Treating heart  rhythm problems  A device may be placed in the chest to help a weak heart maintain a healthy, heartbeat so the heart can pump more effectively:  · Pacemaker. A pacemaker is an implanted device that regulates your heartbeat electronically. It monitors your heart's rhythm and generates a painless electric impulse that helps the heart beat in a regular rhythm. A pacemaker is programmed to meet your specific heart rhythm needs.  · Biventricular pacing/cardiac resynchronization therapy. A type of pacemaker that paces both pumping chambers of the heart at the same time to coordinate contractions and to improve the heart's function. Some people with heart failure are candidates for this therapy.  · Implantable cardioverter defibrillator. A device similar to a pacemaker that senses when the heart is beating too fast and delivers an electrical shock to convert the fast rhythm to a normal rhythm. This can be a life saving device.  In severe cases  In more serious cases of heart failure when other treatments no longer work, other options may include:  · Ventricular assist devices (VADs). These are mechanical devices used to take over the pumping function for one or both of the heart's ventricles, or pumping chambers. A VAD may be necessary when heart failure progresses to the point that medicines and other treatments no longer help. In some cases, a VAD may be used as a bridge to transplant.  · Heart transplant. This is replacing the diseased heart with a healthy one from a donor. This is an option for a few people who are very sick. A heart transplant is very serious and not an option for all patients. Your doctor can tell you more.  Date Last Reviewed: 3/20/2016  © 1354-2144 [employer]. [address] [place] [address], [place] [address]. All rights reserved. This information is not intended as a substitute for professional medical care. Always follow your healthcare professional's instructions.              Heart  Failure: Tracking Your Weight  You have a condition called heart failure. When you have heart failure, a sudden weight gain or a steady rise in weight is a warning sign that your body is retaining too much water and salt. This could mean your heart failure is getting worse. If left untreated, it can cause problems for your lungs and result in shortness of breath. Weighing yourself each day is the best way to know if youre retaining water. If your weight goes up quickly, call your doctor. You will be given instructions on how to get rid of the excess water. You will likely need medicines and to avoid salt. This will help your heart work better.  Call your doctor if you gain more than 2 pounds in 1 day, more than 5 pounds in 1 week, or whatever weight gain you were told to report by your doctor. This is often a sign of worsening heart failure and needs to be evaluated and treated. Your doctor will tell you what to do next.   Tips for weighing yourself    · Weigh yourself at the same time each morning, wearing the same clothes. Weigh yourself after urinating and before eating.  · Use the same scale each day. Make sure the numbers are easy to read. Put the scale on a flat, hard surface -- not on a rug or carpet.  · Do not stop weighing yourself. If you forget one day, weigh again the next morning.  How to use your weight chart  · Keep your weight chart near the scale. Write your weight on the chart as soon as you get off the scale.  · Fill in the month and the start date on the chart. Then write down your weight each day. Your chart will look like this:    · If you miss a day, leave the space blank. Weigh yourself the next day and write your weight in the next space.  · Take your weight chart with you when you go to see your doctor.  Date Last Reviewed: 3/20/2016  © 5596-1605 [employer]. [address] [place] [address], [place] [address]. All rights reserved. This information is not intended as a substitute for  professional medical care. Always follow your healthcare professional's instructions.              Heart Failure: Warning Signs of a Flare-Up  You have a condition called heart failure. Once you have heart failure, flare-ups can happen. Below are signs that can mean your heart failure is getting worse. If you notice any of these warning signs, call your healthcare provider.  Swelling    · Your feet, ankles, or lower legs get puffier.  · You notice skin changes on your lower legs.  · Your shoes feel too tight.  · Your clothes are tighter in the waist.  · You have trouble getting rings on or off your fingers.  Shortness of breath  · You have to breathe harder even when youre doing your normal activities or when youre resting.  · You are short of breath walking up stairs or even short distances.  · You wake up at night short of breath or coughing.  · You need to use more pillows or sit up to sleep.  · You wake up tired or restless.  Other warning signs  · You feel weaker, dizzy, or more tired.  · You have chest pain or changes in your heartbeat.  · You have a cough that wont go away.  · You cant remember things or dont feel like eating.  Tracking your weight  Gaining weight is often the first warning sign that heart failure is getting worse. Gaining even a few pounds can be a sign that your body is retaining excess water and salt. Weighing yourself each day in the morning after you urinate and before you eat, is the best way to know if you're retaining water. Get a scale that is easy to read and make sure you wear the same clothes and use the same scale every time you weigh. Your healthcare provider will show you how to track your weight. Call your doctor if you gain more than 2 pounds in 1 day, 5 pounds in 1 week, or whatever weight gain you were told to report by your doctor. This is often a sign of worsening heart failure and needs to be evaluated and treated before it compromises your breathing. Your doctor  will tell you what to do next.    Date Last Reviewed: 3/15/2016  © 5112-6490 [employer]. [address] [place] [address], [place] [address]. All rights reserved. This information is not intended as a substitute for professional medical care. Always follow your healthcare professional's instructions.              Pneumonmia Discharge Instructions                Chronic [place] Disease Education              [facility] complies with applicable Federal civil rights laws and does not discriminate on the basis of race, color, national origin, age, disability, or sex.

## 2017-03-01 NOTE — ASSESSMENT & PLAN NOTE
Urine, Clean Catch      Color, UA Yellow    Appearance, UA Cloudy (A)    pH, UA 6.0    Specific Gravity, UA 1.020    Protein, UA 1+ (A)    Glucose, UA Negative    Ketones, UA Negative    Bilirubin (UA) 1+ (A)    Occult Blood UA 3+ (A)    Nitrite, UA Negative    Urobilinogen, UA 4.0-6.0 (A) EU/dL    Leukocytes, UA 3+ (A)     Given Cipro 400 mg IV in the ED.  Will give Zosyn to cover empirically pneumonia as well

## 2017-03-01 NOTE — ED NOTES
Patient presents to ED secondary to SOB which has progressively worsened x3 days. Pt reports was dc'd from this hospital two days ago and did not feel better when discharged. C/o productive cough of white and tan sputum. Patient states has been wearing home O2 at 3L with continuous shortness of breath. C/o extreme BAEZ. Pale mucous membranes. Patient skin is dry and pale. Also swelling noted to BLE and LUE. Will continue to monitor closely.

## 2017-03-01 NOTE — PROVIDER PROGRESS NOTES - EMERGENCY DEPT.
Encounter Date: 3/1/2017    ED Physician Progress Notes             10:38 AM pt re-evaluated.  Told of results thus far.  Lab called to draw labs  Still no IV access- consult for periferal line put in

## 2017-03-01 NOTE — ASSESSMENT & PLAN NOTE
Chest X-ray displays small, stable right pleural effusion with with atelectasis/consolidation of the right lower lung zone, concerning for possible pneumonia. Will start Zosyn to cover for respiratory as well as UTI and will check procalcitonin.

## 2017-03-01 NOTE — PROVIDER PROGRESS NOTES - EMERGENCY DEPT.
Encounter Date: 3/1/2017    ED Physician Progress Notes             9:29 AM pt seen, examined and plan discussed with NP

## 2017-03-01 NOTE — H&P
[facility] Medicine  History & Physical    Patient Name: Jennifer Prescott  MRN: [id number]  Admission Date: 3/1/2017  Attending Physician: Anderson Badillo MD   Primary Care Provider: Lianna Mistry MD         Patient information was obtained from patient, past medical records and ER records.     Subjective:     Principal Problem:Acute on chronic combined systolic and diastolic congestive heart failure    Chief Complaint:   Chief Complaint   Patient presents with    Shortness of Breath     accompanied by swelling in all extremeties; wears o2 at home on 3L; BAEZ;         HPI: Jennifer Prescott is a 71 y.o.  woman with coronary artery disease (s/p 3 JAMAL to RCA 1/27/15), chronic systolic diastolic congestive heart failure (EF=20% 9/24/16), chronic hypotension, s/p pacemaker 9/21/10, s/p biventricular AICD 10/09/12, permanent atrial fibrillation (had cardioversion 3/06/15), bilateral carotid artery disease, left lower lobe pulmonary embolism (2/12/15), COPD with 40 pack year smoking history, continuous home oxygen 3 LPM, obesity hypoventilation syndrome (on BiPAP), chronic hypoxic respiratory failure, ischemic colitis with recurrent GI bleeding, iron deficiency anemia due to chronic gastrointestinal blood loss, and chronic kidney disease stage 3. She lives in [place], [place]. She works in a [employer] center. Her primary care physician is Dr. Lianna Mistry at [facility]. Her cardiologist is Dr. Riley Braga.    She was started on apixaban 5 mg BID on 1/30/17, as she had been off anticoagulation since her last GI bleed. She was hospitalized at [facility] from 2/15/17-2/20/17 for another GI bleed and anemia episode. She was told to stop apixaban but also stopped bumetanide in erre, and presented to [facility] 2 days later (2/22/17) with pulmonary edema. BNP was 1856, increased from 1576 on 2/15/17. She was diuresed and advised to continue taking bumetanide  2 mg BID for a few three days after discharge before returning to her usual 1 mg BID. She was discharged home on 2/24/17.    She returns to [facility] ED recurrent SOB and dyspnea on exertion, progressively worse over the past three days. She has been using her Duoneb treatments without improvement.  She has a cough productive light frothy sputum.  She denies chest pain, fevers, chills, nausea , vomiting, diarrhea, or blood in her stool.  ED workup revealed green-brown stool, heme Occult +, Chest X-Ray showing Pulmonary Edema with right pleural effusion and right lower lobe consolidation, unchanged from prior,  12 ECG with Wide Complex regular rhythm with no P waves or notable Pacer spikes, rate 76 BPM, No acute T wave changes (Serum K 4.6) Negative Flu A/B, Urinalysis with occult blood, 3+ leukocyts, > bacteria, no nitrites. WBC 15.45, Alk Phos 162, AST 50. Troponin 0.034 (Basline). BNP 3474 (1856 1 week ago) Physical exam revealed fine bibasilar crackles and bilateral gross pitting edema in lower extremities up to her knees. Patient is ambulatory at home and states that she does not usually carry that much fluid.  Upon her last discharge, she took Bumex 2 mg TID for 3 days and then resumed 1 mg BID as directed. She was given furosemide 60 IV, Duoneb Treatment and ciprofloxacin 400 mg IV in the ED.  She is being admitted to [facility] Medicine for Acute on Chronic CHF with Pulmonary Edema.  Will continue aggressive diuresis and repeat Echo.  Will trend Troponin, and get pro-calcitonin at next lab draw. (Patient is a difficult stick).  Will get daily CBC and CMP to monitor for GI bleeding, Electrolytes with diuresis and liver enzymes for hepatic congestion.  Will treat empirically with Zosyn to cover HAP in light of right lower lobe consolidation and dirty urinalysis.           Past Medical History:   Diagnosis Date    *Atrial fibrillation     Anticoagulant long-term use     Arthritis     Cardiomyopathy      Carotid artery occlusion     CHF (congestive heart failure)     COPD (chronic obstructive pulmonary disease)     COPD exacerbation 2/19/2015    Coronary artery disease     RCA occluded with L to R collaterals and normal LAD and LCx    Hypertension     Internal bleeding     Sleep apnea     uses bipap       Past Surgical History:   Procedure Laterality Date    ABDOMINAL SURGERY      APPENDECTOMY      CARDIAC CATHETERIZATION      CARDIAC DEFIBRILLATOR PLACEMENT  10/9/2012    bivent AICD placed 10/2012 and revised 12/2012    CARDIAC PACEMAKER PLACEMENT  9/21/2010    [facility]    CARDIOVERSION  3/6/2015    CAROTID ENDARTERECTOMY Left     CHOLECYSTECTOMY      COLONOSCOPY N/A 7/11/2016    Procedure: COLONOSCOPY;  Surgeon: Rafael Pérez MD;  Location: [facility] ENDO;  Service: Endoscopy;  Laterality: N/A;    COLONOSCOPY N/A 2/17/2017    Procedure: COLONOSCOPY;  Surgeon: Giovanni Bronson MD;  Location: [facility] ENDO ([address] [place] [address]);  Service: Endoscopy;  Laterality: N/A;    CORONARY ANGIOPLASTY WITH STENT PLACEMENT  1/27/2015    3 JAMAL to RCA    CRT-D implantation      ESOPHAGOGASTRODUODENOSCOPY      GALLBLADDER SURGERY      TONSILLECTOMY      TONSILLECTOMY, ADENOIDECTOMY      VASCULAR SURGERY         Review of patient's allergies indicates:   Allergen Reactions    Gabapentin        No current facility-administered medications on file prior to encounter.      Current Outpatient Prescriptions on File Prior to Encounter   Medication Sig    albuterol 90 mcg/actuation inhaler Inhale 2 puffs into the lungs every 6 (six) hours as needed for Wheezing.    albuterol-ipratropium 2.5mg-0.5mg/3mL (DUO-NEB) 0.5 mg-3 mg(2.5 mg base)/3 mL nebulizer solution USE 1 VIAL VIA NEBULIZER EVERY 6 HOURS AS NEEDED FOR WHEEZING    allopurinol (ZYLOPRIM) 300 MG tablet Take 1 tablet (300 mg total) by mouth once daily.    amiodarone (PACERONE) 200 MG Tab Take 200 mg by mouth once daily.    aspirin 81 MG Chew Take 81 mg by mouth  every evening.     bumetanide (BUMEX) 1 MG tablet Take 1 tablet (1 mg total) by mouth 2 (two) times daily. Take 2 tablets 2 times daily for the first 3 days.    carvedilol (COREG) 3.125 MG tablet Take 1 tablet (3.125 mg total) by mouth 2 (two) times daily with meals.    clopidogrel (PLAVIX) 75 mg tablet Take 75 mg by mouth once daily.    cyclobenzaprine (FLEXERIL) 10 MG tablet Take 10 mg by mouth 3 (three) times daily as needed for Muscle spasms.    ferrous sulfate 325 (65 FE) MG EC tablet Take 1 tablet (325 mg total) by mouth every evening.    fluticasone-vilanterol (BREO) 100-25 mcg/dose diskus inhaler Inhale 1 puff into the lungs once daily. (Patient taking differently: Inhale 1 puff into the lungs daily as needed. )    isosorbide mononitrate (IMDUR) 60 MG 24 hr tablet Take 1 tablet (60 mg total) by mouth once daily.    losartan (COZAAR) 25 MG tablet Take 0.5 tablets (12.5 mg total) by mouth once daily.    pantoprazole (PROTONIX) 40 MG tablet Take 1 tablet (40 mg total) by mouth once daily.    pravastatin (PRAVACHOL) 40 MG tablet Take 1 tablet (40 mg total) by mouth once daily.    tramadol (ULTRAM) 50 mg tablet Take 50 mg by mouth every 6 (six) hours as needed for Pain.    trazodone (DESYREL) 150 MG tablet TAKE 1 TABLET BY MOUTH AT BEDTIME (Patient taking differently: TAKE 1 TABLET BY MOUTH AT BEDTIME AS NEEDED FOR SLEEP)    [DISCONTINUED] digoxin (LANOXIN) 125 mcg tablet Take 125 mcg by mouth once daily. Pt. States that in [facility] told her to stop taking    [DISCONTINUED] DUREZOL 0.05 % Drop ophthalmic solution Place 2 drops into both eyes Daily.    [DISCONTINUED] ILEVRO 0.3 % DrpS Place 1 drop into both eyes Daily.    [DISCONTINUED] isosorbide mononitrate (IMDUR) 60 MG 24 hr tablet Take 60 mg by mouth once daily. Pt. States that in [facility] told her to stop taking    [DISCONTINUED] polymyxin B sulf-trimethoprim (POLYTRIM) 10,000 unit- 1 mg/mL Drop Place 1 drop into both eyes Daily.     [DISCONTINUED] zolpidem (AMBIEN) 5 MG Tab take 1 on the night of the study; if you still can't sleep for another 60-90 min after the pill - take another pill.     Family History     Problem Relation (Age of Onset)    Breast cancer Mother    Cancer Father    Heart attack Father    Heart disease Father    Heart failure Father        Social History Main Topics    Smoking status: Former Smoker     Packs/day: 0.25     Years: 50.00     Types: Cigarettes     Start date: 1/15/1965    Smokeless tobacco: Never Used    Alcohol use No    Drug use: No    Sexual activity: No     Review of Systems   Constitutional: Positive for activity change and fatigue. Negative for chills, diaphoresis and fever.   HENT: Negative for sore throat and trouble swallowing.    Eyes: Negative for photophobia and visual disturbance.   Respiratory: Positive for cough and shortness of breath. Negative for wheezing.    Cardiovascular: Positive for leg swelling. Negative for chest pain and palpitations.   Gastrointestinal: Negative for abdominal pain, constipation, diarrhea, nausea and vomiting.   Endocrine: Negative for polydipsia and polyphagia.   Genitourinary: Positive for decreased urine volume. Negative for dysuria, hematuria and urgency.   Musculoskeletal: Negative for joint swelling, neck pain and neck stiffness.   Neurological: Negative for weakness, numbness and headaches.   Psychiatric/Behavioral: Negative for agitation, dysphoric mood and suicidal ideas.     Objective:     Vital Signs (Most Recent):  Temp: 98.9 °F (37.2 °C) (03/01/17 1432)  Pulse: 71 (03/01/17 1351)  Resp: (!) 23 (03/01/17 1351)  BP: (!) 115/49 (03/01/17 1351)  SpO2: 100 % (03/01/17 1351) Vital Signs (24h Range):  Temp:  [98.8 °F (37.1 °C)-98.9 °F (37.2 °C)] 98.9 °F (37.2 °C)  Pulse:  [71-89] 71  Resp:  [16-29] 23  SpO2:  [99 %-100 %] 100 %  BP: (107-123)/(49-69) 115/49     Weight: 109.8 kg (242 lb)  Body mass index is 34.72 kg/(m^2).    Physical Exam   Constitutional:  She is oriented to person, place, and time. No distress.   Obese   HENT:   Head: Normocephalic and atraumatic.   Mouth/Throat: Oropharynx is clear and moist. No oropharyngeal exudate.   Eyes: Conjunctivae are normal. Pupils are equal, round, and reactive to light. No scleral icterus.   Neck: Neck supple.   Cardiovascular: Normal rate and intact distal pulses.  Exam reveals no gallop and no friction rub.    Murmur heard.  Irregular   Pulmonary/Chest: Effort normal. No respiratory distress. She has no wheezes. She has rales.   On Oxygen VIA NC   Abdominal: Soft. Bowel sounds are normal. She exhibits no distension. There is no tenderness. There is no rebound and no guarding.   Musculoskeletal: She exhibits edema. She exhibits no tenderness or deformity.   Bilateral Pitting Edema from feet to knees   Neurological: She is alert and oriented to person, place, and time. She exhibits normal muscle tone.   Skin: Skin is warm and dry. No rash noted. She is not diaphoretic.   Psychiatric: She has a normal mood and affect. Her behavior is normal.   Nursing note and vitals reviewed.       Significant Labs:   Blood Culture: No results for input(s): LABBLOO in the last 48 hours.  CBC:   Recent Labs  Lab 03/01/17  1038   WBC 15.45*   HGB 9.5*   HCT 32.1*   *     CMP:   Recent Labs  Lab 03/01/17  1038   *   K 4.6   CL 90*   CO2 32*   GLU 82   BUN 44*   CREATININE 1.5*   CALCIUM 9.3   PROT 7.4   ALBUMIN 3.5   BILITOT 2.4*   ALKPHOS 162*   AST 50*   ALT 43   ANIONGAP 12   EGFRNONAA 35*     Coagulation:   Recent Labs  Lab 03/01/17  1038   INR 1.6*   APTT 27.1     Troponin:   Recent Labs  Lab 03/01/17  1038   TROPONINI 0.034*   BNP                  3474    Influenza A/B Negative    Digoxin  0.2    Urine Culture: No results for input(s): LABURIN in the last 48 hours.  Urine Studies:   Recent Labs  Lab 03/01/17  1025   COLORU Yellow   APPEARANCEUA Cloudy*   PHUR 6.0   SPECGRAV 1.020   PROTEINUA 1+*   GLUCUA Negative    KETONESU Negative   BILIRUBINUA 1+*   OCCULTUA 3+*   NITRITE Negative   UROBILINOGEN 4.0-6.0*   LEUKOCYTESUR 3+*   RBCUA 10*   WBCUA >100*   BACTERIA Many*   SQUAMEPITHEL 5   HYALINECASTS 0     12 Lead ECG:   wide complexed rhythm with no definite P waves, consider hyperkalemia   Abnormal ECG  When compared with ECG of 22-FEB-2017 15:24,  pacemaker activity not seen   Confirmed by Carlos Alberto JUSTIN, Kb    Significant Imaging:   Imaging Results         US Upper Extremity Veins Left (Final result) Result time:  03/01/17 13:40:01    Final result by Ezequiel Obregon MD (03/01/17 13:40:01)    Impression:     No venous thrombosis of left upper extremity veins.      Electronically signed by: KECIA OBREGON MD  Date:     03/01/17  Time:    13:40     Narrative:    Color Doppler, duplex, gray scale imaging evaluation of left upper extremity veins.  The jugular, subclavian, axillary, brachial, basilic and cephalic veins are patent and normal.            X-Ray Chest 1 View (Final result) Result time:  03/01/17 10:34:24    Final result by Dakota Tee MD (03/01/17 10:34:24)    Impression:       No significant change when compared to prior exam.  Stable right pleural effusion with atelectasis/consolidation of the right lower lung zone.    Mildly prominent interstitial lung markings which may be seen with mild pulmonary edema or infectious/inflammatory process similar to prior exam.          Electronically signed by: DAKOTA TEE MD  Date:     03/01/17  Time:    10:34     Narrative:    [id number]  Accession # [id number]      Study:  XR CHEST 1 VIEW    Indication: Shortness of breath.    Comparison: Chest radiograph from 02/22/2017.    Findings:     XR CHEST 1 VIEW.    No significant change compared to prior exam.    Stable cardiomegaly.  Stable AICD. Calcification of the aortic arch.  Prominence of the pulmonary vasculature.  Stable right pleural effusion with atelectasis/consolidation of the right lower lung  zone.  Mildly prominent interstitial lung markings which may be seen with mild pulmonary edema or infectious process, similar to prior exam.  Osseous structures are unremarkable.            Assessment/Plan:     * Acute on chronic combined systolic and diastolic congestive heart failure  Ischemic Cardiomyopathy/ CAD s/p PCI  -Chest X-ray concerning for Pulmonary Edema, BNP 3474 (1856 1 week ago), Crackles on exam and bilateral pitting LE extremity edema from feet to knees  -Continue Home Carvedilol 3.125 BID, Imbur 60 mg daily, and Losartan 12.5 mg daily  -Took Bumex 2 mg TID for 3 days , now back to prior dose Bumex 1 mg BID--Holding   -Given Furosemide 60 mg IV in ED.  Will give Furosemide 40 mg IV BID  -Will repeat ECHO.  Consider Cardiology Consult        COPD (chronic obstructive pulmonary disease)  Chronic Respiratory Failure/ Home Oxygen Dependant /Obesity hypoventilation Syndrome on BiPAP  -Oxygenating well on home oxygen 3 LPM, Continue to keep SaO2 >88 %  -Continue home BiPAP at night  -Scheduled Duonebs while awake  -Continue home fluticasone-vilanterol        Permanent atrial fibrillation        Automatic implantable cardioverter-defibrillator in situ  Chronic Atrial Fibrillation  12 Lead ECG shows Wide Complex Regular Rhythm with no P waves or discernable Pacer Spikes. Rate 73 BPM.  -Patient was on digoxin, but this was stopped at a prior hospitalization  -Continue Amiodarone 200 mg and Carvedilol 3.25 mg BID  -Repeat 12 Lead ECG  - Telemetry Monitoring      Pleural effusion, right  Chest X-ray displays small, stable right pleural effusion with with atelectasis/consolidation of the right lower lung zone, concerning for possible pneumonia. Will start Zosyn to cover for respiratory as well as UTI and will check procalcitonin.       UTI (urinary tract infection)  Urine, Clean Catch      Color, UA Yellow    Appearance, UA Cloudy (A)    pH, UA 6.0    Specific Gravity, UA 1.020    Protein, UA 1+ (A)     Glucose, UA Negative    Ketones, UA Negative    Bilirubin (UA) 1+ (A)    Occult Blood UA 3+ (A)    Nitrite, UA Negative    Urobilinogen, UA 4.0-6.0 (A) EU/dL    Leukocytes, UA 3+ (A)     Given Cipro 400 mg IV in the ED.  Will give Zosyn to cover empirically pneumonia as well      Chronic ischemic colitis  Iron Deficiency Anemia d/t Chronic Blood Loss  -Green/Brown Stool + for OB  -Patient denies bloody or dark stools  -Hgb 9.5, Hct 32.1  Stable  -Continue home Ferrous Sulfate  mg daily and Pantoprazole 40 mg daily  -Monitor Daily CBC      VTE Risk Mitigation         Ordered     Medium Risk of VTE  Once      03/01/17 1525     Place sequential compression device  Until discontinued      03/01/17 1525     Place ALL hose  Until discontinued      03/01/17 1525     Reason for No Pharmacological VTE Prophylaxis  Once      03/01/17 1525        Ira Ram NP  Department of Hospital Medicine   [facility]

## 2017-03-01 NOTE — ASSESSMENT & PLAN NOTE
Chronic Respiratory Failure/ Home Oxygen Dependant /Obesity hypoventilation Syndrome on BiPAP  -Oxygenating well on home oxygen 3 LPM, Continue to keep SaO2 >88 %  -Continue home BiPAP at night  -Scheduled Duonebs while awake  -Continue home fluticasone-vilanterol

## 2017-03-01 NOTE — ED NOTES
IV attempt x2 unsuccessful. ED OC and nursing staff notified for assistance. Patient tolerated well. No active bleeding to sites.

## 2017-03-01 NOTE — ASSESSMENT & PLAN NOTE
Ischemic Cardiomyopathy/ CAD s/p PCI  -Chest X-ray concerning for Pulmonary Edema, BNP 3474 (1856 1 week ago), Crackles on exam and bilateral pitting LE extremity edema from feet to knees  -Continue Home Carvedilol 3.125 BID, Imbur 60 mg daily, and Losartan 12.5 mg daily  -Took Bumex 2 mg TID for 3 days , now back to prior dose Bumex 1 mg BID--Holding   -Given Furosemide 60 mg IV in ED.  Will give Furosemide 40 mg IV BID  -Will repeat ECHO.  Consider Cardiology Consult

## 2017-03-01 NOTE — SUBJECTIVE & OBJECTIVE
Past Medical History:   Diagnosis Date    *Atrial fibrillation     Anticoagulant long-term use     Arthritis     Cardiomyopathy     Carotid artery occlusion     CHF (congestive heart failure)     COPD (chronic obstructive pulmonary disease)     COPD exacerbation 2/19/2015    Coronary artery disease     RCA occluded with L to R collaterals and normal LAD and LCx    Hypertension     Internal bleeding     Sleep apnea     uses bipap       Past Surgical History:   Procedure Laterality Date    ABDOMINAL SURGERY      APPENDECTOMY      CARDIAC CATHETERIZATION      CARDIAC DEFIBRILLATOR PLACEMENT  10/9/2012    bivent AICD placed 10/2012 and revised 12/2012    CARDIAC PAC[facility]ER [facility]  [facility]/2010    [facility]    CARDIOVERSION  3/6/2015    CAROTID ENDARTERECTOMY Left     CHOLECYSTECTOMY      COLONOSCOPY N/A 7/11/2016    Procedure: COLONOSCOPY;  Surgeon: Rafael Pérez MD;  Location: [facility] ENDO;  Service: Endoscopy;  Laterality: N/A;    COLONOSCOPY N/A 2/17/2017    Procedure: COLONOSCOPY;  Surgeon: Giovanni Bronson MD;  Location: [facility] ENDO ([address] [place] [address]);  Service: Endoscopy;  Laterality: N/A;    CORONARY ANGIOPLASTY WITH STENT PLACEMENT  1/27/2015    3 JAMAL to RCA    CRT-D implantation      ESOPHAGOGASTRODUODENOSCOPY      GALLBLADDER SURGERY      TONSILLECTOMY      TONSILLECTOMY, ADENOIDECTOMY      VASCULAR SURGERY         Review of patient's allergies indicates:   Allergen Reactions    Gabapentin        No current facility-administered medications on file prior to encounter.      Current Outpatient Prescriptions on File Prior to Encounter   Medication Sig    albuterol 90 mcg/actuation inhaler Inhale 2 puffs into the lungs every 6 (six) hours as needed for Wheezing.    albuterol-ipratropium 2.5mg-0.5mg/3mL (DUO-NEB) 0.5 mg-3 mg(2.5 mg base)/3 mL nebulizer solution USE 1 VIAL VIA NEBULIZER EVERY 6 HOURS AS NEEDED FOR WHEEZING    allopurinol (ZYLOPRIM) 300 MG tablet Take 1 tablet (300 mg total) by  mouth once daily.    amiodarone (PACERONE) 200 MG Tab Take 200 mg by mouth once daily.    aspirin 81 MG Chew Take 81 mg by mouth every evening.     bumetanide (BUMEX) 1 MG tablet Take 1 tablet (1 mg total) by mouth 2 (two) times daily. Take 2 tablets 2 times daily for the first 3 days.    carvedilol (COREG) 3.125 MG tablet Take 1 tablet (3.125 mg total) by mouth 2 (two) times daily with meals.    clopidogrel (PLAVIX) 75 mg tablet Take 75 mg by mouth once daily.    cyclobenzaprine (FLEXERIL) 10 MG tablet Take 10 mg by mouth 3 (three) times daily as needed for Muscle spasms.    ferrous sulfate 325 (65 FE) MG EC tablet Take 1 tablet (325 mg total) by mouth every evening.    fluticasone-vilanterol (BREO) 100-25 mcg/dose diskus inhaler Inhale 1 puff into the lungs once daily. (Patient taking differently: Inhale 1 puff into the lungs daily as needed. )    isosorbide mononitrate (IMDUR) 60 MG 24 hr tablet Take 1 tablet (60 mg total) by mouth once daily.    losartan (COZAAR) 25 MG tablet Take 0.5 tablets (12.5 mg total) by mouth once daily.    pantoprazole (PROTONIX) 40 MG tablet Take 1 tablet (40 mg total) by mouth once daily.    pravastatin (PRAVACHOL) 40 MG tablet Take 1 tablet (40 mg total) by mouth once daily.    tramadol (ULTRAM) 50 mg tablet Take 50 mg by mouth every 6 (six) hours as needed for Pain.    trazodone (DESYREL) 150 MG tablet TAKE 1 TABLET BY MOUTH AT BEDTIME (Patient taking differently: TAKE 1 TABLET BY MOUTH AT BEDTIME AS NEEDED FOR SLEEP)    [DISCONTINUED] digoxin (LANOXIN) 125 mcg tablet Take 125 mcg by mouth once daily. Pt. States that in [facility] told her to stop taking    [DISCONTINUED] DUREZOL 0.05 % Drop ophthalmic solution Place 2 drops into both eyes Daily.    [DISCONTINUED] ILEVRO 0.3 % DrpS Place 1 drop into both eyes Daily.    [DISCONTINUED] isosorbide mononitrate (IMDUR) 60 MG 24 hr tablet Take 60 mg by mouth once daily. Pt. States that in [facility] told her to stop  taking    [DISCONTINUED] polymyxin B sulf-trimethoprim (POLYTRIM) 10,000 unit- 1 mg/mL Drop Place 1 drop into both eyes Daily.    [DISCONTINUED] zolpidem (AMBIEN) 5 MG Tab take 1 on the night of the study; if you still can't sleep for another 60-90 min after the pill - take another pill.     Family History     Problem Relation (Age of Onset)    Breast cancer Mother    Cancer Father    Heart attack Father    Heart disease Father    Heart failure Father        Social History Main Topics    Smoking status: Former Smoker     Packs/day: 0.25     Years: 50.00     Types: Cigarettes     Start date: 1/15/1965    Smokeless tobacco: Never Used    Alcohol use No    Drug use: No    Sexual activity: No     Review of Systems   Constitutional: Positive for activity change and fatigue. Negative for chills, diaphoresis and fever.   HENT: Negative for sore throat and trouble swallowing.    Eyes: Negative for photophobia and visual disturbance.   Respiratory: Positive for cough and shortness of breath. Negative for wheezing.    Cardiovascular: Positive for leg swelling. Negative for chest pain and palpitations.   Gastrointestinal: Negative for abdominal pain, constipation, diarrhea, nausea and vomiting.   Endocrine: Negative for polydipsia and polyphagia.   Genitourinary: Positive for decreased urine volume. Negative for dysuria, hematuria and urgency.   Musculoskeletal: Negative for joint swelling, neck pain and neck stiffness.   Neurological: Negative for weakness, numbness and headaches.   Psychiatric/Behavioral: Negative for agitation, dysphoric mood and suicidal ideas.     Objective:     Vital Signs (Most Recent):  Temp: 98.9 °F (37.2 °C) (03/01/17 1432)  Pulse: 71 (03/01/17 1351)  Resp: (!) 23 (03/01/17 1351)  BP: (!) 115/49 (03/01/17 1351)  SpO2: 100 % (03/01/17 1351) Vital Signs (24h Range):  Temp:  [98.8 °F (37.1 °C)-98.9 °F (37.2 °C)] 98.9 °F (37.2 °C)  Pulse:  [71-89] 71  Resp:  [16-29] 23  SpO2:  [99 %-100 %] 100  %  BP: (107-123)/(49-69) 115/49     Weight: 109.8 kg (242 lb)  Body mass index is 34.72 kg/(m^2).    Physical Exam   Constitutional: She is oriented to person, place, and time. No distress.   Obese   HENT:   Head: Normocephalic and atraumatic.   Mouth/Throat: Oropharynx is clear and moist. No oropharyngeal exudate.   Eyes: Conjunctivae are normal. Pupils are equal, round, and reactive to light. No scleral icterus.   Neck: Neck supple.   Cardiovascular: Normal rate and intact distal pulses.  Exam reveals no gallop and no friction rub.    Murmur heard.  Irregular   Pulmonary/Chest: Effort normal. No respiratory distress. She has no wheezes. She has rales.   On Oxygen VIA NC   Abdominal: Soft. Bowel sounds are normal. She exhibits no distension. There is no tenderness. There is no rebound and no guarding.   Musculoskeletal: She exhibits edema. She exhibits no tenderness or deformity.   Bilateral Pitting Edema from feet to knees   Neurological: She is alert and oriented to person, place, and time. She exhibits normal muscle tone.   Skin: Skin is warm and dry. No rash noted. She is not diaphoretic.   Psychiatric: She has a normal mood and affect. Her behavior is normal.   Nursing note and vitals reviewed.       Significant Labs:   Blood Culture: No results for input(s): LABBLOO in the last 48 hours.  CBC:   Recent Labs  Lab 03/01/17  1038   WBC 15.45*   HGB 9.5*   HCT 32.1*   *     CMP:   Recent Labs  Lab 03/01/17  1038   *   K 4.6   CL 90*   CO2 32*   GLU 82   BUN 44*   CREATININE 1.5*   CALCIUM 9.3   PROT 7.4   ALBUMIN 3.5   BILITOT 2.4*   ALKPHOS 162*   AST 50*   ALT 43   ANIONGAP 12   EGFRNONAA 35*     Coagulation:   Recent Labs  Lab 03/01/17  1038   INR 1.6*   APTT 27.1     Troponin:   Recent Labs  Lab 03/01/17  1038   TROPONINI 0.034*   BNP                  3474    Influenza A/B Negative    Digoxin  0.2    Urine Culture: No results for input(s): LABURIN in the last 48 hours.  Urine Studies:   Recent  Labs  Lab 03/01/17  1025   COLORU Yellow   APPEARANCEUA Cloudy*   PHUR 6.0   SPECGRAV 1.020   PROTEINUA 1+*   GLUCUA Negative   KETONESU Negative   BILIRUBINUA 1+*   OCCULTUA 3+*   NITRITE Negative   UROBILINOGEN 4.0-6.0*   LEUKOCYTESUR 3+*   RBCUA 10*   WBCUA >100*   BACTERIA Many*   SQUAMEPITHEL 5   HYALINECASTS 0     12 Lead ECG:   wide complexed rhythm with no definite P waves, consider hyperkalemia   Abnormal ECG  When compared with ECG of 22-FEB-2017 15:24,  pacemaker activity not seen   Confirmed by Carlos Alberto JUSTIN, Kb    Significant Imaging:   Imaging Results         US Upper Extremity Veins Left (Final result) Result time:  03/01/17 13:40:01    Final result by Ezequiel Obregon MD (03/01/17 13:40:01)    Impression:     No venous thrombosis of left upper extremity veins.      Electronically signed by: KECIA OBREGON MD  Date:     03/01/17  Time:    13:40     Narrative:    Color Doppler, duplex, gray scale imaging evaluation of left upper extremity veins.  The jugular, subclavian, axillary, brachial, basilic and cephalic veins are patent and normal.            X-Ray Chest 1 View (Final result) Result time:  03/01/17 10:34:24    Final result by Dakota Tee MD (03/01/17 10:34:24)    Impression:       No significant change when compared to prior exam.  Stable right pleural effusion with atelectasis/consolidation of the right lower lung zone.    Mildly prominent interstitial lung markings which may be seen with mild pulmonary edema or infectious/inflammatory process similar to prior exam.          Electronically signed by: DAKOTA TEE MD  Date:     03/01/17  Time:    10:34     Narrative:    [id number]  Accession # [id number]      Study:  XR CHEST 1 VIEW    Indication: Shortness of breath.    Comparison: Chest radiograph from 02/22/2017.    Findings:     XR CHEST 1 VIEW.    No significant change compared to prior exam.    Stable cardiomegaly.  Stable AICD. Calcification of the aortic arch.   Prominence of the pulmonary vasculature.  Stable right pleural effusion with atelectasis/consolidation of the right lower lung zone.  Mildly prominent interstitial lung markings which may be seen with mild pulmonary edema or infectious process, similar to prior exam.  Osseous structures are unremarkable.

## 2017-03-02 LAB
ALBUMIN SERPL BCP-MCNC: 2.8 G/DL
ALP SERPL-CCNC: 128 U/L
ALT SERPL W/O P-5'-P-CCNC: 59 U/L
ANION GAP SERPL CALC-SCNC: 7 MMOL/L
APTT BLDCRRT: 29.1 SEC
AST SERPL-CCNC: 66 U/L
BASOPHILS # BLD AUTO: 0.02 K/UL
BASOPHILS NFR BLD: 0.2 %
BILIRUB SERPL-MCNC: 2 MG/DL
BUN SERPL-MCNC: 43 MG/DL
CALCIUM SERPL-MCNC: 8.5 MG/DL
CHLORIDE SERPL-SCNC: 90 MMOL/L
CO2 SERPL-SCNC: 35 MMOL/L
CREAT SERPL-MCNC: 1.3 MG/DL
DIFFERENTIAL METHOD: ABNORMAL
EOSINOPHIL # BLD AUTO: 0 K/UL
EOSINOPHIL NFR BLD: 0.4 %
ERYTHROCYTE [DISTWIDTH] IN BLOOD BY AUTOMATED COUNT: 17.3 %
EST. GFR  (AFRICAN AMERICAN): 48 ML/MIN/1.73 M^2
EST. GFR  (NON AFRICAN AMERICAN): 41 ML/MIN/1.73 M^2
GLUCOSE SERPL-MCNC: 82 MG/DL
HCT VFR BLD AUTO: 28.1 %
HGB BLD-MCNC: 8.2 G/DL
INR PPP: 1.6
LYMPHOCYTES # BLD AUTO: 0.5 K/UL
LYMPHOCYTES NFR BLD: 4.9 %
MAGNESIUM SERPL-MCNC: 1.9 MG/DL
MCH RBC QN AUTO: 27.1 PG
MCHC RBC AUTO-ENTMCNC: 29.2 %
MCV RBC AUTO: 93 FL
MONOCYTES # BLD AUTO: 1.1 K/UL
MONOCYTES NFR BLD: 10.4 %
NEUTROPHILS # BLD AUTO: 9.1 K/UL
NEUTROPHILS NFR BLD: 83.8 %
PHOSPHATE SERPL-MCNC: 3.9 MG/DL
PLATELET # BLD AUTO: 258 K/UL
PMV BLD AUTO: 9.9 FL
POTASSIUM SERPL-SCNC: 3.8 MMOL/L
PROCALCITONIN SERPL IA-MCNC: 0.25 NG/ML
PROT SERPL-MCNC: 6.1 G/DL
PROTHROMBIN TIME: 16.4 SEC
RBC # BLD AUTO: 3.03 M/UL
SODIUM SERPL-SCNC: 132 MMOL/L
TROPONIN I SERPL DL<=0.01 NG/ML-MCNC: 0.03 NG/ML
TROPONIN I SERPL DL<=0.01 NG/ML-MCNC: 0.05 NG/ML
WBC # BLD AUTO: 10.79 K/UL

## 2017-03-02 PROCEDURE — 94664 DEMO&/EVAL PT USE INHALER: CPT

## 2017-03-02 PROCEDURE — 85610 PROTHROMBIN TIME: CPT

## 2017-03-02 PROCEDURE — 94640 AIRWAY INHALATION TREATMENT: CPT

## 2017-03-02 PROCEDURE — G8978 MOBILITY CURRENT STATUS: HCPCS | Mod: CJ

## 2017-03-02 PROCEDURE — G8988 SELF CARE GOAL STATUS: HCPCS | Mod: CJ

## 2017-03-02 PROCEDURE — 97161 PT EVAL LOW COMPLEX 20 MIN: CPT

## 2017-03-02 PROCEDURE — 84484 ASSAY OF TROPONIN QUANT: CPT | Mod: 91

## 2017-03-02 PROCEDURE — G8987 SELF CARE CURRENT STATUS: HCPCS | Mod: CK

## 2017-03-02 PROCEDURE — 25000003 PHARM REV CODE 250: Performed by: NURSE PRACTITIONER

## 2017-03-02 PROCEDURE — 84145 PROCALCITONIN (PCT): CPT

## 2017-03-02 PROCEDURE — 85025 COMPLETE CBC W/AUTO DIFF WBC: CPT

## 2017-03-02 PROCEDURE — 80053 COMPREHEN METABOLIC PANEL: CPT

## 2017-03-02 PROCEDURE — 63600175 PHARM REV CODE 636 W HCPCS: Performed by: NURSE PRACTITIONER

## 2017-03-02 PROCEDURE — 36415 COLL VENOUS BLD VENIPUNCTURE: CPT

## 2017-03-02 PROCEDURE — 83735 ASSAY OF MAGNESIUM: CPT

## 2017-03-02 PROCEDURE — 25000242 PHARM REV CODE 250 ALT 637 W/ HCPCS: Performed by: NURSE PRACTITIONER

## 2017-03-02 PROCEDURE — 27000221 HC OXYGEN, UP TO 24 HOURS

## 2017-03-02 PROCEDURE — 85730 THROMBOPLASTIN TIME PARTIAL: CPT

## 2017-03-02 PROCEDURE — 11000001 HC ACUTE MED/SURG PRIVATE ROOM

## 2017-03-02 PROCEDURE — 99900035 HC TECH TIME PER 15 MIN (STAT)

## 2017-03-02 PROCEDURE — G8979 MOBILITY GOAL STATUS: HCPCS | Mod: CH

## 2017-03-02 PROCEDURE — 84100 ASSAY OF PHOSPHORUS: CPT

## 2017-03-02 PROCEDURE — 94760 N-INVAS EAR/PLS OXIMETRY 1: CPT

## 2017-03-02 PROCEDURE — 84484 ASSAY OF TROPONIN QUANT: CPT

## 2017-03-02 PROCEDURE — 97165 OT EVAL LOW COMPLEX 30 MIN: CPT

## 2017-03-02 RX ORDER — BUMETANIDE 0.25 MG/ML
3 INJECTION INTRAMUSCULAR; INTRAVENOUS ONCE
Status: COMPLETED | OUTPATIENT
Start: 2017-03-02 | End: 2017-03-02

## 2017-03-02 RX ORDER — BUMETANIDE 0.25 MG/ML
2 INJECTION INTRAMUSCULAR; INTRAVENOUS DAILY
Status: DISCONTINUED | OUTPATIENT
Start: 2017-03-02 | End: 2017-03-03

## 2017-03-02 RX ORDER — POTASSIUM CHLORIDE 20 MEQ/1
40 TABLET, EXTENDED RELEASE ORAL DAILY
Status: DISCONTINUED | OUTPATIENT
Start: 2017-03-02 | End: 2017-03-08 | Stop reason: HOSPADM

## 2017-03-02 RX ADMIN — CARVEDILOL 3.12 MG: 3.12 TABLET, FILM COATED ORAL at 04:03

## 2017-03-02 RX ADMIN — FERROUS SULFATE TAB EC 325 MG (65 MG FE EQUIVALENT) 325 MG: 325 (65 FE) TABLET DELAYED RESPONSE at 09:03

## 2017-03-02 RX ADMIN — PANTOPRAZOLE SODIUM 40 MG: 40 TABLET, DELAYED RELEASE ORAL at 09:03

## 2017-03-02 RX ADMIN — PIPERACILLIN SODIUM AND TAZOBACTAM SODIUM 4.5 G: 4; .5 INJECTION, POWDER, FOR SOLUTION INTRAVENOUS at 06:03

## 2017-03-02 RX ADMIN — HYDROCODONE BITARTRATE AND ACETAMINOPHEN 1 TABLET: 10; 325 TABLET ORAL at 01:03

## 2017-03-02 RX ADMIN — BUMETANIDE 3 MG: 0.25 INJECTION INTRAMUSCULAR; INTRAVENOUS at 04:03

## 2017-03-02 RX ADMIN — TRAZODONE HYDROCHLORIDE 150 MG: 100 TABLET ORAL at 09:03

## 2017-03-02 RX ADMIN — IPRATROPIUM BROMIDE AND ALBUTEROL SULFATE 3 ML: .5; 3 SOLUTION RESPIRATORY (INHALATION) at 12:03

## 2017-03-02 RX ADMIN — HYDROCODONE BITARTRATE AND ACETAMINOPHEN 1 TABLET: 10; 325 TABLET ORAL at 09:03

## 2017-03-02 RX ADMIN — IPRATROPIUM BROMIDE AND ALBUTEROL SULFATE 3 ML: .5; 3 SOLUTION RESPIRATORY (INHALATION) at 08:03

## 2017-03-02 RX ADMIN — AMIODARONE HYDROCHLORIDE 200 MG: 200 TABLET ORAL at 09:03

## 2017-03-02 RX ADMIN — PIPERACILLIN SODIUM AND TAZOBACTAM SODIUM 4.5 G: 4; .5 INJECTION, POWDER, FOR SOLUTION INTRAVENOUS at 11:03

## 2017-03-02 RX ADMIN — CARVEDILOL 3.12 MG: 3.12 TABLET, FILM COATED ORAL at 11:03

## 2017-03-02 RX ADMIN — PIPERACILLIN SODIUM AND TAZOBACTAM SODIUM 4.5 G: 4; .5 INJECTION, POWDER, FOR SOLUTION INTRAVENOUS at 02:03

## 2017-03-02 RX ADMIN — IPRATROPIUM BROMIDE AND ALBUTEROL SULFATE 3 ML: .5; 3 SOLUTION RESPIRATORY (INHALATION) at 03:03

## 2017-03-02 RX ADMIN — HYDROCODONE BITARTRATE AND ACETAMINOPHEN 1 TABLET: 10; 325 TABLET ORAL at 12:03

## 2017-03-02 RX ADMIN — CLOPIDOGREL BISULFATE 75 MG: 75 TABLET ORAL at 09:03

## 2017-03-02 RX ADMIN — BUMETANIDE 2 MG: 0.25 INJECTION INTRAMUSCULAR; INTRAVENOUS at 02:03

## 2017-03-02 RX ADMIN — IPRATROPIUM BROMIDE AND ALBUTEROL SULFATE 3 ML: .5; 3 SOLUTION RESPIRATORY (INHALATION) at 07:03

## 2017-03-02 RX ADMIN — PRAVASTATIN SODIUM 40 MG: 40 TABLET ORAL at 09:03

## 2017-03-02 RX ADMIN — POTASSIUM CHLORIDE 40 MEQ: 20 TABLET, EXTENDED RELEASE ORAL at 02:03

## 2017-03-02 RX ADMIN — FUROSEMIDE 40 MG: 10 INJECTION, SOLUTION INTRAMUSCULAR; INTRAVENOUS at 09:03

## 2017-03-02 RX ADMIN — ASPIRIN 81 MG 81 MG: 81 TABLET ORAL at 09:03

## 2017-03-02 NOTE — PLAN OF CARE
Future Appointments  Date Time Provider Department Center   3/8/2017 10:00 AM Lydia Hackett NP [facility] HEPAT [facility] [facility]   4/5/2017 1:00 PM Jovani Urrutia MD [facility] GASTRO [place] [facility]     Works in a an office 5 days/week. Uses power chair, brings oxygen, but doesn't wear oxygen all day long.     03/02/17 1124   Discharge Assessment   Assessment Type Discharge Planning Assessment   Confirmed/corrected address and phone number on facesheet? Yes   Assessment information obtained from? Patient   If Healthcare Directive is received, is it scanned into Epic? no (comment)   Prior to hospitilization cognitive status: Alert/Oriented   Prior to hospitalization functional status: Independent   Current Functional Status: Independent;Assistive Equipment   Arrived From home or self-care   Lives With child(vanita), adult   Is patient able to care for self after discharge? Yes   Patient's perception of discharge disposition home or selfcare   Readmission Within The Last 30 Days other (see comments)  (couldn't breathe)   Patient currently being followed by outpatient case management? No   Patient currently receives home health services? No   Does the patient currently use HME? Yes   Patient currently receives private duty nursing? No   Patient currently receives any other outside agency services? No   Equipment Currently Used at Home oxygen;wheelchair;power chair;cane, quad;rollator   Do you have any problems affording any of your prescribed medications? No   Is the patient taking medications as prescribed? yes   Do you have any financial concerns preventing you from receiving the healthcare you need? No   Does the patient have transportation to healthcare appointments? No   Transportation Available family or friend will provide   Does the patient receive services at the [facility]? No   Discharge Plan A Home;Home with family   Discharge Plan B Home;Home with family   Patient/Family In Agreement With Plan yes     Irma Jack RN,  PRATIK, CMSRN  RN Transition Navigator  [phone]

## 2017-03-02 NOTE — PT/OT/SLP EVAL
Physical Therapy  Evaluation    Jennifer Prescott   MRN: [id number]   Admitting Diagnosis: Acute on chronic combined systolic and diastolic congestive heart failure    PT Received On: 03/02/17  PT Start Time: 1059     PT Stop Time: 1110    PT Total Time (min): 11 min       Billable Minutes:  Evaluation 11    Diagnosis: Acute on chronic combined systolic and diastolic congestive heart failure  Diagnoses of SOB (shortness of breath), Acute on chronic heart failure, and Acute on chronic combined systolic and diastolic congestive heart failure were pertinent to this visit.      Past Medical History:   Diagnosis Date    *Atrial fibrillation     Anticoagulant long-term use     Arthritis     Cardiomyopathy     Carotid artery occlusion     CHF (congestive heart failure)     COPD (chronic obstructive pulmonary disease)     COPD exacerbation 2/19/2015    Coronary artery disease     RCA occluded with L to R collaterals and normal LAD and LCx    Hypertension     Internal bleeding     Sleep apnea     uses bipap      Past Surgical History:   Procedure Laterality Date    ABDOMINAL SURGERY      APPENDECTOMY      CARDIAC CATHETERIZATION      CARDIAC DEFIBRILLATOR PLACEMENT  10/9/2012    bivent AICD placed 10/2012 and revised 12/2012    CARDIAC [facility]ER [facility]ACEMENT  9/[facility]/2010    [facility]    CARDIOVERSION  3/6/2015    CAROTID ENDARTERECTOMY Left     CHOLECYSTECTOMY      COLONOSCOPY N/A 7/11/2016    Procedure: COLONOSCOPY;  Surgeon: Rafael Pérez MD;  Location: [facility] ENDO;  Service: Endoscopy;  Laterality: N/A;    COLONOSCOPY N/A 2/17/2017    Procedure: COLONOSCOPY;  Surgeon: Giovanni Bronson MD;  Location: [facility] ENDO ([address] [place] [address]);  Service: Endoscopy;  Laterality: N/A;    CORONARY ANGIOPLASTY WITH STENT PLACEMENT  1/27/2015    3 JAMAL to RCA    CRT-D implantation      ESOPHAGOGASTRODUODENOSCOPY      GALLBLADDER SURGERY      TONSILLECTOMY      TONSILLECTOMY, ADENOIDECTOMY      VASCULAR SURGERY         Referring  physician: Aby  Date referred to PT: 3/2/2017    General Precautions: Standard, fall, respiratory  Orthopedic Precautions: N/A   Braces: N/A            Patient History:  Lives With: child(vanita), adult  Living Arrangements: house  Stair Railings at Home: none  Transportation Available: family or friend will provide  Equipment Currently Used at Home: oxygen, walker, rolling, wheelchair, cane, straight  DME owned (not currently used): rolling walker    Previous Level of Function:  Ambulation Skills: independent  Transfer Skills: independent  ADL Skills: independent  Work/Leisure Activity: independent    Subjective:  Communicated with primary nurse prior to session.    Chief Complaint: SOB  Patient goals: go home    Pain Ratin/10               Pain Rating Post-Intervention: 0/10    Objective:   Patient found with: oxygen, telemetry     Cognitive Exam:  Oriented to: Person, Place, Time and Situation    Follows Commands/attention: Follows two-step commands  Communication: clear/fluent  Safety awareness/insight to disability: intact    Physical Exam:  Postural examination/scapula alignment: Rounded shoulder and Head forward    Skin integrity: Visible skin intact  Edema: Moderate both LE    Sensation:   Intact    Upper Extremity Range of Motion:  Right Upper Extremity: WFL  Left Upper Extremity: WFL    Upper Extremity Strength:  Right Upper Extremity: WFL  Left Upper Extremity: WFL    Lower Extremity Range of Motion:  Right Lower Extremity: WFL  Left Lower Extremity: WFL    Lower Extremity Strength:  Right Lower Extremity: WFL  Left Lower Extremity: WNL     Fine motor coordination:  Intact    Gross motor coordination: WFL    Functional Mobility:  Bed Mobility:  Supine to Sit: Supervision    Transfers:  Sit <> Stand Assistance: Stand By Assistance  Sit <> Stand Assistive Device: No Assistive Device  Bed <> Chair Technique: Stand Pivot  Bed <> Chair Assistance: Stand By Assistance  Bed <> Chair Assistive Device: No  Assistive Device    Gait:   Gait Distance: did not occur    Stairs:  na    Balance:   Static Sit: FAIR+: Able to take MINIMAL challenges from all directions  Dynamic Sit: FAIR+: Maintains balance through MINIMAL excursions of active trunk motion  Static Stand: FAIR: Maintains without assist but unable to take challenges  Dynamic stand: FAIR: Needs CONTACT GUARD during gait    Therapeutic Activities and Exercises:  na    AM-PAC 6 CLICK MOBILITY  How much help from another person does this patient currently need?   1 = Unable, Total/Dependent Assistance  2 = A lot, Maximum/Moderate Assistance  3 = A little, Minimum/Contact Guard/Supervision  4 = None, Modified [place]/Independent    Turning over in bed (including adjusting bedclothes, sheets and blankets)?: 4  Sitting down on and standing up from a chair with arms (e.g., wheelchair, bedside commode, etc.): 4  Moving from lying on back to sitting on the side of the bed?: 4  Moving to and from a bed to a chair (including a wheelchair)?: 3  Need to walk in hospital room?: 3  Climbing 3-5 steps with a railing?: 3  Total Score: 21     AM-PAC Raw Score CMS G-Code Modifier Level of Impairment Assistance   6 % Total / Unable   7 - 9 CM 80 - 100% Maximal Assist   10 - 14 CL 60 - 80% Moderate Assist   15 - 19 CK 40 - 60% Moderate Assist   20 - 22 CJ 20 - 40% Minimal Assist   23 CI 1-20% SBA / CGA   24 CH 0% Independent/ Mod I     Patient left seated at EOB with all lines intact, call button in reach and student nurse present.    Assessment:   Jennifer Prescott is a 71 y.o. female with a medical diagnosis of Acute on chronic combined systolic and diastolic congestive heart failure and presents with cardiopulmonary dysfunction, gait disturbance, weakness and decline from PLOF. Patient should benefit from strengthening and gait training .    Rehab identified problem list/impairments: Rehab identified problem list/impairments: weakness, impaired endurance, impaired  cardiopulmonary response to activity, impaired functional mobilty    Rehab potential is good.    Activity tolerance: Fair    Discharge recommendations: Discharge Facility/Level Of Care Needs: home with home health     Barriers to discharge: Barriers to Discharge: None    Equipment recommendations: Equipment Needed After Discharge: none     GOALS:   Physical Therapy Goals        Problem: Physical Therapy Goal    Goal Priority Disciplines Outcome Goal Variances Interventions   Physical Therapy Goal     PT/OT, PT Ongoing (interventions implemented as appropriate)     Description:  Goals to be met by: 2017     Patient will increase functional independence with mobility by performin. Sit to stand transfer with Modified [place]  2. Bed to chair transfer with Modified [place] using no AD  3. Gait  x 20 feet with Modified [place] using Rolling Walker.                 PLAN:    Patient to be seen 6 x/week to address the above listed problems via gait training, therapeutic exercises  Plan of Care expires: 17  Plan of Care reviewed with: patient    Functional Assessment Tool Used: ampac  Score: 21  Functional Limitation: Mobility: Walking and moving around  Mobility: Walking and Moving Around Current Status ():   Mobility: Walking and Moving Around Goal Status (): MARTA Crandall, PT  2017

## 2017-03-02 NOTE — PLAN OF CARE
03/02/17 1130   Readmission Questionnaire   At the time of your discharge, did someone talk to you about what your health problems were? Yes   At the time of discharge, did someone talk to you about what to watch out for regarding worsening of your health problem? Yes   At the time of discharge, did someone talk to you about what to do if you experienced worsening of your health problem? Yes   At the time of discharge, did someone talk to you about which medication to take when you left the hospital and which ones to stop taking? Yes   What do you believe caused you to be sick enough to be re-admitted? shorteness of breath   How often do you need to have someone help you when you read instructions, pamphlets, or other written material from your doctor or pharmacy? Never   Do you have problems taking your medications as prescribed? No   Do you have any problems affording any of  your prescribed medications? No   Do you have problems obtaining/receiving your medications? No   Does the patient have transportation to healthcare appointments? Yes   Lives With child(vanita), adult   Living Arrangements house   Does the patient have family/friends to help with healtcare needs after discharge? yes   Does your caregiver provide all the help you need? No   Are you currently feeling confused? No   Are you currently having problems thinking? No   Are you currently having memory problems? No   Have you felt down, depressed, or hopeless? 0   Have you felt little interest or pleasure in doing things? 0   In the last 7 days, my sleep quality was: poor     Irma Jack, RN, [facility], CMSRN  RN Transition Navigator  [phone]

## 2017-03-02 NOTE — PROGRESS NOTES
[facility] Medicine  Progress Note    Patient Name: Jennifer Prescott  MRN: [id number]  Patient Class: IP- Inpatient   Admission Date: 3/1/2017  Length of Stay: 1 days  Attending Physician: Anderson Badillo MD  Primary Care Provider: Lianna Mistry MD        Subjective:     Principal Problem:Acute on chronic combined systolic and diastolic congestive heart failure    HPI:  Jennifer Prescott is a 71 y.o.  woman with coronary artery disease (s/p 3 JAMAL to RCA 1/27/15), chronic systolic diastolic congestive heart failure (EF=20% 9/24/16), chronic hypotension, s/p pacemaker 9/21/10, s/p biventricular AICD 10/09/12, permanent atrial fibrillation (had cardioversion 3/06/15), bilateral carotid artery disease, left lower lobe pulmonary embolism (2/12/15), COPD with 40 pack year smoking history, continuous home oxygen 3 LPM, obesity hypoventilation syndrome (on BiPAP), chronic hypoxic respiratory failure, ischemic colitis with recurrent GI bleeding, iron deficiency anemia due to chronic gastrointestinal blood loss, and chronic kidney disease stage 3. She lives in [place], [place]. She works in a [employer] center. Her primary care physician is Dr. Lianna Mistry at [facility]. Her cardiologist is Dr. Riley Braga.    She was started on apixaban 5 mg BID on 1/30/17, as she had been off anticoagulation since her last GI bleed. She was hospitalized at [facility] from 2/15/17-2/20/17 for another GI bleed and anemia episode. She was told to stop apixaban but also stopped bumetanide in err, and presented to [facility] 2 days later (2/22/17) with pulmonary edema. BNP was 1856, increased from 1576 on 2/15/17. She was diuresed and advised to continue taking bumetanide 2 mg BID for a few three days after discharge before returning to her usual 1 mg BID. She was discharged home on 2/24/17.    She returns to [facility] ED recurrent SOB and dyspnea on exertion, progressively  worse over the past three days. She has been using her Duoneb treatments without improvement.  She has a cough productive light frothy sputum.  She denies chest pain, fevers, chills, nausea , vomiting, diarrhea, or blood in her stool.  ED workup revealed green-brown stool, heme Occult +, Chest X-Ray showing Pulmonary Edema with right pleural effusion and right lower lobe consolidation, unchanged from prior,  12 ECG with Wide Complex regular rhythm with no P waves or notable Pacer spikes, rate 76 BPM, No acute T wave changes (Serum K 4.6) Negative Flu A/B, Urinalysis with occult blood, 3+ leukocyts, > bacteria, no nitrites. WBC 15.45, Alk Phos 162, AST 50. Troponin 0.034 (Basline). BNP 3474 (1856 1 week ago) Physical exam revealed fine bibasilar crackles and bilateral gross pitting edema in lower extremities up to her knees. Patient is ambulatory at home and states that she does not usually carry that much fluid.  Upon her last discharge, she took Bumex 2 mg TID for 3 days and then resumed 1 mg BID as directed. She was given furosemide 60 IV, Duoneb Treatment and ciprofloxacin 400 mg IV in the ED.  She is being admitted to [facility] for Acute on Chronic CHF with Pulmonary Edema.  Will continue aggressive diuresis and repeat Echo.  Will trend Troponin, and get pro-calcitonin at next lab draw. (Patient is a difficult stick).  Will get daily CBC and CMP to monitor for GI bleeding, Electrolytes with diuresis and liver enzymes for hepatic congestion.  Will treat empirically with Zosyn to cover HAP in light of right lower lobe consolidation and dirty urinalysis.           Hospital Course:  3/2/17 Echo shows EF 25% (Improved from 20%), Pulmonary HTN with Systolic PA 50, and worsening valvular disease with severe Mitral and Tricuspid Insufficuancy    Interval History:   Diuresis is slow with only 850 ml net output after a total of 120 mg Lasix since admit.  Will give a dose of Bumex 2 mg IV to see if she  responds better. Still has crackles on exam and only mild improvement of LE edema.     Review of Systems   Respiratory: Positive for cough and shortness of breath. Negative for wheezing.    Cardiovascular: Positive for leg swelling. Negative for chest pain and palpitations.   Gastrointestinal: Negative for diarrhea, nausea and vomiting.     Objective:     Vital Signs (Most Recent):  Temp: 97.6 °F (36.4 °C) (03/02/17 1140)  Pulse: 71 (03/02/17 1214)  Resp: 18 (03/02/17 1214)  BP: 100/77 (03/02/17 1140)  SpO2: (!) 93 % (03/02/17 1214) Vital Signs (24h Range):  Temp:  [96.5 °F (35.8 °C)-98.9 °F (37.2 °C)] 97.6 °F (36.4 °C)  Pulse:  [68-78] 71  Resp:  [15-23] 18  SpO2:  [93 %-100 %] 93 %  BP: ()/(49-96) 100/77     Weight: 109.8 kg (242 lb)  Body mass index is 34.72 kg/(m^2).    Intake/Output Summary (Last 24 hours) at 03/02/17 1318  Last data filed at 03/02/17 0835   Gross per 24 hour   Intake              300 ml   Output             1150 ml   Net             -850 ml      Physical Exam   Constitutional: She is oriented to person, place, and time. No distress.   Obese   HENT:   Head: Normocephalic and atraumatic.   Mouth/Throat: Oropharynx is clear and moist. No oropharyngeal exudate.   Eyes: Conjunctivae are normal. Pupils are equal, round, and reactive to light. No scleral icterus.   Neck: Neck supple.   Cardiovascular: Normal rate and intact distal pulses.  Exam reveals no gallop and no friction rub.    Murmur heard.  Irregular   Pulmonary/Chest: Effort normal. No respiratory distress. She has no wheezes. She has rales.   On Oxygen VIA NC   Abdominal: Soft. Bowel sounds are normal. She exhibits no distension. There is no tenderness. There is no rebound and no guarding.   Musculoskeletal: She exhibits edema. She exhibits no tenderness or deformity.   Bilateral Pitting Edema from feet to knees   Neurological: She is alert and oriented to person, place, and time. She exhibits normal muscle tone.   Skin: Skin is  warm and dry. No rash noted. She is not diaphoretic.   Psychiatric: She has a normal mood and affect. Her behavior is normal.   Nursing note and vitals reviewed.      Significant Labs:   CBC:   Recent Labs  Lab 03/01/17  1038 03/02/17  0541   WBC 15.45* 10.79   HGB 9.5* 8.2*   HCT 32.1* 28.1*   * 258     CMP:   Recent Labs  Lab 03/01/17  1038 03/02/17  0541   * 132*   K 4.6 3.8   CL 90* 90*   CO2 32* 35*   GLU 82 82   BUN 44* 43*   CREATININE 1.5* 1.3   CALCIUM 9.3 8.5*   PROT 7.4 6.1   ALBUMIN 3.5 2.8*   BILITOT 2.4* 2.0*   ALKPHOS 162* 128   AST 50* 66*   ALT 43 59*   ANIONGAP 12 7*   EGFRNONAA 35* 41*     Coagulation:   Recent Labs  Lab 03/02/17  0541   INR 1.6*   APTT 29.1     Troponin:   Recent Labs  Lab 03/01/17  1934 03/02/17  0003 03/02/17  0541   TROPONINI 0.037* 0.047* 0.027*       Significant Imaging:   Echocardiogram 3/1/17  CONCLUSIONS     1 - Severely depressed left ventricular systolic function (EF 25-30%).     2 - Severe left atrial enlargement.     3 - Left ventricular diastolic dysfunction.     4 - Eccentric hypertrophy.     5 - Pulmonary hypertension. The estimated PA systolic pressure is 50 mmHg.     6 - Normal right ventricular systolic function .     7 - Severe mitral regurgitation.     8 - Moderate to severe tricuspid regurgitation.     9 - Increased central venous pressure.     Assessment/Plan:      * Acute on chronic combined systolic and diastolic congestive heart failure  Ischemic Cardiomyopathy/ CAD s/p PCI  -Chest X-ray concerning for Pulmonary Edema, BNP 3474 (1856 1 week ago), Crackles on exam and bilateral pitting LE extremity edema from feet to knees  -Continue Home Carvedilol 3.125 BID, Imbur 60 mg daily, and Losartan 12.5 mg daily  -Took Bumex 2 mg TID for 3 days , now back to prior dose Bumex 1 mg BID--Holding   -Echo shows EF 25% (improved), Pulm HTN Systolic PA 50 (worse) and Severe Mitral and Tricuspid insufficiency (Worse)  --Only 850 net output with total 120 mg  IV Lasix.    --Trial of 2 mg IV Bumex to see if there a better response   Consider Cardiology Consult        COPD (chronic obstructive pulmonary disease)  Chronic Respiratory Failure/ Home Oxygen Dependant /Obesity hypoventilation Syndrome on BiPAP  -Oxygenating well on home oxygen 3 LPM, Continue to keep SaO2 >88 %  -Continue home BiPAP at night  -Scheduled Duonebs while awake  -Continue home fluticasone-vilanterol        Automatic implantable cardioverter-defibrillator in situ  Chronic Atrial Fibrillation  12 Lead ECG shows Wide Complex Regular Rhythm with no P waves or discernable Pacer Spikes. Rate 73 BPM.  -Patient was on digoxin, but this was stopped at a prior hospitalization  -Continue Amiodarone 200 mg and Carvedilol 3.25 mg BID  -Repeat 12 Lead ECG  - Telemetry Monitoring      CKD (chronic kidney disease) stage 3, GFR 30-59 ml/min  BUN 43, Creatinin 1.3. Stable.  Avoid nephrotoxins and renally dose meds. Monitor closely d/t aggressive diuresis       Pleural effusion, right  Chest X-ray displays small, stable right pleural effusion with with atelectasis/consolidation of the right lower lung zone, concerning for possible pneumonia.   --procalcitonin results pending  --Continue Zosyn to cover for respiratory as well as UTI      UTI (urinary tract infection)  Urine, Clean Catch      Color, UA Yellow    Appearance, UA Cloudy (A)    pH, UA 6.0    Specific Gravity, UA 1.020    Protein, UA 1+ (A)    Glucose, UA Negative    Ketones, UA Negative    Bilirubin (UA) 1+ (A)    Occult Blood UA 3+ (A)    Nitrite, UA Negative    Urobilinogen, UA 4.0-6.0 (A) EU/dL    Leukocytes, UA 3+ (A)     Given Cipro 400 mg IV in the ED.  Continue Zosyn to cover empirically pneumonia as well      Chronic ischemic colitis  Iron Deficiency Anemia d/t Chronic Blood Loss  -Green/Brown Stool + for OB  -Patient denies bloody or dark stools  -Hgb 9.5, Hct 32.1  Stable  -Continue home Ferrous Sulfate  mg daily and Pantoprazole 40 mg  daily  -Monitor Daily CBC      VTE Risk Mitigation         Ordered     Medium Risk of VTE  Once      03/01/17 1525     Place sequential compression device  Until discontinued      03/01/17 1525     Place ALL hose  Until discontinued      03/01/17 1525     Reason for No Pharmacological VTE Prophylaxis  Once      03/01/17 1525          Ira Ram NP  Department of Hospital Medicine   [facility]

## 2017-03-02 NOTE — ASSESSMENT & PLAN NOTE
BUN 43, Creatinin 1.3. Stable.  Avoid nephrotoxins and renally dose meds. Monitor closely d/t aggressive diuresis

## 2017-03-02 NOTE — PLAN OF CARE
Problem: Patient Care Overview  Goal: Plan of Care Review  Outcome: Ongoing (interventions implemented as appropriate)  Plan of care reviewed with patient. Voices understanding. Paced on monitor with a wide QRS complex. Ira Ram NP aware. BAEZ noted with spo2 98% on 4 lnc. Lasix administered per orders. Patient will continue to be monitored.

## 2017-03-02 NOTE — PLAN OF CARE
Problem: Patient Care Overview  Goal: Plan of Care Review  Outcome: Ongoing (interventions implemented as appropriate)  Pt able to ambulate to BSC independently, but was there for standby assist. Pt states that pain meds were because of her aching hands and feet. Continue w/ iv abx.

## 2017-03-02 NOTE — SUBJECTIVE & OBJECTIVE
Interval History:   Diuresis is slow with only 850 ml net output after a total of 120 mg Lasix since admit.  Will give a dose of Bumex 2 mg IV to see if she responds better. Still has crackles on exam and only mild improvement of LE edema.     Review of Systems   Respiratory: Positive for cough and shortness of breath. Negative for wheezing.    Cardiovascular: Positive for leg swelling. Negative for chest pain and palpitations.   Gastrointestinal: Negative for diarrhea, nausea and vomiting.     Objective:     Vital Signs (Most Recent):  Temp: 97.6 °F (36.4 °C) (03/02/17 1140)  Pulse: 71 (03/02/17 1214)  Resp: 18 (03/02/17 1214)  BP: 100/77 (03/02/17 1140)  SpO2: (!) 93 % (03/02/17 1214) Vital Signs (24h Range):  Temp:  [96.5 °F (35.8 °C)-98.9 °F (37.2 °C)] 97.6 °F (36.4 °C)  Pulse:  [68-78] 71  Resp:  [15-23] 18  SpO2:  [93 %-100 %] 93 %  BP: ()/(49-96) 100/77     Weight: 109.8 kg (242 lb)  Body mass index is 34.72 kg/(m^2).    Intake/Output Summary (Last 24 hours) at 03/02/17 1318  Last data filed at 03/02/17 0835   Gross per 24 hour   Intake              300 ml   Output             1150 ml   Net             -850 ml      Physical Exam   Constitutional: She is oriented to person, place, and time. No distress.   Obese   HENT:   Head: Normocephalic and atraumatic.   Mouth/Throat: Oropharynx is clear and moist. No oropharyngeal exudate.   Eyes: Conjunctivae are normal. Pupils are equal, round, and reactive to light. No scleral icterus.   Neck: Neck supple.   Cardiovascular: Normal rate and intact distal pulses.  Exam reveals no gallop and no friction rub.    Murmur heard.  Irregular   Pulmonary/Chest: Effort normal. No respiratory distress. She has no wheezes. She has rales.   On Oxygen VIA NC   Abdominal: Soft. Bowel sounds are normal. She exhibits no distension. There is no tenderness. There is no rebound and no guarding.   Musculoskeletal: She exhibits edema. She exhibits no tenderness or deformity.    Bilateral Pitting Edema from feet to knees   Neurological: She is alert and oriented to person, place, and time. She exhibits normal muscle tone.   Skin: Skin is warm and dry. No rash noted. She is not diaphoretic.   Psychiatric: She has a normal mood and affect. Her behavior is normal.   Nursing note and vitals reviewed.      Significant Labs:   CBC:   Recent Labs  Lab 03/01/17  1038 03/02/17  0541   WBC 15.45* 10.79   HGB 9.5* 8.2*   HCT 32.1* 28.1*   * 258     CMP:   Recent Labs  Lab 03/01/17  1038 03/02/17  0541   * 132*   K 4.6 3.8   CL 90* 90*   CO2 32* 35*   GLU 82 82   BUN 44* 43*   CREATININE 1.5* 1.3   CALCIUM 9.3 8.5*   PROT 7.4 6.1   ALBUMIN 3.5 2.8*   BILITOT 2.4* 2.0*   ALKPHOS 162* 128   AST 50* 66*   ALT 43 59*   ANIONGAP 12 7*   EGFRNONAA 35* 41*     Coagulation:   Recent Labs  Lab 03/02/17  0541   INR 1.6*   APTT 29.1     Troponin:   Recent Labs  Lab 03/01/17  1934 03/02/17  0003 03/02/17  0541   TROPONINI 0.037* 0.047* 0.027*       Significant Imaging:   Echocardiogram 3/1/17  CONCLUSIONS     1 - Severely depressed left ventricular systolic function (EF 25-30%).     2 - Severe left atrial enlargement.     3 - Left ventricular diastolic dysfunction.     4 - Eccentric hypertrophy.     5 - Pulmonary hypertension. The estimated PA systolic pressure is 50 mmHg.     6 - Normal right ventricular systolic function .     7 - Severe mitral regurgitation.     8 - Moderate to severe tricuspid regurgitation.     9 - Increased central venous pressure.

## 2017-03-02 NOTE — PLAN OF CARE
Problem: Patient Care Overview  Goal: Plan of Care Review  SpO2   100% on  4 lpm NC. No apparent distress noted. Will continue to monitor.

## 2017-03-02 NOTE — ASSESSMENT & PLAN NOTE
Chest X-ray displays small, stable right pleural effusion with with atelectasis/consolidation of the right lower lung zone, concerning for possible pneumonia.   --procalcitonin results pending  --Continue Zosyn to cover for respiratory as well as UTI

## 2017-03-02 NOTE — ASSESSMENT & PLAN NOTE
Urine, Clean Catch      Color, UA Yellow    Appearance, UA Cloudy (A)    pH, UA 6.0    Specific Gravity, UA 1.020    Protein, UA 1+ (A)    Glucose, UA Negative    Ketones, UA Negative    Bilirubin (UA) 1+ (A)    Occult Blood UA 3+ (A)    Nitrite, UA Negative    Urobilinogen, UA 4.0-6.0 (A) EU/dL    Leukocytes, UA 3+ (A)     Given Cipro 400 mg IV in the ED.  Continue Zosyn to cover empirically pneumonia as well

## 2017-03-02 NOTE — PLAN OF CARE
Problem: Occupational Therapy Goal  Goal: Occupational Therapy Goal  Goals to be met by: 4/2/2017     Patient will increase functional independence with ADLs by performing:    UE Dressing with Modified [place].  LE Dressing with Modified [place].  Grooming while standing at sink with Modified [place].  Toileting from bedside commode with Modified [place] for hygiene and clothing management.   Bathing from shower chair/bench with Supervision.  Stand pivot transfers with Modified [place].  Toilet transfer to bedside commode with Modified [place].    Recs: Pt. With shower chair & BSC already in use at home; all equipment needs met. To benefit from  OT services.  Outcome: Ongoing (interventions implemented as appropriate)  Initial OT eval complete.  Remained on 3L O2 NC throughout tasks.  Supine <-> sit SBA for safety.  Donned socks seated EOB with assist for sock to RLE; required seated rest break after donning socks.  Educated on deep breathing tech with good carryover.  Stand-pivot bed <-> BSC with SBA & seated rest break after.   ModA for LB dress socks only, SBA for transitional movements & functional T/F.  Required frequent rest breaks 2* impaired endurance.  To benefit from continued OT services to increase independence in ADL.  Continue POC.

## 2017-03-02 NOTE — PLAN OF CARE
Problem: Physical Therapy Goal  Goal: Physical Therapy Goal  Goals to be met by: 2017     Patient will increase functional independence with mobility by performin. Sit to stand transfer with Modified [place]  2. Bed to chair transfer with Modified [place] using no AD  3. Gait x 20 feet with Modified [place] using Rolling Walker.    Outcome: Ongoing (interventions implemented as appropriate)  Recommend Home with HH  No DME needed

## 2017-03-02 NOTE — ASSESSMENT & PLAN NOTE
Iron Deficiency Anemia d/t Chronic Blood Loss  -Green/Brown Stool + for OB  -Patient denies bloody or dark stools  -Hgb 9.5, Hct 32.1  Stable  -Continue home Ferrous Sulfate  mg daily and Pantoprazole 40 mg daily  -Monitor Daily CBC

## 2017-03-02 NOTE — PT/OT/SLP EVAL
Occupational Therapy  Evaluation    Jennifer Prescott   MRN: [id number]   Admitting Diagnosis: Acute on chronic combined systolic and diastolic congestive heart failure    OT Date of Treatment: 03/02/17   OT Start Time: 1059  OT Stop Time: 1115  OT Total Time (min): 16 min    Billable Minutes:  Evaluation 16    Diagnosis: Acute on chronic combined systolic and diastolic congestive heart failure       Past Medical History:   Diagnosis Date    *Atrial fibrillation     Anticoagulant long-term use     Arthritis     Cardiomyopathy     Carotid artery occlusion     CHF (congestive heart failure)     COPD (chronic obstructive pulmonary disease)     COPD exacerbation 2/19/2015    Coronary artery disease     RCA occluded with L to R collaterals and normal LAD and LCx    Hypertension     Internal bleeding     Sleep apnea     uses bipap      Past Surgical History:   Procedure Laterality Date    ABDOMINAL SURGERY      APPENDECTOMY      CARDIAC CATHETERIZATION      CARDIAC DEFIBRILLATOR PLACEMENT  10/9/2012    bivent AICD placed 10/2012 and revised 12/2012    CARDIAC PACEMAKER [facility]ACEMENT  9/21/2010    [facility]    CARDIOVERSION  3/6/2015    CAROTID ENDARTERECTOMY Left     CHOLECYSTECTOMY      COLONOSCOPY N/A 7/11/2016    Procedure: COLONOSCOPY;  Surgeon: Rafale Pérez MD;  Location: [facility];  Service: Endoscopy;  Laterality: N/A;    COLONOSCOPY N/A 2/17/2017    Procedure: COLONOSCOPY;  Surgeon: Giovanni Bronson MD;  Location: [facility] ([address] [place] [address]);  Service: Endoscopy;  Laterality: N/A;    CORONARY ANGIOPLASTY WITH STENT PLACEMENT  1/27/2015    3 JAMAL to RCA    CRT-D implantation      ESOPHAGOGASTRODUODENOSCOPY      GALLBLADDER SURGERY      TONSILLECTOMY      TONSILLECTOMY, ADENOIDECTOMY      VASCULAR SURGERY             General Precautions: Standard, fall, respiratory  Orthopedic Precautions: N/A  Braces: N/A    Do you have any cultural, spiritual, Orthodoxy conflicts, given your current situation?: None      Patient History:  Living Environment  Lives With: grandchild(vanita), child(avnita), adult  Living Arrangements: house  Home Layout: Able to live on 1st floor  Stair Railings at Home: none  Transportation Available: car, family or friend will provide  Living Environment Comment: Pt. lives with daughter/granddaughter in  home with threshold entry.  Bathroom set-up is walk-in shower with shower chair & BSC over toilet.  Daughter performs all cooking, cleaning, & driving.  Works as a  & commented that she walks approx. 10ft to enter place of emloyment.  Right handed.  Equipment Currently Used at Home: BIPAP, oxygen, bedside commode, shower chair, walker, rolling, wheelchair, cane, straight    Prior level of function:   Bed Mobility/Transfers: independent  Grooming: independent  Bathing: independent  Upper Body Dressing: independent  Lower Body Dressing: independent  Toileting: independent  Homemaking Responsibilities: No  Driving License: No  Mode of Transportation: Car, Family, Friends  Occupation: Full time employment (Employed as .)  Type of Occupation:      Dominant hand: right    Subjective:  Communicated with nursing prior to session.  Pt. Agreeable to OT eval.  Chief Complaint: Difficulty breathing.  Patient/Family stated goals: Return to PLOF.     Pain Ratin/10                   Objective:  Patient found with: telemetry, peripheral IV, oxygen    Cognitive Exam:  Oriented to: Person, Place, Time and Situation  Follows Commands/attention: Follows multistep  commands  Communication: clear/fluent  Memory:  No Deficits noted  Safety awareness/insight to disability: intact  Coping skills/emotional control: Appropriate to situation    Visual/perceptual:  Intact    Physical Exam:  Postural examination/scapula alignment: Rounded shoulder and Head forward  Skin integrity: Visible skin intact  Edema: Mild to mod edema in BLE.    Sensation:   Intact    Upper Extremity Range of  Motion:  Right Upper Extremity: WFL  Left Upper Extremity: WFL    Upper Extremity Strength:  Right Upper Extremity: WFL  Left Upper Extremity: WFL   Strength: WFL    Fine motor coordination:   Intact    Gross motor coordination: WFL    Functional Mobility:  Bed Mobility:  Supine to Sit: Stand by Assistance    Transfers:  Sit <> Stand Assistance: Stand By Assistance  Sit <> Stand Assistive Device: No Assistive Device  Toilet Transfer Technique: Stand Pivot  Toilet Transfer Assistance: Stand By Assistance  Toilet Transfer Assistive Device: bedside commode    Functional Ambulation: Stand-pivot bed <-> BSC with SBA & assist for line management.    Activities of Daily Living:     Feeding adaptive equipment: None     UE adaptive equipment: None  LE Dressing Level of Assistance: Moderate assistance (socks only)  LE adaptive equipment: None                    Bathing adaptive equipment: no assistive device    Balance:   Static Sit: FAIR+: Able to take MINIMAL challenges from all directions  Dynamic Sit: FAIR+: Maintains balance through MINIMAL excursions of active trunk motion  Static Stand: FAIR: Maintains without assist but unable to take challenges  Dynamic stand: FAIR+: Needs CLOSE SUPERVISION during gait and is able to right self with minor LOB    Therapeutic Activities and Exercises:  Initial OT eval complete. Remained on 3L O2 NC throughout tasks. Supine <-> sit SBA for safety. Donned socks seated EOB with assist for sock to RLE; required seated rest break after donning socks. Educated on deep breathing tech with good carryover. Stand-pivot bed <-> BSC with SBA & seated rest break after. ModA for LB dress socks only, SBA for transitional movements & functional T/F. Required frequent rest breaks 2* impaired endurance. To benefit from continued OT services to increase independence in ADL. Continue POC.    AM-PAC 6 CLICK ADL  How much help from another person does this patient currently need?  1 = Unable,  "Total/Dependent Assistance  2 = A lot, Maximum/Moderate Assistance  3 = A little, Minimum/Contact Guard/Supervision  4 = None, Modified [place]/Independent    Putting on and taking off regular lower body clothing? : 3  Bathing (including washing, rinsing, drying)?: 3  Toileting, which includes using toilet, bedpan, or urinal? : 3  Putting on and taking off regular upper body clothing?: 3  Taking care of personal grooming such as brushing teeth?: 3  Eating meals?: 4  Total Score: 19    AM-PAC Raw Score CMS "G-Code Modifier Level of Impairment Assistance   6 % Total / Unable   7 - 9 CM 80 - 100% Maximal Assist   10 - 14 CL 60 - 80% Moderate Assist   15 - 19 CK 40 - 60% Moderate Assist   20 - 22 CJ 20 - 40% Minimal Assist   23 CI 1-20% SBA / CGA   24 CH 0% Independent/ Mod I       Patient left seated EOB  with all lines intact, call button in reach and nursing student present    Assessment:  Jennifer Prescott is a 71 y.o. female with a medical diagnosis of Acute on chronic combined systolic and diastolic congestive heart failure and presents with deficits listed below decreasing independence in self-care.    Rehab identified problem list/impairments: Rehab identified problem list/impairments: weakness, impaired endurance, impaired self care skills, impaired functional mobilty, gait instability, impaired balance, impaired cardiopulmonary response to activity    Rehab potential is good.    Activity tolerance: Fair    Discharge recommendations: Discharge Facility/Level Of Care Needs: home health OT     Barriers to discharge:      Equipment recommendations: none     GOALS:   Occupational Therapy Goals        Problem: Occupational Therapy Goal    Goal Priority Disciplines Outcome Interventions   Occupational Therapy Goal     OT, PT/OT Ongoing (interventions implemented as appropriate)    Description:  Goals to be met by: 4/2/2017     Patient will increase functional independence with ADLs by performing:    UE " Dressing with Modified [place].  LE Dressing with Modified [place].  Grooming while standing at sink with Modified [place].  Toileting from bedside commode with Modified [place] for hygiene and clothing management.   Bathing from  shower chair/bench with Supervision.  Stand pivot transfers with Modified [place].  Toilet transfer to bedside commode with Modified [place].    Recs:  Pt. With shower chair & BSC already in use at home; all equipment needs met.  To benefit from  OT services.              PLAN:  Patient to be seen 5 x/week to address the above listed problems via self-care/home management, therapeutic activities, therapeutic exercises  Plan of Care expires: 04/02/17  Plan of Care reviewed with: patient    OT G-codes  Functional Assessment Tool Used: AMPAC  Score: 19  Functional Limitation: Self care  Self Care Current Status (): CK  Self Care Goal Status (): HUGH Panchal OT  03/02/2017

## 2017-03-02 NOTE — ASSESSMENT & PLAN NOTE
Ischemic Cardiomyopathy/ CAD s/p PCI  -Chest X-ray concerning for Pulmonary Edema, BNP 3474 (1856 1 week ago), Crackles on exam and bilateral pitting LE extremity edema from feet to knees  -Continue Home Carvedilol 3.125 BID, Imbur 60 mg daily, and Losartan 12.5 mg daily  -Took Bumex 2 mg TID for 3 days , now back to prior dose Bumex 1 mg BID--Holding   -Echo shows EF 25% (improved), Pulm HTN Systolic PA 50 (worse) and Severe Mitral and Tricuspid insufficiency (Worse)  --Only 850 net output with total 120 mg IV Lasix.    --Trial of 2 mg IV Bumex to see if there a better response   Consider Cardiology Consult

## 2017-03-02 NOTE — PROGRESS NOTES
.Pharmacy New Medication Education    Patient accepted medication education.    Pharmacy educated patient on the following medications, using the teach-back method.   Amiodarone  Asa  Coreg  Plavix  Feoso4  Lasix  Alto  Imdur  Losartan  Zofran  Pantoprazole  Zosyn  Miralax  Pravachol  Ramelteon  Tramadol  Trazodone      Learners of pharmacy medication education included:  patient    Patient +/- learner response:  verbalize understanding

## 2017-03-03 LAB
ANION GAP SERPL CALC-SCNC: 7 MMOL/L
BACTERIA UR CULT: NORMAL
BASOPHILS # BLD AUTO: 0.01 K/UL
BASOPHILS NFR BLD: 0.1 %
BUN SERPL-MCNC: 39 MG/DL
CALCIUM SERPL-MCNC: 8.3 MG/DL
CHLORIDE SERPL-SCNC: 91 MMOL/L
CO2 SERPL-SCNC: 35 MMOL/L
CREAT SERPL-MCNC: 1.2 MG/DL
DIFFERENTIAL METHOD: ABNORMAL
EOSINOPHIL # BLD AUTO: 0.1 K/UL
EOSINOPHIL NFR BLD: 1.1 %
ERYTHROCYTE [DISTWIDTH] IN BLOOD BY AUTOMATED COUNT: 17.5 %
EST. GFR  (AFRICAN AMERICAN): 53 ML/MIN/1.73 M^2
EST. GFR  (NON AFRICAN AMERICAN): 46 ML/MIN/1.73 M^2
GLUCOSE SERPL-MCNC: 107 MG/DL
HCT VFR BLD AUTO: 29.3 %
HGB BLD-MCNC: 8.4 G/DL
LYMPHOCYTES # BLD AUTO: 0.3 K/UL
LYMPHOCYTES NFR BLD: 3.4 %
MAGNESIUM SERPL-MCNC: 1.8 MG/DL
MCH RBC QN AUTO: 27 PG
MCHC RBC AUTO-ENTMCNC: 28.7 %
MCV RBC AUTO: 94 FL
MONOCYTES # BLD AUTO: 0.7 K/UL
MONOCYTES NFR BLD: 9 %
NEUTROPHILS # BLD AUTO: 6.9 K/UL
NEUTROPHILS NFR BLD: 86.3 %
PHOSPHATE SERPL-MCNC: 3.5 MG/DL
PLATELET # BLD AUTO: 259 K/UL
PMV BLD AUTO: 9.9 FL
POTASSIUM SERPL-SCNC: 4.3 MMOL/L
RBC # BLD AUTO: 3.11 M/UL
SODIUM SERPL-SCNC: 133 MMOL/L
WBC # BLD AUTO: 7.96 K/UL

## 2017-03-03 PROCEDURE — 25000242 PHARM REV CODE 250 ALT 637 W/ HCPCS: Performed by: NURSE PRACTITIONER

## 2017-03-03 PROCEDURE — 11000001 HC ACUTE MED/SURG PRIVATE ROOM

## 2017-03-03 PROCEDURE — 36415 COLL VENOUS BLD VENIPUNCTURE: CPT

## 2017-03-03 PROCEDURE — 94761 N-INVAS EAR/PLS OXIMETRY MLT: CPT

## 2017-03-03 PROCEDURE — 85025 COMPLETE CBC W/AUTO DIFF WBC: CPT

## 2017-03-03 PROCEDURE — 94660 CPAP INITIATION&MGMT: CPT

## 2017-03-03 PROCEDURE — 63600175 PHARM REV CODE 636 W HCPCS: Performed by: NURSE PRACTITIONER

## 2017-03-03 PROCEDURE — 97535 SELF CARE MNGMENT TRAINING: CPT

## 2017-03-03 PROCEDURE — 84100 ASSAY OF PHOSPHORUS: CPT

## 2017-03-03 PROCEDURE — 97116 GAIT TRAINING THERAPY: CPT

## 2017-03-03 PROCEDURE — 83735 ASSAY OF MAGNESIUM: CPT

## 2017-03-03 PROCEDURE — 25000003 PHARM REV CODE 250: Performed by: NURSE PRACTITIONER

## 2017-03-03 PROCEDURE — 97110 THERAPEUTIC EXERCISES: CPT

## 2017-03-03 PROCEDURE — 27000221 HC OXYGEN, UP TO 24 HOURS

## 2017-03-03 PROCEDURE — 80048 BASIC METABOLIC PNL TOTAL CA: CPT

## 2017-03-03 PROCEDURE — 97530 THERAPEUTIC ACTIVITIES: CPT

## 2017-03-03 PROCEDURE — 99255 IP/OBS CONSLTJ NEW/EST HI 80: CPT | Mod: ,,, | Performed by: INTERNAL MEDICINE

## 2017-03-03 PROCEDURE — 99900035 HC TECH TIME PER 15 MIN (STAT)

## 2017-03-03 PROCEDURE — 94640 AIRWAY INHALATION TREATMENT: CPT

## 2017-03-03 RX ORDER — BUMETANIDE 0.25 MG/ML
3 INJECTION INTRAMUSCULAR; INTRAVENOUS 2 TIMES DAILY
Status: DISCONTINUED | OUTPATIENT
Start: 2017-03-03 | End: 2017-03-08

## 2017-03-03 RX ADMIN — TRAZODONE HYDROCHLORIDE 150 MG: 100 TABLET ORAL at 09:03

## 2017-03-03 RX ADMIN — FERROUS SULFATE TAB EC 325 MG (65 MG FE EQUIVALENT) 325 MG: 325 (65 FE) TABLET DELAYED RESPONSE at 09:03

## 2017-03-03 RX ADMIN — BUMETANIDE 3 MG: 0.25 INJECTION INTRAMUSCULAR; INTRAVENOUS at 05:03

## 2017-03-03 RX ADMIN — CARVEDILOL 3.12 MG: 3.12 TABLET, FILM COATED ORAL at 08:03

## 2017-03-03 RX ADMIN — PRAVASTATIN SODIUM 40 MG: 40 TABLET ORAL at 08:03

## 2017-03-03 RX ADMIN — IPRATROPIUM BROMIDE AND ALBUTEROL SULFATE 3 ML: .5; 3 SOLUTION RESPIRATORY (INHALATION) at 07:03

## 2017-03-03 RX ADMIN — IPRATROPIUM BROMIDE AND ALBUTEROL SULFATE 3 ML: .5; 3 SOLUTION RESPIRATORY (INHALATION) at 04:03

## 2017-03-03 RX ADMIN — IPRATROPIUM BROMIDE AND ALBUTEROL SULFATE 3 ML: .5; 3 SOLUTION RESPIRATORY (INHALATION) at 11:03

## 2017-03-03 RX ADMIN — PANTOPRAZOLE SODIUM 40 MG: 40 TABLET, DELAYED RELEASE ORAL at 08:03

## 2017-03-03 RX ADMIN — PIPERACILLIN SODIUM AND TAZOBACTAM SODIUM 4.5 G: 4; .5 INJECTION, POWDER, FOR SOLUTION INTRAVENOUS at 06:03

## 2017-03-03 RX ADMIN — PIPERACILLIN SODIUM AND TAZOBACTAM SODIUM 4.5 G: 4; .5 INJECTION, POWDER, FOR SOLUTION INTRAVENOUS at 03:03

## 2017-03-03 RX ADMIN — BUMETANIDE 3 MG: 0.25 INJECTION INTRAMUSCULAR; INTRAVENOUS at 09:03

## 2017-03-03 RX ADMIN — LOSARTAN POTASSIUM 12.5 MG: 25 TABLET, FILM COATED ORAL at 09:03

## 2017-03-03 RX ADMIN — ISOSORBIDE MONONITRATE 60 MG: 60 TABLET, EXTENDED RELEASE ORAL at 08:03

## 2017-03-03 RX ADMIN — CLOPIDOGREL BISULFATE 75 MG: 75 TABLET ORAL at 08:03

## 2017-03-03 RX ADMIN — HYDROCODONE BITARTRATE AND ACETAMINOPHEN 1 TABLET: 10; 325 TABLET ORAL at 09:03

## 2017-03-03 RX ADMIN — PIPERACILLIN SODIUM AND TAZOBACTAM SODIUM 4.5 G: 4; .5 INJECTION, POWDER, FOR SOLUTION INTRAVENOUS at 10:03

## 2017-03-03 RX ADMIN — AMIODARONE HYDROCHLORIDE 200 MG: 200 TABLET ORAL at 08:03

## 2017-03-03 RX ADMIN — ASPIRIN 81 MG 81 MG: 81 TABLET ORAL at 09:03

## 2017-03-03 RX ADMIN — POTASSIUM CHLORIDE 40 MEQ: 20 TABLET, EXTENDED RELEASE ORAL at 08:03

## 2017-03-03 RX ADMIN — CARVEDILOL 3.12 MG: 3.12 TABLET, FILM COATED ORAL at 05:03

## 2017-03-03 NOTE — CONSULTS
[facility]  Cardiology  Consult Note    Patient Name: Jennifer Prescott  MRN: [id number]  Admission Date: 3/1/2017  Hospital Length of Stay: 2 days  Code Status: Full Code   Attending Provider: Anderson Badillo MD   Consulting Provider: SEJAL Strickland ANP  Primary Care Physician: Lianna Mistry MD  Principal Problem:Acute on chronic combined systolic and diastolic congestive heart failure    Patient information was obtained from patient and past medical records.     Inpatient consult to [facility]  Consult performed by: JEANIE SPEARS  Consult ordered by: MALISSA MACE  Reason for consult: ADHF        Subjective:     Chief Complaint:  SOB    Consult Reason: ADHF    HPI:   70yo female with history of CAD s/p LAD PCI and RCA PCI for  1/2015, permanent afib, COPD, ICM EF 25%, MR, OHVS, CKD stage III, NHI, GIB and ischemic colitis who presented to the ER with complaints of SOB for the past 3 days. She complains of BAEZ with minimal exertion with progressive worsening over the past 3 days. She reports using her oxygen more lately and has not been able to do her normal activities or go to work. She reports compliance with her medication regimen and dietary restrictions lately. She has not been able to do her normal activities for the past month and is eager to return to work. Admitted to [facility] for ADHF with initiated of aggressive diuresis and Cardiology was consulted for evaluation and management.     She was admitted on 2/15/2017 for GIB with dark tarry stools. Her Hgb at that time was 5.1 therefore she received 3 units PRBCs and was evaluated by GI. Underwent EGD with nonbleeding duodenal diverticulum, portal hypertension with no biospy due to recent Plavix and Eliquis and hiatal hernia. Colonoscopy performed as well with no acute abnormalites. She required additional one unit of PRBC due to Hgb of 7 with slight trend upward of H&H to 8&28 (baseline  9-11/32-39). Discharged with discontinuation of Eliquis and Plavix due to GIB and HAS BLED score of 4 with recommendation to follow up with Cardiology for evaluation for resumption. Additionally her Bumex, Digoxin, Imdur and Losartan were discontinued as well. She reports feeling okay but not her usual self upon discharge with recurrent SOB requiring re admission last week at [facility]. At that time, she was admitted to [facility] and was given Bumex 1mg IV with minimal response therefore she was given Diuril 250mg IV x 1 along with Bumex 2mg IV TID with 4 liters out and was discharged with recommendation for Bumex 2mg BID x 3 days then to decrease to 1mg po BID. She reports at that time her SOB was not back to her baseline and continued with recommended medication regimen. However, her SOB persisted and progressively worsened prompting her to present to the ER                Past Medical History:   Diagnosis Date    *Atrial fibrillation     Anticoagulant long-term use     Arthritis     Cardiomyopathy     Carotid artery occlusion     CHF (congestive heart failure)     COPD (chronic obstructive pulmonary disease)     COPD exacerbation 2/19/2015    Coronary artery disease     RCA occluded with L to R collaterals and normal LAD and LCx    Hypertension     Internal bleeding     Sleep apnea     uses bipap       Past Surgical History:   Procedure Laterality Date    ABDOMINAL SURGERY      APPENDECTOMY      CARDIAC CATHETERIZATION      CARDIAC DEFIBRILLATOR PLACEMENT  10/9/2012    bivent AICD placed 10/2012 and revised 12/2012    CARDIAC PAC[facility]AKER [facility]ACEMENT  9/21/2010    [facility]    CARDIOVERSION  3/6/2015    CAROTID ENDARTERECTOMY Left     CHOLECYSTECTOMY      COLONOSCOPY N/A 7/11/2016    Procedure: COLONOSCOPY;  Surgeon: Rafael Pérez MD;  Location: [facility];  Service: Endoscopy;  Laterality: N/A;    COLONOSCOPY N/A 2/17/2017    Procedure: COLONOSCOPY;  Surgeon: Giovanni Bronson MD;   Location: [facility] ([address] [place] [address]);  Service: Endoscopy;  Laterality: N/A;    CORONARY ANGIOPLASTY WITH STENT PLACEMENT  1/27/2015    3 JAMAL to RCA    CRT-D implantation      ESOPHAGOGASTRODUODENOSCOPY      GALLBLADDER SURGERY      TONSILLECTOMY      TONSILLECTOMY, ADENOIDECTOMY      VASCULAR SURGERY         Review of patient's allergies indicates:   Allergen Reactions    Gabapentin        No current facility-administered medications on file prior to encounter.      Current Outpatient Prescriptions on File Prior to Encounter   Medication Sig    albuterol 90 mcg/actuation inhaler Inhale 2 puffs into the lungs every 6 (six) hours as needed for Wheezing.    albuterol-ipratropium 2.5mg-0.5mg/3mL (DUO-NEB) 0.5 mg-3 mg(2.5 mg base)/3 mL nebulizer solution USE 1 VIAL VIA NEBULIZER EVERY 6 HOURS AS NEEDED FOR WHEEZING    allopurinol (ZYLOPRIM) 300 MG tablet Take 1 tablet (300 mg total) by mouth once daily.    amiodarone (PACERONE) 200 MG Tab Take 200 mg by mouth once daily.    aspirin 81 MG Chew Take 81 mg by mouth every evening.     bumetanide (BUMEX) 1 MG tablet Take 1 tablet (1 mg total) by mouth 2 (two) times daily. Take 2 tablets 2 times daily for the first 3 days.    carvedilol (COREG) 3.125 MG tablet Take 1 tablet (3.125 mg total) by mouth 2 (two) times daily with meals.    clopidogrel (PLAVIX) 75 mg tablet Take 75 mg by mouth once daily.    cyclobenzaprine (FLEXERIL) 10 MG tablet Take 10 mg by mouth 3 (three) times daily as needed for Muscle spasms.    ferrous sulfate 325 (65 FE) MG EC tablet Take 1 tablet (325 mg total) by mouth every evening.    fluticasone-vilanterol (BREO) 100-25 mcg/dose diskus inhaler Inhale 1 puff into the lungs once daily. (Patient taking differently: Inhale 1 puff into the lungs daily as needed. )    isosorbide mononitrate (IMDUR) 60 MG 24 hr tablet Take 1 tablet (60 mg total) by mouth once daily.    losartan (COZAAR) 25 MG tablet Take 0.5 tablets (12.5 mg total) by mouth  once daily.    pantoprazole (PROTONIX) 40 MG tablet Take 1 tablet (40 mg total) by mouth once daily.    pravastatin (PRAVACHOL) 40 MG tablet Take 1 tablet (40 mg total) by mouth once daily.    tramadol (ULTRAM) 50 mg tablet Take 50 mg by mouth every 6 (six) hours as needed for Pain.    trazodone (DESYREL) 150 MG tablet TAKE 1 TABLET BY MOUTH AT BEDTIME (Patient taking differently: TAKE 1 TABLET BY MOUTH AT BEDTIME AS NEEDED FOR SLEEP)    [DISCONTINUED] zolpidem (AMBIEN) 5 MG Tab take 1 on the night of the study; if you still can't sleep for another 60-90 min after the pill - take another pill.     Family History     Problem Relation (Age of Onset)    Breast cancer Mother    Cancer Father    Heart attack Father    Heart disease Father    Heart failure Father        Social History Main Topics    Smoking status: Former Smoker     Packs/day: 0.25     Years: 50.00     Types: Cigarettes     Start date: 1/15/1965    Smokeless tobacco: Never Used    Alcohol use No    Drug use: No    Sexual activity: No     Review of Systems   Constitution: Positive for decreased appetite (decreased appetite x 1-2 weeks), weakness and malaise/fatigue.   Cardiovascular: Positive for dyspnea on exertion, leg swelling, orthopnea and paroxysmal nocturnal dyspnea. Negative for chest pain, claudication, cyanosis, near-syncope, palpitations and syncope.   Respiratory: Positive for shortness of breath. Negative for cough and wheezing.    Gastrointestinal: Negative for bloating, abdominal pain, constipation, diarrhea, nausea and vomiting.     Objective:     Vital Signs (Most Recent):  Temp: 97.5 °F (36.4 °C) (03/03/17 1200)  Pulse: 69 (03/03/17 1200)  Resp: 18 (03/03/17 1200)  BP: (!) 90/51 (03/03/17 1200)  SpO2: 97 % (03/03/17 1136) Vital Signs (24h Range):  Temp:  [96.7 °F (35.9 °C)-98 °F (36.7 °C)] 97.5 °F (36.4 °C)  Pulse:  [67-70] 69  Resp:  [18-22] 18  SpO2:  [93 %-100 %] 97 %  BP: ()/(51-75) 90/51     Weight: 122 kg (268 lb  14.4 oz)  Body mass index is 38.58 kg/(m^2).    SpO2: 97 %  O2 Device (Oxygen Therapy): nasal cannula      Intake/Output Summary (Last 24 hours) at 03/03/17 1605  Last data filed at 03/03/17 1200   Gross per 24 hour   Intake             1390 ml   Output             1800 ml   Net             -410 ml       Lines/Drains/Airways     Peripheral Intravenous Line                 Peripheral IV - Single Lumen 03/03/17 1430 Right Forearm less than 1 day                Physical Exam   Constitutional: She appears well-developed and well-nourished. No distress.   Neck: JVD (@10cm ) present.   Cardiovascular: Normal rate, regular rhythm, S1 normal and S2 normal.  Exam reveals no gallop.    Murmur heard.  Pulmonary/Chest: Tachypnea (with activity ) noted. She has rales in the right middle field, the right lower field, the left middle field and the left lower field.   Abdominal: Soft. Bowel sounds are normal. She exhibits no distension. There is no tenderness.   Musculoskeletal: She exhibits edema (4+ BLE edema from knees to ankles; 1-2+ edema to bilateral thighs).   Skin: Skin is warm and dry.       Significant Labs:       Recent Labs  Lab 03/03/17  1102   WBC 7.96   RBC 3.11*   HGB 8.4*   HCT 29.3*      MCV 94   MCH 27.0   MCHC 28.7*       Recent Labs  Lab 03/03/17  1102   CALCIUM 8.3*   *   K 4.3   CO2 35*   CL 91*   BUN 39*   CREATININE 1.2       Significant Imaging: Echocardiogram:   2D echo with color flow doppler:   Results for orders placed or performed during the hospital encounter of 03/01/17   2D echo with color flow doppler   Result Value Ref Range    EF 25 (A) 55 - 65    Mitral Valve Regurgitation SEVERE (A)     Diastolic Dysfunction Yes (A)     Est. PA Systolic Pressure 50.28 (A)     Pericardial Effusion NONE     Tricuspid Valve Regurgitation MODERATE TO SEVERE (A)      Assessment and Plan:     * Acute on chronic combined systolic and diastolic congestive heart failure  Echo on 3/1/2017 with LVEF 25%,  diastolic dysfunction and severe MR; acute decompensated HF with marked volume overload with JVD, rales, peripheral edema along with elevated BNP and pulmonary vascular congestion on CXR; given IV Lasix with minimal response and only 850cc out; transitioned to Bumex 3mg IV TID with good response and 1.9 liters out overnight; barely net negative since admission; recommend continued aggressive IV diuresis with Bumex- feel she likely has 10-20lbs of fluid on board;  if u/o not significant overnight will consider dose of IV Diuril; continue ARB, BB and Imdur with decent afterload reduction; likely is a good candidate for  Entresto but will defer to outpatient once acute decompensation improves     Permanent atrial fibrillation  Currently paced rhythm with consistent paced rhythm since admission; no underlying afib noted; continue BB; chronic AC on hold due to recent GIB; was previously on Coumadin but changes to Eliquis on 1/30/2017 due to inconsistency with INR monitoring per my recollection; will continue to hold chronic AC due to persistent low H&H and recent stool positive for OCB; would not be opposed to GI consult for further input; CHADSVAS 5 (CHF, HTN, age, female, PAD) and HAS BLED 4-5    CAD S/P percutaneous coronary angioplasty  S/p LAD PCI and RCA PCI for  in 1/2015; mild troponin rise but at baseline; complaints of SOB and no complaints of chest pain; EKG with paced rhythm; SOB more concerning for ADHF presentation; no concern for ACS currently; continue ASA, Plavix, BB, ARB and statin     Ischemic cardiomyopathy  Management as detailed below      VTE Risk Mitigation         Ordered     Medium Risk of VTE  Once      03/01/17 1525     Place sequential compression device  Until discontinued      03/01/17 1525     Place ALL hose  Until discontinued      03/01/17 1525     Reason for No Pharmacological VTE Prophylaxis  Once      03/01/17 1525          Thank you for your consult. I will follow-up with patient.  Please contact us if you have any additional questions.    SEJAL Strickland, ANP  Cardiology   [facility]

## 2017-03-03 NOTE — PLAN OF CARE
Problem: Physical Therapy Goal  Goal: Physical Therapy Goal  Goals to be met by: 2017     Patient will increase functional independence with mobility by performin. Sit to stand transfer with Modified [place]  2. Bed to chair transfer with Modified [place] using no AD  3. Gait x 20 feet with Modified [place] using Rolling Walker.    Pt progressing towards goals, inc amb dist today, 30-40' w/ RW and O2 at 2L w/ CGA/SBA. Sats 98% at rest, 90-94% w/ amb.

## 2017-03-03 NOTE — PROGRESS NOTES
[facility] Medicine  Progress Note    Patient Name: Jennifer Prescott  MRN: [id number]  Patient Class: IP- Inpatient   Admission Date: 3/1/2017  Length of Stay: 2 days  Attending Physician: Anderson Badillo MD  Primary Care Provider: Lianna Mistry MD        Subjective:     Principal Problem:Acute on chronic combined systolic and diastolic congestive heart failure    HPI:  Jennifer Prescott is a 71 y.o.  woman with coronary artery disease (s/p 3 JAMAL to RCA 1/27/15), chronic systolic diastolic congestive heart failure (EF=20% 9/24/16), chronic hypotension, s/p pacemaker 9/21/10, s/p biventricular AICD 10/09/12, permanent atrial fibrillation (had cardioversion 3/06/15), bilateral carotid artery disease, left lower lobe pulmonary embolism (2/12/15), COPD with 40 pack year smoking history, continuous home oxygen 3 LPM, obesity hypoventilation syndrome (on BiPAP), chronic hypoxic respiratory failure, ischemic colitis with recurrent GI bleeding, iron deficiency anemia due to chronic gastrointestinal blood loss, and chronic kidney disease stage 3. She lives in [place], [place]. She works in a [employer] center. Her primary care physician is Dr. Lianna Mistry at [facility]. Her cardiologist is Dr. Riley Braga.    She was started on apixaban 5 mg BID on 1/30/17, as she had been off anticoagulation since her last GI bleed. She was hospitalized at [facility] from 2/15/17-2/20/17 for another GI bleed and anemia episode. She was told to stop apixaban but also stopped bumetanide in err, and presented to [facility] 2 days later (2/22/17) with pulmonary edema. BNP was 1856, increased from 1576 on 2/15/17. She was diuresed and advised to continue taking bumetanide 2 mg BID for a few three days after discharge before returning to her usual 1 mg BID. She was discharged home on 2/24/17.    She returns to [facility] ED recurrent SOB and dyspnea on exertion, progressively  worse over the past three days. She has been using her Duoneb treatments without improvement.  She has a cough productive light frothy sputum.  She denies chest pain, fevers, chills, nausea , vomiting, diarrhea, or blood in her stool.  ED workup revealed green-brown stool, heme Occult +, Chest X-Ray showing Pulmonary Edema with right pleural effusion and right lower lobe consolidation, unchanged from prior,  12 ECG with Wide Complex regular rhythm with no P waves or notable Pacer spikes, rate 76 BPM, No acute T wave changes (Serum K 4.6) Negative Flu A/B, Urinalysis with occult blood, 3+ leukocyts, > bacteria, no nitrites. WBC 15.45, Alk Phos 162, AST 50. Troponin 0.034 (Basline). BNP 3474 (1856 1 week ago) Physical exam revealed fine bibasilar crackles and bilateral gross pitting edema in lower extremities up to her knees. Patient is ambulatory at home and states that she does not usually carry that much fluid.  Upon her last discharge, she took Bumex 2 mg TID for 3 days and then resumed 1 mg BID as directed. She was given furosemide 60 IV, Duoneb Treatment and ciprofloxacin 400 mg IV in the ED.  She is being admitted to [facility] for Acute on Chronic CHF with Pulmonary Edema.  Will continue aggressive diuresis and repeat Echo.  Will trend Troponin, and get pro-calcitonin at next lab draw. (Patient is a difficult stick).  Will get daily CBC and CMP to monitor for GI bleeding, Electrolytes with diuresis and liver enzymes for hepatic congestion.  Will treat empirically with Zosyn to cover HAP in light of right lower lobe consolidation and dirty urinalysis.           Hospital Course:  3/2/17 Echo shows EF 25% (Improved from 20%), Pulmonary HTN with Systolic PA 50, and worsening valvular disease with severe Mitral and Tricuspid Insufficuancy    3/3/17 Diuresis is slow with only 1.5 L net output after a total of 140 mg Lasix and 6 mg IV Bumex.   Still has crackles on exam and only mild improvement of  LE edema. Cardiology consulted for assistance.       Interval History:   Diuresis remains slow with only 1.5 L net output after a total of 140 mg Lasix and 6 mg IV Bumex.   Still has crackles on exam and only mild improvement of LE edema. Cardiology consulted for assistance.       Review of Systems   Respiratory: Positive for cough and shortness of breath. Negative for wheezing.    Cardiovascular: Positive for leg swelling. Negative for chest pain and palpitations.   Gastrointestinal: Negative for diarrhea, nausea and vomiting.     Objective:     Vital Signs (Most Recent):  Temp: 97.5 °F (36.4 °C) (03/03/17 1200)  Pulse: 67 (03/03/17 1614)  Resp: 16 (03/03/17 1614)  BP: (!) 90/51 (03/03/17 1200)  SpO2: 97 % (03/03/17 1614) Vital Signs (24h Range):  Temp:  [96.7 °F (35.9 °C)-98 °F (36.7 °C)] 97.5 °F (36.4 °C)  Pulse:  [67-70] 67  Resp:  [16-22] 16  SpO2:  [93 %-100 %] 97 %  BP: ()/(51-56) 90/51     Weight: 122 kg (268 lb 14.4 oz)  Body mass index is 38.58 kg/(m^2).    Intake/Output Summary (Last 24 hours) at 03/03/17 1648  Last data filed at 03/03/17 1200   Gross per 24 hour   Intake             1390 ml   Output             1800 ml   Net             -410 ml      Physical Exam   Constitutional: She is oriented to person, place, and time. No distress.   Obese   HENT:   Head: Normocephalic and atraumatic.   Mouth/Throat: Oropharynx is clear and moist. No oropharyngeal exudate.   Eyes: Conjunctivae are normal. Pupils are equal, round, and reactive to light. No scleral icterus.   Neck: Neck supple.   Cardiovascular: Normal rate and intact distal pulses.  Exam reveals no gallop and no friction rub.    Murmur heard.  Irregular   Pulmonary/Chest: Effort normal. No respiratory distress. She has no wheezes. She has rales.   On Oxygen VIA NC   Abdominal: Soft. Bowel sounds are normal. She exhibits no distension. There is no tenderness. There is no rebound and no guarding.   Musculoskeletal: She exhibits edema. She  exhibits no tenderness or deformity.   Bilateral Pitting Edema from feet to knees   Neurological: She is alert and oriented to person, place, and time. She exhibits normal muscle tone.   Skin: Skin is warm and dry. No rash noted. She is not diaphoretic.   Psychiatric: She has a normal mood and affect. Her behavior is normal.   Nursing note and vitals reviewed.      Significant Labs:   BMP:   Recent Labs  Lab 03/03/17  1102      *   K 4.3   CL 91*   CO2 35*   BUN 39*   CREATININE 1.2   CALCIUM 8.3*   MG 1.8     CBC:   Recent Labs  Lab 03/02/17  0541 03/03/17  1102   WBC 10.79 7.96   HGB 8.2* 8.4*   HCT 28.1* 29.3*    259       Significant Imaging:  No New    Assessment/Plan:      * Acute on chronic combined systolic and diastolic congestive heart failure  Ischemic Cardiomyopathy/ CAD s/p PCI  -Chest X-ray concerning for Pulmonary Edema, BNP 3474 (1856 1 week ago), Crackles on exam and bilateral pitting LE extremity edema from feet to knees  -Continue Home Carvedilol 3.125 BID, Imbur 60 mg daily, and Losartan 12.5 mg daily  -Took Bumex 2 mg TID for 3 days , now back to prior dose Bumex 1 mg BID--Holding   -Echo shows EF 25% (improved), Pulm HTN Systolic PA 50 (worse) and Severe Mitral and Tricuspid insufficiency (Worse)  --140 mg IV Lasix given then changed to Bumex 3 mg IV BID with only 1.5 liters net output  -- Cardiology Consulted for assistance        COPD (chronic obstructive pulmonary disease)  Chronic Respiratory Failure/ Home Oxygen Dependant /Obesity hypoventilation Syndrome on BiPAP  -Oxygenating well on home oxygen 3 LPM, Continue to keep SaO2 >88 %  -Continue home BiPAP at night  -No wheezing on exam  -Scheduled Duonebs while awake  -Continue home fluticasone-vilanterol        Automatic implantable cardioverter-defibrillator in situ  Chronic Atrial Fibrillation  12 Lead ECG shows Wide Complex Regular Rhythm with no P waves or discernable Pacer Spikes. Rate 73 BPM.  -Patient was on  digoxin, but this was stopped at a prior hospitalization  -Continue Amiodarone 200 mg and Carvedilol 3.25 mg BID  -Repeat 12 Lead ECG  - Telemetry Monitoring      CKD (chronic kidney disease) stage 3, GFR 30-59 ml/min  BUN 39, Creatinin 1.2. Stable.  Avoid nephrotoxins and renally dose meds. Monitor closely d/t aggressive diuresis       Pleural effusion, right  Chest X-ray displays small, stable right pleural effusion with with atelectasis/consolidation of the right lower lung zone, concerning for possible pneumonia.   --procalcitonin results pending  --Continue Zosyn to cover for respiratory as well as UTI      UTI (urinary tract infection)  Urine, Clean Catch      Color, UA Yellow    Appearance, UA Cloudy (A)    pH, UA 6.0    Specific Gravity, UA 1.020    Protein, UA 1+ (A)    Glucose, UA Negative    Ketones, UA Negative    Bilirubin (UA) 1+ (A)    Occult Blood UA 3+ (A)    Nitrite, UA Negative    Urobilinogen, UA 4.0-6.0 (A) EU/dL    Leukocytes, UA 3+ (A)     Given Cipro 400 mg IV in the ED.  Continue Zosyn to cover empirically pneumonia as well      VTE Risk Mitigation         Ordered     Medium Risk of VTE  Once      03/01/17 1525     Place sequential compression device  Until discontinued      03/01/17 1525     Place ALL hose  Until discontinued      03/01/17 1525     Reason for No Pharmacological VTE Prophylaxis  Once      03/01/17 1525          Ira Ram NP  Department of Hospital Medicine   [facility]

## 2017-03-03 NOTE — PLAN OF CARE
Problem: Patient Care Overview  Goal: Plan of Care Review  Outcome: Ongoing (interventions implemented as appropriate)  Pt is urinating, edema remains. Pt ambulates and refuses the bed alarm. Pt refuses SCD's but does ambulate to bedside commode and sits up in bed on occasion. Infrequent pain to distal extremities relieved by PRN pain medication. No noted distress.

## 2017-03-03 NOTE — ASSESSMENT & PLAN NOTE
S/p LAD PCI and RCA PCI for  in 1/2015; mild troponin rise but at baseline; complaints of SOB and no complaints of chest pain; EKG with paced rhythm; SOB more concerning for ADHF presentation; no concern for ACS currently; continue ASA, Plavix, BB, ARB and statin

## 2017-03-03 NOTE — PLAN OF CARE
Problem: Occupational Therapy Goal  Goal: Occupational Therapy Goal  Goals to be met by: 4/2/2017     Patient will increase functional independence with ADLs by performing:    UE Dressing with Modified [place].  LE Dressing with Modified [place].  Grooming while standing at sink with Modified [place].  Toileting from bedside commode with Modified [place] for hygiene and clothing management.   Bathing from shower chair/bench with Supervision.  Stand pivot transfers with Modified [place].  Toilet transfer to bedside commode with Modified [place].    Recs: Pt. With shower chair & BSC already in use at home; all equipment needs met. To benefit from  OT services.   Outcome: Ongoing (interventions implemented as appropriate)  Remained on 3L O2 NC.  Supine <-> sit [place] with increased time & use of bed rails.  Ambulated household distance with RW bed <-> bathroom with SBA for safety 2* impaired balance.  Stand-pivot RW <->BSC over toilet; min v/c for safe hand placement & good carryover.  Front shahid hygiene in stance with SBA.  Educated on maneuvering small spaces with lateral steps with RW; verbalized understanding.  Required prolonged rest break after ambulation & toileting task.  Educated on pursed lip breathing tech with good carryover.  Performed sit <-> stand there-act 4X2 with prolonged seated rest break between sets.  Pt. SBA with toileting & toilet T/F.  Demos decreased endurance as seen in need for prolonged seated rest.  To benefit from continued OT to increase independence in self-care.  Continue POC.

## 2017-03-03 NOTE — PROGRESS NOTES
Patient dos not want to use BIPAP tonight, says she cannot tolerate our mask. I suggested  that a family member can bring her mask and tubing for her to use with our machine. Patient will arrange for someone to bring it tomorrow.

## 2017-03-03 NOTE — PT/OT/SLP PROGRESS
Occupational Therapy  Treatment    Jennifer Prescott   MRN: [id number]   Admitting Diagnosis: Acute on chronic combined systolic and diastolic congestive heart failure    OT Date of Treatment: 17   OT Start Time: 1055  OT Stop Time: 1125  OT Total Time (min): 30 min    Billable Minutes:  Self Care/Home Management 18 and Therapeutic Exercise 12    General Precautions: Standard, fall, respiratory  Orthopedic Precautions: N/A  Braces: N/A    Do you have any cultural, spiritual, Sabianist conflicts, given your current situation?: None    Subjective:  Communicated with nursing prior to session.  Pt. Agreeable to OT treat this day.  Pain Ratin/10                   Objective:  Patient found with: oxygen, telemetry     Functional Mobility:  Bed Mobility:  Supine to Sit: Supervision  Sit to Supine: Supervision    Transfers:   Sit <> Stand Assistance: Stand By Assistance  Sit <> Stand Assistive Device: Rolling Walker  Toilet Transfer Technique: Stand Pivot  Toilet Transfer Assistance: Stand By Assistance  Toilet Transfer Assistive Device: Rolling Walker, bedside commode    Functional Ambulation: Ambulated bed <-> bathroom with RW with supervision & 3L O2 NC.    Activities of Daily Living:     Feeding adaptive equipment: None     UE adaptive equipment: None     LE adaptive equipment: None        Toileting Where Assessed: Toilet, Bedside commode  Toileting Level of Assistance: Stand by assistance        Bathing adaptive equipment: None    Balance:   Static Sit: FAIR+: Able to take MINIMAL challenges from all directions  Dynamic Sit: FAIR+: Maintains balance through MINIMAL excursions of active trunk motion  Static Stand: FAIR+: Takes MINIMAL challenges from all directions  Dynamic stand: FAIR+: Needs CLOSE SUPERVISION during gait and is able to right self with minor LOB    Therapeutic Activities and Exercises:  Remained on 3L O2 NC. Supine <-> sit Candelario with increased time & use of bed rails. Ambulated household distance  with RW bed <-> bathroom with SBA for safety 2* impaired balance. Stand-pivot RW <->BSC over toilet; min v/c for safe hand placement & good carryover. Front shahid hygiene in stance with SBA. Educated on maneuvering small spaces with lateral steps with RW; verbalized understanding. Required prolonged rest break after ambulation & toileting task. Educated on pursed lip breathing tech with good carryover. Performed sit <-> stand there-act 4X2 with prolonged seated rest break between sets. Pt. SBA with toileting & toilet T/F. Demos decreased endurance as seen in need for prolonged seated rest. To benefit from continued OT to increase independence in self-care. Continue POC    AM-PAC 6 CLICK ADL   How much help from another person does this patient currently need?   1 = Unable, Total/Dependent Assistance  2 = A lot, Maximum/Moderate Assistance  3 = A little, Minimum/Contact Guard/Supervision  4 = None, Modified [place]/Independent    Putting on and taking off regular lower body clothing? : 3  Bathing (including washing, rinsing, drying)?: 3  Toileting, which includes using toilet, bedpan, or urinal? : 3  Putting on and taking off regular upper body clothing?: 3  Taking care of personal grooming such as brushing teeth?: 3  Eating meals?: 4  Total Score: 19     AM-PAC Raw Score CMS G-Code Modifier Level of Impairment Assistance   6 % Total / Unable   7 - 9 CM 80 - 100% Maximal Assist   10 - 14 CL 60 - 80% Moderate Assist   15 - 19 CK 40 - 60% Moderate Assist   20 - 22 CJ 20 - 40% Minimal Assist   23 CI 1-20% SBA / CGA   24 CH 0% Independent/ Mod I     Patient left supine with all lines intact, call button in reach and nursing notified    ASSESSMENT:  Jennifer Prescott is a 71 y.o. female with a medical diagnosis of Acute on chronic combined systolic and diastolic congestive heart failure and presents with deficits listed below to decrease independence in self-care.    Rehab identified problem list/impairments:  Rehab identified problem list/impairments: weakness, impaired endurance, impaired self care skills, impaired functional mobilty, gait instability, impaired balance, impaired cardiopulmonary response to activity    Rehab potential is good.    Activity tolerance: Fair    Discharge recommendations: Discharge Facility/Level Of Care Needs: home health OT     Barriers to discharge: Barriers to Discharge: None    Equipment recommendations: none     GOALS:   Occupational Therapy Goals        Problem: Occupational Therapy Goal    Goal Priority Disciplines Outcome Interventions   Occupational Therapy Goal     OT, PT/OT Ongoing (interventions implemented as appropriate)    Description:  Goals to be met by: 4/2/2017     Patient will increase functional independence with ADLs by performing:    UE Dressing with Modified [place].  LE Dressing with Modified [place].  Grooming while standing at sink with Modified [place].  Toileting from bedside commode with Modified [place] for hygiene and clothing management.   Bathing from  shower chair/bench with Supervision.  Stand pivot transfers with Modified [place].  Toilet transfer to bedside commode with Modified [place].    Recs:  Pt. With shower chair & BSC already in use at home; all equipment needs met.  To benefit from  OT services.              Plan:  Patient to be seen 5 x/week to address the above listed problems via self-care/home management, therapeutic activities, therapeutic exercises  Plan of Care expires: 04/02/17  Plan of Care reviewed with: patient         Concepcion DONN Panchal, OT  03/03/2017

## 2017-03-03 NOTE — ASSESSMENT & PLAN NOTE
Ischemic Cardiomyopathy/ CAD s/p PCI  -Chest X-ray concerning for Pulmonary Edema, BNP 3474 (1856 1 week ago), Crackles on exam and bilateral pitting LE extremity edema from feet to knees  -Continue Home Carvedilol 3.125 BID, Imbur 60 mg daily, and Losartan 12.5 mg daily  -Took Bumex 2 mg TID for 3 days , now back to prior dose Bumex 1 mg BID--Holding   -Echo shows EF 25% (improved), Pulm HTN Systolic PA 50 (worse) and Severe Mitral and Tricuspid insufficiency (Worse)  --140 mg IV Lasix given then changed to Bumex 3 mg IV BID with only 1.5 liters net output  -- Cardiology Consulted for assistance

## 2017-03-03 NOTE — ASSESSMENT & PLAN NOTE
Echo on 3/1/2017 with LVEF 25%, diastolic dysfunction and severe MR; acute decompensated HF with marked volume overload with JVD, rales, peripheral edema along with elevated BNP and pulmonary vascular congestion on CXR; given IV Lasix with minimal response and only 850cc out; transitioned to Bumex 3mg IV TID with good response and 1.9 liters out overnight; barely net negative since admission; recommend continued aggressive IV diuresis with Bumex- feel she likely has 10-20lbs of fluid on board;  if u/o not significant overnight will consider dose of IV Diuril; continue ARB, BB and Imdur with decent afterload reduction; likely is a good candidate for  Entresto but will defer to outpatient once acute decompensation improves

## 2017-03-03 NOTE — PLAN OF CARE
Problem: Patient Care Overview  Goal: Plan of Care Review  Outcome: Ongoing (interventions implemented as appropriate)  Plan of care reviewed with pt including strict i and o's. Pt verbalizes understanding. Pt educated on fall risk. Refusing bed alarm. Bed in lowest position, side rails up times 2, wheels locked, nonslip socks on, and bsc at bedside. Call bell w/in reach. Instructed to call for needs/assist oob. Pt on telemetry. Afib with bbb. No true red alarms noted. Will continue to monitor.

## 2017-03-03 NOTE — SUBJECTIVE & OBJECTIVE
Past Medical History:   Diagnosis Date    *Atrial fibrillation     Anticoagulant long-term use     Arthritis     Cardiomyopathy     Carotid artery occlusion     CHF (congestive heart failure)     COPD (chronic obstructive pulmonary disease)     COPD exacerbation 2/19/2015    Coronary artery disease     RCA occluded with L to R collaterals and normal LAD and LCx    Hypertension     Internal bleeding     Sleep apnea     uses bipap       Past Surgical History:   Procedure Laterality Date    ABDOMINAL SURGERY      APPENDECTOMY      CARDIAC CATHETERIZATION      CARDIAC DEFIBRILLATOR PLACEMENT  10/9/2012    bivent AICD placed 10/2012 and revised 12/2012    CARDIAC PACEMAKER PLACEMENT  9/21/2010    [facility]    CARDIOVERSION  3/6/2015    CAROTID ENDARTERECTOMY Left     CHOLECYSTECTOMY      COLONOSCOPY N/A 7/11/2016    Procedure: COLONOSCOPY;  Surgeon: Rafael Pérez MD;  Location: [facility] ENDO;  Service: Endoscopy;  Laterality: N/A;    COLONOSCOPY N/A 2/17/2017    Procedure: COLONOSCOPY;  Surgeon: Giovanni Bronson MD;  Location: [facility] ENDO ([address] [place] [address]);  Service: Endoscopy;  Laterality: N/A;    CORONARY ANGIOPLASTY WITH STENT PLACEMENT  1/27/2015    3 JAMAL to RCA    CRT-D implantation      ESOPHAGOGASTRODUODENOSCOPY      GALLBLADDER SURGERY      TONSILLECTOMY      TONSILLECTOMY, ADENOIDECTOMY      VASCULAR SURGERY         Review of patient's allergies indicates:   Allergen Reactions    Gabapentin        No current facility-administered medications on file prior to encounter.      Current Outpatient Prescriptions on File Prior to Encounter   Medication Sig    albuterol 90 mcg/actuation inhaler Inhale 2 puffs into the lungs every 6 (six) hours as needed for Wheezing.    albuterol-ipratropium 2.5mg-0.5mg/3mL (DUO-NEB) 0.5 mg-3 mg(2.5 mg base)/3 mL nebulizer solution USE 1 VIAL VIA NEBULIZER EVERY 6 HOURS AS NEEDED FOR WHEEZING    allopurinol (ZYLOPRIM) 300 MG tablet Take 1 tablet (300 mg total) by  mouth once daily.    amiodarone (PACERONE) 200 MG Tab Take 200 mg by mouth once daily.    aspirin 81 MG Chew Take 81 mg by mouth every evening.     bumetanide (BUMEX) 1 MG tablet Take 1 tablet (1 mg total) by mouth 2 (two) times daily. Take 2 tablets 2 times daily for the first 3 days.    carvedilol (COREG) 3.125 MG tablet Take 1 tablet (3.125 mg total) by mouth 2 (two) times daily with meals.    clopidogrel (PLAVIX) 75 mg tablet Take 75 mg by mouth once daily.    cyclobenzaprine (FLEXERIL) 10 MG tablet Take 10 mg by mouth 3 (three) times daily as needed for Muscle spasms.    ferrous sulfate 325 (65 FE) MG EC tablet Take 1 tablet (325 mg total) by mouth every evening.    fluticasone-vilanterol (BREO) 100-25 mcg/dose diskus inhaler Inhale 1 puff into the lungs once daily. (Patient taking differently: Inhale 1 puff into the lungs daily as needed. )    isosorbide mononitrate (IMDUR) 60 MG 24 hr tablet Take 1 tablet (60 mg total) by mouth once daily.    losartan (COZAAR) 25 MG tablet Take 0.5 tablets (12.5 mg total) by mouth once daily.    pantoprazole (PROTONIX) 40 MG tablet Take 1 tablet (40 mg total) by mouth once daily.    pravastatin (PRAVACHOL) 40 MG tablet Take 1 tablet (40 mg total) by mouth once daily.    tramadol (ULTRAM) 50 mg tablet Take 50 mg by mouth every 6 (six) hours as needed for Pain.    trazodone (DESYREL) 150 MG tablet TAKE 1 TABLET BY MOUTH AT BEDTIME (Patient taking differently: TAKE 1 TABLET BY MOUTH AT BEDTIME AS NEEDED FOR SLEEP)    [DISCONTINUED] zolpidem (AMBIEN) 5 MG Tab take 1 on the night of the study; if you still can't sleep for another 60-90 min after the pill - take another pill.     Family History     Problem Relation (Age of Onset)    Breast cancer Mother    Cancer Father    Heart attack Father    Heart disease Father    Heart failure Father        Social History Main Topics    Smoking status: Former Smoker     Packs/day: 0.25     Years: 50.00     Types: Cigarettes      Start date: 1/15/1965    Smokeless tobacco: Never Used    Alcohol use No    Drug use: No    Sexual activity: No     Review of Systems   Constitution: Positive for decreased appetite (decreased appetite x 1-2 weeks), weakness and malaise/fatigue.   Cardiovascular: Positive for dyspnea on exertion, leg swelling, orthopnea and paroxysmal nocturnal dyspnea. Negative for chest pain, claudication, cyanosis, near-syncope, palpitations and syncope.   Respiratory: Positive for shortness of breath. Negative for cough and wheezing.    Gastrointestinal: Negative for bloating, abdominal pain, constipation, diarrhea, nausea and vomiting.     Objective:     Vital Signs (Most Recent):  Temp: 97.5 °F (36.4 °C) (03/03/17 1200)  Pulse: 69 (03/03/17 1200)  Resp: 18 (03/03/17 1200)  BP: (!) 90/51 (03/03/17 1200)  SpO2: 97 % (03/03/17 1136) Vital Signs (24h Range):  Temp:  [96.7 °F (35.9 °C)-98 °F (36.7 °C)] 97.5 °F (36.4 °C)  Pulse:  [67-70] 69  Resp:  [18-22] 18  SpO2:  [93 %-100 %] 97 %  BP: ()/(51-75) 90/51     Weight: 122 kg (268 lb 14.4 oz)  Body mass index is 38.58 kg/(m^2).    SpO2: 97 %  O2 Device (Oxygen Therapy): nasal cannula      Intake/Output Summary (Last 24 hours) at 03/03/17 1605  Last data filed at 03/03/17 1200   Gross per 24 hour   Intake             1390 ml   Output             1800 ml   Net             -410 ml       Lines/Drains/Airways     Peripheral Intravenous Line                 Peripheral IV - Single Lumen 03/03/17 1430 Right Forearm less than 1 day                Physical Exam   Constitutional: She appears well-developed and well-nourished. No distress.   Neck: JVD (@10cm ) present.   Cardiovascular: Normal rate, regular rhythm, S1 normal and S2 normal.  Exam reveals no gallop.    Murmur heard.  Pulmonary/Chest: Tachypnea (with activity ) noted. She has rales in the right middle field, the right lower field, the left middle field and the left lower field.   Abdominal: Soft. Bowel sounds are normal.  She exhibits no distension. There is no tenderness.   Musculoskeletal: She exhibits edema (4+ BLE edema from knees to ankles; 1-2+ edema to bilateral thighs).   Skin: Skin is warm and dry.       Significant Labs:       Recent Labs  Lab 03/03/17  1102   WBC 7.96   RBC 3.11*   HGB 8.4*   HCT 29.3*      MCV 94   MCH 27.0   MCHC 28.7*       Recent Labs  Lab 03/03/17  1102   CALCIUM 8.3*   *   K 4.3   CO2 35*   CL 91*   BUN 39*   CREATININE 1.2       Significant Imaging: Echocardiogram:   2D echo with color flow doppler:   Results for orders placed or performed during the hospital encounter of 03/01/17   2D echo with color flow doppler   Result Value Ref Range    EF 25 (A) 55 - 65    Mitral Valve Regurgitation SEVERE (A)     Diastolic Dysfunction Yes (A)     Est. PA Systolic Pressure 50.28 (A)     Pericardial Effusion NONE     Tricuspid Valve Regurgitation MODERATE TO SEVERE (A)

## 2017-03-03 NOTE — ASSESSMENT & PLAN NOTE
Currently paced rhythm with consistent paced rhythm since admission; no underlying afib noted; continue BB; chronic AC on hold due to recent GIB; was previously on Coumadin but changes to Eliquis on 1/30/2017 due to inconsistency with INR monitoring per my recollection; will continue to hold chronic AC due to persistent low H&H and recent stool positive for OCB; would not be opposed to GI consult for further input; CHADSVAS 5 (CHF, HTN, age, female, PAD) and HAS BLED 4-5

## 2017-03-03 NOTE — ASSESSMENT & PLAN NOTE
BUN 39, Creatinin 1.2. Stable.  Avoid nephrotoxins and renally dose meds. Monitor closely d/t aggressive diuresis

## 2017-03-03 NOTE — ASSESSMENT & PLAN NOTE
Chronic Respiratory Failure/ Home Oxygen Dependant /Obesity hypoventilation Syndrome on BiPAP  -Oxygenating well on home oxygen 3 LPM, Continue to keep SaO2 >88 %  -Continue home BiPAP at night  -No wheezing on exam  -Scheduled Duonebs while awake  -Continue home fluticasone-vilanterol

## 2017-03-03 NOTE — SUBJECTIVE & OBJECTIVE
Interval History:   Diuresis remains slow with only 1.5 L net output after a total of 140 mg Lasix and 6 mg IV Bumex.   Still has crackles on exam and only mild improvement of LE edema. Cardiology consulted for assistance.       Review of Systems   Respiratory: Positive for cough and shortness of breath. Negative for wheezing.    Cardiovascular: Positive for leg swelling. Negative for chest pain and palpitations.   Gastrointestinal: Negative for diarrhea, nausea and vomiting.     Objective:     Vital Signs (Most Recent):  Temp: 97.5 °F (36.4 °C) (03/03/17 1200)  Pulse: 67 (03/03/17 1614)  Resp: 16 (03/03/17 1614)  BP: (!) 90/51 (03/03/17 1200)  SpO2: 97 % (03/03/17 1614) Vital Signs (24h Range):  Temp:  [96.7 °F (35.9 °C)-98 °F (36.7 °C)] 97.5 °F (36.4 °C)  Pulse:  [67-70] 67  Resp:  [16-22] 16  SpO2:  [93 %-100 %] 97 %  BP: ()/(51-56) 90/51     Weight: 122 kg (268 lb 14.4 oz)  Body mass index is 38.58 kg/(m^2).    Intake/Output Summary (Last 24 hours) at 03/03/17 1648  Last data filed at 03/03/17 1200   Gross per 24 hour   Intake             1390 ml   Output             1800 ml   Net             -410 ml      Physical Exam   Constitutional: She is oriented to person, place, and time. No distress.   Obese   HENT:   Head: Normocephalic and atraumatic.   Mouth/Throat: Oropharynx is clear and moist. No oropharyngeal exudate.   Eyes: Conjunctivae are normal. Pupils are equal, round, and reactive to light. No scleral icterus.   Neck: Neck supple.   Cardiovascular: Normal rate and intact distal pulses.  Exam reveals no gallop and no friction rub.    Murmur heard.  Irregular   Pulmonary/Chest: Effort normal. No respiratory distress. She has no wheezes. She has rales.   On Oxygen VIA NC   Abdominal: Soft. Bowel sounds are normal. She exhibits no distension. There is no tenderness. There is no rebound and no guarding.   Musculoskeletal: She exhibits edema. She exhibits no tenderness or deformity.   Bilateral Pitting  Edema from feet to knees   Neurological: She is alert and oriented to person, place, and time. She exhibits normal muscle tone.   Skin: Skin is warm and dry. No rash noted. She is not diaphoretic.   Psychiatric: She has a normal mood and affect. Her behavior is normal.   Nursing note and vitals reviewed.      Significant Labs:   BMP:   Recent Labs  Lab 03/03/17  1102      *   K 4.3   CL 91*   CO2 35*   BUN 39*   CREATININE 1.2   CALCIUM 8.3*   MG 1.8     CBC:   Recent Labs  Lab 03/02/17  0541 03/03/17  1102   WBC 10.79 7.96   HGB 8.2* 8.4*   HCT 28.1* 29.3*    259       Significant Imaging:  No New

## 2017-03-03 NOTE — PT/OT/SLP PROGRESS
Physical Therapy  Treatment    Jennifer Prescott   MRN: [id number]   Admitting Diagnosis: Acute on chronic combined systolic and diastolic congestive heart failure    PT Received On: 17  PT Start Time: 1329     PT Stop Time: 1400    PT Total Time (min): 31 min       Billable Minutes:  Gait [id number] and Therapeutic Exercise 11    Treatment Type: Treatment  PT/PTA: PTA     PTA Visit Number: 1       General Precautions: Standard, fall, respiratory  Orthopedic Precautions: N/A   Braces:           Subjective:  Communicated with ns prior to session.  Pt agreeable to tx., requesting to use bathroom.    Pain Ratin/10              Pain Rating Post-Intervention: 0/10    Objective:   Patient found with: telemetry, peripheral IV, oxygen    Functional Mobility:  Bed Mobility:   Sit to Supine: Stand by Assistance, With leg lift    Transfers:  Sit <> Stand Assistance: Stand By Assistance, Supervision  Sit <> Stand Assistive Device: Rolling Walker    Gait:   Gait Distance: 30' w/ RW and O2 at 2L, and 40' w/ RW and O2 at 2L w/ CGA/SBA  Assistance 1: Contact Guard Assistance, Stand by Assistance  Gait Assistive Device: Rolling walker  Gait Pattern: reciprocal  Gait Deviation(s): decreased loreto, decreased velocity of limb motion, decreased step length, forward lean, decreased toe-to-floor clearance    Stairs:  n/a    Balance:   Static Sit: GOOD: Takes MODERATE challenges from all directions  Dynamic Sit: GOOD: Maintains balance through MODERATE excursions of active trunk movement  Static Stand: FAIR+: Takes MINIMAL challenges from all directions  Dynamic stand: FAIR+: Needs CLOSE SUPERVISION during gait and is able to right self with minor LOB     Therapeutic Activities and Exercises:  Pt perf'd seated LE ex's of heel/toe raises, LAQ's x 10 ea. Commode t/f's w/ SBA/Sup. Pt inst'd in pursed lip breathing act's.   O2: on RA at rest:85%, with pursed lip breathing pt could bring it to 93%.  O2: 2L at rest 98%  O2:2L w/ amb.  90-94%       AM-PAC 6 CLICK MOBILITY  How much help from another person does this patient currently need?   1 = Unable, Total/Dependent Assistance  2 = A lot, Maximum/Moderate Assistance  3 = A little, Minimum/Contact Guard/Supervision  4 = None, Modified [place]/Independent    Turning over in bed (including adjusting bedclothes, sheets and blankets)?: 4  Sitting down on and standing up from a chair with arms (e.g., wheelchair, bedside commode, etc.): 4  Moving from lying on back to sitting on the side of the bed?: 4  Moving to and from a bed to a chair (including a wheelchair)?: 3  Need to walk in hospital room?: 3  Climbing 3-5 steps with a railing?: 3  Total Score: 21    AM-PAC Raw Score CMS G-Code Modifier Level of Impairment Assistance   6 % Total / Unable   7 - 9 CM 80 - 100% Maximal Assist   10 - 14 CL 60 - 80% Moderate Assist   15 - 19 CK 40 - 60% Moderate Assist   20 - 22 CJ 20 - 40% Minimal Assist   23 CI 1-20% SBA / CGA   24 CH 0% Independent/ Mod I     Patient left HOB elevated with all lines intact, call button in reach and daughter present.    Assessment:  Jennifer Prescott is a 71 y.o. female with a medical diagnosis of Acute on chronic combined systolic and diastolic congestive heart failure and presents with inc mobility today, inc amb dist., mild/mod SOB noted..    Rehab identified problem list/impairments: Rehab identified problem list/impairments: weakness, impaired endurance, impaired cardiopulmonary response to activity, impaired functional mobilty, gait instability    Rehab potential is good.    Activity tolerance: Good    Discharge recommendations: Discharge Facility/Level Of Care Needs: home health PT, home health OT     Barriers to discharge: Barriers to Discharge: None    Equipment recommendations: Equipment Needed After Discharge: none     GOALS:   Physical Therapy Goals        Problem: Physical Therapy Goal    Goal Priority Disciplines Outcome Goal Variances Interventions    Physical Therapy Goal     PT/OT, PT Ongoing (interventions implemented as appropriate)     Description:  Goals to be met by: 2017     Patient will increase functional independence with mobility by performin. Sit to stand transfer with Modified [place]  2. Bed to chair transfer with Modified [place] using no AD  3. Gait  x 20 feet with Modified [place] using Rolling Walker.                 PLAN:    Patient to be seen 6 x/week  to address the above listed problems via gait training, therapeutic exercises  Plan of Care expires: 17  Plan of Care reviewed with: patient, daughter         Ny Blanca, PTA  2017

## 2017-03-04 LAB
ANION GAP SERPL CALC-SCNC: 9 MMOL/L
BASOPHILS # BLD AUTO: 0.01 K/UL
BASOPHILS NFR BLD: 0.1 %
BUN SERPL-MCNC: 38 MG/DL
CALCIUM SERPL-MCNC: 8 MG/DL
CHLORIDE SERPL-SCNC: 93 MMOL/L
CO2 SERPL-SCNC: 31 MMOL/L
CREAT SERPL-MCNC: 1.2 MG/DL
DIFFERENTIAL METHOD: ABNORMAL
EOSINOPHIL # BLD AUTO: 0.1 K/UL
EOSINOPHIL NFR BLD: 1.7 %
ERYTHROCYTE [DISTWIDTH] IN BLOOD BY AUTOMATED COUNT: 17.5 %
EST. GFR  (AFRICAN AMERICAN): 53 ML/MIN/1.73 M^2
EST. GFR  (NON AFRICAN AMERICAN): 46 ML/MIN/1.73 M^2
GLUCOSE SERPL-MCNC: 72 MG/DL
HCT VFR BLD AUTO: 27.2 %
HGB BLD-MCNC: 7.9 G/DL
LYMPHOCYTES # BLD AUTO: 0.5 K/UL
LYMPHOCYTES NFR BLD: 6.5 %
MAGNESIUM SERPL-MCNC: 1.8 MG/DL
MCH RBC QN AUTO: 27.2 PG
MCHC RBC AUTO-ENTMCNC: 29 %
MCV RBC AUTO: 94 FL
MONOCYTES # BLD AUTO: 1.3 K/UL
MONOCYTES NFR BLD: 16.1 %
NEUTROPHILS # BLD AUTO: 6.2 K/UL
NEUTROPHILS NFR BLD: 75.5 %
PHOSPHATE SERPL-MCNC: 3.6 MG/DL
PLATELET # BLD AUTO: 224 K/UL
PMV BLD AUTO: 9.7 FL
POTASSIUM SERPL-SCNC: 3.9 MMOL/L
RBC # BLD AUTO: 2.9 M/UL
SODIUM SERPL-SCNC: 133 MMOL/L
WBC # BLD AUTO: 8.26 K/UL

## 2017-03-04 PROCEDURE — 27000221 HC OXYGEN, UP TO 24 HOURS

## 2017-03-04 PROCEDURE — 25000242 PHARM REV CODE 250 ALT 637 W/ HCPCS: Performed by: NURSE PRACTITIONER

## 2017-03-04 PROCEDURE — 84100 ASSAY OF PHOSPHORUS: CPT

## 2017-03-04 PROCEDURE — 25000003 PHARM REV CODE 250: Performed by: HOSPITALIST

## 2017-03-04 PROCEDURE — 99233 SBSQ HOSP IP/OBS HIGH 50: CPT | Mod: ,,, | Performed by: INTERNAL MEDICINE

## 2017-03-04 PROCEDURE — 97110 THERAPEUTIC EXERCISES: CPT

## 2017-03-04 PROCEDURE — 25000003 PHARM REV CODE 250: Performed by: NURSE PRACTITIONER

## 2017-03-04 PROCEDURE — 97116 GAIT TRAINING THERAPY: CPT

## 2017-03-04 PROCEDURE — 85025 COMPLETE CBC W/AUTO DIFF WBC: CPT

## 2017-03-04 PROCEDURE — 63600175 PHARM REV CODE 636 W HCPCS: Performed by: NURSE PRACTITIONER

## 2017-03-04 PROCEDURE — 63600175 PHARM REV CODE 636 W HCPCS: Performed by: INTERNAL MEDICINE

## 2017-03-04 PROCEDURE — 36415 COLL VENOUS BLD VENIPUNCTURE: CPT

## 2017-03-04 PROCEDURE — 11000001 HC ACUTE MED/SURG PRIVATE ROOM

## 2017-03-04 PROCEDURE — 25000003 PHARM REV CODE 250: Performed by: INTERNAL MEDICINE

## 2017-03-04 PROCEDURE — 94640 AIRWAY INHALATION TREATMENT: CPT

## 2017-03-04 PROCEDURE — 94761 N-INVAS EAR/PLS OXIMETRY MLT: CPT

## 2017-03-04 PROCEDURE — 83735 ASSAY OF MAGNESIUM: CPT

## 2017-03-04 PROCEDURE — 80048 BASIC METABOLIC PNL TOTAL CA: CPT

## 2017-03-04 RX ORDER — BENZONATATE 100 MG/1
100 CAPSULE ORAL 3 TIMES DAILY PRN
Status: DISCONTINUED | OUTPATIENT
Start: 2017-03-04 | End: 2017-03-08 | Stop reason: HOSPADM

## 2017-03-04 RX ADMIN — PIPERACILLIN SODIUM AND TAZOBACTAM SODIUM 4.5 G: 4; .5 INJECTION, POWDER, FOR SOLUTION INTRAVENOUS at 03:03

## 2017-03-04 RX ADMIN — PRAVASTATIN SODIUM 40 MG: 40 TABLET ORAL at 09:03

## 2017-03-04 RX ADMIN — AMIODARONE HYDROCHLORIDE 200 MG: 200 TABLET ORAL at 09:03

## 2017-03-04 RX ADMIN — FERROUS SULFATE TAB EC 325 MG (65 MG FE EQUIVALENT) 325 MG: 325 (65 FE) TABLET DELAYED RESPONSE at 09:03

## 2017-03-04 RX ADMIN — HYDROCODONE BITARTRATE AND ACETAMINOPHEN 1 TABLET: 10; 325 TABLET ORAL at 10:03

## 2017-03-04 RX ADMIN — CLOPIDOGREL BISULFATE 75 MG: 75 TABLET ORAL at 09:03

## 2017-03-04 RX ADMIN — BUMETANIDE 3 MG: 0.25 INJECTION INTRAMUSCULAR; INTRAVENOUS at 05:03

## 2017-03-04 RX ADMIN — LOSARTAN POTASSIUM 12.5 MG: 25 TABLET, FILM COATED ORAL at 10:03

## 2017-03-04 RX ADMIN — PIPERACILLIN SODIUM AND TAZOBACTAM SODIUM 4.5 G: 4; .5 INJECTION, POWDER, FOR SOLUTION INTRAVENOUS at 06:03

## 2017-03-04 RX ADMIN — PANTOPRAZOLE SODIUM 40 MG: 40 TABLET, DELAYED RELEASE ORAL at 09:03

## 2017-03-04 RX ADMIN — CHLOROTHIAZIDE SODIUM 250 MG: 500 INJECTION, POWDER, LYOPHILIZED, FOR SOLUTION INTRAVENOUS at 02:03

## 2017-03-04 RX ADMIN — CARVEDILOL 3.12 MG: 3.12 TABLET, FILM COATED ORAL at 05:03

## 2017-03-04 RX ADMIN — BENZONATATE 100 MG: 100 CAPSULE ORAL at 01:03

## 2017-03-04 RX ADMIN — TRAMADOL HYDROCHLORIDE 50 MG: 50 TABLET, COATED ORAL at 12:03

## 2017-03-04 RX ADMIN — IPRATROPIUM BROMIDE AND ALBUTEROL SULFATE 3 ML: .5; 3 SOLUTION RESPIRATORY (INHALATION) at 09:03

## 2017-03-04 RX ADMIN — BUMETANIDE 3 MG: 0.25 INJECTION INTRAMUSCULAR; INTRAVENOUS at 09:03

## 2017-03-04 RX ADMIN — TRAZODONE HYDROCHLORIDE 150 MG: 100 TABLET ORAL at 09:03

## 2017-03-04 RX ADMIN — HYDROCODONE BITARTRATE AND ACETAMINOPHEN 1 TABLET: 10; 325 TABLET ORAL at 02:03

## 2017-03-04 RX ADMIN — ASPIRIN 81 MG 81 MG: 81 TABLET ORAL at 09:03

## 2017-03-04 RX ADMIN — IPRATROPIUM BROMIDE AND ALBUTEROL SULFATE 3 ML: .5; 3 SOLUTION RESPIRATORY (INHALATION) at 08:03

## 2017-03-04 RX ADMIN — CARVEDILOL 3.12 MG: 3.12 TABLET, FILM COATED ORAL at 10:03

## 2017-03-04 RX ADMIN — POTASSIUM CHLORIDE 40 MEQ: 20 TABLET, EXTENDED RELEASE ORAL at 09:03

## 2017-03-04 RX ADMIN — ISOSORBIDE MONONITRATE 60 MG: 60 TABLET, EXTENDED RELEASE ORAL at 10:03

## 2017-03-04 RX ADMIN — PIPERACILLIN SODIUM AND TAZOBACTAM SODIUM 4.5 G: 4; .5 INJECTION, POWDER, FOR SOLUTION INTRAVENOUS at 11:03

## 2017-03-04 RX ADMIN — IPRATROPIUM BROMIDE AND ALBUTEROL SULFATE 3 ML: .5; 3 SOLUTION RESPIRATORY (INHALATION) at 03:03

## 2017-03-04 NOTE — PLAN OF CARE
Reviewed plan of care with patient. Patient verbalized understanding. Patient on continuous tele monitoring; HR 60-70s. No true red alarms or ectopy noted. PRN cough and pain med given x1 during shift. Bed alarm set, bed in lowest position, call bell in reach. Will continue to monitor.

## 2017-03-04 NOTE — PROGRESS NOTES
[facility]  Cardiology  Consult Note     Patient Name: Jennifer Prescott  MRN: [id number]  Admission Date: 3/1/2017  Hospital Length of Stay: 2 days  Code Status: Full Code   Attending Provider: Anderson Badillo MD   Consulting Provider: Pedro Kinney MD  Primary Care Physician: Lianna Mistry MD  Principal Problem:Acute on chronic combined systolic and diastolic congestive heart failure     Patient information was obtained from patient and past medical records.           Subjective:      Chief Complaint:  SOB     Consult Reason: ADHF     HPI:   72yo female with history of CAD s/p LAD PCI and RCA PCI for  1/2015, permanent afib, COPD, ICM EF 25%, MR, OHVS, CKD stage III, NHI, GIB and ischemic colitis who presented to the ER with complaints of SOB for the past 3 days. She complains of BAEZ with minimal exertion with progressive worsening over the past 3 days. She reports using her oxygen more lately and has not been able to do her normal activities or go to work. She reports compliance with her medication regimen and dietary restrictions lately. She has not been able to do her normal activities for the past month and is eager to return to work. Admitted to [facility] for ADHF with initiated of aggressive diuresis and Cardiology was consulted for evaluation and management.      She was admitted on 2/15/2017 for GIB with dark tarry stools. Her Hgb at that time was 5.1 therefore she received 3 units PRBCs and was evaluated by GI. Underwent EGD with nonbleeding duodenal diverticulum, portal hypertension with no biospy due to recent Plavix and Eliquis and hiatal hernia. Colonoscopy performed as well with no acute abnormalites. She required additional one unit of PRBC due to Hgb of 7 with slight trend upward of H&H to 8&28 (baseline 9-11/32-39). Discharged with discontinuation of Eliquis and Plavix due to GIB and HAS BLED score of 4 with recommendation to follow up with Cardiology for  evaluation for resumption. Additionally her Bumex, Digoxin, Imdur and Losartan were discontinued as well. She reports feeling okay but not her usual self upon discharge with recurrent SOB requiring re admission last week at [facility]. At that time, she was admitted to [facility] and was given Bumex 1mg IV with minimal response therefore she was given Diuril 250mg IV x 1 along with Bumex 2mg IV TID with 4 liters out and was discharged with recommendation for Bumex 2mg BID x 3 days then to decrease to 1mg po BID. She reports at that time her SOB was not back to her baseline and continued with recommended medication regimen. However, her SOB persisted and progressively worsened prompting her to present to the ER      Overnight no acute events. SOB has improved, LE edema the same. Net positive overnight with decrease in UOP. Hemodynamically stable and Cr stable.                       Past Medical History:   Diagnosis Date    *Atrial fibrillation      Anticoagulant long-term use      Arthritis      Cardiomyopathy      Carotid artery occlusion      CHF (congestive heart failure)      COPD (chronic obstructive pulmonary disease)      COPD exacerbation 2/19/2015    Coronary artery disease       RCA occluded with L to R collaterals and normal LAD and LCx    Hypertension      Internal bleeding      Sleep apnea       uses bipap               Past Surgical History:   Procedure Laterality Date    ABDOMINAL SURGERY        APPENDECTOMY        CARDIAC CATHETERIZATION        CARDIAC DEFIBRILLATOR PLACEMENT   10/9/2012     bivent AICD placed 10/2012 and revised 12/2012    CARDIAC PACEMAKER PLACEMENT   9/21/2010     [facility]    CARDIOVERSION   3/6/2015    CAROTID ENDARTERECTOMY Left      CHOLECYSTECTOMY        COLONOSCOPY N/A 7/11/2016     Procedure: COLONOSCOPY; Surgeon: Rafael Pérez MD; Location: [facility]; Service: Endoscopy; Laterality: N/A;    COLONOSCOPY N/A 2/17/2017     Procedure:  COLONOSCOPY; Surgeon: Giovanni Bronson MD; Location: [facility] ([address] [place] [address]); Service: Endoscopy; Laterality: N/A;    CORONARY ANGIOPLASTY WITH STENT PLACEMENT   1/27/2015     3 JAMAL to RCA    CRT-D implantation        ESOPHAGOGASTRODUODENOSCOPY        GALLBLADDER SURGERY        TONSILLECTOMY        TONSILLECTOMY, ADENOIDECTOMY        VASCULAR SURGERY                  Review of patient's allergies indicates:   Allergen Reactions    Gabapentin           No current facility-administered medications on file prior to encounter.            Current Outpatient Prescriptions on File Prior to Encounter   Medication Sig    albuterol 90 mcg/actuation inhaler Inhale 2 puffs into the lungs every 6 (six) hours as needed for Wheezing.    albuterol-ipratropium 2.5mg-0.5mg/3mL (DUO-NEB) 0.5 mg-3 mg(2.5 mg base)/3 mL nebulizer solution USE 1 VIAL VIA NEBULIZER EVERY 6 HOURS AS NEEDED FOR WHEEZING    allopurinol (ZYLOPRIM) 300 MG tablet Take 1 tablet (300 mg total) by mouth once daily.    amiodarone (PACERONE) 200 MG Tab Take 200 mg by mouth once daily.    aspirin 81 MG Chew Take 81 mg by mouth every evening.     bumetanide (BUMEX) 1 MG tablet Take 1 tablet (1 mg total) by mouth 2 (two) times daily. Take 2 tablets 2 times daily for the first 3 days.    carvedilol (COREG) 3.125 MG tablet Take 1 tablet (3.125 mg total) by mouth 2 (two) times daily with meals.    clopidogrel (PLAVIX) 75 mg tablet Take 75 mg by mouth once daily.    cyclobenzaprine (FLEXERIL) 10 MG tablet Take 10 mg by mouth 3 (three) times daily as needed for Muscle spasms.    ferrous sulfate 325 (65 FE) MG EC tablet Take 1 tablet (325 mg total) by mouth every evening.    fluticasone-vilanterol (BREO) 100-25 mcg/dose diskus inhaler Inhale 1 puff into the lungs once daily. (Patient taking differently: Inhale 1 puff into the lungs daily as needed. )    isosorbide mononitrate (IMDUR) 60 MG 24 hr tablet Take 1 tablet (60 mg total) by mouth once daily.     losartan (COZAAR) 25 MG tablet Take 0.5 tablets (12.5 mg total) by mouth once daily.    pantoprazole (PROTONIX) 40 MG tablet Take 1 tablet (40 mg total) by mouth once daily.    pravastatin (PRAVACHOL) 40 MG tablet Take 1 tablet (40 mg total) by mouth once daily.    tramadol (ULTRAM) 50 mg tablet Take 50 mg by mouth every 6 (six) hours as needed for Pain.    trazodone (DESYREL) 150 MG tablet TAKE 1 TABLET BY MOUTH AT BEDTIME (Patient taking differently: TAKE 1 TABLET BY MOUTH AT BEDTIME AS NEEDED FOR SLEEP)    [DISCONTINUED] zolpidem (AMBIEN) 5 MG Tab take 1 on the night of the study; if you still can't sleep for another 60-90 min after the pill - take another pill.      Family History     Problem Relation (Age of Onset)     Breast cancer Mother     Cancer Father     Heart attack Father     Heart disease Father     Heart failure Father               Social History Main Topics    Smoking status: Former Smoker       Packs/day: 0.25       Years: 50.00       Types: Cigarettes       Start date: 1/15/1965    Smokeless tobacco: Never Used    Alcohol use No    Drug use: No    Sexual activity: No      Review of Systems   Constitution: Positive for decreased appetite (decreased appetite x 1-2 weeks), weakness and malaise/fatigue.   Cardiovascular: Positive for dyspnea on exertion, leg swelling, orthopnea and paroxysmal nocturnal dyspnea. Negative for chest pain, claudication, cyanosis, near-syncope, palpitations and syncope.   Respiratory: Positive for shortness of breath. Negative for cough and wheezing.   Gastrointestinal: Negative for bloating, abdominal pain, constipation, diarrhea, nausea and vomiting.      Objective:      Vitals:    03/04/17 0900 03/04/17 0927 03/04/17 1006 03/04/17 1205   BP: (!) 97/44 (!) 84/40 (!) 86/45 114/60   BP Location: Left arm Left arm Left arm Left arm   Patient Position: Sitting Lying Lying Sitting   BP Method: Automatic Manual Manual Automatic   Pulse: 74 77 76 75   Resp: (!) 22  (!) 21 (!) 21 (!) 21   Temp: 98.5 °F (36.9 °C) 98.5 °F (36.9 °C)  98.6 °F (37 °C)   TempSrc:  Oral  Oral   SpO2:       Weight:       Height:                Weight: 122 kg (268 lb 14.4 oz)  Body mass index is 38.58 kg/(m^2).     SpO2: 97 %  O2 Device (Oxygen Therapy): nasal cannula          Intake/Output Summary (Last 24 hours) at 03/04/17 1223  Last data filed at 03/03/17 1904   Gross per 24 hour   Intake              420 ml   Output              300 ml   Net              120 ml                 Lines/Drains/Airways            Peripheral Intravenous Line                             Peripheral IV - Single Lumen 03/03/17 1430 Right Forearm less than 1 day                      Physical Exam   Constitutional: She appears well-developed and well-nourished. No distress.   Neck: JVD (@10cm ) present.   Cardiovascular: Normal rate, regular rhythm, S1 normal and S2 normal. Exam reveals no gallop.   Murmur heard.  Pulmonary/Chest: Tachypnea (with activity ) noted. She has rales in the right middle field, the right lower field, the left middle field and the left lower field.   Abdominal: Soft. Bowel sounds are normal. She exhibits no distension. There is no tenderness.   Musculoskeletal: She exhibits edema (4+ BLE edema from knees to ankles; 1-2+ edema to bilateral thighs).   Skin: Skin is warm and dry.         Significant Labs:         Recent Labs  Recent Labs      03/04/17   0722   HGB  7.9*   HCT  27.2*   PLT  224   NA  133*   K  3.9   CL  93*   BUN  38*   CREATININE  1.2   ]     Significant Imaging: Echocardiogram:   2D echo with color flow doppler:         Results for orders placed or performed during the hospital encounter of 03/01/17   2D echo with color flow doppler   Result Value Ref Range     EF 25 (A) 55 - 65     Mitral Valve Regurgitation SEVERE (A)       Diastolic Dysfunction Yes (A)       Est. PA Systolic Pressure 50.28 (A)       Pericardial Effusion NONE       Tricuspid Valve Regurgitation MODERATE TO SEVERE (A)         Assessment and Plan:      * Acute on chronic combined systolic and diastolic congestive heart failure  Echo on 3/1/2017 with LVEF 25%, diastolic dysfunction and severe MR; acute decompensated HF with marked volume overload with JVD, rales, peripheral edema along with elevated BNP and pulmonary vascular congestion on CXR; given IV Lasix with minimal response and only 850cc out; transitioned to Bumex 3mg IV TID with good response and 1.9 liters out overnight; overnight decrease UOP and net positive; will add diuril today and observe for response; continue ARB, BB and Imdur with decent afterload reduction; likely is a good candidate for Entresto but will defer to outpatient once acute decompensation improves      Permanent atrial fibrillation  Currently paced rhythm with consistent paced rhythm since admission; no underlying afib noted; continue BB; chronic AC on hold due to recent GIB; was previously on Coumadin but changes to Eliquis on 1/30/2017 due to inconsistency with INR monitoring per my recollection; will continue to hold chronic AC due to persistent low H&H and recent stool positive for OCB; would not be opposed to GI consult for further input; CHADSVAS 5 (CHF, HTN, age, female, PAD) and HAS BLED 4-5     CAD S/P percutaneous coronary angioplasty  S/p LAD PCI and RCA PCI for  in 1/2015; mild troponin rise but at baseline; complaints of SOB and no complaints of chest pain; EKG with paced rhythm; SOB more concerning for ADHF presentation; no concern for ACS currently; continue ASA, Plavix, BB, ARB and statin      Ischemic cardiomyopathy  Management as detailed below        VTE Risk Mitigation           Ordered       Medium Risk of VTE Once      03/01/17 1525       Place sequential compression device Until discontinued      03/01/17 1525       Place ALL hose Until discontinued      03/01/17 1525       Reason for No Pharmacological VTE Prophylaxis Once      03/01/17 1525                 Pedro Kinney,  MD  Cardiology   [facility]

## 2017-03-04 NOTE — PLAN OF CARE
Problem: Physical Therapy Goal  Goal: Physical Therapy Goal  Goals to be met by: 2017     Patient will increase functional independence with mobility by performin. Sit to stand transfer with Modified [place]  2. Bed to chair transfer with Modified [place] using no AD  3. Gait x 20 feet with Modified [place] using Rolling Walker.    Outcome: Ongoing (interventions implemented as appropriate)  Pt continues to progress toward established goals.

## 2017-03-04 NOTE — PT/OT/SLP PROGRESS
Physical Therapy  Treatment    Jennifer Prescott   MRN: [id number]   Admitting Diagnosis: Acute on chronic combined systolic and diastolic congestive heart failure    PT Received On: 03/04/17  PT Start Time: 1228     PT Stop Time: 1259    PT Total Time (min): 31 min       Billable Minutes:  Gait Training 15 and Therapeutic Exercise 16    Treatment Type: Treatment  PT/PTA: PTA     PTA Visit Number: 2       General Precautions: Standard, fall, respiratory  Orthopedic Precautions: N/A   Braces: N/A         Subjective:  Communicated with nursing prior to session. Pt agreed to treatment. Found sitting at edge of bed.                          Objective:   Patient found with: oxygen, telemetry    Functional Mobility:  Bed Mobility:   Supine to Sit:  (activity did not occur pt sitting at EOB)  Sit to Supine:  (activity did not occur as pt preferred to sit at EOB to visit with family friend in room.)    Transfers:  Sit <> Stand Assistance: Stand By Assistance, Supervision  Sit <> Stand Assistive Device: Rolling Walker    Gait:   Gait Distance: ~30 ft with RW and O2 at 2 liters  Assistance 1: Contact Guard Assistance, Stand by Assistance  Gait Assistive Device: Rolling walker  Gait Deviation(s): decreased [place], decreased velocity of limb motion, decreased step length, decreased stride length, decreased toe-to-floor clearance          Balance:   Static Sit: GOOD: Takes MODERATE challenges from all directions  Dynamic Sit: GOOD: Maintains balance through MODERATE excursions of active trunk movement  Static Stand: FAIR+: Takes MINIMAL challenges from all directions  Dynamic stand: FAIR: Needs CONTACT GUARD during gait     Therapeutic Activities and Exercises:  All transfers with assistance as documented above. Pt performed seated therapeutic exercise BLE 1 x 10 reps consisting of LAQ's, hip abd/add, heel/toe raises. 6 sit to stand transfers with SBA/Sup, 5 for therapeutic exercise purposes. SpO2 level taken post exercise and level  fluctuating between 83-87 recovering to 93 after ~1-1 1/2 minutes. Gait training ~30 ft with RW and CGA/SBA with pt attempting to lean forward onto front walker bar. Verbal cueing to correct this at which time pt stated she was tired. This was only after ~15 ft. SpO2 level taken post gait and level at 84 which recovered to 95 after sitting for ~1 minute.    AM-PAC 6 CLICK MOBILITY  How much help from another person does this patient currently need?   1 = Unable, Total/Dependent Assistance  2 = A lot, Maximum/Moderate Assistance  3 = A little, Minimum/Contact Guard/Supervision  4 = None, Modified [place]/Independent    Turning over in bed (including adjusting bedclothes, sheets and blankets)?: 4  Sitting down on and standing up from a chair with arms (e.g., wheelchair, bedside commode, etc.): 4  Moving from lying on back to sitting on the side of the bed?: 4  Moving to and from a bed to a chair (including a wheelchair)?: 3  Need to walk in hospital room?: 3  Climbing 3-5 steps with a railing?: 3  Total Score: 21    Patient left seated at EOB with all lines intact, call button in reach, nursing notified and friend present.    Assessment:  Jennifer Prescott is a 71 y.o. female with a medical diagnosis of Acute on chronic combined systolic and diastolic congestive heart failure. Pt SpO2 level taken throughout treatment on 2 1/2 liters nasal cannula and levels between 83-95. 83 when exercising and required rest breaks to recover. Pt needs max encouragement and motivation for performing gait training. Will continue PT to achieve all goals establsihed in plan of care.    Rehab identified problem list/impairments:      Rehab potential is good.    Activity tolerance: Poor    Discharge recommendations:       Barriers to discharge:      Equipment recommendations: Equipment Needed After Discharge: none     GOALS:   Physical Therapy Goals        Problem: Physical Therapy Goal    Goal Priority Disciplines Outcome Goal  Variances Interventions   Physical Therapy Goal     PT/OT, PT Ongoing (interventions implemented as appropriate)     Description:  Goals to be met by: 2017     Patient will increase functional independence with mobility by performin. Sit to stand transfer with Modified [place]  2. Bed to chair transfer with Modified [place] using no AD  3. Gait  x 20 feet with Modified [place] using Rolling Walker.                 PLAN:    Patient to be seen 6 x/week  to address the above listed problems via gait training, therapeutic exercises  Plan of Care expires: 17  Plan of Care reviewed with: patient, friend         Adamaris Trejo, PTA  2017

## 2017-03-04 NOTE — PLAN OF CARE
Problem: Patient Care Overview  Goal: Plan of Care Review  Outcome: Ongoing (interventions implemented as appropriate)  Pt received on nasal cannula at 2.5lpm.  SPO2  97%.  Pt in no apparent respiratory distress. Will continue to monitor.

## 2017-03-04 NOTE — PLAN OF CARE
Problem: Patient Care Overview  Goal: Plan of Care Review  Outcome: Ongoing (interventions implemented as appropriate)  Pt states that fatigue increased exponentially after working with PT/OT. Educated pt that this will be the case until she has done this a few times. Pt indicated that was not taking pain meds the previous day, but needed them last evening because of working with therapy. Will continue to monitor activity tolerance and overall condition

## 2017-03-05 LAB
ALBUMIN SERPL BCP-MCNC: 2.6 G/DL
ALP SERPL-CCNC: 121 U/L
ALT SERPL W/O P-5'-P-CCNC: 35 U/L
ANION GAP SERPL CALC-SCNC: 13 MMOL/L
AST SERPL-CCNC: 25 U/L
BASOPHILS # BLD AUTO: 0.01 K/UL
BASOPHILS NFR BLD: 0.1 %
BILIRUB DIRECT SERPL-MCNC: 0.7 MG/DL
BILIRUB SERPL-MCNC: 1 MG/DL
BUN SERPL-MCNC: 41 MG/DL
CALCIUM SERPL-MCNC: 8.4 MG/DL
CHLORIDE SERPL-SCNC: 92 MMOL/L
CO2 SERPL-SCNC: 26 MMOL/L
CREAT SERPL-MCNC: 1.7 MG/DL
DIFFERENTIAL METHOD: ABNORMAL
EOSINOPHIL # BLD AUTO: 0.2 K/UL
EOSINOPHIL NFR BLD: 2.8 %
ERYTHROCYTE [DISTWIDTH] IN BLOOD BY AUTOMATED COUNT: 17.4 %
EST. GFR  (AFRICAN AMERICAN): 34 ML/MIN/1.73 M^2
EST. GFR  (NON AFRICAN AMERICAN): 30 ML/MIN/1.73 M^2
GLUCOSE SERPL-MCNC: 76 MG/DL
HCT VFR BLD AUTO: 27.7 %
HGB BLD-MCNC: 8.1 G/DL
LYMPHOCYTES # BLD AUTO: 0.6 K/UL
LYMPHOCYTES NFR BLD: 7.8 %
MAGNESIUM SERPL-MCNC: 2 MG/DL
MCH RBC QN AUTO: 27.3 PG
MCHC RBC AUTO-ENTMCNC: 29.2 %
MCV RBC AUTO: 93 FL
MONOCYTES # BLD AUTO: 0.4 K/UL
MONOCYTES NFR BLD: 6.1 %
NEUTROPHILS # BLD AUTO: 5.9 K/UL
NEUTROPHILS NFR BLD: 83.1 %
PHOSPHATE SERPL-MCNC: 4.4 MG/DL
PLATELET # BLD AUTO: 233 K/UL
PMV BLD AUTO: 9.7 FL
POTASSIUM SERPL-SCNC: 4 MMOL/L
PROT SERPL-MCNC: 6.1 G/DL
RBC # BLD AUTO: 2.97 M/UL
SODIUM SERPL-SCNC: 131 MMOL/L
WBC # BLD AUTO: 7.16 K/UL

## 2017-03-05 PROCEDURE — 27000221 HC OXYGEN, UP TO 24 HOURS

## 2017-03-05 PROCEDURE — 36415 COLL VENOUS BLD VENIPUNCTURE: CPT

## 2017-03-05 PROCEDURE — 84100 ASSAY OF PHOSPHORUS: CPT

## 2017-03-05 PROCEDURE — 80076 HEPATIC FUNCTION PANEL: CPT

## 2017-03-05 PROCEDURE — 99233 SBSQ HOSP IP/OBS HIGH 50: CPT | Mod: ,,, | Performed by: INTERNAL MEDICINE

## 2017-03-05 PROCEDURE — 63600175 PHARM REV CODE 636 W HCPCS: Performed by: NURSE PRACTITIONER

## 2017-03-05 PROCEDURE — 80048 BASIC METABOLIC PNL TOTAL CA: CPT

## 2017-03-05 PROCEDURE — 94761 N-INVAS EAR/PLS OXIMETRY MLT: CPT

## 2017-03-05 PROCEDURE — 25000242 PHARM REV CODE 250 ALT 637 W/ HCPCS: Performed by: NURSE PRACTITIONER

## 2017-03-05 PROCEDURE — 63600175 PHARM REV CODE 636 W HCPCS: Performed by: INTERNAL MEDICINE

## 2017-03-05 PROCEDURE — 85025 COMPLETE CBC W/AUTO DIFF WBC: CPT

## 2017-03-05 PROCEDURE — 25000003 PHARM REV CODE 250: Performed by: INTERNAL MEDICINE

## 2017-03-05 PROCEDURE — 11000001 HC ACUTE MED/SURG PRIVATE ROOM

## 2017-03-05 PROCEDURE — 83735 ASSAY OF MAGNESIUM: CPT

## 2017-03-05 PROCEDURE — 94640 AIRWAY INHALATION TREATMENT: CPT

## 2017-03-05 PROCEDURE — 25000003 PHARM REV CODE 250: Performed by: NURSE PRACTITIONER

## 2017-03-05 RX ADMIN — HYDROCODONE BITARTRATE AND ACETAMINOPHEN 1 TABLET: 10; 325 TABLET ORAL at 12:03

## 2017-03-05 RX ADMIN — PIPERACILLIN SODIUM AND TAZOBACTAM SODIUM 4.5 G: 4; .5 INJECTION, POWDER, FOR SOLUTION INTRAVENOUS at 11:03

## 2017-03-05 RX ADMIN — FERROUS SULFATE TAB EC 325 MG (65 MG FE EQUIVALENT) 325 MG: 325 (65 FE) TABLET DELAYED RESPONSE at 09:03

## 2017-03-05 RX ADMIN — PIPERACILLIN SODIUM AND TAZOBACTAM SODIUM 4.5 G: 4; .5 INJECTION, POWDER, FOR SOLUTION INTRAVENOUS at 03:03

## 2017-03-05 RX ADMIN — PIPERACILLIN SODIUM AND TAZOBACTAM SODIUM 4.5 G: 4; .5 INJECTION, POWDER, FOR SOLUTION INTRAVENOUS at 06:03

## 2017-03-05 RX ADMIN — CHLOROTHIAZIDE SODIUM 250 MG: 500 INJECTION, POWDER, LYOPHILIZED, FOR SOLUTION INTRAVENOUS at 12:03

## 2017-03-05 RX ADMIN — ASPIRIN 81 MG 81 MG: 81 TABLET ORAL at 09:03

## 2017-03-05 RX ADMIN — AMIODARONE HYDROCHLORIDE 200 MG: 200 TABLET ORAL at 09:03

## 2017-03-05 RX ADMIN — IPRATROPIUM BROMIDE AND ALBUTEROL SULFATE 3 ML: .5; 3 SOLUTION RESPIRATORY (INHALATION) at 07:03

## 2017-03-05 RX ADMIN — IPRATROPIUM BROMIDE AND ALBUTEROL SULFATE 3 ML: .5; 3 SOLUTION RESPIRATORY (INHALATION) at 12:03

## 2017-03-05 RX ADMIN — BUMETANIDE 3 MG: 0.25 INJECTION INTRAMUSCULAR; INTRAVENOUS at 09:03

## 2017-03-05 RX ADMIN — HYDROCODONE BITARTRATE AND ACETAMINOPHEN 1 TABLET: 10; 325 TABLET ORAL at 09:03

## 2017-03-05 RX ADMIN — CLOPIDOGREL BISULFATE 75 MG: 75 TABLET ORAL at 09:03

## 2017-03-05 RX ADMIN — PANTOPRAZOLE SODIUM 40 MG: 40 TABLET, DELAYED RELEASE ORAL at 09:03

## 2017-03-05 RX ADMIN — TRAZODONE HYDROCHLORIDE 150 MG: 100 TABLET ORAL at 09:03

## 2017-03-05 RX ADMIN — CARVEDILOL 3.12 MG: 3.12 TABLET, FILM COATED ORAL at 05:03

## 2017-03-05 RX ADMIN — IPRATROPIUM BROMIDE AND ALBUTEROL SULFATE 3 ML: .5; 3 SOLUTION RESPIRATORY (INHALATION) at 09:03

## 2017-03-05 RX ADMIN — CARVEDILOL 3.12 MG: 3.12 TABLET, FILM COATED ORAL at 09:03

## 2017-03-05 RX ADMIN — BUMETANIDE 3 MG: 0.25 INJECTION INTRAMUSCULAR; INTRAVENOUS at 05:03

## 2017-03-05 RX ADMIN — IPRATROPIUM BROMIDE AND ALBUTEROL SULFATE 3 ML: .5; 3 SOLUTION RESPIRATORY (INHALATION) at 04:03

## 2017-03-05 RX ADMIN — POTASSIUM CHLORIDE 40 MEQ: 20 TABLET, EXTENDED RELEASE ORAL at 09:03

## 2017-03-05 RX ADMIN — PRAVASTATIN SODIUM 40 MG: 40 TABLET ORAL at 09:03

## 2017-03-05 NOTE — ASSESSMENT & PLAN NOTE
Ischemic Cardiomyopathy/ CAD s/p PCI  Remains volume overloaded. Cardiology plans to give some diuril today. Daily weights. Monitor I&Os.

## 2017-03-05 NOTE — SUBJECTIVE & OBJECTIVE
Interval History: Did not urinate much overnight or this AM. Still with swelling and BAEZ, but not as severe. Not eating much.     Review of Systems   Constitutional: Negative for chills and fever.   Cardiovascular: Negative for chest pain and palpitations.   Gastrointestinal: Negative for nausea and vomiting.     Objective:     Vital Signs (Most Recent):  Temp: 98.5 °F (36.9 °C) (03/04/17 2054)  Pulse: 71 (03/04/17 2156)  Resp: 16 (03/04/17 2156)  BP: 125/85 (03/04/17 2054)  SpO2: 98 % (03/04/17 2200) Vital Signs (24h Range):  Temp:  [97.8 °F (36.6 °C)-98.6 °F (37 °C)] 98.5 °F (36.9 °C)  Pulse:  [66-97] 71  Resp:  [12-22] 16  SpO2:  [97 %-100 %] 98 %  BP: ()/(40-85) 125/85     Weight: 122 kg (268 lb 14.4 oz)  Body mass index is 38.58 kg/(m^2).    Intake/Output Summary (Last 24 hours) at 03/04/17 2303  Last data filed at 03/04/17 1742   Gross per 24 hour   Intake              877 ml   Output              900 ml   Net              -23 ml      Physical Exam   Constitutional: She is oriented to person, place, and time. She appears well-developed and well-nourished. No distress.   Cardiovascular: Normal rate and regular rhythm.    Murmur heard.  Pulmonary/Chest: Effort normal. No respiratory distress. She has no wheezes. She has rales.   Abdominal: Soft. Bowel sounds are normal. She exhibits no distension. There is no tenderness.   Musculoskeletal: She exhibits edema.   Neurological: She is alert and oriented to person, place, and time.   Skin: Skin is warm and dry.   Psychiatric: She has a normal mood and affect. Her behavior is normal.   Nursing note and vitals reviewed.      Significant Labs:   CBC:   Recent Labs  Lab 03/03/17  1102 03/04/17  0722   WBC 7.96 8.26   HGB 8.4* 7.9*   HCT 29.3* 27.2*    224     CMP:   Recent Labs  Lab 03/03/17  1102 03/04/17  0722   * 133*   K 4.3 3.9   CL 91* 93*   CO2 35* 31*    72   BUN 39* 38*   CREATININE 1.2 1.2   CALCIUM 8.3* 8.0*   ANIONGAP 7* 9    EGFRNONAA 46* 46*       Significant Imaging: I have reviewed all pertinent imaging results/findings within the past 24 hours.

## 2017-03-05 NOTE — PLAN OF CARE
Problem: Patient Care Overview  Goal: Plan of Care Review  Outcome: Ongoing (interventions implemented as appropriate)  Pt received on nasal cannula at 2.5 lpm.  SPO2 98 %.  Pt in no apparent respiratory distress. Will continue to monitor.

## 2017-03-05 NOTE — ASSESSMENT & PLAN NOTE
Creatinine is stable. Avoid nephrotoxins and renally dose meds. Monitor closely d/t aggressive diuresis

## 2017-03-05 NOTE — PLAN OF CARE
Problem: Cardiac: Heart Failure (Adult)  Goal: Signs and Symptoms of Listed Potential Problems Will be Absent, Minimized or Managed (Cardiac: Heart Failure)  Signs and symptoms of listed potential problems will be absent, minimized or managed by discharge/transition of care (reference Cardiac: Heart Failure (Adult) CPG).   Outcome: Ongoing (interventions implemented as appropriate)  Patient sitting on side of bed when rounds occurred. Patient complaining about chronic pain in lower extremities.

## 2017-03-05 NOTE — PROGRESS NOTES
[facility] Medicine  Progress Note    Patient Name: Jennifer Prescott  MRN: [id number]  Patient Class: IP- Inpatient   Admission Date: 3/1/2017  Length of Stay: 3 days  Attending Physician: Anderson Badillo MD  Primary Care Provider: Lianna Mistry MD        Subjective:     Principal Problem:Acute on chronic combined systolic and diastolic congestive heart failure    HPI:  Jennifer Prescott is a 71 y.o.  woman with coronary artery disease (s/p 3 JAMAL to RCA 1/27/15), chronic systolic diastolic congestive heart failure (EF=20% 9/24/16), chronic hypotension, s/p pacemaker 9/21/10, s/p biventricular AICD 10/09/12, permanent atrial fibrillation (had cardioversion 3/06/15), bilateral carotid artery disease, left lower lobe pulmonary embolism (2/12/15), COPD with 40 pack year smoking history, continuous home oxygen 3 LPM, obesity hypoventilation syndrome (on BiPAP), chronic hypoxic respiratory failure, ischemic colitis with recurrent GI bleeding, iron deficiency anemia due to chronic gastrointestinal blood loss, and chronic kidney disease stage 3. She lives in [place], [place]. She works in a [employer] center. Her primary care physician is Dr. Lianna Mistry at [facility]. Her cardiologist is Dr. Riley Braga.    She was started on apixaban 5 mg BID on 1/30/17, as she had been off anticoagulation since her last GI bleed. She was hospitalized at [facility] from 2/15/17-2/20/17 for another GI bleed and anemia episode. She was told to stop apixaban but also stopped bumetanide in err, and presented to [facility] 2 days later (2/22/17) with pulmonary edema. BNP was 1856, increased from 1576 on 2/15/17. She was diuresed and advised to continue taking bumetanide 2 mg BID for a few three days after discharge before returning to her usual 1 mg BID. She was discharged home on 2/24/17.    She returns to [facility] ED recurrent SOB and dyspnea on exertion, progressively  worse over the past three days. She has been using her Duoneb treatments without improvement.  She has a cough productive light frothy sputum.  She denies chest pain, fevers, chills, nausea , vomiting, diarrhea, or blood in her stool.  ED workup revealed green-brown stool, heme Occult +, Chest X-Ray showing Pulmonary Edema with right pleural effusion and right lower lobe consolidation, unchanged from prior,  12 ECG with Wide Complex regular rhythm with no P waves or notable Pacer spikes, rate 76 BPM, No acute T wave changes (Serum K 4.6) Negative Flu A/B, Urinalysis with occult blood, 3+ leukocyts, > bacteria, no nitrites. WBC 15.45, Alk Phos 162, AST 50. Troponin 0.034 (Basline). BNP 3474 (1856 1 week ago) Physical exam revealed fine bibasilar crackles and bilateral gross pitting edema in lower extremities up to her knees. Patient is ambulatory at home and states that she does not usually carry that much fluid.  Upon her last discharge, she took Bumex 2 mg TID for 3 days and then resumed 1 mg BID as directed. She was given furosemide 60 IV, Duoneb Treatment and ciprofloxacin 400 mg IV in the ED.  She is being admitted to [facility] for Acute on Chronic CHF with Pulmonary Edema.  Will continue aggressive diuresis and repeat Echo.  Will trend Troponin, and get pro-calcitonin at next lab draw. (Patient is a difficult stick).  Will get daily CBC and CMP to monitor for GI bleeding, Electrolytes with diuresis and liver enzymes for hepatic congestion.  Will treat empirically with Zosyn to cover HAP in light of right lower lobe consolidation and dirty urinalysis.           Hospital Course:  3/2/17 Echo shows EF 25% (Improved from 20%), Pulmonary HTN with Systolic PA 50, and worsening valvular disease with severe Mitral and Tricuspid Insufficuancy    3/3/17 Diuresis is slow with only 1.5 L net output after a total of 140 mg Lasix and 6 mg IV Bumex.   Still has crackles on exam and only mild improvement of  LE edema. Cardiology consulted for assistance.       Interval History: Did not urinate much overnight or this AM. Still with swelling and BAEZ, but not as severe. Not eating much.     Review of Systems   Constitutional: Negative for chills and fever.   Cardiovascular: Negative for chest pain and palpitations.   Gastrointestinal: Negative for nausea and vomiting.     Objective:     Vital Signs (Most Recent):  Temp: 98.5 °F (36.9 °C) (03/04/17 2054)  Pulse: 71 (03/04/17 2156)  Resp: 16 (03/04/17 2156)  BP: 125/85 (03/04/17 2054)  SpO2: 98 % (03/04/17 2200) Vital Signs (24h Range):  Temp:  [97.8 °F (36.6 °C)-98.6 °F (37 °C)] 98.5 °F (36.9 °C)  Pulse:  [66-97] 71  Resp:  [12-22] 16  SpO2:  [97 %-100 %] 98 %  BP: ()/(40-85) 125/85     Weight: 122 kg (268 lb 14.4 oz)  Body mass index is 38.58 kg/(m^2).    Intake/Output Summary (Last 24 hours) at 03/04/17 2303  Last data filed at 03/04/17 1742   Gross per 24 hour   Intake              877 ml   Output              900 ml   Net              -23 ml      Physical Exam   Constitutional: She is oriented to person, place, and time. She appears well-developed and well-nourished. No distress.   Cardiovascular: Normal rate and regular rhythm.    Murmur heard.  Pulmonary/Chest: Effort normal. No respiratory distress. She has no wheezes. She has rales.   Abdominal: Soft. Bowel sounds are normal. She exhibits no distension. There is no tenderness.   Musculoskeletal: She exhibits edema.   Neurological: She is alert and oriented to person, place, and time.   Skin: Skin is warm and dry.   Psychiatric: She has a normal mood and affect. Her behavior is normal.   Nursing note and vitals reviewed.      Significant Labs:   CBC:   Recent Labs  Lab 03/03/17  1102 03/04/17  0722   WBC 7.96 8.26   HGB 8.4* 7.9*   HCT 29.3* 27.2*    224     CMP:   Recent Labs  Lab 03/03/17  1102 03/04/17  0722   * 133*   K 4.3 3.9   CL 91* 93*   CO2 35* 31*    72   BUN 39* 38*   CREATININE  1.2 1.2   CALCIUM 8.3* 8.0*   ANIONGAP 7* 9   EGFRNONAA 46* 46*       Significant Imaging: I have reviewed all pertinent imaging results/findings within the past 24 hours.    Assessment/Plan:      * Acute on chronic combined systolic and diastolic congestive heart failure  Ischemic Cardiomyopathy/ CAD s/p PCI  Remains volume overloaded. Cardiology plans to give some diuril today. Daily weights. Monitor I&Os.       CKD (chronic kidney disease) stage 3, GFR 30-59 ml/min  Creatinine is stable. Avoid nephrotoxins and renally dose meds. Monitor closely d/t aggressive diuresis       UTI (urinary tract infection)  Continue the zosyn.     VTE Risk Mitigation         Ordered     Medium Risk of VTE  Once      03/01/17 1525     Place sequential compression device  Until discontinued      03/01/17 1525     Place ALL hose  Until discontinued      03/01/17 1525     Reason for No Pharmacological VTE Prophylaxis  Once      03/01/17 1525          Anderson Badillo MD  Department of Hospital Medicine   [facility]

## 2017-03-06 LAB
ANION GAP SERPL CALC-SCNC: 10 MMOL/L
BACTERIA BLD CULT: NORMAL
BASOPHILS # BLD AUTO: 0.03 K/UL
BASOPHILS NFR BLD: 0.4 %
BUN SERPL-MCNC: 47 MG/DL
CALCIUM SERPL-MCNC: 8.6 MG/DL
CHLORIDE SERPL-SCNC: 89 MMOL/L
CO2 SERPL-SCNC: 33 MMOL/L
CREAT SERPL-MCNC: 2 MG/DL
DIFFERENTIAL METHOD: ABNORMAL
EOSINOPHIL # BLD AUTO: 0.2 K/UL
EOSINOPHIL NFR BLD: 2.8 %
ERYTHROCYTE [DISTWIDTH] IN BLOOD BY AUTOMATED COUNT: 17.5 %
EST. GFR  (AFRICAN AMERICAN): 28 ML/MIN/1.73 M^2
EST. GFR  (NON AFRICAN AMERICAN): 25 ML/MIN/1.73 M^2
GLUCOSE SERPL-MCNC: 97 MG/DL
HCT VFR BLD AUTO: 27.6 %
HGB BLD-MCNC: 8.1 G/DL
LYMPHOCYTES # BLD AUTO: 0.9 K/UL
LYMPHOCYTES NFR BLD: 12 %
MAGNESIUM SERPL-MCNC: 2.2 MG/DL
MCH RBC QN AUTO: 26.9 PG
MCHC RBC AUTO-ENTMCNC: 29.3 %
MCV RBC AUTO: 92 FL
MONOCYTES # BLD AUTO: 0.5 K/UL
MONOCYTES NFR BLD: 6.9 %
NEUTROPHILS # BLD AUTO: 5.5 K/UL
NEUTROPHILS NFR BLD: 77.6 %
PHOSPHATE SERPL-MCNC: 4.3 MG/DL
PLATELET # BLD AUTO: 243 K/UL
PMV BLD AUTO: 9.6 FL
POTASSIUM SERPL-SCNC: 3.5 MMOL/L
RBC # BLD AUTO: 3.01 M/UL
SODIUM SERPL-SCNC: 132 MMOL/L
WBC # BLD AUTO: 7.08 K/UL

## 2017-03-06 PROCEDURE — 84100 ASSAY OF PHOSPHORUS: CPT

## 2017-03-06 PROCEDURE — 94640 AIRWAY INHALATION TREATMENT: CPT

## 2017-03-06 PROCEDURE — 94761 N-INVAS EAR/PLS OXIMETRY MLT: CPT

## 2017-03-06 PROCEDURE — 25000242 PHARM REV CODE 250 ALT 637 W/ HCPCS: Performed by: NURSE PRACTITIONER

## 2017-03-06 PROCEDURE — 25000003 PHARM REV CODE 250: Performed by: NURSE PRACTITIONER

## 2017-03-06 PROCEDURE — 80048 BASIC METABOLIC PNL TOTAL CA: CPT

## 2017-03-06 PROCEDURE — 97535 SELF CARE MNGMENT TRAINING: CPT

## 2017-03-06 PROCEDURE — 27000221 HC OXYGEN, UP TO 24 HOURS

## 2017-03-06 PROCEDURE — 97116 GAIT TRAINING THERAPY: CPT

## 2017-03-06 PROCEDURE — 25000003 PHARM REV CODE 250: Performed by: HOSPITALIST

## 2017-03-06 PROCEDURE — 83735 ASSAY OF MAGNESIUM: CPT

## 2017-03-06 PROCEDURE — 63600175 PHARM REV CODE 636 W HCPCS: Performed by: NURSE PRACTITIONER

## 2017-03-06 PROCEDURE — 99233 SBSQ HOSP IP/OBS HIGH 50: CPT | Mod: ,,, | Performed by: INTERNAL MEDICINE

## 2017-03-06 PROCEDURE — 11000001 HC ACUTE MED/SURG PRIVATE ROOM

## 2017-03-06 PROCEDURE — 97530 THERAPEUTIC ACTIVITIES: CPT

## 2017-03-06 PROCEDURE — 94660 CPAP INITIATION&MGMT: CPT

## 2017-03-06 PROCEDURE — 36415 COLL VENOUS BLD VENIPUNCTURE: CPT

## 2017-03-06 PROCEDURE — 25000003 PHARM REV CODE 250: Performed by: INTERNAL MEDICINE

## 2017-03-06 PROCEDURE — 63600175 PHARM REV CODE 636 W HCPCS: Performed by: INTERNAL MEDICINE

## 2017-03-06 PROCEDURE — 85025 COMPLETE CBC W/AUTO DIFF WBC: CPT

## 2017-03-06 RX ORDER — AMOXICILLIN AND CLAVULANATE POTASSIUM 875; 125 MG/1; MG/1
1 TABLET, FILM COATED ORAL EVERY 12 HOURS
Status: COMPLETED | OUTPATIENT
Start: 2017-03-06 | End: 2017-03-08

## 2017-03-06 RX ADMIN — FERROUS SULFATE TAB EC 325 MG (65 MG FE EQUIVALENT) 325 MG: 325 (65 FE) TABLET DELAYED RESPONSE at 08:03

## 2017-03-06 RX ADMIN — HYDROCODONE BITARTRATE AND ACETAMINOPHEN 1 TABLET: 10; 325 TABLET ORAL at 09:03

## 2017-03-06 RX ADMIN — CHLOROTHIAZIDE SODIUM 250 MG: 500 INJECTION, POWDER, LYOPHILIZED, FOR SOLUTION INTRAVENOUS at 12:03

## 2017-03-06 RX ADMIN — CARVEDILOL 3.12 MG: 3.12 TABLET, FILM COATED ORAL at 05:03

## 2017-03-06 RX ADMIN — POTASSIUM CHLORIDE 40 MEQ: 20 TABLET, EXTENDED RELEASE ORAL at 08:03

## 2017-03-06 RX ADMIN — AMIODARONE HYDROCHLORIDE 200 MG: 200 TABLET ORAL at 08:03

## 2017-03-06 RX ADMIN — TRAZODONE HYDROCHLORIDE 150 MG: 100 TABLET ORAL at 08:03

## 2017-03-06 RX ADMIN — BUMETANIDE 3 MG: 0.25 INJECTION INTRAMUSCULAR; INTRAVENOUS at 08:03

## 2017-03-06 RX ADMIN — HYDROCODONE BITARTRATE AND ACETAMINOPHEN 1 TABLET: 10; 325 TABLET ORAL at 08:03

## 2017-03-06 RX ADMIN — CLOPIDOGREL BISULFATE 75 MG: 75 TABLET ORAL at 08:03

## 2017-03-06 RX ADMIN — BUMETANIDE 3 MG: 0.25 INJECTION INTRAMUSCULAR; INTRAVENOUS at 05:03

## 2017-03-06 RX ADMIN — PIPERACILLIN SODIUM AND TAZOBACTAM SODIUM 4.5 G: 4; .5 INJECTION, POWDER, FOR SOLUTION INTRAVENOUS at 02:03

## 2017-03-06 RX ADMIN — PANTOPRAZOLE SODIUM 40 MG: 40 TABLET, DELAYED RELEASE ORAL at 08:03

## 2017-03-06 RX ADMIN — IPRATROPIUM BROMIDE AND ALBUTEROL SULFATE 3 ML: .5; 3 SOLUTION RESPIRATORY (INHALATION) at 04:03

## 2017-03-06 RX ADMIN — IPRATROPIUM BROMIDE AND ALBUTEROL SULFATE 3 ML: .5; 3 SOLUTION RESPIRATORY (INHALATION) at 12:03

## 2017-03-06 RX ADMIN — IPRATROPIUM BROMIDE AND ALBUTEROL SULFATE 3 ML: .5; 3 SOLUTION RESPIRATORY (INHALATION) at 07:03

## 2017-03-06 RX ADMIN — CARVEDILOL 3.12 MG: 3.12 TABLET, FILM COATED ORAL at 08:03

## 2017-03-06 RX ADMIN — ASPIRIN 81 MG 81 MG: 81 TABLET ORAL at 08:03

## 2017-03-06 RX ADMIN — PIPERACILLIN SODIUM AND TAZOBACTAM SODIUM 4.5 G: 4; .5 INJECTION, POWDER, FOR SOLUTION INTRAVENOUS at 12:03

## 2017-03-06 RX ADMIN — IPRATROPIUM BROMIDE AND ALBUTEROL SULFATE 3 ML: .5; 3 SOLUTION RESPIRATORY (INHALATION) at 09:03

## 2017-03-06 RX ADMIN — AMOXICILLIN AND CLAVULANATE POTASSIUM 1 TABLET: 875; 125 TABLET, FILM COATED ORAL at 08:03

## 2017-03-06 RX ADMIN — PRAVASTATIN SODIUM 40 MG: 40 TABLET ORAL at 08:03

## 2017-03-06 RX ADMIN — CHLOROTHIAZIDE SODIUM 250 MG: 500 INJECTION, POWDER, LYOPHILIZED, FOR SOLUTION INTRAVENOUS at 01:03

## 2017-03-06 NOTE — PT/OT/SLP PROGRESS
Occupational Therapy      Jennifer Prescott  MRN: [id number]    Patient not seen today secondary to refusal as Pt. Expressed feeling weak & drowsy upon attempt for OT treat made @1115.  Communicated with nursing prior.  Will follow-up in PM.    Concepcion Panchal OT  3/6/2017

## 2017-03-06 NOTE — ASSESSMENT & PLAN NOTE
Echo on 3/1/2017 with LVEF 25%, diastolic dysfunction and severe MR; acute decompensated HF upon presentation; aggressively diuresed since admission with IV Bumex and Diuril; 1.7 liters out overnight and negative 1.5 liters in 24 hours period; negative 3.6 since admission; SOB improving; peripheral edema remains; will discontinue Diuril due to upward trend of creatinine and continue Bumex IV TID; will repeat CXR in AM; continue with BB only; ARB on hold due to renal function; strict I&Os and daily weights; monitor creatinine closely

## 2017-03-06 NOTE — ASSESSMENT & PLAN NOTE
Creatinine is up to 1.7 today. Avoid nephrotoxins and renally dose meds. Monitor closely d/t aggressive diuresis. Hold losartan today.

## 2017-03-06 NOTE — PT/OT/SLP PROGRESS
Occupational Therapy  Treatment    Jennifer Prescott   MRN: [id number]   Admitting Diagnosis: Acute on chronic combined systolic and diastolic congestive heart failure    OT Date of Treatment: 17   OT Start Time: 1243 (2nd tx: 1427)  OT Stop Time: 1304 (2nd tx: 1443)  OT Total Time (min): 21 min    Billable Minutes:  Self Care/Home Management 16, Therapeutic Activity 21 and Total Time 37    General Precautions: Standard, fall  Orthopedic Precautions: N/A  Braces: N/A    Do you have any cultural, spiritual, Samaritan conflicts, given your current situation?: None    Subjective:  Communicated with nursing prior to session.  Pt. Agreeable to OT treat.    Pain Ratin/10  Location - Side: Bilateral     Location: foot     Pain Rating Post-Intervention: /10    Objective:  Patient found with: oxygen, telemetry, peripheral IV     Functional Mobility:  Bed Mobility:  Supine to Sit: Supervision (bed rails)  Sit to Supine: Supervision (bed rails)    Transfers:   Sit <> Stand Assistance: Minimum Assistance  Sit <> Stand Assistive Device: Rolling Walker    Functional Ambulation: SBA with RW & assist for line management for household distance; 2L O2 NC throughout.    Activities of Daily Living:     Feeding adaptive equipment: None     UE adaptive equipment: None     LE adaptive equipment: None  Grooming Position: Standing at sink  Grooming Level of Assistance: Stand by assistance              Bathing adaptive equipment: None    Balance:   Static Sit: FAIR+: Able to take MINIMAL challenges from all directions  Dynamic Sit: FAIR+: Maintains balance through MINIMAL excursions of active trunk motion  Static Stand: FAIR+: Takes MINIMAL challenges from all directions  Dynamic stand: FAIR+: Needs CLOSE SUPERVISION during gait and is able to right self with minor LOB    Therapeutic Activities and Exercises:  Pt. Found seated EOB with c/o 5/10 B-foot pain. Pt. Also expressed leaking peripheral IV with nursing already notified.  Remained on O2 NC 2L throughout. Performed downward/overhead reach 4 sets of 3 reps seated EOB with skilled instruction on proper form. Session terminated prematurely in PM as RN needed to address leaking peripheral IV. Pt. Found seated EOB with c/o 5/10 B-foot pain upon return. Remained on O2 NC 2L throughout as before. Required John with sit->stand this day with min v/c for proper form & safe hand placement. Ambulated bed <-> bathroom with SBA & assist for line management. Performed oral hygiene in stance at sink with B-forearm supported on sink 50% of time. Pt. Left seated EOB with all needs. SBA for grooming/hygiene this day. To benefit from continued OT to increase independence in self-care. Continue POC.     AM-PAC 6 CLICK ADL   How much help from another person does this patient currently need?   1 = Unable, Total/Dependent Assistance  2 = A lot, Maximum/Moderate Assistance  3 = A little, Minimum/Contact Guard/Supervision  4 = None, Modified [place]/Independent    Putting on and taking off regular lower body clothing? : 3  Bathing (including washing, rinsing, drying)?: 3  Toileting, which includes using toilet, bedpan, or urinal? : 3  Putting on and taking off regular upper body clothing?: 3  Taking care of personal grooming such as brushing teeth?: 3  Eating meals?: 4  Total Score: 19     AM-PAC Raw Score CMS G-Code Modifier Level of Impairment Assistance   6 % Total / Unable   7 - 9 CM 80 - 100% Maximal Assist   10 - 14 CL 60 - 80% Moderate Assist   15 - 19 CK 40 - 60% Moderate Assist   20 - 22 CJ 20 - 40% Minimal Assist   23 CI 1-20% SBA / CGA   24 CH 0% Independent/ Mod I     Patient left seated EOB with all lines intact, call button in reach and nursing notified    ASSESSMENT:  Jennifer Prescott is a 71 y.o. female with a medical diagnosis of Acute on chronic combined systolic and diastolic congestive heart failure and presents with deficits listed below decreasing independence in  self-care.    Rehab identified problem list/impairments: Rehab identified problem list/impairments: weakness, impaired endurance, impaired self care skills, impaired functional mobilty, gait instability, impaired balance, pain    Rehab potential is good.    Activity tolerance: Fair    Discharge recommendations: Discharge Facility/Level Of Care Needs: home health OT, home with home health     Barriers to discharge: Barriers to Discharge: None    Equipment recommendations: none     GOALS:   Occupational Therapy Goals        Problem: Occupational Therapy Goal    Goal Priority Disciplines Outcome Interventions   Occupational Therapy Goal     OT, PT/OT Ongoing (interventions implemented as appropriate)    Description:  Goals to be met by: 4/2/2017     Patient will increase functional independence with ADLs by performing:    UE Dressing with Modified [place].  LE Dressing with Modified [place].  Grooming while standing at sink with Modified [place].  Toileting from bedside commode with Modified [place] for hygiene and clothing management.   Bathing from  shower chair/bench with Supervision.  Stand pivot transfers with Modified [place].  Toilet transfer to bedside commode with Modified [place].    Recs:  Pt. With shower chair & BSC already in use at home; all equipment needs met.  To benefit from  OT services.              Plan:  Patient to be seen 5 x/week to address the above listed problems via self-care/home management, therapeutic activities, therapeutic exercises  Plan of Care expires: 04/02/17  Plan of Care reviewed with: patient         Concepcion Panchal, OT  03/06/2017

## 2017-03-06 NOTE — SUBJECTIVE & OBJECTIVE
Review of Systems   Constitution: Negative for weakness and malaise/fatigue.   Cardiovascular: Positive for dyspnea on exertion (improving ), leg swelling and orthopnea. Negative for chest pain, claudication, cyanosis, near-syncope, palpitations, paroxysmal nocturnal dyspnea and syncope.   Respiratory: Positive for shortness of breath. Negative for cough.      Objective:     Vital Signs (Most Recent):  Temp: 98.3 °F (36.8 °C) (03/06/17 1135)  Pulse: 68 (03/06/17 1602)  Resp: 18 (03/06/17 1602)  BP: (!) 102/53 (03/06/17 1135)  SpO2: 99 % (03/06/17 1602) Vital Signs (24h Range):  Temp:  [97.6 °F (36.4 °C)-98.7 °F (37.1 °C)] 98.3 °F (36.8 °C)  Pulse:  [68-87] 68  Resp:  [18] 18  SpO2:  [95 %-99 %] 99 %  BP: ()/(45-58) 102/53     Weight: 122 kg (268 lb 14.4 oz)  Body mass index is 38.58 kg/(m^2).     SpO2: 99 %  O2 Device (Oxygen Therapy): nasal cannula w/ humidification      Intake/Output Summary (Last 24 hours) at 03/06/17 1643  Last data filed at 03/06/17 1221   Gross per 24 hour   Intake              360 ml   Output             1090 ml   Net             -730 ml       Lines/Drains/Airways     Peripheral Intravenous Line                 Peripheral IV - Single Lumen 03/06/17 1325 Right Forearm less than 1 day                Physical Exam   Constitutional: She appears well-developed.   Neck: JVD present.   Cardiovascular: Normal rate and regular rhythm.    Pulmonary/Chest: Effort normal and breath sounds normal. No respiratory distress. She has no wheezes.   Abdominal: Soft. Bowel sounds are normal. She exhibits no distension. There is no tenderness.   Musculoskeletal: She exhibits edema (3-4+ BLE edeme present ).   Skin: Skin is warm and dry.       Significant Labs:       Recent Labs  Lab 03/06/17  0831   *   K 3.5   CL 89*   CO2 33*   BUN 47*   CREATININE 2.0*   MG 2.2       Recent Labs  Lab 03/06/17  0831   WBC 7.08   RBC 3.01*   HGB 8.1*   HCT 27.6*      MCV 92   MCH 26.9*   MCHC 29.3*        Significant Imaging: Echocardiogram:   2D echo with color flow doppler:   Results for orders placed or performed during the hospital encounter of 03/01/17   2D echo with color flow doppler   Result Value Ref Range    EF 25 (A) 55 - 65    Mitral Valve Regurgitation SEVERE (A)     Diastolic Dysfunction Yes (A)     Est. PA Systolic Pressure 50.28 (A)     Pericardial Effusion NONE     Tricuspid Valve Regurgitation MODERATE TO SEVERE (A)

## 2017-03-06 NOTE — PROGRESS NOTES
[facility]  Progress Note    Patient Name: Jennifer Prescott  MRN: [id number]  Patient Class: IP- Inpatient   Admission Date: 3/1/2017  Length of Stay: 5 days  Attending Physician: Anderson Badillo MD  Primary Care Provider: Lianna Mistry MD        Subjective:     Principal Problem:Acute on chronic combined systolic and diastolic congestive heart failure    HPI:  Jennifer Prescott is a 71 y.o.  woman with coronary artery disease (s/p 3 JAMAL to RCA 1/27/15), chronic systolic diastolic congestive heart failure (EF=20% 9/24/16), chronic hypotension, s/p pacemaker 9/21/10, s/p biventricular AICD 10/09/12, permanent atrial fibrillation (had cardioversion 3/06/15), bilateral carotid artery disease, left lower lobe pulmonary embolism (2/12/15), COPD with 40 pack year smoking history, continuous home oxygen 3 LPM, obesity hypoventilation syndrome (on BiPAP), chronic hypoxic respiratory failure, ischemic colitis with recurrent GI bleeding, iron deficiency anemia due to chronic gastrointestinal blood loss, and chronic kidney disease stage 3. She lives in [place], [place]. She works in a [employer] center. Her primary care physician is Dr. Lianna Mistry at [facility]. Her cardiologist is Dr. Riley Braga.    She was started on apixaban 5 mg BID on 1/30/17, as she had been off anticoagulation since her last GI bleed. She was hospitalized at [facility] from 2/15/17-2/20/17 for another GI bleed and anemia episode. She was told to stop apixaban but also stopped bumetanide in err, and presented to [facility] 2 days later (2/22/17) with pulmonary edema. BNP was 1856, increased from 1576 on 2/15/17. She was diuresed and advised to continue taking bumetanide 2 mg BID for a few three days after discharge before returning to her usual 1 mg BID. She was discharged home on 2/24/17.    She returns to [facility] ED recurrent SOB and dyspnea on exertion, progressively  worse over the past three days. She has been using her Duoneb treatments without improvement.  She has a cough productive light frothy sputum.  She denies chest pain, fevers, chills, nausea , vomiting, diarrhea, or blood in her stool.  ED workup revealed green-brown stool, heme Occult +, Chest X-Ray showing Pulmonary Edema with right pleural effusion and right lower lobe consolidation, unchanged from prior,  12 ECG with Wide Complex regular rhythm with no P waves or notable Pacer spikes, rate 76 BPM, No acute T wave changes (Serum K 4.6) Negative Flu A/B, Urinalysis with occult blood, 3+ leukocyts, > bacteria, no nitrites. WBC 15.45, Alk Phos 162, AST 50. Troponin 0.034 (Basline). BNP 3474 (1856 1 week ago) Physical exam revealed fine bibasilar crackles and bilateral gross pitting edema in lower extremities up to her knees. Patient is ambulatory at home and states that she does not usually carry that much fluid.  Upon her last discharge, she took Bumex 2 mg TID for 3 days and then resumed 1 mg BID as directed. She was given furosemide 60 IV, Duoneb Treatment and ciprofloxacin 400 mg IV in the ED.  She is being admitted to [facility] for Acute on Chronic CHF with Pulmonary Edema.  Will continue aggressive diuresis and repeat Echo.  Will trend Troponin, and get pro-calcitonin at next lab draw. (Patient is a difficult stick).  Will get daily CBC and CMP to monitor for GI bleeding, Electrolytes with diuresis and liver enzymes for hepatic congestion.  Will treat empirically with Zosyn to cover HAP in light of right lower lobe consolidation and dirty urinalysis.           Hospital Course:  3/2/17 Echo shows EF 25% (Improved from 20%), Pulmonary HTN with Systolic PA 50, and worsening valvular disease with severe Mitral and Tricuspid Insufficuancy    3/3/17 Diuresis is slow with only 1.5 L net output after a total of 140 mg Lasix and 6 mg IV Bumex.   Still has crackles on exam and only mild improvement of  LE edema. Cardiology consulted for assistance.       Interval History: A little less SOB this AM. Had more UOP overnight. Still with swelling in the legs.     Review of Systems   Constitutional: Negative for fever.   Cardiovascular: Positive for leg swelling. Negative for chest pain and palpitations.   Gastrointestinal: Negative for abdominal pain, nausea and vomiting.     Objective:     Vital Signs (Most Recent):  Temp: 97.9 °F (36.6 °C) (03/06/17 0040)  Pulse: 70 (03/06/17 0040)  Resp: 18 (03/05/17 2109)  BP: (!) 96/45 (03/06/17 0040)  SpO2: 97 % (03/06/17 0014) Vital Signs (24h Range):  Temp:  [97.3 °F (36.3 °C)-98.6 °F (37 °C)] 97.9 °F (36.6 °C)  Pulse:  [] 70  Resp:  [18-20] 18  SpO2:  [95 %-98 %] 97 %  BP: ()/(45-78) 96/45     Weight: 122 kg (268 lb 14.4 oz)  Body mass index is 38.58 kg/(m^2).    Intake/Output Summary (Last 24 hours) at 03/06/17 0303  Last data filed at 03/05/17 2133   Gross per 24 hour   Intake              150 ml   Output             2365 ml   Net            -2215 ml      Physical Exam   Constitutional: She is oriented to person, place, and time. She appears well-developed and well-nourished. No distress.   Cardiovascular: Normal rate and regular rhythm.    Murmur heard.  Pulmonary/Chest: Effort normal. No respiratory distress. She has no wheezes. She has no rales.   Abdominal: Soft. Bowel sounds are normal. She exhibits no distension. There is no tenderness.   Musculoskeletal: She exhibits edema.   Neurological: She is alert and oriented to person, place, and time.   Skin: Skin is warm and dry.   Psychiatric: She has a normal mood and affect. Her behavior is normal.   Nursing note and vitals reviewed.      Significant Labs:   BMP:   Recent Labs  Lab 03/05/17  0728   GLU 76   *   K 4.0   CL 92*   CO2 26   BUN 41*   CREATININE 1.7*   CALCIUM 8.4*   MG 2.0     CBC:   Recent Labs  Lab 03/04/17  0722 03/05/17  1305   WBC 8.26 7.16   HGB 7.9* 8.1*   HCT 27.2* 27.7*    233        Significant Imaging: I have reviewed all pertinent imaging results/findings within the past 24 hours.    Assessment/Plan:      * Acute on chronic combined systolic and diastolic congestive heart failure  Ischemic Cardiomyopathy/ CAD s/p PCI  Remains volume overloaded. Cardiology gave diuril yesterday. Continue bumex. Daily weights. Monitor I&Os.       CKD (chronic kidney disease) stage 3, GFR 30-59 ml/min  Creatinine is up to 1.7 today. Avoid nephrotoxins and renally dose meds. Monitor closely d/t aggressive diuresis. Hold losartan today.       UTI (urinary tract infection)  Continue the zosyn.     VTE Risk Mitigation         Ordered     Medium Risk of VTE  Once      03/01/17 1525     Place sequential compression device  Until discontinued      03/01/17 1525     Place ALL hose  Until discontinued      03/01/17 1525     Reason for No Pharmacological VTE Prophylaxis  Once      03/01/17 1525          Anderson Badillo MD  Department of Hospital Medicine   [facility]

## 2017-03-06 NOTE — ASSESSMENT & PLAN NOTE
Currently paced rhythm with consistent paced rhythm since admission; no underlying afib noted; continue BB; chronic AC on hold due to recent GIB; recently changed to  Eliquis on 1/30/2017; will continue to hold chronic AC due to persistent low H&H and recent stool positive for OCB and low H&H

## 2017-03-06 NOTE — PLAN OF CARE
Problem: Occupational Therapy Goal  Goal: Occupational Therapy Goal  Goals to be met by: 4/2/2017     Patient will increase functional independence with ADLs by performing:    UE Dressing with Modified [place].  LE Dressing with Modified [place].  Grooming while standing at sink with Modified [place].  Toileting from bedside commode with Modified [place] for hygiene and clothing management.   Bathing from shower chair/bench with Supervision.  Stand pivot transfers with Modified [place].  Toilet transfer to bedside commode with Modified [place].    Recs: Pt. With shower chair & BSC already in use at home; all equipment needs met. To benefit from  OT services.   Outcome: Ongoing (interventions implemented as appropriate)  Pt. Found seated EOB with c/o 5/10 B-foot pain.  Pt. Also expressed leaking peripheral IV with nursing already notified.  Remained on O2 NC 2L throughout.  Performed downward/overhead reach 4 sets of 3 reps seated EOB with skilled instruction on proper form.  Session terminated prematurely in PM as RN needed to address leaking peripheral IV.  Pt. Found seated EOB with c/o 5/10 B-foot pain upon return.  Remained on O2 NC 2L throughout as before.  Required John with sit->stand this day with min v/c for proper form & safe hand placement.  Ambulated bed <-> bathroom with SBA & assist for line management.  Performed oral hygiene in stance at sink with B-forearm supported on sink 50% of time.  Pt. Left seated EOB with all needs.  SBA for grooming/hygiene this day.  To benefit from continued OT to increase independence in self-care.  Continue POC.

## 2017-03-06 NOTE — PT/OT/SLP PROGRESS
Physical Therapy  Treatment    Jennifer Prescott   MRN: [id number]   Admitting Diagnosis: Acute on chronic combined systolic and diastolic congestive heart failure    PT Received On: 03/06/17  PT Start Time: 1315     PT Stop Time: 1330    PT Total Time (min): 15 min       Billable Minutes:  Gait [id number]    Treatment Type: Treatment  PT/PTA: PT     PTA Visit Number: 2       General Precautions: Standard, fall  Orthopedic Precautions: N/A   Braces: N/A         Subjective:  Communicated with primary nurse prior to session.      Pain Rating: 3/10  Location - Side: Bilateral     Location: leg     Pain Rating Post-Intervention: 3/10    Objective:   Patient found with: peripheral IV, telemetry    Functional Mobility:  Bed Mobility:   Supine to Sit: Supervision  Sit to Supine: Supervision    Transfers:  Sit <> Stand Assistance: Supervision (increase height of bed)    Gait:   Gait Distance: 25 ft with RW and O2 at 2 liters  Assistance 1: Stand by Assistance  Gait Assistive Device: Rolling walker  Gait Pattern: reciprocal  Gait Deviation(s): decreased [place], decreased weight-shifting ability, decreased toe-to-floor clearance, decreased velocity of limb motion    Stairs:  na    Balance:   Static Sit: GOOD-: Takes MODERATE challenges from all directions but inconsistently  Dynamic Sit: GOOD-: Maintains balance through MODERATE excursions of active trunk movement,     Static Stand: FAIR+: Takes MINIMAL challenges from all directions  Dynamic stand: FAIR+: Needs CLOSE SUPERVISION during gait and is able to right self with minor LOB     Therapeutic Activities and Exercises:  Reviewed ankle pumps, LAQ,marching and hip ABD/ADD     AM-PAC 6 CLICK MOBILITY  How much help from another person does this patient currently need?   1 = Unable, Total/Dependent Assistance  2 = A lot, Maximum/Moderate Assistance  3 = A little, Minimum/Contact Guard/Supervision  4 = None, Modified [place]/Independent    Turning over in bed (including  adjusting bedclothes, sheets and blankets)?: 4  Sitting down on and standing up from a chair with arms (e.g., wheelchair, bedside commode, etc.): 4  Moving from lying on back to sitting on the side of the bed?: 4  Moving to and from a bed to a chair (including a wheelchair)?: 3  Need to walk in hospital room?: 3  Climbing 3-5 steps with a railing?: 3  Total Score: 21    AM-PAC Raw Score CMS G-Code Modifier Level of Impairment Assistance   6 % Total / Unable   7 - 9 CM 80 - 100% Maximal Assist   10 - 14 CL 60 - 80% Moderate Assist   15 - 19 CK 40 - 60% Moderate Assist   20 - 22 CJ 20 - 40% Minimal Assist   23 CI 1-20% SBA / CGA   24 CH 0% Independent/ Mod I     Patient left Sitting EOB with call button in reach and primary nurse notified.    Assessment:  Jennifer Prescott is a 71 y.o. female with a medical diagnosis of Acute on chronic combined systolic and diastolic congestive heart failure and presents with impaired cardiopulmonary response to activity and gait disturbance.    Rehab identified problem list/impairments: Rehab identified problem list/impairments: weakness, impaired functional mobilty, impaired self care skills, impaired cardiopulmonary response to activity, pain, impaired balance, impaired endurance    Rehab potential is fair.    Activity tolerance: Fair    Discharge recommendations: Discharge Facility/Level Of Care Needs: home with home health, home health PT     Barriers to discharge: Barriers to Discharge: None    Equipment recommendations: Equipment Needed After Discharge: none     GOALS:   Physical Therapy Goals        Problem: Physical Therapy Goal    Goal Priority Disciplines Outcome Goal Variances Interventions   Physical Therapy Goal     PT/OT, PT Ongoing (interventions implemented as appropriate)     Description:  Goals to be met by: 2017     Patient will increase functional independence with mobility by performin. Sit to stand transfer with Modified [place]  2. Bed to  chair transfer with Modified [place] using no AD  3. Gait  x 20 feet with Modified [place] using Rolling Walker.                 PLAN:    Patient to be seen 6 x/week  to address the above listed problems via gait training, therapeutic activities, therapeutic exercises  Plan of Care expires: 04/02/17  Plan of Care reviewed with: patient         Tien BOUCHER Karley, PT  03/06/2017

## 2017-03-06 NOTE — PLAN OF CARE
Problem: Patient Care Overview  Goal: Plan of Care Review  Outcome: Ongoing (interventions implemented as appropriate)  Patient given aero treatment with no adverse reactions noted.  Patient tolerated treatment well.

## 2017-03-06 NOTE — ASSESSMENT & PLAN NOTE
Ischemic Cardiomyopathy/ CAD s/p PCI  Remains volume overloaded. Cardiology gave diuril yesterday. Continue bumex. Daily weights. Monitor I&Os.

## 2017-03-06 NOTE — SUBJECTIVE & OBJECTIVE
Interval History: A little less SOB this AM. Had more UOP overnight. Still with swelling in the legs.     Review of Systems   Constitutional: Negative for fever.   Cardiovascular: Positive for leg swelling. Negative for chest pain and palpitations.   Gastrointestinal: Negative for abdominal pain, nausea and vomiting.     Objective:     Vital Signs (Most Recent):  Temp: 97.9 °F (36.6 °C) (03/06/17 0040)  Pulse: 70 (03/06/17 0040)  Resp: 18 (03/05/17 2109)  BP: (!) 96/45 (03/06/17 0040)  SpO2: 97 % (03/06/17 0014) Vital Signs (24h Range):  Temp:  [97.3 °F (36.3 °C)-98.6 °F (37 °C)] 97.9 °F (36.6 °C)  Pulse:  [] 70  Resp:  [18-20] 18  SpO2:  [95 %-98 %] 97 %  BP: ()/(45-78) 96/45     Weight: 122 kg (268 lb 14.4 oz)  Body mass index is 38.58 kg/(m^2).    Intake/Output Summary (Last 24 hours) at 03/06/17 0303  Last data filed at 03/05/17 2133   Gross per 24 hour   Intake              150 ml   Output             2365 ml   Net            -2215 ml      Physical Exam   Constitutional: She is oriented to person, place, and time. She appears well-developed and well-nourished. No distress.   Cardiovascular: Normal rate and regular rhythm.    Murmur heard.  Pulmonary/Chest: Effort normal. No respiratory distress. She has no wheezes. She has no rales.   Abdominal: Soft. Bowel sounds are normal. She exhibits no distension. There is no tenderness.   Musculoskeletal: She exhibits edema.   Neurological: She is alert and oriented to person, place, and time.   Skin: Skin is warm and dry.   Psychiatric: She has a normal mood and affect. Her behavior is normal.   Nursing note and vitals reviewed.      Significant Labs:   BMP:   Recent Labs  Lab 03/05/17  0728   GLU 76   *   K 4.0   CL 92*   CO2 26   BUN 41*   CREATININE 1.7*   CALCIUM 8.4*   MG 2.0     CBC:   Recent Labs  Lab 03/04/17  0722 03/05/17  1305   WBC 8.26 7.16   HGB 7.9* 8.1*   HCT 27.2* 27.7*    233       Significant Imaging: I have reviewed all  pertinent imaging results/findings within the past 24 hours.

## 2017-03-06 NOTE — PROGRESS NOTES
[facility]  Cardiology  Consult Note     Patient Name: Jennifer Prescott  MRN: [id number]  Admission Date: 3/1/2017  Hospital Length of Stay: 2 days  Code Status: Full Code   Attending Provider: Anderson Badillo MD   Consulting Provider: Pedro Kinney MD  Primary Care Physician: Lianna Mistry MD  Principal Problem:Acute on chronic combined systolic and diastolic congestive heart failure     Patient information was obtained from patient and past medical records.           Subjective:      Chief Complaint:  SOB     Consult Reason: ADHF     HPI:   72yo female with history of CAD s/p LAD PCI and RCA PCI for  1/2015, permanent afib, COPD, ICM EF 25%, MR, OHVS, CKD stage III, NHI, GIB and ischemic colitis who presented to the ER with complaints of SOB for the past 3 days. She complains of BAEZ with minimal exertion with progressive worsening over the past 3 days. She reports using her oxygen more lately and has not been able to do her normal activities or go to work. She reports compliance with her medication regimen and dietary restrictions lately. She has not been able to do her normal activities for the past month and is eager to return to work. Admitted to [facility] Medicine for ADHF with initiated of aggressive diuresis and Cardiology was consulted for evaluation and management.      She was admitted on 2/15/2017 for GIB with dark tarry stools. Her Hgb at that time was 5.1 therefore she received 3 units PRBCs and was evaluated by GI. Underwent EGD with nonbleeding duodenal diverticulum, portal hypertension with no biospy due to recent Plavix and Eliquis and hiatal hernia. Colonoscopy performed as well with no acute abnormalites. She required additional one unit of PRBC due to Hgb of 7 with slight trend upward of H&H to 8&28 (baseline 9-11/32-39). Discharged with discontinuation of Eliquis and Plavix due to GIB and HAS BLED score of 4 with recommendation to follow up with Cardiology for  evaluation for resumption. Additionally her Bumex, Digoxin, Imdur and Losartan were discontinued as well. She reports feeling okay but not her usual self upon discharge with recurrent SOB requiring re admission last week at [facility]. At that time, she was admitted to [facility] Medicine and was given Bumex 1mg IV with minimal response therefore she was given Diuril 250mg IV x 1 along with Bumex 2mg IV TID with 4 liters out and was discharged with recommendation for Bumex 2mg BID x 3 days then to decrease to 1mg po BID. She reports at that time her SOB was not back to her baseline and continued with recommended medication regimen. However, her SOB persisted and progressively worsened prompting her to present to the ER      Overnight given Diuril with improvement in diuresis. Overnight neg 1200cc and total 2100cc. Cr has increased, however. hemodynacmically stable and SOB improved.               Past Medical History:   Diagnosis Date    *Atrial fibrillation      Anticoagulant long-term use      Arthritis      Cardiomyopathy      Carotid artery occlusion      CHF (congestive heart failure)      COPD (chronic obstructive pulmonary disease)      COPD exacerbation 2/19/2015    Coronary artery disease       RCA occluded with L to R collaterals and normal LAD and LCx    Hypertension      Internal bleeding      Sleep apnea       uses bipap               Past Surgical History:   Procedure Laterality Date    ABDOMINAL SURGERY        APPENDECTOMY        CARDIAC CATHETERIZATION        CARDIAC DEFIBRILLATOR PLACEMENT   10/9/2012     bivent AICD placed 10/2012 and revised 12/2012    CARDIAC PACEMAKER PLACEMENT   9/21/2010     [facility]    CARDIOVERSION   3/6/2015    CAROTID ENDARTERECTOMY Left      CHOLECYSTECTOMY        COLONOSCOPY N/A 7/11/2016     Procedure: COLONOSCOPY; Surgeon: Rafael Pérez MD; Location: [facility]; Service: Endoscopy; Laterality: N/A;    COLONOSCOPY N/A 2/17/2017     Procedure:  COLONOSCOPY; Surgeon: Giovanni Bronson MD; Location: [facility] ([address] [place] [address]); Service: Endoscopy; Laterality: N/A;    CORONARY ANGIOPLASTY WITH STENT PLACEMENT   1/27/2015     3 JAMAL to RCA    CRT-D implantation        ESOPHAGOGASTRODUODENOSCOPY        GALLBLADDER SURGERY        TONSILLECTOMY        TONSILLECTOMY, ADENOIDECTOMY        VASCULAR SURGERY                  Review of patient's allergies indicates:   Allergen Reactions    Gabapentin           No current facility-administered medications on file prior to encounter.            Current Outpatient Prescriptions on File Prior to Encounter   Medication Sig    albuterol 90 mcg/actuation inhaler Inhale 2 puffs into the lungs every 6 (six) hours as needed for Wheezing.    albuterol-ipratropium 2.5mg-0.5mg/3mL (DUO-NEB) 0.5 mg-3 mg(2.5 mg base)/3 mL nebulizer solution USE 1 VIAL VIA NEBULIZER EVERY 6 HOURS AS NEEDED FOR WHEEZING    allopurinol (ZYLOPRIM) 300 MG tablet Take 1 tablet (300 mg total) by mouth once daily.    amiodarone (PACERONE) 200 MG Tab Take 200 mg by mouth once daily.    aspirin 81 MG Chew Take 81 mg by mouth every evening.     bumetanide (BUMEX) 1 MG tablet Take 1 tablet (1 mg total) by mouth 2 (two) times daily. Take 2 tablets 2 times daily for the first 3 days.    carvedilol (COREG) 3.125 MG tablet Take 1 tablet (3.125 mg total) by mouth 2 (two) times daily with meals.    clopidogrel (PLAVIX) 75 mg tablet Take 75 mg by mouth once daily.    cyclobenzaprine (FLEXERIL) 10 MG tablet Take 10 mg by mouth 3 (three) times daily as needed for Muscle spasms.    ferrous sulfate 325 (65 FE) MG EC tablet Take 1 tablet (325 mg total) by mouth every evening.    fluticasone-vilanterol (BREO) 100-25 mcg/dose diskus inhaler Inhale 1 puff into the lungs once daily. (Patient taking differently: Inhale 1 puff into the lungs daily as needed. )    isosorbide mononitrate (IMDUR) 60 MG 24 hr tablet Take 1 tablet (60 mg total) by mouth once daily.     losartan (COZAAR) 25 MG tablet Take 0.5 tablets (12.5 mg total) by mouth once daily.    pantoprazole (PROTONIX) 40 MG tablet Take 1 tablet (40 mg total) by mouth once daily.    pravastatin (PRAVACHOL) 40 MG tablet Take 1 tablet (40 mg total) by mouth once daily.    tramadol (ULTRAM) 50 mg tablet Take 50 mg by mouth every 6 (six) hours as needed for Pain.    trazodone (DESYREL) 150 MG tablet TAKE 1 TABLET BY MOUTH AT BEDTIME (Patient taking differently: TAKE 1 TABLET BY MOUTH AT BEDTIME AS NEEDED FOR SLEEP)    [DISCONTINUED] zolpidem (AMBIEN) 5 MG Tab take 1 on the night of the study; if you still can't sleep for another 60-90 min after the pill - take another pill.      Family History     Problem Relation (Age of Onset)     Breast cancer Mother     Cancer Father     Heart attack Father     Heart disease Father     Heart failure Father               Social History Main Topics    Smoking status: Former Smoker       Packs/day: 0.25       Years: 50.00       Types: Cigarettes       Start date: 1/15/1965    Smokeless tobacco: Never Used    Alcohol use No    Drug use: No    Sexual activity: No      Review of Systems   Constitution: Positive for decreased appetite (decreased appetite x 1-2 weeks), weakness and malaise/fatigue.   Cardiovascular: Positive for dyspnea on exertion, leg swelling, orthopnea and paroxysmal nocturnal dyspnea. Negative for chest pain, claudication, cyanosis, near-syncope, palpitations and syncope.   Respiratory: Positive for shortness of breath. Negative for cough and wheezing.   Gastrointestinal: Negative for bloating, abdominal pain, constipation, diarrhea, nausea and vomiting.      Objective:      Vitals:    03/05/17 1222 03/05/17 1241 03/05/17 1613 03/05/17 1700   BP:  (!) 94/47  (!) 115/56   BP Location:  Right arm  Right arm   Patient Position:  Sitting  Sitting   BP Method:  Automatic  Automatic   Pulse: 70 69 68 87   Resp: 18 20 18 18   Temp:  97.3 °F (36.3 °C)  98.6 °F (37 °C)    TempSrc:       SpO2: 96%  97%    Weight:       Height:                Weight: 122 kg (268 lb 14.4 oz)  Body mass index is 38.58 kg/(m^2).     SpO2: 97 %  O2 Device (Oxygen Therapy): nasal cannula          Intake/Output Summary (Last 24 hours) at 03/05/17 1803  Last data filed at 03/05/17 1700   Gross per 24 hour   Intake              150 ml   Output             2255 ml   Net            -2105 ml                 Lines/Drains/Airways            Peripheral Intravenous Line                             Peripheral IV - Single Lumen 03/03/17 1430 Right Forearm less than 1 day                      Physical Exam   Constitutional: She appears well-developed and well-nourished. No distress.   Neck: JVD (@10cm ) present.   Cardiovascular: Normal rate, regular rhythm, S1 normal and S2 normal. Exam reveals no gallop.   Murmur heard.  Pulmonary/Chest: Tachypnea (with activity ) noted. She has rales in the right middle field, the right lower field, the left middle field and the left lower field.   Abdominal: Soft. Bowel sounds are normal. She exhibits no distension. There is no tenderness.   Musculoskeletal: She exhibits edema (4+ BLE edema from knees to ankles; 1-2+ edema to bilateral thighs).   Skin: Skin is warm and dry.         Significant Labs:         Recent Labs  Recent Labs      03/05/17   0728  03/05/17   1305   HGB   --   8.1*   HCT   --   27.7*   PLT   --   233   NA  131*   --    K  4.0   --    CL  92*   --    BUN  41*   --    CREATININE  1.7*   --    ]     Significant Imaging: Echocardiogram:   2D echo with color flow doppler:         Results for orders placed or performed during the hospital encounter of 03/01/17   2D echo with color flow doppler   Result Value Ref Range     EF 25 (A) 55 - 65     Mitral Valve Regurgitation SEVERE (A)       Diastolic Dysfunction Yes (A)       Est. PA Systolic Pressure 50.28 (A)       Pericardial Effusion NONE       Tricuspid Valve Regurgitation MODERATE TO SEVERE (A)        Assessment  and Plan:      * Acute on chronic combined systolic and diastolic congestive heart failure  Echo on 3/1/2017 with LVEF 25%, diastolic dysfunction and severe MR; acute decompensated HF with marked volume overload with JVD, rales, peripheral edema along with elevated BNP and pulmonary vascular congestion on CXR; given IV Lasix with minimal response and only 850cc out; transitioned to Bumex 3mg IV TID with good response and 1.9 liters out overnight; UOP decreased and she was given diuril; overnight increased UOP and net negative 1200cc;; continue ARB, BB and Imdur with decent afterload reduction; likely is a good candidate for Entresto but will defer to outpatient once acute decompensation improves      Permanent atrial fibrillation  Currently paced rhythm with consistent paced rhythm since admission; no underlying afib noted; continue BB; chronic AC on hold due to recent GIB; was previously on Coumadin but changes to Eliquis on 1/30/2017 due to inconsistency with INR monitoring per my recollection; will continue to hold chronic AC due to persistent low H&H and recent stool positive for OCB; would not be opposed to GI consult for further input; CHADSVAS 5 (CHF, HTN, age, female, PAD) and HAS BLED 4-5     CAD S/P percutaneous coronary angioplasty  S/p LAD PCI and RCA PCI for  in 1/2015; mild troponin rise but at baseline; complaints of SOB and no complaints of chest pain; EKG with paced rhythm; SOB more concerning for ADHF presentation; no concern for ACS currently; continue ASA, Plavix, BB, ARB and statin      Ischemic cardiomyopathy  Management as detailed below        VTE Risk Mitigation           Ordered       Medium Risk of VTE Once      03/01/17 1525       Place sequential compression device Until discontinued      03/01/17 1525       Place ALL hose Until discontinued      03/01/17 1525       Reason for No Pharmacological VTE Prophylaxis Once      03/01/17 1525                 Pedro Kinney MD  Cardiology    [facility]

## 2017-03-06 NOTE — PROGRESS NOTES
[facility]  Cardiology  Progress Note    Patient Name: Jennifer Prescott  MRN: [id number]  Admission Date: 3/1/2017  Hospital Length of Stay: 5 days  Code Status: Full Code   Attending Physician: Anderson Badillo MD   Primary Care Physician: Lianna Mistry MD  Expected Discharge Date:   Principal Problem:Acute on chronic combined systolic and diastolic congestive heart failure    Subjective:     Hospital Course:   3/1/2017-3/2/2017 Admitted for trcurrent SOB and BAEZ with progressive worsening for the past 3 days. No improvement with duoneb treatments Denies chest pain, fevers, chills, nausea , vomiting, diarrhea, or blood in her stool. ED workup revealed green-brown stool, heme Occult +, Chest X-Ray showing Pulmonary Edema with right pleural effusion and right lower lobe consolidation, unchanged from prior, 12 ECG with Wide Complex regular rhythm with no P waves or notable Pacer spikes, rate 76 BPM, No acute T wave changes (Serum K 4.6) Negative Flu A/B, Urinalysis with occult blood, 3+ leukocyts, > bacteria, no nitrites. WBC 15.45, Alk Phos 162, AST 50. Troponin 0.034 (Basline). BNP 3474 (1856 1 week ago) Physical exam revealed fine bibasilar crackles and bilateral gross pitting edema in lower extremities up to her knees. Given furosemide 60 IV, Duoneb Treatment and ciprofloxacin 400 mg IV in the ED with only 850cc urine output. Aggressive diuresis continued and changed to Bumex 3mg IV TID with resumption of ASA, Plavix, Imdur, Losartan and Coreg. Total bilirubin elevated at 2.0 and primary working diagnosis is ADHF due to inadeverent discontinuation of diuretic therapy 3/3/2017 On telemetry unit with aggressive diuresis with Bumex 3mg IV TID with 1.9 liters out overnight and negative 1.0 liters in 24 hours and barely net negative. Peripheral edema remains with continued BAEZ although slightly improved per patient. Cardiology consulted for management with recommendation for continued aggressive  diuresis with IV Bumex until euvolemic and peripheral edema markedly improved 3/4/2017-3/5/2017 Continued on IV Bumex TID with addition of IV Diuril 250mg BID with good response and negative fluid balance since admission. Creatinine up to 1.7 with conitnuation of medication regimen for volume removal 3/6/2017 HR and BP stable overnight. 1.7 liters out overnight and negative 1.5 liters in 24hours and negative 3.6 liters since admission. Creatinine 2.0 this AM up from 1.7 yesterday. One dose of Diuril given this AM with discontinuation with this afternoon and continuation of Bumex 3mg IV TID             Review of Systems   Constitution: Negative for weakness and malaise/fatigue.   Cardiovascular: Positive for dyspnea on exertion (improving ), leg swelling and orthopnea. Negative for chest pain, claudication, cyanosis, near-syncope, palpitations, paroxysmal nocturnal dyspnea and syncope.   Respiratory: Positive for shortness of breath. Negative for cough.      Objective:     Vital Signs (Most Recent):  Temp: 98.3 °F (36.8 °C) (03/06/17 1135)  Pulse: 68 (03/06/17 1602)  Resp: 18 (03/06/17 1602)  BP: (!) 102/53 (03/06/17 1135)  SpO2: 99 % (03/06/17 1602) Vital Signs (24h Range):  Temp:  [97.6 °F (36.4 °C)-98.7 °F (37.1 °C)] 98.3 °F (36.8 °C)  Pulse:  [68-87] 68  Resp:  [18] 18  SpO2:  [95 %-99 %] 99 %  BP: ()/(45-58) 102/53     Weight: 122 kg (268 lb 14.4 oz)  Body mass index is 38.58 kg/(m^2).     SpO2: 99 %  O2 Device (Oxygen Therapy): nasal cannula w/ humidification      Intake/Output Summary (Last 24 hours) at 03/06/17 1643  Last data filed at 03/06/17 1221   Gross per 24 hour   Intake              360 ml   Output             1090 ml   Net             -730 ml       Lines/Drains/Airways     Peripheral Intravenous Line                 Peripheral IV - Single Lumen 03/06/17 1325 Right Forearm less than 1 day                Physical Exam   Constitutional: She appears well-developed.   Neck: JVD present.    Cardiovascular: Normal rate and regular rhythm.    Pulmonary/Chest: Effort normal and breath sounds normal. No respiratory distress. She has no wheezes.   Abdominal: Soft. Bowel sounds are normal. She exhibits no distension. There is no tenderness.   Musculoskeletal: She exhibits edema (3-4+ BLE edeme present ).   Skin: Skin is warm and dry.       Significant Labs:       Recent Labs  Lab 03/06/17  0831   *   K 3.5   CL 89*   CO2 33*   BUN 47*   CREATININE 2.0*   MG 2.2       Recent Labs  Lab 03/06/17  0831   WBC 7.08   RBC 3.01*   HGB 8.1*   HCT 27.6*      MCV 92   MCH 26.9*   MCHC 29.3*       Significant Imaging: Echocardiogram:   2D echo with color flow doppler:   Results for orders placed or performed during the hospital encounter of 03/01/17   2D echo with color flow doppler   Result Value Ref Range    EF 25 (A) 55 - 65    Mitral Valve Regurgitation SEVERE (A)     Diastolic Dysfunction Yes (A)     Est. PA Systolic Pressure 50.28 (A)     Pericardial Effusion NONE     Tricuspid Valve Regurgitation MODERATE TO SEVERE (A)      Assessment and Plan:     Brief HPI: Seen on AM NP rounds while resting in bed after pain medications. Denies any complaints and reports SOB improving. Attempted to discuss cardiac POC with patient but lethargy interfering.     * Acute on chronic combined systolic and diastolic congestive heart failure  Echo on 3/1/2017 with LVEF 25%, diastolic dysfunction and severe MR; acute decompensated HF upon presentation; aggressively diuresed since admission with IV Bumex and Diuril; 1.7 liters out overnight and negative 1.5 liters in 24 hours period; negative 3.6 since admission; SOB improving; peripheral edema remains; will discontinue Diuril due to upward trend of creatinine and continue Bumex IV TID; will repeat CXR in AM; continue with BB only; ARB on hold due to renal function; strict I&Os and daily weights; monitor creatinine closely     Permanent atrial fibrillation  Currently  paced rhythm with consistent paced rhythm since admission; no underlying afib noted; continue BB; chronic AC on hold due to recent GIB; recently changed to  Eliquis on 1/30/2017; will continue to hold chronic AC due to persistent low H&H and recent stool positive for OCB and low H&H    CAD S/P percutaneous coronary angioplasty  S/p LAD PCI and RCA PCI for  in 1/2015; mild troponin due to demand etiology in setting of ADHF; no concern for ACS currently; continue ASA, Plavix, BB, and statin; ARB on hold due to rise in creatinine       VTE Risk Mitigation         Ordered     Medium Risk of VTE  Once      03/01/17 1525     Place sequential compression device  Until discontinued      03/01/17 1525     Place ALL hose  Until discontinued      03/01/17 1525     Reason for No Pharmacological VTE Prophylaxis  Once      03/01/17 1525            SEJAL Strickland, ANP  Cardiology  [facility]

## 2017-03-06 NOTE — PLAN OF CARE
Problem: Fall Risk (Adult)  Goal: Identify Related Risk Factors and Signs and Symptoms  Related risk factors and signs and symptoms are identified upon initiation of Human Response Clinical Practice Guideline (CPG)   Outcome: Ongoing (interventions implemented as appropriate)  Patient in bed alert and oriented, patient complaining of legs hurting at this time. Medicated  With hydrocodone 10 mg po at this time. Will continue to observe. Patient continues to void per bedside commode,  Will continue to observe.

## 2017-03-06 NOTE — PLAN OF CARE
Problem: Patient Care Overview  Goal: Plan of Care Review  Outcome: Ongoing (interventions implemented as appropriate)  Received pt on 2.5L; SAT 95%. BIPAP on SB, ready for use. Will cont to monitor.

## 2017-03-06 NOTE — PLAN OF CARE
Problem: Patient Care Overview  Goal: Plan of Care Review  Outcome: Ongoing (interventions implemented as appropriate)  Pt given pain medication once this shift. Pt remains on o2 per NC. Pt worked with PT and OT today. Pt c/o SOB on exertion. Safety maintained. Bed in low locked position.

## 2017-03-07 LAB
ANION GAP SERPL CALC-SCNC: 10 MMOL/L
BASOPHILS # BLD AUTO: 0.02 K/UL
BASOPHILS NFR BLD: 0.3 %
BUN SERPL-MCNC: 50 MG/DL
CALCIUM SERPL-MCNC: 8.8 MG/DL
CHLORIDE SERPL-SCNC: 90 MMOL/L
CO2 SERPL-SCNC: 34 MMOL/L
CREAT SERPL-MCNC: 1.9 MG/DL
DIFFERENTIAL METHOD: ABNORMAL
EOSINOPHIL # BLD AUTO: 0.2 K/UL
EOSINOPHIL NFR BLD: 2.4 %
ERYTHROCYTE [DISTWIDTH] IN BLOOD BY AUTOMATED COUNT: 17.5 %
EST. GFR  (AFRICAN AMERICAN): 30 ML/MIN/1.73 M^2
EST. GFR  (NON AFRICAN AMERICAN): 26 ML/MIN/1.73 M^2
GLUCOSE SERPL-MCNC: 120 MG/DL
HCT VFR BLD AUTO: 26.4 %
HGB BLD-MCNC: 7.8 G/DL
LYMPHOCYTES # BLD AUTO: 0.6 K/UL
LYMPHOCYTES NFR BLD: 9.2 %
MAGNESIUM SERPL-MCNC: 2.1 MG/DL
MCH RBC QN AUTO: 27.4 PG
MCHC RBC AUTO-ENTMCNC: 29.5 %
MCV RBC AUTO: 93 FL
MONOCYTES # BLD AUTO: 0.6 K/UL
MONOCYTES NFR BLD: 9.1 %
NEUTROPHILS # BLD AUTO: 5.2 K/UL
NEUTROPHILS NFR BLD: 78.7 %
PHOSPHATE SERPL-MCNC: 4 MG/DL
PLATELET # BLD AUTO: 230 K/UL
PMV BLD AUTO: 9.7 FL
POTASSIUM SERPL-SCNC: 3.6 MMOL/L
RBC # BLD AUTO: 2.85 M/UL
SODIUM SERPL-SCNC: 134 MMOL/L
WBC # BLD AUTO: 6.6 K/UL

## 2017-03-07 PROCEDURE — 84100 ASSAY OF PHOSPHORUS: CPT

## 2017-03-07 PROCEDURE — 97530 THERAPEUTIC ACTIVITIES: CPT

## 2017-03-07 PROCEDURE — 11000001 HC ACUTE MED/SURG PRIVATE ROOM

## 2017-03-07 PROCEDURE — 27000221 HC OXYGEN, UP TO 24 HOURS

## 2017-03-07 PROCEDURE — 25000003 PHARM REV CODE 250: Performed by: NURSE PRACTITIONER

## 2017-03-07 PROCEDURE — 25000003 PHARM REV CODE 250: Performed by: HOSPITALIST

## 2017-03-07 PROCEDURE — 36415 COLL VENOUS BLD VENIPUNCTURE: CPT

## 2017-03-07 PROCEDURE — 94761 N-INVAS EAR/PLS OXIMETRY MLT: CPT

## 2017-03-07 PROCEDURE — 83735 ASSAY OF MAGNESIUM: CPT

## 2017-03-07 PROCEDURE — 99233 SBSQ HOSP IP/OBS HIGH 50: CPT | Mod: ,,, | Performed by: INTERNAL MEDICINE

## 2017-03-07 PROCEDURE — 80048 BASIC METABOLIC PNL TOTAL CA: CPT

## 2017-03-07 PROCEDURE — 85025 COMPLETE CBC W/AUTO DIFF WBC: CPT

## 2017-03-07 PROCEDURE — 94640 AIRWAY INHALATION TREATMENT: CPT

## 2017-03-07 PROCEDURE — 25000242 PHARM REV CODE 250 ALT 637 W/ HCPCS: Performed by: NURSE PRACTITIONER

## 2017-03-07 RX ADMIN — ASPIRIN 81 MG 81 MG: 81 TABLET ORAL at 08:03

## 2017-03-07 RX ADMIN — PRAVASTATIN SODIUM 40 MG: 40 TABLET ORAL at 08:03

## 2017-03-07 RX ADMIN — TRAZODONE HYDROCHLORIDE 150 MG: 100 TABLET ORAL at 08:03

## 2017-03-07 RX ADMIN — HYDROCODONE BITARTRATE AND ACETAMINOPHEN 1 TABLET: 10; 325 TABLET ORAL at 08:03

## 2017-03-07 RX ADMIN — BUMETANIDE 3 MG: 0.25 INJECTION INTRAMUSCULAR; INTRAVENOUS at 05:03

## 2017-03-07 RX ADMIN — CARVEDILOL 3.12 MG: 3.12 TABLET, FILM COATED ORAL at 08:03

## 2017-03-07 RX ADMIN — POTASSIUM CHLORIDE 40 MEQ: 20 TABLET, EXTENDED RELEASE ORAL at 08:03

## 2017-03-07 RX ADMIN — AMOXICILLIN AND CLAVULANATE POTASSIUM 1 TABLET: 875; 125 TABLET, FILM COATED ORAL at 08:03

## 2017-03-07 RX ADMIN — FERROUS SULFATE TAB EC 325 MG (65 MG FE EQUIVALENT) 325 MG: 325 (65 FE) TABLET DELAYED RESPONSE at 08:03

## 2017-03-07 RX ADMIN — BUMETANIDE 3 MG: 0.25 INJECTION INTRAMUSCULAR; INTRAVENOUS at 08:03

## 2017-03-07 RX ADMIN — CLOPIDOGREL BISULFATE 75 MG: 75 TABLET ORAL at 08:03

## 2017-03-07 RX ADMIN — AMIODARONE HYDROCHLORIDE 200 MG: 200 TABLET ORAL at 08:03

## 2017-03-07 RX ADMIN — BENZONATATE 100 MG: 100 CAPSULE ORAL at 12:03

## 2017-03-07 RX ADMIN — IPRATROPIUM BROMIDE AND ALBUTEROL SULFATE 3 ML: .5; 3 SOLUTION RESPIRATORY (INHALATION) at 11:03

## 2017-03-07 RX ADMIN — CARVEDILOL 3.12 MG: 3.12 TABLET, FILM COATED ORAL at 05:03

## 2017-03-07 RX ADMIN — PANTOPRAZOLE SODIUM 40 MG: 40 TABLET, DELAYED RELEASE ORAL at 08:03

## 2017-03-07 RX ADMIN — IPRATROPIUM BROMIDE AND ALBUTEROL SULFATE 3 ML: .5; 3 SOLUTION RESPIRATORY (INHALATION) at 08:03

## 2017-03-07 RX ADMIN — IPRATROPIUM BROMIDE AND ALBUTEROL SULFATE 3 ML: .5; 3 SOLUTION RESPIRATORY (INHALATION) at 04:03

## 2017-03-07 RX ADMIN — HYDROCODONE BITARTRATE AND ACETAMINOPHEN 1 TABLET: 10; 325 TABLET ORAL at 04:03

## 2017-03-07 NOTE — ASSESSMENT & PLAN NOTE
Takes amiodarone 200 mg daily, carvedilol 3.125 mg BID. Not on anticoagulation due to life-threatening GI bleeds on different anticoagulants.

## 2017-03-07 NOTE — ASSESSMENT & PLAN NOTE
H&H remains low and below baseline; recent EGD and colonoscopy at main campus; stool positive for blood upon admission; will discuss with primary team in regards to GI consult

## 2017-03-07 NOTE — SUBJECTIVE & OBJECTIVE
Interval History: Still has leg swelling but otherwise feels okay.    Review of Systems   Cardiovascular: Positive for leg swelling. Negative for chest pain.   Gastrointestinal: Negative for abdominal pain, nausea and vomiting.     Objective:     Vital Signs (Most Recent):  Temp: 98.7 °F (37.1 °C) (03/07/17 0800)  Pulse: 72 (03/07/17 0802)  Resp: 18 (03/07/17 0802)  BP: (!) 95/46 (03/07/17 0800)  SpO2: 95 % (03/07/17 0802) Vital Signs (24h Range):  Temp:  [98 °F (36.7 °C)-98.9 °F (37.2 °C)] 98.7 °F (37.1 °C)  Pulse:  [68-77] 72  Resp:  [18] 18  SpO2:  [88 %-99 %] 95 %  BP: ()/(46-55) 95/46     Weight: 122 kg (268 lb 14.4 oz)  Body mass index is 38.58 kg/(m^2).    Intake/Output Summary (Last 24 hours) at 03/07/17 1133  Last data filed at 03/07/17 0916   Gross per 24 hour   Intake             1040 ml   Output             2200 ml   Net            -1160 ml      Physical Exam   Constitutional: She is oriented to person, place, and time. She appears well-developed and well-nourished. No distress.   Cardiovascular: Normal rate and regular rhythm.    Murmur heard.  Pulmonary/Chest: Effort normal. No respiratory distress. She has no wheezes. She has rales.   Decreased at right lower lung   Abdominal: Soft. Bowel sounds are normal. She exhibits no distension. There is no tenderness.   Musculoskeletal: She exhibits edema.   Neurological: She is alert and oriented to person, place, and time.   Skin: Skin is warm and dry.   Psychiatric: She has a normal mood and affect. Her behavior is normal.   Nursing note and vitals reviewed.      Significant Labs:   BMP:     Recent Labs  Lab 03/07/17  0915   *   *   K 3.6   CL 90*   CO2 34*   BUN 50*   CREATININE 1.9*   CALCIUM 8.8   MG 2.1     CBC:     Recent Labs  Lab 03/05/17  1305 03/06/17  0831 03/07/17  0915   WBC 7.16 7.08 6.60   HGB 8.1* 8.1* 7.8*   HCT 27.7* 27.6* 26.4*    243 230       Significant Imaging: I have reviewed all pertinent imaging  results/findings within the past 24 hours.   X-Ray Chest 1 View 3/01/17: Stable cardiomegaly.  Stable AICD. Calcification of the aortic arch.  Prominence of the pulmonary vasculature.  Stable right pleural effusion with atelectasis/consolidation of the right lower lung zone.  Mildly prominent interstitial lung markings which may be seen with mild pulmonary edema or infectious process, similar to prior exam.  Osseous structures are unremarkable.  US Upper Extremity Veins Left 3/01/17: Color Doppler, duplex, gray scale imaging evaluation of left upper extremity veins.  The jugular, subclavian, axillary, brachial, basilic and cephalic veins are patent and normal.  2D echo with color flow doppler 3/01/17:     1 - Severely depressed left ventricular systolic function (EF 25-30%).     2 - Severe left atrial enlargement.     3 - Left ventricular diastolic dysfunction.     4 - Eccentric hypertrophy.     5 - Pulmonary hypertension. The estimated PA systolic pressure is 50 mmHg.     6 - Normal right ventricular systolic function .     7 - Severe mitral regurgitation.     8 - Moderate to severe tricuspid regurgitation.     9 - Increased central venous pressure.   X-Ray Chest PA And Lateral 3/07/17: Slight enlargement of the right pleural effusion. Patchy bilateral interstitial opacities. The mediastinal structures are midline. The cardiac silhouette is enlarged but unchanged in size. The osseous structures demonstrate no acute abnormality.

## 2017-03-07 NOTE — PT/OT/SLP PROGRESS
Physical Therapy      Jennifer Prescott  MRN: [id number]  Time 1350      Patient found upright sitting EOB. Patient not seen today secondary to refusal to participate at this time c/o swelling LE's. Will follow as able.    Tien Crandall, PT

## 2017-03-07 NOTE — PT/OT/SLP PROGRESS
Occupational Therapy  Multiple Visit Attempts    Jennifer Prescott  MRN: [id number]    Patient not seen today secondary to patient THELMA in Radiology (10:00AM). Second attempt at 11:25 AM and patient declining therapy at this time 2* fatigue. Will attempt in PM, as time permits,    ABBEY Vital  3/7/2017

## 2017-03-07 NOTE — PLAN OF CARE
Patient remains in hospital.  Has all DME including home oxygen.    Future Appointments  Date Time Provider Department Center   3/8/2017 10:00 AM Lydia Hackett NP [facility] HEPAT [address]   3/10/2017 2:00 PM Zuleyka Becerra MD [facility] RICA [facility]   4/5/2017 1:00 PM Jovani Urrutia MD [facility] GASTRO [address]        03/07/17 9614   Discharge Reassessment   Assessment Type Discharge Planning Reassessment   Can the patient answer the patient profile reliably? Yes, cognitively intact   How does the patient rate their overall health at the present time? Fair   Describe the patient's ability to walk at the present time. Walks with the help of equipment   During the past month, has the patient often been bothered by feeling down, depressed or hopeless? No   During the past month, has the patient often been bothered by little interest or pleasure in doing things? No   Discharge plan remains the same: Yes   Provided patient/caregiver education on the expected discharge date and the discharge plan Yes   Discharge Plan A Home;Home with family   Discharge Plan B Home;Home with family;Home Health   Change in patient condition or support system No   Explained to the the patient/caregiver why the discharge planned changed: Yes   Involved the patient/caregiver in establishing a new discharge plan: Yes     Irma Jack, RN, CCM, CMSRN  RN Transition Navigator  [phone]

## 2017-03-07 NOTE — PHYSICIAN QUERY
"PT Name: Jennifer Prescott  MR #: [id number]  Physician Query Form - Renal Clarification   Reviewer  [email or web address]    This form is a permanent document in the medical record.     QueryDate: March 7, 2017    By submitting this query, we are merely seeking further clarification of documentation. Please utilize your independent clinical judgment when addressing the question(s) below.    The Medical record reflects the following:   Indicator Supporting Clinical Findings Location in Medical Record    "Renal Insufficiency" documented       "Dehydration" documented      CR/BUN =               GFR = CR = 1.2----->2.0    BUN = 39----->47    GFR = 46------->25     Labs 3/3-3/6    K=       "Nausea/Vomiting" documented      Dialysis/CRRT       Medication:      x Treatment: Cardiology started diuril, but will hold now with increasing creatinine   HM PN 3/6   x Other:  Remains volume overloaded  PN 3/6     Provider, please specify the diagnosis or diagnoses associated with above clinical findings.    [ ] Acute Kidney Injury / Acute Renal Failure    [ ] Acute Kidney Injury With lesion of tubular necrosis (ATN)    [ ] Acute Interstitial Nephritis (AIN)    [ ] Glomerulonephritis    [ x] Other Renal Diagnosis (Specify) ________acute kidney injury on chronic kidney disease stage 3 due to cardiorenal syndrome    [ ] Clinically Undetermined      Please document in your progress notes daily for the duration of treatment, until resolved, and include in your discharge summary.  "

## 2017-03-07 NOTE — ASSESSMENT & PLAN NOTE
Echo on 3/1/2017 with LVEF 25%, diastolic dysfunction and severe MR; acute decompensated HF upon presentation; aggressive diuresis in process; Diuril discontinued due to rise in creatinine; on Bumex 3mg IV BID; 1.9 liters out overnight with 1.2 liters negative in 24hours; negative 4.9 liters since admission; CXR improving but remains with volume overload with rales on exam and peripheral edema; recommend continued diuresis with IV Bumex BID-will continue to hold Diuril for now; continue BB only; ARB on hold due to renal function; strict I&Os and daily weights; monitor creatinine closely

## 2017-03-07 NOTE — PROGRESS NOTES
[facility]  Cardiology  Progress Note    Patient Name: Jennifer Prescott  MRN: [id number]  Admission Date: 3/1/2017  Hospital Length of Stay: 6 days  Code Status: Full Code   Attending Physician: Freddie Waterman MD   Primary Care Physician: Lianna Mistry MD  Expected Discharge Date:   Principal Problem:Acute on chronic combined systolic and diastolic congestive heart failure    Subjective:     Hospital Course:   3/1/2017-3/2/2017 Admitted for trcurrent SOB and BAEZ with progressive worsening for the past 3 days. No improvement with duoneb treatments Denies chest pain, fevers, chills, nausea , vomiting, diarrhea, or blood in her stool. ED workup revealed green-brown stool, heme Occult +, Chest X-Ray showing Pulmonary Edema with right pleural effusion and right lower lobe consolidation, unchanged from prior, 12 ECG with Wide Complex regular rhythm with no P waves or notable Pacer spikes, rate 76 BPM, No acute T wave changes (Serum K 4.6) Negative Flu A/B, Urinalysis with occult blood, 3+ leukocyts, > bacteria, no nitrites. WBC 15.45, Alk Phos 162, AST 50. Troponin 0.034 ([facility]). BNP 3474 (1856 1 week ago) Physical exam revealed fine bibasilar crackles and bilateral gross pitting edema in lower extremities up to her knees. Given furosemide 60 IV, Duoneb Treatment and ciprofloxacin 400 mg IV in the ED with only 850cc urine output. Aggressive diuresis continued and changed to Bumex 3mg IV TID with resumption of ASA, Plavix, Imdur, Losartan and Coreg. Total bilirubin elevated at 2.0 and primary working diagnosis is ADHF due to inadeverent discontinuation of diuretic therapy 3/3/2017 On telemetry unit with aggressive diuresis with Bumex 3mg IV TID with 1.9 liters out overnight and negative 1.0 liters in 24 hours and barely net negative. Peripheral edema remains with continued BAEZ although slightly improved per patient. Cardiology consulted for management with recommendation for continued aggressive  diuresis with IV Bumex until euvolemic and peripheral edema markedly improved 3/4/2017-3/5/2017 Continued on IV Bumex BID with addition of IV Diuril 250mg BID with good response and negative fluid balance since admission. Creatinine up to 1.7 with conitnuation of medication regimen for volume removal 3/6/2017 HR and BP stable overnight. 1.7 liters out overnight and negative 1.5 liters in 24hours and negative 3.6 liters since admission. Creatinine 2.0 this AM up from 1.7 yesterday. Diuril discontinued due to upward trend of creatinine and Bumex continued 3/7/2017 SOB improving 1.9 liters out overnight and negative 1.2 in 24hours period and net negative 4.9 liters since admission. Peripheral edema only slightly improved (down to 2-3+ from 4+). Repeat CXR with continued pulmonary vascular congestion although improving. H&H remains low at 7&26             Review of Systems   Constitution: Negative for fever and weakness.   Cardiovascular: Positive for leg swelling. Negative for claudication, cyanosis, dyspnea on exertion, irregular heartbeat, near-syncope, orthopnea, palpitations, paroxysmal nocturnal dyspnea and syncope.   Respiratory: Negative for cough, shortness of breath and wheezing.    Gastrointestinal: Negative for abdominal pain, constipation, diarrhea and nausea.     Objective:     Vital Signs (Most Recent):  Temp: 98.7 °F (37.1 °C) (03/07/17 0800)  Pulse: 73 (03/07/17 1147)  Resp: 18 (03/07/17 1147)  BP: (!) 95/46 (03/07/17 0800)  SpO2: 95 % (03/07/17 1147) Vital Signs (24h Range):  Temp:  [98 °F (36.7 °C)-98.9 °F (37.2 °C)] 98.7 °F (37.1 °C)  Pulse:  [68-77] 73  Resp:  [18] 18  SpO2:  [88 %-99 %] 95 %  BP: ()/(46-55) 95/46     Weight: 122 kg (268 lb 14.4 oz)  Body mass index is 38.58 kg/(m^2).     SpO2: 95 %  O2 Device (Oxygen Therapy): nasal cannula      Intake/Output Summary (Last 24 hours) at 03/07/17 1507  Last data filed at 03/07/17 0916   Gross per 24 hour   Intake              680 ml   Output              2000 ml   Net            -1320 ml       Lines/Drains/Airways     Peripheral Intravenous Line                 Peripheral IV - Single Lumen 03/06/17 1325 Right Forearm 1 day                Physical Exam   Constitutional: She is oriented to person, place, and time. She appears well-developed and well-nourished. No distress.   Cardiovascular: Normal rate and regular rhythm.    No murmur heard.  Pulmonary/Chest: Effort normal. She has rales in the right lower field and the left lower field.   Abdominal: Soft. Bowel sounds are normal. She exhibits no distension. There is no tenderness.   Musculoskeletal: She exhibits edema (3+ BLE edema ).   Neurological: She is alert and oriented to person, place, and time.   Skin: Skin is warm and dry.       Significant Labs:       Recent Labs  Lab 03/07/17  0915   *   K 3.6   CL 90*   CO2 34*   BUN 50*   CREATININE 1.9*   MG 2.1       Recent Labs  Lab 03/07/17  0915   WBC 6.60   RBC 2.85*   HGB 7.8*   HCT 26.4*      MCV 93   MCH 27.4   MCHC 29.5*       Significant Imaging: Echocardiogram:   2D echo with color flow doppler:   Results for orders placed or performed during the hospital encounter of 03/01/17   2D echo with color flow doppler   Result Value Ref Range    EF 25 (A) 55 - 65    Mitral Valve Regurgitation SEVERE (A)     Diastolic Dysfunction Yes (A)     Est. PA Systolic Pressure 50.28 (A)     Pericardial Effusion NONE     Tricuspid Valve Regurgitation MODERATE TO SEVERE (A)      Assessment and Plan:     Brief HPI: Seen this morning on AM rounds with Dr. Kinney while sitting on the edge of the bed. Reports SOB improving but LE swelling remains. Discussed cardiac POC with patient to include continuation of fluid removal with IV medication with close monitoring-patient verbalized understanding of POC     * Acute on chronic combined systolic and diastolic congestive heart failure  Echo on 3/1/2017 with LVEF 25%, diastolic dysfunction and severe MR; acute  decompensated HF upon presentation; aggressive diuresis in process; Diuril discontinued due to rise in creatinine; on Bumex 3mg IV BID; 1.9 liters out overnight with 1.2 liters negative in 24hours; negative 4.9 liters since admission; CXR improving but remains with volume overload with rales on exam and peripheral edema; recommend continued diuresis with IV Bumex BID-will continue to hold Diuril for now; continue BB only; ARB on hold due to renal function; strict I&Os and daily weights; monitor creatinine closely     Permanent atrial fibrillation  Currently paced rhythm with consistent paced rhythm since admission; no underlying afib noted; continue BB; chronic AC on hold due to recent GIB;  ; will continue to hold chronic AC due to persistent low H&H and recent stool positive for OCB-recommend GI evaluation     CAD S/P percutaneous coronary angioplasty  S/p LAD PCI and RCA PCI for  in 1/2015; chest pain free; no concern for ACS;  continue ASA, Plavix, BB, and statin; ARB on hold due to rise in creatinine     Ischemic cardiomyopathy  Management as detailed below    Iron deficiency anemia due to chronic blood loss  H&H remains low and below baseline; recent EGD and colonoscopy at [facility]; stool positive for blood upon admission; will discuss with primary team in regards to GI consult       VTE Risk Mitigation         Ordered     Medium Risk of VTE  Once      03/01/17 1525     Place sequential compression device  Until discontinued      03/01/17 1525     Place ALL hose  Until discontinued      03/01/17 1525     Reason for No Pharmacological VTE Prophylaxis  Once      03/01/17 1525            SEJAL Strickland, ANP  Cardiology  [facility]

## 2017-03-07 NOTE — PT/OT/SLP PROGRESS
Occupational Therapy  Treatment    Jennifer Prescott   MRN: [id number]   Admitting Diagnosis: Acute on chronic combined systolic and diastolic congestive heart failure    OT Date of Treatment: 17   OT Start Time: 1453  OT Stop Time: 1502  OT Total Time (min): 9 min    Billable Minutes:  Therapeutic Activity 9    General Precautions: Standard, fall  Orthopedic Precautions: N/A  Braces: N/A    Do you have any cultural, spiritual, Rastafari conflicts, given your current situation?: None    Subjective:  Communicated with nurseSusan prior to session.    Pain Ratin/10  Pain Rating Post-Intervention: 0/10    Objective:  Patient found with: peripheral IV, oxygen, telemetry     Functional Mobility:  Bed Mobility:  Scooting/Bridging: Contact Guard Assistance  Sit to Supine: Minimum Assistance, With leg lift    Transfers: N/A    Functional Ambulation: N/A    Activities of Daily Living:  N/A    Balance:   Static Sit: GOOD: Takes MODERATE challenges from all directions  Dynamic Sit: GOOD-: Maintains balance through MODERATE excursions of active trunk movement,       Therapeutic Activities and Exercises:  Patient seated EOB on arrival (had been seated EOB since 11:00AM). Min (A) for leg lift to return supine. Able to scoot to HOB using BUEs on handrail and min VCs to pull. Patient declined pillow to elevate heels - foot of bed elevated to reduce edema noted in BLEs.     AM-PAC 6 CLICK ADL   How much help from another person does this patient currently need?   1 = Unable, Total/Dependent Assistance  2 = A lot, Maximum/Moderate Assistance  3 = A little, Minimum/Contact Guard/Supervision  4 = None, Modified [place]/Independent    Putting on and taking off regular lower body clothing? : 3  Bathing (including washing, rinsing, drying)?: 3  Toileting, which includes using toilet, bedpan, or urinal? : 3  Putting on and taking off regular upper body clothing?: 3  Taking care of personal grooming such as brushing teeth?:  3  Eating meals?: 4  Total Score: 19     AM-PAC Raw Score CMS G-Code Modifier Level of Impairment Assistance   6 % Total / Unable   7 - 9 CM 80 - 100% Maximal Assist   10 - 14 CL 60 - 80% Moderate Assist   15 - 19 CK 40 - 60% Moderate Assist   20 - 22 CJ 20 - 40% Minimal Assist   23 CI 1-20% SBA / CGA   24 CH 0% Independent/ Mod I     Patient left HOB elevated with all lines intact, call button in reach, bed alarm on and NSG notified    ASSESSMENT:  Jennifer Prescott is a 71 y.o. female with a medical diagnosis of Acute on chronic combined systolic and diastolic congestive heart failure   Patient fatigued from sitting EOB. Required Min (A) to return to supine. Increased edema noted in BLEs from maintaining prolonged sitting position at EOB. Patient will benefit from continued OT to address functional deficits.    Rehab identified problem list/impairments: Rehab identified problem list/impairments: weakness, impaired endurance, impaired self care skills, impaired functional mobilty, gait instability, impaired balance, edema, decreased safety awareness, decreased lower extremity function, decreased upper extremity function, impaired cardiopulmonary response to activity    Rehab potential is fair+    Activity tolerance: Fair    Discharge recommendations: Discharge Facility/Level Of Care Needs: home health OT, home with home health     Barriers to discharge: Barriers to Discharge: None    Equipment recommendations: none     GOALS:   Occupational Therapy Goals        Problem: Occupational Therapy Goal    Goal Priority Disciplines Outcome Interventions   Occupational Therapy Goal     OT, PT/OT Ongoing (interventions implemented as appropriate)    Description:  Goals to be met by: 4/2/2017     Patient will increase functional independence with ADLs by performing:    UE Dressing with Modified [place].  LE Dressing with Modified [place].  Grooming while standing at sink with Modified [place].  Toileting  from bedside commode with Modified [place] for hygiene and clothing management.   Bathing from  shower chair/bench with Supervision.  Stand pivot transfers with Modified [place].  Toilet transfer to bedside commode with Modified [place].    Recs:  Pt. With shower chair & BSC already in use at home; all equipment needs met.  To benefit from  OT services.              Plan:  Patient to be seen 5 x/week to address the above listed problems via self-care/home management, therapeutic activities, therapeutic exercises  Plan of Care expires: 04/02/17  Plan of Care reviewed with: patient         KASSANDRA Vital/GILMAR  03/07/2017

## 2017-03-07 NOTE — NURSING
Plan of care reviewed with patient. Patient remains paced on monitor heart rate 70s 80s . Patient complained of pain in both feet, nurse administered PRN percocet. Patient slept the remainder of shift. Fall precautions maintain, side rails X 2, bed alarm, call bell within reach.

## 2017-03-07 NOTE — ASSESSMENT & PLAN NOTE
Ischemic Cardiomyopathy/ CAD s/p PCI  Appreciate Cardiology. Continue IV bumetanide another day. Likely can discharge home tomorrow on bumetanide 3 mg PO BID. Follow up with Dr. Braga.

## 2017-03-07 NOTE — ASSESSMENT & PLAN NOTE
Creatinine is up to 2 today. Avoid nephrotoxins and renally dose meds. Monitor closely d/t aggressive diuresis. Hold losartan. Stop diuril. Continue bumex.

## 2017-03-07 NOTE — ASSESSMENT & PLAN NOTE
Chronic Respiratory Failure/ Home Oxygen Dependant /Obesity hypoventilation Syndrome on BiPAP  -Oxygenating well on home oxygen 3 LPM, Continue to keep SaO2 >88 %  -Continue home BiPAP at night  -No wheezing on exam  -Scheduled Duonebs while awake

## 2017-03-07 NOTE — ASSESSMENT & PLAN NOTE
S/p LAD PCI and RCA PCI for  in 1/2015; chest pain free; no concern for ACS;  continue ASA, Plavix, BB, and statin; ARB on hold due to rise in creatinine

## 2017-03-07 NOTE — PROGRESS NOTES
[facility] Medicine  Progress Note    Patient Name: Jennifer Prescott  MRN: [id number]  Patient Class: IP- Inpatient   Admission Date: 3/1/2017  Length of Stay: 6 days  Attending Physician: Freddie Waterman MD  Primary Care Provider: Lianna Mistry MD        Subjective:     Principal Problem:Acute on chronic combined systolic and diastolic congestive heart failure    HPI:  Jennifer Prescott is a 71 y.o.  woman with coronary artery disease (s/p 3 JAMAL to RCA 1/27/15), chronic systolic diastolic congestive heart failure (EF=20% 9/24/16), chronic hypotension, s/p pacemaker 9/21/10, s/p biventricular AICD 10/09/12, permanent atrial fibrillation (had cardioversion 3/06/15), bilateral carotid artery disease, left lower lobe pulmonary embolism (2/12/15), COPD with 40 pack year smoking history, continuous home oxygen 3 LPM, obesity hypoventilation syndrome (on BiPAP), chronic hypoxic respiratory failure, ischemic colitis with recurrent GI bleeding, iron deficiency anemia due to chronic gastrointestinal blood loss, and chronic kidney disease stage 3. She lives in [place], [place]. She works in a [employer] center. Her primary care physician is Dr. Lianna Mistry at [facility]. Her cardiologist is Dr. Riley Braga.    She was started on apixaban 5 mg BID on 1/30/17, as she had been off anticoagulation since her last GI bleed. She was hospitalized at [facility] from 2/15/17-2/20/17 for another GI bleed and anemia episode. She was told to stop apixaban but also stopped bumetanide instead of decreasing the dose as erroneously advised, and presented to [facility] 2 days later (2/22/17) with pulmonary edema. BNP was 1856, increased from 1576 on 2/15/17. She was diuresed with IV chlorothiazide and IV bumetanide 2 mg BID and advised to continue taking oral bumetanide 2 mg BID for three days after discharge before returning to her usual 1 mg BID. She was discharged home on  2/24/17.    She returned to [facility] ED on 3/01/17 with recurrent shortness of breath and dyspnea on exertion, progressively worse over the past three days. Chest x-ray was unchanged but BNP was even higher (3474) and she was still edematous with pulmonary crackles. WBC was 15,450 and urinalysis showed >100 WBC/hpf with many bacteria (she did not have a Ahuja catheter during her previous admission). She was readmitted to [facility] for aggressive diuresis and antibiotics for new UTI.       Hospital Course:  Echocardiogram showed EF 25% (improved from 20%) and worsening valvular disease with severe mitral and tricuspid insufficiency. IV chlorothiazide and IV bumetanide were again given, with a higher bumetanide dose (3 mg). Cardiology was consulted and increased frequency of chlorothiazide to BID. Urine culture grew pan-sensitive E coli, so antibiotics were switched to amoxicillin-clavulanate to complete the course. By 3/06/17 she was net negative 4.9 liters and chlorothiazide was held. Cough had resolved. She still had taut leg edema. Repeat chest x-ray on 3/07/17 showed enlargement of the right pleural effusion.     Interval History: Still has leg swelling but otherwise feels okay.    Review of Systems   Cardiovascular: Positive for leg swelling. Negative for chest pain.   Gastrointestinal: Negative for abdominal pain, nausea and vomiting.     Objective:     Vital Signs (Most Recent):  Temp: 98.7 °F (37.1 °C) (03/07/17 0800)  Pulse: 72 (03/07/17 0802)  Resp: 18 (03/07/17 0802)  BP: (!) 95/46 (03/07/17 0800)  SpO2: 95 % (03/07/17 0802) Vital Signs (24h Range):  Temp:  [98 °F (36.7 °C)-98.9 °F (37.2 °C)] 98.7 °F (37.1 °C)  Pulse:  [68-77] 72  Resp:  [18] 18  SpO2:  [88 %-99 %] 95 %  BP: ()/(46-55) 95/46     Weight: 122 kg (268 lb 14.4 oz)  Body mass index is 38.58 kg/(m^2).    Intake/Output Summary (Last 24 hours) at 03/07/17 1133  Last data filed at 03/07/17 0916   Gross per 24 hour   Intake              1040 ml   Output             2200 ml   Net            -1160 ml      Physical Exam   Constitutional: She is oriented to person, place, and time. She appears well-developed and well-nourished. No distress.   Cardiovascular: Normal rate and regular rhythm.    Murmur heard.  Pulmonary/Chest: Effort normal. No respiratory distress. She has no wheezes. She has rales.   Decreased at right lower lung   Abdominal: Soft. Bowel sounds are normal. She exhibits no distension. There is no tenderness.   Musculoskeletal: She exhibits edema.   Neurological: She is alert and oriented to person, place, and time.   Skin: Skin is warm and dry.   Psychiatric: She has a normal mood and affect. Her behavior is normal.   Nursing note and vitals reviewed.      Significant Labs:   BMP:     Recent Labs  Lab 03/07/17  0915   *   *   K 3.6   CL 90*   CO2 34*   BUN 50*   CREATININE 1.9*   CALCIUM 8.8   MG 2.1     CBC:     Recent Labs  Lab 03/05/17  1305 03/06/17  0831 03/07/17  0915   WBC 7.16 7.08 6.60   HGB 8.1* 8.1* 7.8*   HCT 27.7* 27.6* 26.4*    243 230       Significant Imaging: I have reviewed all pertinent imaging results/findings within the past 24 hours.   X-Ray Chest 1 View 3/01/17: Stable cardiomegaly.  Stable AICD. Calcification of the aortic arch.  Prominence of the pulmonary vasculature.  Stable right pleural effusion with atelectasis/consolidation of the right lower lung zone.  Mildly prominent interstitial lung markings which may be seen with mild pulmonary edema or infectious process, similar to prior exam.  Osseous structures are unremarkable.  US Upper Extremity Veins Left 3/01/17: Color Doppler, duplex, gray scale imaging evaluation of left upper extremity veins.  The jugular, subclavian, axillary, brachial, basilic and cephalic veins are patent and normal.  2D echo with color flow doppler 3/01/17:     1 - Severely depressed left ventricular systolic function (EF 25-30%).     2 - Severe left  atrial enlargement.     3 - Left ventricular diastolic dysfunction.     4 - Eccentric hypertrophy.     5 - Pulmonary hypertension. The estimated PA systolic pressure is 50 mmHg.     6 - Normal right ventricular systolic function .     7 - Severe mitral regurgitation.     8 - Moderate to severe tricuspid regurgitation.     9 - Increased central venous pressure.   X-Ray Chest PA And Lateral 3/07/17: Slight enlargement of the right pleural effusion. Patchy bilateral interstitial opacities. The mediastinal structures are midline. The cardiac silhouette is enlarged but unchanged in size. The osseous structures demonstrate no acute abnormality.    Assessment/Plan:      * Acute on chronic combined systolic and diastolic congestive heart failure  Ischemic Cardiomyopathy/ CAD s/p PCI  Appreciate Cardiology. Continue IV bumetanide another day. Likely can discharge home tomorrow on bumetanide 3 mg PO BID. Follow up with Dr. Braga.      Permanent atrial fibrillation  Takes amiodarone 200 mg daily, carvedilol 3.125 mg BID. Not on anticoagulation due to life-threatening GI bleeds on different anticoagulants.      COPD (chronic obstructive pulmonary disease)  Chronic Respiratory Failure/ Home Oxygen Dependant /Obesity hypoventilation Syndrome on BiPAP  Takes fluticasone-vilanterol 100-25 mcg daily, albuterol-ipratropium nebulizer treatments. Continue BiPAP nightly.        CAD S/P percutaneous coronary angioplasty  Continue aspirin 81 mg daily, clopidrogel 75 mg daily, isosorbide mononitrate 60 mg daily, pravastatin 40 mg daily.    E. coli UTI  Not related to Ahuja catheter. Finish course with amoxicillin-clavulanate.    VTE Risk Mitigation         Ordered     Medium Risk of VTE  Once      03/01/17 1525     Place sequential compression device  Until discontinued      03/01/17 1525     Place ALL hose  Until discontinued      03/01/17 1525     Reason for No Pharmacological VTE Prophylaxis  Once      03/01/17 1525         Disposition: If doing okay tomorrow, discharge home on bumetanide increased to 3 mg BID.    Freddie Waterman MD  Department of Hospital Medicine   [facility]

## 2017-03-07 NOTE — PLAN OF CARE
Problem: Patient Care Overview  Goal: Plan of Care Review  Outcome: Ongoing (interventions implemented as appropriate)  Pt on nasal cannula flow as documented in flowsheets.  Pt in no apparent respiratory distress.  Will continue to monitor.

## 2017-03-07 NOTE — ASSESSMENT & PLAN NOTE
Currently paced rhythm with consistent paced rhythm since admission; no underlying afib noted; continue BB; chronic AC on hold due to recent GIB;  ; will continue to hold chronic AC due to persistent low H&H and recent stool positive for OCB-recommend GI evaluation

## 2017-03-07 NOTE — PLAN OF CARE
Problem: Patient Care Overview  Goal: Plan of Care Review  Outcome: Ongoing (interventions implemented as appropriate)  Pt safety maintained. Pt remained on O2 per NC. Pt went for pa and lat chest x ray. No distress noted. Will continue to monitor.

## 2017-03-07 NOTE — ASSESSMENT & PLAN NOTE
Continue aspirin 81 mg daily, clopidrogel 75 mg daily, isosorbide mononitrate 60 mg daily, pravastatin 40 mg daily.

## 2017-03-07 NOTE — SUBJECTIVE & OBJECTIVE
Review of Systems   Constitution: Negative for fever and weakness.   Cardiovascular: Positive for leg swelling. Negative for claudication, cyanosis, dyspnea on exertion, irregular heartbeat, near-syncope, orthopnea, palpitations, paroxysmal nocturnal dyspnea and syncope.   Respiratory: Negative for cough, shortness of breath and wheezing.    Gastrointestinal: Negative for abdominal pain, constipation, diarrhea and nausea.     Objective:     Vital Signs (Most Recent):  Temp: 98.7 °F (37.1 °C) (03/07/17 0800)  Pulse: 73 (03/07/17 1147)  Resp: 18 (03/07/17 1147)  BP: (!) 95/46 (03/07/17 0800)  SpO2: 95 % (03/07/17 1147) Vital Signs (24h Range):  Temp:  [98 °F (36.7 °C)-98.9 °F (37.2 °C)] 98.7 °F (37.1 °C)  Pulse:  [68-77] 73  Resp:  [18] 18  SpO2:  [88 %-99 %] 95 %  BP: ()/(46-55) 95/46     Weight: 122 kg (268 lb 14.4 oz)  Body mass index is 38.58 kg/(m^2).     SpO2: 95 %  O2 Device (Oxygen Therapy): nasal cannula      Intake/Output Summary (Last 24 hours) at 03/07/17 1507  Last data filed at 03/07/17 0916   Gross per 24 hour   Intake              680 ml   Output             2000 ml   Net            -1320 ml       Lines/Drains/Airways     Peripheral Intravenous Line                 Peripheral IV - Single Lumen 03/06/17 1325 Right Forearm 1 day                Physical Exam   Constitutional: She is oriented to person, place, and time. She appears well-developed and well-nourished. No distress.   Cardiovascular: Normal rate and regular rhythm.    No murmur heard.  Pulmonary/Chest: Effort normal. She has rales in the right lower field and the left lower field.   Abdominal: Soft. Bowel sounds are normal. She exhibits no distension. There is no tenderness.   Musculoskeletal: She exhibits edema (3+ BLE edema ).   Neurological: She is alert and oriented to person, place, and time.   Skin: Skin is warm and dry.       Significant Labs:       Recent Labs  Lab 03/07/17  0915   *   K 3.6   CL 90*   CO2 34*   BUN 50*    CREATININE 1.9*   MG 2.1       Recent Labs  Lab 03/07/17  0915   WBC 6.60   RBC 2.85*   HGB 7.8*   HCT 26.4*      MCV 93   MCH 27.4   MCHC 29.5*       Significant Imaging: Echocardiogram:   2D echo with color flow doppler:   Results for orders placed or performed during the hospital encounter of 03/01/17   2D echo with color flow doppler   Result Value Ref Range    EF 25 (A) 55 - 65    Mitral Valve Regurgitation SEVERE (A)     Diastolic Dysfunction Yes (A)     Est. PA Systolic Pressure 50.28 (A)     Pericardial Effusion NONE     Tricuspid Valve Regurgitation MODERATE TO SEVERE (A)

## 2017-03-07 NOTE — ASSESSMENT & PLAN NOTE
Chronic Respiratory Failure/ Home Oxygen Dependant /Obesity hypoventilation Syndrome on BiPAP  Takes fluticasone-vilanterol 100-25 mcg daily, albuterol-ipratropium nebulizer treatments. Continue BiPAP nightly.

## 2017-03-07 NOTE — PT/OT/SLP PROGRESS
Occupational Therapy  Visit Attempt    Jennifer Prescott  MRN: [id number]    Patient not seen today secondary to patient declining therapy participation at this time 2* fatigue. Educated on importance of ax participation. Will follow-up later, as time permits.    ABBEY Vital  3/7/2017

## 2017-03-07 NOTE — ASSESSMENT & PLAN NOTE
Ischemic Cardiomyopathy/ CAD s/p PCI  Remains volume overloaded. Cardiology started diuril, but will hold now with increasing creatinine. Continue bumex. Daily weights. Monitor I&Os.

## 2017-03-07 NOTE — SUBJECTIVE & OBJECTIVE
Interval History: Breathing is doing better today. Still with significant swelling.     Review of Systems   Constitutional: Positive for fatigue. Negative for fever.   Respiratory: Positive for shortness of breath.    Cardiovascular: Positive for leg swelling. Negative for chest pain.   Gastrointestinal: Negative for abdominal pain, nausea and vomiting.     Objective:     Vital Signs (Most Recent):  Temp: 98.9 °F (37.2 °C) (03/06/17 2059)  Pulse: 70 (03/07/17 0100)  Resp: 18 (03/06/17 2123)  BP: (!) 97/50 (03/06/17 2059)  SpO2: 97 % (03/07/17 0310) Vital Signs (24h Range):  Temp:  [97.6 °F (36.4 °C)-98.9 °F (37.2 °C)] 98.9 °F (37.2 °C)  Pulse:  [68-77] 70  Resp:  [18] 18  SpO2:  [88 %-99 %] 97 %  BP: ()/(50-58) 97/50     Weight: 122 kg (268 lb 14.4 oz)  Body mass index is 38.58 kg/(m^2).    Intake/Output Summary (Last 24 hours) at 03/07/17 0432  Last data filed at 03/07/17 0000   Gross per 24 hour   Intake              680 ml   Output             1150 ml   Net             -470 ml      Physical Exam   Constitutional: She is oriented to person, place, and time. She appears well-developed and well-nourished. No distress.   Cardiovascular: Normal rate and regular rhythm.    Murmur heard.  Pulmonary/Chest: Effort normal. No respiratory distress. She has no wheezes. She has no rales.   Abdominal: Soft. Bowel sounds are normal. She exhibits no distension. There is no tenderness.   Musculoskeletal: She exhibits edema.   Neurological: She is alert and oriented to person, place, and time.   Skin: Skin is warm and dry.   Psychiatric: She has a normal mood and affect. Her behavior is normal.   Nursing note and vitals reviewed.      Significant Labs:   BMP:   Recent Labs  Lab 03/06/17  0831   GLU 97   *   K 3.5   CL 89*   CO2 33*   BUN 47*   CREATININE 2.0*   CALCIUM 8.6*   MG 2.2     CBC:   Recent Labs  Lab 03/05/17  1305 03/06/17  0831   WBC 7.16 7.08   HGB 8.1* 8.1*   HCT 27.7* 27.6*    243        Significant Imaging: I have reviewed all pertinent imaging results/findings within the past 24 hours.

## 2017-03-07 NOTE — PLAN OF CARE
Problem: Occupational Therapy Goal  Goal: Occupational Therapy Goal  Goals to be met by: 4/2/2017     Patient will increase functional independence with ADLs by performing:    UE Dressing with Modified [place].  LE Dressing with Modified [place].  Grooming while standing at sink with Modified [place].  Toileting from bedside commode with Modified [place] for hygiene and clothing management.   Bathing from shower chair/bench with Supervision.  Stand pivot transfers with Modified [place].  Toilet transfer to bedside commode with Modified [place].    Recs: Pt. With shower chair & BSC already in use at home; all equipment needs met. To benefit from  OT services.   Outcome: Ongoing (interventions implemented as appropriate)  Patient fatigued from sitting EOB. Required Min (A) to return to supine. Increased edema noted in BLEs from maintaining prolonged sitting position at EOB. Patient will benefit from continued OT to address functional deficits.

## 2017-03-07 NOTE — PROGRESS NOTES
[facility] Medicine  Progress Note    Patient Name: Jennifer Prescott  MRN: [id number]  Patient Class: IP- Inpatient   Admission Date: 3/1/2017  Length of Stay: 6 days  Attending Physician: Anderson Badillo MD  Primary Care Provider: Lianna Mistry MD        Subjective:     Principal Problem:Acute on chronic combined systolic and diastolic congestive heart failure    HPI:  Jennifer Prescott is a 71 y.o.  woman with coronary artery disease (s/p 3 JAMAL to RCA 1/27/15), chronic systolic diastolic congestive heart failure (EF=20% 9/24/16), chronic hypotension, s/p pacemaker 9/21/10, s/p biventricular AICD 10/09/12, permanent atrial fibrillation (had cardioversion 3/06/15), bilateral carotid artery disease, left lower lobe pulmonary embolism (2/12/15), COPD with 40 pack year smoking history, continuous home oxygen 3 LPM, obesity hypoventilation syndrome (on BiPAP), chronic hypoxic respiratory failure, ischemic colitis with recurrent GI bleeding, iron deficiency anemia due to chronic gastrointestinal blood loss, and chronic kidney disease stage 3. She lives in [place], [place]. She works in a [employer] center. Her primary care physician is Dr. Lianna Mistry at [facility]. Her cardiologist is Dr. Riley Braga.    She was started on apixaban 5 mg BID on 1/30/17, as she had been off anticoagulation since her last GI bleed. She was hospitalized at [facility] from 2/15/17-2/20/17 for another GI bleed and anemia episode. She was told to stop apixaban but also stopped bumetanide in err, and presented to [facility] 2 days later (2/22/17) with pulmonary edema. BNP was 1856, increased from 1576 on 2/15/17. She was diuresed and advised to continue taking bumetanide 2 mg BID for a few three days after discharge before returning to her usual 1 mg BID. She was discharged home on 2/24/17.    She returns to [facility] ED recurrent SOB and dyspnea on exertion, progressively  worse over the past three days. She has been using her Duoneb treatments without improvement.  She has a cough productive light frothy sputum.  She denies chest pain, fevers, chills, nausea , vomiting, diarrhea, or blood in her stool.  ED workup revealed green-brown stool, heme Occult +, Chest X-Ray showing Pulmonary Edema with right pleural effusion and right lower lobe consolidation, unchanged from prior,  12 ECG with Wide Complex regular rhythm with no P waves or notable Pacer spikes, rate 76 BPM, No acute T wave changes (Serum K 4.6) Negative Flu A/B, Urinalysis with occult blood, 3+ leukocyts, > bacteria, no nitrites. WBC 15.45, Alk Phos 162, AST 50. Troponin 0.034 (Basline). BNP 3474 (1856 1 week ago) Physical exam revealed fine bibasilar crackles and bilateral gross pitting edema in lower extremities up to her knees. Patient is ambulatory at home and states that she does not usually carry that much fluid.  Upon her last discharge, she took Bumex 2 mg TID for 3 days and then resumed 1 mg BID as directed. She was given furosemide 60 IV, Duoneb Treatment and ciprofloxacin 400 mg IV in the ED.  She is being admitted to [facility] for Acute on Chronic CHF with Pulmonary Edema.  Will continue aggressive diuresis and repeat Echo.  Will trend Troponin, and get pro-calcitonin at next lab draw. (Patient is a difficult stick).  Will get daily CBC and CMP to monitor for GI bleeding, Electrolytes with diuresis and liver enzymes for hepatic congestion.  Will treat empirically with Zosyn to cover HAP in light of right lower lobe consolidation and dirty urinalysis.           Hospital Course:  3/2/17 Echo shows EF 25% (Improved from 20%), Pulmonary HTN with Systolic PA 50, and worsening valvular disease with severe Mitral and Tricuspid Insufficuancy    3/3/17 Diuresis is slow with only 1.5 L net output after a total of 140 mg Lasix and 6 mg IV Bumex.   Still has crackles on exam and only mild improvement of  LE edema. Cardiology consulted for assistance.     3/4 - 3/6: Had increased UOP with addition of diuril. Less SOB, but still with significant LE edema. Creatinine increased with the diuresis.       Interval History: Breathing is doing better today. Still with significant swelling.     Review of Systems   Constitutional: Positive for fatigue. Negative for fever.   Respiratory: Positive for shortness of breath.    Cardiovascular: Positive for leg swelling. Negative for chest pain.   Gastrointestinal: Negative for abdominal pain, nausea and vomiting.     Objective:     Vital Signs (Most Recent):  Temp: 98.9 °F (37.2 °C) (03/06/17 2059)  Pulse: 70 (03/07/17 0100)  Resp: 18 (03/06/17 2123)  BP: (!) 97/50 (03/06/17 2059)  SpO2: 97 % (03/07/17 0310) Vital Signs (24h Range):  Temp:  [97.6 °F (36.4 °C)-98.9 °F (37.2 °C)] 98.9 °F (37.2 °C)  Pulse:  [68-77] 70  Resp:  [18] 18  SpO2:  [88 %-99 %] 97 %  BP: ()/(50-58) 97/50     Weight: 122 kg (268 lb 14.4 oz)  Body mass index is 38.58 kg/(m^2).    Intake/Output Summary (Last 24 hours) at 03/07/17 0432  Last data filed at 03/07/17 0000   Gross per 24 hour   Intake              680 ml   Output             1150 ml   Net             -470 ml      Physical Exam   Constitutional: She is oriented to person, place, and time. She appears well-developed and well-nourished. No distress.   Cardiovascular: Normal rate and regular rhythm.    Murmur heard.  Pulmonary/Chest: Effort normal. No respiratory distress. She has no wheezes. She has no rales.   Abdominal: Soft. Bowel sounds are normal. She exhibits no distension. There is no tenderness.   Musculoskeletal: She exhibits edema.   Neurological: She is alert and oriented to person, place, and time.   Skin: Skin is warm and dry.   Psychiatric: She has a normal mood and affect. Her behavior is normal.   Nursing note and vitals reviewed.      Significant Labs:   BMP:   Recent Labs  Lab 03/06/17  0831   GLU 97   *   K 3.5   CL 89*    CO2 33*   BUN 47*   CREATININE 2.0*   CALCIUM 8.6*   MG 2.2     CBC:   Recent Labs  Lab 03/05/17  1305 03/06/17  0831   WBC 7.16 7.08   HGB 8.1* 8.1*   HCT 27.7* 27.6*    243       Significant Imaging: I have reviewed all pertinent imaging results/findings within the past 24 hours.    Assessment/Plan:      * Acute on chronic combined systolic and diastolic congestive heart failure  Ischemic Cardiomyopathy/ CAD s/p PCI  Remains volume overloaded. Cardiology started diuril, but will hold now with increasing creatinine. Continue bumex. Daily weights. Monitor I&Os.       COPD (chronic obstructive pulmonary disease)  Chronic Respiratory Failure/ Home Oxygen Dependant /Obesity hypoventilation Syndrome on BiPAP  -Oxygenating well on home oxygen 3 LPM, Continue to keep SaO2 >88 %  -Continue home BiPAP at night  -No wheezing on exam  -Scheduled Duonebs while awake        CKD (chronic kidney disease) stage 3, GFR 30-59 ml/min  Creatinine is up to 2 today. Avoid nephrotoxins and renally dose meds. Monitor closely d/t aggressive diuresis. Hold losartan. Stop diuril. Continue bumex.       UTI (urinary tract infection)  Transition to agumentin.     VTE Risk Mitigation         Ordered     Medium Risk of VTE  Once      03/01/17 1525     Place sequential compression device  Until discontinued      03/01/17 1525     Place ALL hose  Until discontinued      03/01/17 1525     Reason for No Pharmacological VTE Prophylaxis  Once      03/01/17 1525          Anderson Badillo MD  Department of Hospital Medicine   [facility]

## 2017-03-08 VITALS
SYSTOLIC BLOOD PRESSURE: 113 MMHG | BODY MASS INDEX: 38.49 KG/M2 | WEIGHT: 268.88 LBS | OXYGEN SATURATION: 97 % | HEART RATE: 70 BPM | DIASTOLIC BLOOD PRESSURE: 60 MMHG | HEIGHT: 70 IN | TEMPERATURE: 98 F | RESPIRATION RATE: 18 BRPM

## 2017-03-08 LAB
ANION GAP SERPL CALC-SCNC: 10 MMOL/L
BASOPHILS # BLD AUTO: 0.03 K/UL
BASOPHILS NFR BLD: 0.4 %
BUN SERPL-MCNC: 49 MG/DL
CALCIUM SERPL-MCNC: 9.2 MG/DL
CHLORIDE SERPL-SCNC: 90 MMOL/L
CO2 SERPL-SCNC: 34 MMOL/L
CREAT SERPL-MCNC: 1.6 MG/DL
DIFFERENTIAL METHOD: ABNORMAL
EOSINOPHIL # BLD AUTO: 0.2 K/UL
EOSINOPHIL NFR BLD: 2.2 %
ERYTHROCYTE [DISTWIDTH] IN BLOOD BY AUTOMATED COUNT: 17.6 %
EST. GFR  (AFRICAN AMERICAN): 37 ML/MIN/1.73 M^2
EST. GFR  (NON AFRICAN AMERICAN): 32 ML/MIN/1.73 M^2
GLUCOSE SERPL-MCNC: 116 MG/DL
HCT VFR BLD AUTO: 28.1 %
HGB BLD-MCNC: 8.2 G/DL
LYMPHOCYTES # BLD AUTO: 0.7 K/UL
LYMPHOCYTES NFR BLD: 9.9 %
MAGNESIUM SERPL-MCNC: 2.3 MG/DL
MCH RBC QN AUTO: 27.2 PG
MCHC RBC AUTO-ENTMCNC: 29.2 %
MCV RBC AUTO: 93 FL
MONOCYTES # BLD AUTO: 0.7 K/UL
MONOCYTES NFR BLD: 9.8 %
NEUTROPHILS # BLD AUTO: 5.6 K/UL
NEUTROPHILS NFR BLD: 77.6 %
PHOSPHATE SERPL-MCNC: 3.6 MG/DL
PLATELET # BLD AUTO: 251 K/UL
PMV BLD AUTO: 10.2 FL
POTASSIUM SERPL-SCNC: 4.1 MMOL/L
RBC # BLD AUTO: 3.02 M/UL
SODIUM SERPL-SCNC: 134 MMOL/L
WBC # BLD AUTO: 7.24 K/UL

## 2017-03-08 PROCEDURE — 80048 BASIC METABOLIC PNL TOTAL CA: CPT

## 2017-03-08 PROCEDURE — 97530 THERAPEUTIC ACTIVITIES: CPT

## 2017-03-08 PROCEDURE — 63600175 PHARM REV CODE 636 W HCPCS: Performed by: HOSPITALIST

## 2017-03-08 PROCEDURE — 97116 GAIT TRAINING THERAPY: CPT | Performed by: PHYSICAL THERAPIST

## 2017-03-08 PROCEDURE — 97802 MEDICAL NUTRITION INDIV IN: CPT

## 2017-03-08 PROCEDURE — 84100 ASSAY OF PHOSPHORUS: CPT

## 2017-03-08 PROCEDURE — 94660 CPAP INITIATION&MGMT: CPT

## 2017-03-08 PROCEDURE — 99233 SBSQ HOSP IP/OBS HIGH 50: CPT | Mod: ,,, | Performed by: INTERNAL MEDICINE

## 2017-03-08 PROCEDURE — 25000003 PHARM REV CODE 250: Performed by: HOSPITALIST

## 2017-03-08 PROCEDURE — 36415 COLL VENOUS BLD VENIPUNCTURE: CPT

## 2017-03-08 PROCEDURE — 85025 COMPLETE CBC W/AUTO DIFF WBC: CPT

## 2017-03-08 PROCEDURE — 25000003 PHARM REV CODE 250: Performed by: NURSE PRACTITIONER

## 2017-03-08 PROCEDURE — 27000221 HC OXYGEN, UP TO 24 HOURS

## 2017-03-08 PROCEDURE — 94640 AIRWAY INHALATION TREATMENT: CPT

## 2017-03-08 PROCEDURE — 94761 N-INVAS EAR/PLS OXIMETRY MLT: CPT

## 2017-03-08 PROCEDURE — 83735 ASSAY OF MAGNESIUM: CPT

## 2017-03-08 PROCEDURE — 97110 THERAPEUTIC EXERCISES: CPT | Performed by: PHYSICAL THERAPIST

## 2017-03-08 PROCEDURE — 25000242 PHARM REV CODE 250 ALT 637 W/ HCPCS: Performed by: NURSE PRACTITIONER

## 2017-03-08 RX ORDER — BUMETANIDE 1 MG/1
3 TABLET ORAL 2 TIMES DAILY
Qty: 180 TABLET | Refills: 3 | Status: SHIPPED | OUTPATIENT
Start: 2017-03-08 | End: 2018-03-08

## 2017-03-08 RX ORDER — BUMETANIDE 0.25 MG/ML
3 INJECTION INTRAMUSCULAR; INTRAVENOUS 2 TIMES DAILY
Status: DISCONTINUED | OUTPATIENT
Start: 2017-03-08 | End: 2017-03-08 | Stop reason: HOSPADM

## 2017-03-08 RX ADMIN — IPRATROPIUM BROMIDE AND ALBUTEROL SULFATE 3 ML: .5; 3 SOLUTION RESPIRATORY (INHALATION) at 08:03

## 2017-03-08 RX ADMIN — CARVEDILOL 3.12 MG: 3.12 TABLET, FILM COATED ORAL at 05:03

## 2017-03-08 RX ADMIN — PRAVASTATIN SODIUM 40 MG: 40 TABLET ORAL at 09:03

## 2017-03-08 RX ADMIN — IPRATROPIUM BROMIDE AND ALBUTEROL SULFATE 3 ML: .5; 3 SOLUTION RESPIRATORY (INHALATION) at 12:03

## 2017-03-08 RX ADMIN — CHLOROTHIAZIDE SODIUM 500 MG: 500 INJECTION, POWDER, LYOPHILIZED, FOR SOLUTION INTRAVENOUS at 04:03

## 2017-03-08 RX ADMIN — BUMETANIDE 3 MG: 0.25 INJECTION INTRAMUSCULAR; INTRAVENOUS at 09:03

## 2017-03-08 RX ADMIN — BUMETANIDE 3 MG: 0.25 INJECTION INTRAMUSCULAR; INTRAVENOUS at 04:03

## 2017-03-08 RX ADMIN — CARVEDILOL 3.12 MG: 3.12 TABLET, FILM COATED ORAL at 09:03

## 2017-03-08 RX ADMIN — AMOXICILLIN AND CLAVULANATE POTASSIUM 1 TABLET: 875; 125 TABLET, FILM COATED ORAL at 09:03

## 2017-03-08 RX ADMIN — POTASSIUM CHLORIDE 40 MEQ: 20 TABLET, EXTENDED RELEASE ORAL at 09:03

## 2017-03-08 RX ADMIN — PANTOPRAZOLE SODIUM 40 MG: 40 TABLET, DELAYED RELEASE ORAL at 09:03

## 2017-03-08 RX ADMIN — CLOPIDOGREL BISULFATE 75 MG: 75 TABLET ORAL at 09:03

## 2017-03-08 RX ADMIN — IPRATROPIUM BROMIDE AND ALBUTEROL SULFATE 3 ML: .5; 3 SOLUTION RESPIRATORY (INHALATION) at 04:03

## 2017-03-08 RX ADMIN — AMIODARONE HYDROCHLORIDE 200 MG: 200 TABLET ORAL at 09:03

## 2017-03-08 RX ADMIN — HYDROCODONE BITARTRATE AND ACETAMINOPHEN 1 TABLET: 10; 325 TABLET ORAL at 09:03

## 2017-03-08 NOTE — PLAN OF CARE
Iv access discontinued, discharge instructions given , verbalizes understanding of instructions given

## 2017-03-08 NOTE — PLAN OF CARE
Future Appointments  Date Time Provider Department Center   3/10/2017 2:00 PM Zuleyka Becerra MD [facility]   4/5/2017 1:00 PM Jovani Urrutia MD [facility] [address]        03/08/17 1103   Final Note   Assessment Type Final Discharge Note   Discharge Disposition Home   Discharge planning education complete? Yes   What phone number can be called within the next 1-3 days to see how you are doing after discharge? [phone]   Hospital Follow Up  Appt(s) scheduled? Yes   Discharge plans and expectations educations in teach back method with documentation complete? Yes   Offered [facility] -- Bedside Delivery? Yes   Referral to Outpatient Case Management complete? No   Referral to / orders for Home Health Complete? No   30 day supply of medicines given at discharge, if documented non-compliance / non-adherence? No   Any social issues identified prior to discharge? No   Did you assess the readiness or willingness of the family or caregiver to support self management of care? Yes   Right Care Referral Info   Post Acute Recommendation Home-care     Patient has home oxygen and no DME needs.    Kary Zarate RN  Transition Navigator  [phone]

## 2017-03-08 NOTE — PLAN OF CARE
Problem: Patient Care Overview  Goal: Plan of Care Review  Outcome: Ongoing (interventions implemented as appropriate)  Pt on 2LNC, sats  97%, no distress, sob or increase WOB noted at this time. Will continue to monitor

## 2017-03-08 NOTE — PLAN OF CARE
Problem: Physical Therapy Goal  Goal: Physical Therapy Goal  Goals to be met by: 2017     Patient will increase functional independence with mobility by performin. Sit to stand transfer with Modified [place]  2. Bed to chair transfer with Modified [place] using no AD  3. Gait x 20 feet with Modified [place] using Rolling Walker.    Outcome: Ongoing (interventions implemented as appropriate)  Pt is very slowly demonstrating improved mobility.  Pt would benefit from further PT to progress functional mobility.

## 2017-03-08 NOTE — PLAN OF CARE
Problem: Occupational Therapy Goal  Goal: Occupational Therapy Goal  Goals to be met by: 4/2/2017     Patient will increase functional independence with ADLs by performing:    UE Dressing with Modified [place].  LE Dressing with Modified [place].  Grooming while standing at sink with Modified [place].  Toileting from bedside commode with Modified [place] for hygiene and clothing management.   Bathing from shower chair/bench with Supervision.  Stand pivot transfers with Modified [place].  Toilet transfer to bedside commode with Modified [place].    Recs: Pt. With shower chair & BSC already in use at home; all equipment needs met. To benefit from  OT services.   Outcome: Ongoing (interventions implemented as appropriate)  Patient with minimal participation in therapy. Did get OOB to bedside chair with P.T. Today. Patient limited by fatigue, decreased strength, and decreased ax tolerance. Will benefit from continued OT to address functional deficits.

## 2017-03-08 NOTE — CONSULTS
Consult received for low salt diet education. Discussed foods high in salt. Pt reports daughter does the cooking but pt doesn't add any salt to her foods. Pt very sleepy during visit & not showing much interest. Provided pt with handouts. Encouraged pt to show them to her daughter. Pt voiced understanding. Expect fair compliance.

## 2017-03-08 NOTE — DISCHARGE INSTRUCTIONS
ANEMIA (ENGLISH) View Edit Remove   ATRIAL FIBRILLATION, UNDERSTANDING (ENGLISH) View Edit Remove   HYPERTENSION, ESTABLISHED (ENGLISH) View Edit Remove   HEART FAILURE: MAKING CHANGES TO YOUR DIET (ENGLISH) View Edit Remove   HEART FAILURE: BEING ACTIVE (ENGLISH) View Edit Remove   HEART FAILURE: TRACKING YOUR WEIGHT (ENGLISH) View Edit Remove   CORONARY ARTERY DISEASE (CAD), UNDERSTANDING (ENGLISH) View Edit Remove   PACEMAKER, LIVING WITH (ENGLISH) View Edit Remove   GASTROINTESTINAL (GI) BLEEDING, WHEN YOU HAVE (ENGLISH) View Edit Remove   BLADDER INFECTION, FEMALE (ADULT) (ENGLISH) View Edit Remove   BUMETANIDE TABLETS (ENGLISH) View Edit Remove

## 2017-03-08 NOTE — PT/OT/SLP PROGRESS
Physical Therapy  Treatment    Jennifer Prescott   MRN: [id number]   Admitting Diagnosis: Acute on chronic combined systolic and diastolic congestive heart failure    PT Received On: 17  PT Start Time: 1010     PT Stop Time: 1040    PT Total Time (min): 30 min       Billable Minutes:  Gait Training 10 and Therapeutic Exercise 20    Treatment Type: Treatment  PT/PTA: PT     PTA Visit Number: 2       General Precautions: Standard, fall  Orthopedic Precautions: N/A   Braces:           Subjective:  Communicated with nurse prior to session.      Pain Ratin/10                   Objective:   Patient found with: oxygen    Functional Mobility:  Bed Mobility:        Transfers:  Sit <> Stand Assistance: Supervision  Sit <> Stand Assistive Device: Rolling Walker  Toilet Transfer Assistance: Supervision  Toilet Transfer Assistive Device: Rolling Walker    Gait:   Gait Distance: Pt ambulated 30' with RW with CG/sup on 2 Lit O2.  Pt later stood for 40 seconds, reporting she could not walk anymore today.    Stairs:  NT    Balance:   Static Sit: GOOD: Takes MODERATE challenges from all directions  Dynamic Sit: GOOD-: Maintains balance through MODERATE excursions of active trunk movement,     Static Stand: FAIR+: Takes MINIMAL challenges from all directions  Dynamic stand: FAIR+: Needs CLOSE SUPERVISION during gait and is able to right self with minor LOB     Therapeutic Activities and Exercises:  Pt performed BLE seated LAQ, hip flexion and hip abd, DF, PF 10 x 2.     AM-PAC 6 CLICK MOBILITY  How much help from another person does this patient currently need?   1 = Unable, Total/Dependent Assistance  2 = A lot, Maximum/Moderate Assistance  3 = A little, Minimum/Contact Guard/Supervision  4 = None, Modified [place]/Independent    Turning over in bed (including adjusting bedclothes, sheets and blankets)?: 3  Sitting down on and standing up from a chair with arms (e.g., wheelchair, bedside commode, etc.): 3  Moving from lying  on back to sitting on the side of the bed?: 3  Moving to and from a bed to a chair (including a wheelchair)?: 3  Need to walk in hospital room?: 3  Climbing 3-5 steps with a railing?: 2  Total Score: 17    AM-PAC Raw Score CMS G-Code Modifier Level of Impairment Assistance   6 % Total / Unable   7 - 9 CM 80 - 100% Maximal Assist   10 - 14 CL 60 - 80% Moderate Assist   15 - 19 CK 40 - 60% Moderate Assist   20 - 22 CJ 20 - 40% Minimal Assist   23 CI 1-20% SBA / CGA   24 CH 0% Independent/ Mod I     Patient left up in chair with call button in reach, nurse notified and O2 on patient.    Assessment:  Jennifer Prescott is a 71 y.o. female with a medical diagnosis of Acute on chronic combined systolic and diastolic congestive heart failure.    Rehab identified problem list/impairments: Rehab identified problem list/impairments: weakness, impaired self care skills, impaired balance, decreased safety awareness, impaired endurance, impaired functional mobilty, gait instability, decreased lower extremity function, impaired cardiopulmonary response to activity    Rehab potential is fair.    Activity tolerance: Fair    Discharge recommendations: Discharge Facility/Level Of Care Needs: home with home health     Barriers to discharge: Barriers to Discharge: None    Equipment recommendations: Equipment Needed After Discharge: none     GOALS:   Physical Therapy Goals        Problem: Physical Therapy Goal    Goal Priority Disciplines Outcome Goal Variances Interventions   Physical Therapy Goal     PT/OT, PT Ongoing (interventions implemented as appropriate)     Description:  Goals to be met by: 2017     Patient will increase functional independence with mobility by performin. Sit to stand transfer with Modified [place]  2. Bed to chair transfer with Modified [place] using no AD  3. Gait  x 20 feet with Modified [place] using Rolling Walker.              Problem: Physical Therapy Goal    Goal Priority  Disciplines Outcome Goal Variances Interventions   Physical Therapy Goal     PT/OT, PT                PLAN:    Patient to be seen 6 x/week  to address the above listed problems via gait training, therapeutic activities, therapeutic exercises, neuromuscular re-education  Plan of Care expires: 04/08/17  Plan of Care reviewed with: patient         Deana RUTLEDGE Oma, PT  03/08/2017

## 2017-03-08 NOTE — PROGRESS NOTES
[facility]  Cardiology  Progress Note    Patient Name: Jennifer Prescott  MRN: [id number]  Admission Date: 3/1/2017  Hospital Length of Stay: 7 days  Code Status: Full Code   Attending Physician: Freddie Waterman MD   Primary Care Physician: Lianna Mistry MD  Expected Discharge Date: 3/8/2017  Principal Problem:Acute on chronic combined systolic and diastolic congestive heart failure    Subjective:     Hospital Course: 3/1/2017-3/2/2017 Admitted for trcurrent SOB and BAEZ with progressive worsening for the past 3 days. No improvement with duoneb treatments Denies chest pain, fevers, chills, nausea , vomiting, diarrhea, or blood in her stool. ED workup revealed green-brown stool, heme Occult +, Chest X-Ray showing Pulmonary Edema with right pleural effusion and right lower lobe consolidation, unchanged from prior, 12 ECG with Wide Complex regular rhythm with no P waves or notable Pacer spikes, rate 76 BPM, No acute T wave changes (Serum K 4.6) Negative Flu A/B, Urinalysis with occult blood, 3+ leukocyts, > bacteria, no nitrites. WBC 15.45, Alk Phos 162, AST 50. Troponin 0.034 (Basline). BNP 3474 (1856 1 week ago) Physical exam revealed fine bibasilar crackles and bilateral gross pitting edema in lower extremities up to her knees. Given furosemide 60 IV, Duoneb Treatment and ciprofloxacin 400 mg IV in the ED with only 850cc urine output. Aggressive diuresis continued and changed to Bumex 3mg IV TID with resumption of ASA, Plavix, Imdur, Losartan and Coreg. Total bilirubin elevated at 2.0 and primary working diagnosis is ADHF due to inadeverent discontinuation of diuretic therapy 3/3/2017 On telemetry unit with aggressive diuresis with Bumex 3mg IV TID with 1.9 liters out overnight and negative 1.0 liters in 24 hours and barely net negative. Peripheral edema remains with continued BAEZ although slightly improved per patient. Cardiology consulted for management with recommendation for continued  aggressive diuresis with IV Bumex until euvolemic and peripheral edema markedly improved 3/4/2017-3/5/2017 Continued on IV Bumex BID with addition of IV Diuril 250mg BID with good response and negative fluid balance since admission. Creatinine up to 1.7 with conitnuation of medication regimen for volume removal 3/6/2017 HR and BP stable overnight. 1.7 liters out overnight and negative 1.5 liters in 24hours and negative 3.6 liters since admission. Creatinine 2.0 this AM up from 1.7 yesterday. Diuril discontinued due to upward trend of creatinine and Bumex continued 3/7/2017 SOB improving 1.9 liters out overnight and negative 1.2 in 24hours period and net negative 4.9 liters since admission. Peripheral edema only slightly improved (down to 2-3+ from 4+). Repeat CXR with continued pulmonary vascular congestion although improving. H&H remains low at 7&26. 03/08/2017 Urine output has decreased. 1010 cc in and 1000 cc out in past 24 hours, -4.9 L from admission. Cr improved today at 1.6. A dose of Diuril to be given with pm dose of Bumex 3 mg. HH 8.2/28 today. VSS. BLE edema remains with continued rales on exam.     Review of Systems   Constitution: Positive for malaise/fatigue.   Cardiovascular: Positive for dyspnea on exertion and leg swelling. Negative for chest pain, orthopnea, palpitations and paroxysmal nocturnal dyspnea.   Respiratory: Negative.      Objective:     Vital Signs (Most Recent):  Temp: 97.6 °F (36.4 °C) (03/08/17 0800)  Pulse: 72 (03/08/17 0810)  Resp: 18 (03/08/17 0810)  BP: 113/60 (03/08/17 0800)  SpO2: (!) 91 % (03/08/17 0810) Vital Signs (24h Range):  Temp:  [97.3 °F (36.3 °C)-98.8 °F (37.1 °C)] 97.6 °F (36.4 °C)  Pulse:  [69-87] 72  Resp:  [16-20] 18  SpO2:  [90 %-98 %] 91 %  BP: (105-171)/(55-79) 113/60     Weight: 122 kg (268 lb 14.4 oz)  Body mass index is 38.58 kg/(m^2).    SpO2: (!) 91 %  O2 Device (Oxygen Therapy): room air      Intake/Output Summary (Last 24 hours) at 03/08/17 1014  Last  data filed at 03/07/17 1700   Gross per 24 hour   Intake              650 ml   Output              700 ml   Net              -50 ml       Lines/Drains/Airways     Peripheral Intravenous Line                 Peripheral IV - Single Lumen 03/06/17 1325 Right Forearm 1 day                Physical Exam   Constitutional: She is oriented to person, place, and time. She appears well-developed and well-nourished. No distress.   HENT:   Head: Normocephalic and atraumatic.   Eyes: Right eye exhibits no discharge. Left eye exhibits no discharge.   Cardiovascular: Normal rate and regular rhythm.    Pulmonary/Chest: Effort normal. She has decreased breath sounds. She has rales.   Musculoskeletal: She exhibits edema (3+ to BLE).   Neurological: She is alert and oriented to person, place, and time.   Skin: Skin is warm and dry. She is not diaphoretic.   Psychiatric: She has a normal mood and affect. Her behavior is normal. Judgment and thought content normal.       Significant Labs:   BMP:   Recent Labs  Lab 03/07/17  0915 03/08/17  0825   * 116*   * 134*   K 3.6 4.1   CL 90* 90*   CO2 34* 34*   BUN 50* 49*   CREATININE 1.9* 1.6*   CALCIUM 8.8 9.2   MG 2.1 2.3   , CMP   Recent Labs  Lab 03/07/17  0915 03/08/17  0825   * 134*   K 3.6 4.1   CL 90* 90*   CO2 34* 34*   * 116*   BUN 50* 49*   CREATININE 1.9* 1.6*   CALCIUM 8.8 9.2   ANIONGAP 10 10   ESTGFRAFRICA 30* 37*   EGFRNONAA 26* 32*   , CBC   Recent Labs  Lab 03/07/17  0915 03/08/17  0825   WBC 6.60 7.24   HGB 7.8* 8.2*   HCT 26.4* 28.1*    251    and All pertinent lab results from the last 24 hours have been reviewed.    Significant Imaging: Echocardiogram:   2D echo with color flow doppler:   Results for orders placed or performed during the hospital encounter of 03/01/17   2D echo with color flow doppler   Result Value Ref Range    EF 25 (A) 55 - 65    Mitral Valve Regurgitation SEVERE (A)     Diastolic Dysfunction Yes (A)     Est. PA Systolic  Pressure 50.28 (A)     Pericardial Effusion NONE     Tricuspid Valve Regurgitation MODERATE TO SEVERE (A)     and X-Ray: CXR: X-Ray Chest 1 View (CXR):   Results for orders placed or performed during the hospital encounter of 03/01/17   X-Ray Chest 1 View    [facility]    [id number]  Accession # [id number]      Study:  XR CHEST 1 VIEW    Indication: Shortness of breath.    Comparison: Chest radiograph from 02/22/2017.    Findings:     XR CHEST 1 VIEW.    No significant change compared to prior exam.    Stable cardiomegaly.  Stable AICD. Calcification of the aortic arch.  Prominence of the pulmonary vasculature.  Stable right pleural effusion with atelectasis/consolidation of the right lower lung zone.  Mildly prominent interstitial lung markings which may be seen with mild pulmonary edema or infectious process, similar to prior exam.  Osseous structures are unremarkable.    Impression       No significant change when compared to prior exam.  Stable right pleural effusion with atelectasis/consolidation of the right lower lung zone.    Mildly prominent interstitial lung markings which may be seen with mild pulmonary edema or infectious/inflammatory process similar to prior exam.          Electronically signed by: RONA TEE MD  Date:     03/01/17  Time:    10:34      Assessment and Plan:     Brief HPI: Patient seen this morning on rounds. Not feeling well today, did not rest well last night. Continues to be volume overloaded on exam with rales and edema. POC discussed with patient; will receive Diuril this evening with Bumex dose.      Active Diagnoses:    Diagnosis Date Noted POA    Acute on chronic combined systolic and diastolic congestive heart failure  Echo on 3/1/2017 with LVEF 25%, diastolic dysfunction and severe MR; acute decompensated HF upon presentation; aggressive diuresis in process; Diuril discontinued due to rise in creatinine; on Bumex 3mg IV BID; 1010 in with 1000 cc out over past 24 hours; Cr  improved at 1.6 today; will add Diuril to evening dose of Bumex today; Remains overload with rales on exam and peripheral edema; continue BB; ARB on hold due to renal function; strict I&Os; Daily weights not being performed- added today   Yes    IIron deficiency anemia due to chronic blood loss  H&H remains low  8.2/28.1 today; recent EGD and colonoscopy at [facility]; stool positive for blood upon admission; will discuss with primary team in regards to GI consult   Yes    CAD S/P percutaneous coronary angioplasty  S/p LAD PCI and RCA PCI for  in 1/2015; chest pain free; no concern for ACS;  continue ASA, Plavix, BB, and statin; ARB on hold due to elevated Cr (1.6 today) 08/03/2012 Not Applicable    Permanent atrial fibrillation  Currently paced rhythm with consistent paced rhythm since admission; no underlying afib noted; continue BB; chronic AC on hold due to recent GIB; persistent low H&H and recent stool positive for OCB-recommend GI evaluation 07/12/2012 Yes      Problems Resolved During this Admission:    Diagnosis Date Noted Date Resolved POA    PRINCIPAL PROBLEM:  Acute on chronic combined systolic and diastolic congestive heart failure [I50.43] 07/31/2015 03/08/2017 Yes    E. coli UTI [N39.0, B96.20] 03/03/2015 03/08/2017 Yes       VTE Risk Mitigation         Ordered     Medium Risk of VTE  Once      03/01/17 1525     Place sequential compression device  Until discontinued      03/01/17 1525     Place ALL hose  Until discontinued      03/01/17 1525     Reason for No Pharmacological VTE Prophylaxis  Once      03/01/17 1525          Esequiel Johnson NP  Cardiology  [facility]

## 2017-03-08 NOTE — DISCHARGE SUMMARY
[facility]  Discharge Summary      Patient Name: Jennifer Prescott  MRN: [id number]  Admission Date: 3/1/2017  Hospital Length of Stay: 7 days  Discharge Date and Time: 3/8/2017  6:15 PM  Attending Physician: Freddie Waterman MD   Discharging Provider: Freddie Waterman MD  Primary Care Provider: Lianna Mistry MD      HPI:   Jennifer Prescott is a 71 y.o.  woman with coronary artery disease (s/p 3 JAMAL to RCA 1/27/15), chronic systolic diastolic congestive heart failure (EF=20% 9/24/16), chronic hypotension, s/p pacemaker 9/21/10, s/p biventricular AICD 10/09/12, permanent atrial fibrillation (had cardioversion 3/06/15), bilateral carotid artery disease, left lower lobe pulmonary embolism (2/12/15), COPD with 40 pack year smoking history, continuous home oxygen 3 LPM, obesity hypoventilation syndrome (on BiPAP), chronic hypoxic respiratory failure, ischemic colitis with recurrent GI bleeding, iron deficiency anemia due to chronic gastrointestinal blood loss, and chronic kidney disease stage 3. She lives in [place], [place]. She works in a [employer]. Her primary care physician is Dr. Lianna Mistry at [facility]. Her cardiologist is Dr. Riley Braga.    She was started on apixaban 5 mg BID on 1/30/17, as she had been off anticoagulation since her last GI bleed. She was hospitalized at [facility] from 2/15/17-2/20/17 for another GI bleed and anemia episode. She was told to stop apixaban but also stopped bumetanide instead of decreasing the dose as erroneously advised, and presented to [facility] 2 days later (2/22/17) with pulmonary edema. BNP was 1856, increased from 1576 on 2/15/17. She was diuresed with IV chlorothiazide and IV bumetanide 2 mg BID and advised to continue taking oral bumetanide 2 mg BID for three days after discharge before returning to her usual 1 mg BID. She was discharged home on 2/24/17.    She returned to  [facility] ED on 3/01/17 with recurrent shortness of breath and dyspnea on exertion, progressively worse over the past three days. Chest x-ray was unchanged but BNP was even higher (3474) and she was still edematous with pulmonary crackles. WBC was 15,450 and urinalysis showed >100 WBC/hpf with many bacteria (she did not have a Ahuja catheter during her previous admission). She was readmitted to [facility] for aggressive diuresis and antibiotics for new UTI.      Indwelling Lines/Drains at time of discharge: None    Hospital Course:   Echocardiogram showed EF 25% (improved from 20%) and worsening valvular disease with severe mitral and tricuspid insufficiency. IV chlorothiazide and IV bumetanide were again given, with a higher bumetanide dose (3 mg). Cardiology was consulted and increased frequency of chlorothiazide to BID. Urine culture grew pan-sensitive E coli, so antibiotics were switched to amoxicillin-clavulanate to complete the course. By 3/06/17 she was net negative 4.9 liters and chlorothiazide was held. Cough had resolved. She still had taut leg edema. Repeat chest x-ray on 3/07/17 showed enlargement of the right pleural effusion. She was weaned to room air on 3/08/17, so was discharged home with bumetanide dose increased to 3 mg BID, after a final dose of IV chlorothiazide and bumetanide. She will follow up with [facility] on 3/10/17.     Consults:   Consults         Status Ordering Provider     Inpatient consult to [facility]  Once     Provider:  Riley Braga MD    Completed MALISSA MACE     Inpatient consult to Dietary  Once     Provider:  (Not yet assigned)    JEANIE Oh          Significant Diagnostic Studies:   X-Ray Chest 1 View 3/01/17: Stable cardiomegaly.  Stable AICD. Calcification of the aortic arch.  Prominence of the pulmonary vasculature.  Stable right pleural effusion with atelectasis/consolidation of the right lower  lung zone.  Mildly prominent interstitial lung markings which may be seen with mild pulmonary edema or infectious process, similar to prior exam.  Osseous structures are unremarkable.  US Upper Extremity Veins Left 3/01/17: Color Doppler, duplex, gray scale imaging evaluation of left upper extremity veins.  The jugular, subclavian, axillary, brachial, basilic and cephalic veins are patent and normal.  2D echo with color flow doppler 3/01/17:     1 - Severely depressed left ventricular systolic function (EF 25-30%).     2 - Severe left atrial enlargement.     3 - Left ventricular diastolic dysfunction.     4 - Eccentric hypertrophy.     5 - Pulmonary hypertension. The estimated PA systolic pressure is 50 mmHg.     6 - Normal right ventricular systolic function .     7 - Severe mitral regurgitation.     8 - Moderate to severe tricuspid regurgitation.     9 - Increased central venous pressure.   X-Ray Chest PA And Lateral 3/07/17: Slight enlargement of the right pleural effusion. Patchy bilateral interstitial opacities. The mediastinal structures are midline. The cardiac silhouette is enlarged but unchanged in size. The osseous structures demonstrate no acute abnormality.    Final Active Diagnoses:    Diagnosis Date Noted POA    On home oxygen therapy [Z99.81] 03/01/2017 Not Applicable     Chronic    Chronic hypoxemic respiratory failure [J96.11] 02/22/2017 Yes     Chronic    Chronic ischemic colitis [K55.1] 07/08/2016 Yes     Chronic    Chronic combined systolic and diastolic congestive heart failure [I50.42]  Yes     Chronic    Pleural effusion, right [J90] 02/25/2015 Yes    Iron deficiency anemia due to chronic blood loss [D50.0]  Yes     Chronic    CKD (chronic kidney disease) stage 3, GFR 30-59 ml/min [N18.3] 09/15/2014 Yes     Chronic    Obesity hypoventilation syndrome on BiPAP [E66.2] 03/29/2013 Yes     Chronic    Ischemic cardiomyopathy [I25.5] 10/23/2012 Yes     Chronic    Automatic implantable  cardioverter-defibrillator in situ [Z95.810] 10/23/2012 Yes     Chronic    CAD S/P percutaneous coronary angioplasty [I25.10, Z98.61] 08/03/2012 Not Applicable     Chronic    COPD (chronic obstructive pulmonary disease) [J44.9] 07/12/2012 Yes     Chronic    Permanent atrial fibrillation [I48.2] 07/12/2012 Yes     Chronic      Problems Resolved During this Admission:    Diagnosis Date Noted Date Resolved POA    PRINCIPAL PROBLEM:  Acute on chronic combined systolic and diastolic congestive heart failure [I50.43] 07/31/2015 03/08/2017 Yes    E. coli UTI [N39.0, B96.20] 03/03/2015 03/08/2017 Yes      Discharged Condition: good    Disposition: Home or Self Care    Follow Up:  Follow-up Information     Follow up with Zuleyka Becerra MD On 3/10/2017.    Specialty:  Hospitalist    Why:  2:00 PM    Contact information:    [address]  [address]  [facility] [id number] [phone]          Patient Instructions:     Diet Cardiac     Activity as tolerated     Call MD for:  persistent dizziness, light-headedness, or visual disturbances     Call MD for:  difficulty breathing or increased cough       Medications:  Reconciled Home Medications:   Current Discharge Medication List      CONTINUE these medications which have CHANGED    Details   bumetanide (BUMEX) 1 MG tablet Take 3 tablets (3 mg total) by mouth 2 (two) times daily.  Qty: 180 tablet, Refills: 3    Comments: PLEASE FILL ON 12/16/15  Associated Diagnoses: Ischemic cardiomyopathy; Essential hypertension; Chronic combined systolic and diastolic congestive heart failure         CONTINUE these medications which have NOT CHANGED    Details   albuterol 90 mcg/actuation inhaler Inhale 2 puffs into the lungs every 6 (six) hours as needed for Wheezing.  Qty: 18 g, Refills: 0    Associated Diagnoses: Pulmonary emphysema, unspecified emphysema type      albuterol-ipratropium 2.5mg-0.5mg/3mL (DUO-NEB) 0.5 mg-3 mg(2.5 mg base)/3 mL nebulizer solution USE 1 VIAL VIA  NEBULIZER EVERY 6 HOURS AS NEEDED FOR WHEEZING  Refills: 2      allopurinol (ZYLOPRIM) 300 MG tablet Take 1 tablet (300 mg total) by mouth once daily.  Qty: 90 tablet, Refills: 3    Associated Diagnoses: Acute on chronic congestive heart failure, unspecified congestive heart failure type; Pacemaker; Chronic obstructive pulmonary disease, unspecified COPD type; Coronary artery disease involving native coronary artery without angina pectoris, unspecified whether native or transplanted heart; Congestive cardiomyopathy; Ischemic cardiomyopathy; Acute gout of foot, unspecified cause, unspecified laterality; S/P ICD (internal cardiac defibrillator) procedure      amiodarone (PACERONE) 200 MG Tab Take 200 mg by mouth once daily.      aspirin 81 MG Chew Take 81 mg by mouth every evening.       carvedilol (COREG) 3.125 MG tablet Take 1 tablet (3.125 mg total) by mouth 2 (two) times daily with meals.  Qty: 180 tablet, Refills: 3    Comments: PLEASE FILL ON 12/16/15  Associated Diagnoses: Atrial fibrillation status post cardioversion; CAD S/P percutaneous coronary angioplasty; Ischemic cardiomyopathy; Essential hypertension; Chronic combined systolic and diastolic congestive heart failure; Sick sinus syndrome; Acute on chronic combined systolic and diastolic congestive heart failure; Other emphysema; CKD (chronic kidney disease) stage 3, GFR 30-59 ml/min; Pacemaker; Automatic implantable cardioverter-defibrillator in situ; Obesity hypoventilation syndrome      clopidogrel (PLAVIX) 75 mg tablet Take 75 mg by mouth once daily.      cyclobenzaprine (FLEXERIL) 10 MG tablet Take 10 mg by mouth 3 (three) times daily as needed for Muscle spasms.      ferrous sulfate 325 (65 FE) MG EC tablet Take 1 tablet (325 mg total) by mouth every evening.  Refills: 0      fluticasone-vilanterol (BREO) 100-25 mcg/dose diskus inhaler Inhale 1 puff into the lungs once daily.  Qty: 28 each, Refills: 5    Associated Diagnoses: Pulmonary emphysema,  unspecified emphysema type; Wheezing      isosorbide mononitrate (IMDUR) 60 MG 24 hr tablet Take 1 tablet (60 mg total) by mouth once daily.      losartan (COZAAR) 25 MG tablet Take 0.5 tablets (12.5 mg total) by mouth once daily.  Qty: 45 tablet, Refills: 3    Associated Diagnoses: Atrial fibrillation status post cardioversion; CAD S/P percutaneous coronary angioplasty; Ischemic cardiomyopathy; Essential hypertension; Chronic combined systolic and diastolic congestive heart failure; Sick sinus syndrome; Acute on chronic combined systolic and diastolic congestive heart failure; Other emphysema; CKD (chronic kidney disease) stage 3, GFR 30-59 ml/min; Pacemaker; Automatic implantable cardioverter-defibrillator in situ; Obesity hypoventilation syndrome      pantoprazole (PROTONIX) 40 MG tablet Take 1 tablet (40 mg total) by mouth once daily.  Qty: 30 tablet, Refills: 11      pravastatin (PRAVACHOL) 40 MG tablet Take 1 tablet (40 mg total) by mouth once daily.  Qty: 90 tablet, Refills: 3      tramadol (ULTRAM) 50 mg tablet Take 50 mg by mouth every 6 (six) hours as needed for Pain.      trazodone (DESYREL) 150 MG tablet TAKE 1 TABLET BY MOUTH AT BEDTIME  Qty: 30 tablet, Refills: 3    Associated Diagnoses: Insomnia           Time spent on the discharge of patient: 35 minutes    Freddie Waterman MD  Department of Hospital Medicine  [facility]

## 2017-03-10 ENCOUNTER — TELEPHONE (OUTPATIENT)
Dept: PRIMARY CARE CLINIC | Facility: CLINIC | Age: 72
End: 2017-03-10

## 2017-03-10 NOTE — TELEPHONE ENCOUNTER
Pt had arrived to lab for 1:10 appt prior to 2:00 appt for PC. Call placed to mobile number ( same as home # ). Appt scheduled during hospitalization and pt was informed by ROB Delgado,[place].

## 2017-03-10 NOTE — TELEPHONE ENCOUNTER
"Pt has not arrived for pre-appt labs, unable to reach her. Called EC, GogoMilagros' (sister), she stated, " I spoke to her this morning, she didn't mention an appointment today, was it on her discharge paperwork?" I explained that it is noted under the 'Future Appointment' section on her discharge papers, and explained the importance of this HOSPFU appt. I offered to re-schedule the appt for Monday, when the pt returns my call and that I had left her a message. She stated understanding and replied, " Ok, thank you, I will have her call you".  "

## 2017-03-13 ENCOUNTER — DOCUMENTATION ONLY (OUTPATIENT)
Dept: TRANSPLANT | Facility: CLINIC | Age: 72
End: 2017-03-13

## 2017-03-13 DIAGNOSIS — G47.00 INSOMNIA, UNSPECIFIED TYPE: ICD-10-CM

## 2017-03-13 RX ORDER — TRAMADOL HYDROCHLORIDE 50 MG/1
TABLET ORAL
Qty: 60 TABLET | Refills: 3 | Status: SHIPPED | OUTPATIENT
Start: 2017-03-13

## 2017-03-13 RX ORDER — TRAZODONE HYDROCHLORIDE 150 MG/1
150 TABLET ORAL NIGHTLY
Qty: 30 TABLET | Refills: 3 | Status: SHIPPED | OUTPATIENT
Start: 2017-03-13

## 2017-03-13 RX ORDER — IPRATROPIUM BROMIDE AND ALBUTEROL SULFATE 2.5; .5 MG/3ML; MG/3ML
SOLUTION RESPIRATORY (INHALATION)
Qty: 90 ML | Refills: 3 | Status: SHIPPED | OUTPATIENT
Start: 2017-03-13

## 2017-03-13 NOTE — NURSING
Pt records reviewed.   Pt will be referred to Hepatology.    Initial referral received  from the workque.   Referring Provider/diagnosis    Please evaluate for portal HTN or cirrhosis in patient with endoscopic findings concerning for possible cirrhosis and elevated LFTs    Referral letter sent to provider and patient.

## 2017-03-15 NOTE — PROGRESS NOTES
Pt reports she will not reschedule the appt she no showed for. She is going to see her PCP.  She stated she does not need the appt.

## 2017-03-16 ENCOUNTER — HOSPITAL ENCOUNTER (INPATIENT)
Facility: HOSPITAL | Age: 72
LOS: 19 days | Discharge: HOSPICE | DRG: 291 | End: 2017-04-04
Attending: EMERGENCY MEDICINE | Admitting: INTERNAL MEDICINE
Payer: COMMERCIAL

## 2017-03-16 DIAGNOSIS — D50.0 IRON DEFICIENCY ANEMIA DUE TO CHRONIC BLOOD LOSS: Chronic | ICD-10-CM

## 2017-03-16 DIAGNOSIS — K92.2 GASTROINTESTINAL HEMORRHAGE, UNSPECIFIED GASTROINTESTINAL HEMORRHAGE TYPE: ICD-10-CM

## 2017-03-16 DIAGNOSIS — I50.42 CHRONIC COMBINED SYSTOLIC AND DIASTOLIC CONGESTIVE HEART FAILURE: Primary | Chronic | ICD-10-CM

## 2017-03-16 DIAGNOSIS — I50.9 CONGESTIVE HEART FAILURE, UNSPECIFIED CONGESTIVE HEART FAILURE CHRONICITY, UNSPECIFIED CONGESTIVE HEART FAILURE TYPE: ICD-10-CM

## 2017-03-16 DIAGNOSIS — I50.43 ACUTE ON CHRONIC COMBINED SYSTOLIC AND DIASTOLIC HEART FAILURE: ICD-10-CM

## 2017-03-16 DIAGNOSIS — R06.02 SOB (SHORTNESS OF BREATH): ICD-10-CM

## 2017-03-16 PROBLEM — E87.1 HYPONATREMIA: Status: ACTIVE | Noted: 2017-03-16

## 2017-03-16 PROBLEM — I07.9 TRICUSPID VALVE DISEASE: Status: ACTIVE | Noted: 2017-03-16

## 2017-03-16 PROBLEM — I34.0 MITRAL REGURGITATION: Status: ACTIVE | Noted: 2017-03-16

## 2017-03-16 LAB
ALBUMIN SERPL BCP-MCNC: 3 G/DL
ALLENS TEST: ABNORMAL
ALP SERPL-CCNC: 176 U/L
ALT SERPL W/O P-5'-P-CCNC: 29 U/L
ANION GAP SERPL CALC-SCNC: 16 MMOL/L
APTT BLDCRRT: 27.2 SEC
AST SERPL-CCNC: 45 U/L
BACTERIA #/AREA URNS HPF: ABNORMAL /HPF
BASOPHILS # BLD AUTO: 0.03 K/UL
BASOPHILS NFR BLD: 0.3 %
BILIRUB SERPL-MCNC: 1.5 MG/DL
BILIRUB UR QL STRIP: ABNORMAL
BNP SERPL-MCNC: 3344 PG/ML
BUN SERPL-MCNC: 44 MG/DL
CALCIUM SERPL-MCNC: 8.9 MG/DL
CHLORIDE SERPL-SCNC: 90 MMOL/L
CLARITY UR: CLEAR
CO2 SERPL-SCNC: 26 MMOL/L
COLOR UR: YELLOW
CREAT SERPL-MCNC: 1.6 MG/DL
DELSYS: ABNORMAL
DIFFERENTIAL METHOD: ABNORMAL
EOSINOPHIL # BLD AUTO: 0.1 K/UL
EOSINOPHIL NFR BLD: 0.6 %
ERYTHROCYTE [DISTWIDTH] IN BLOOD BY AUTOMATED COUNT: 16.7 %
EST. GFR  (AFRICAN AMERICAN): 37 ML/MIN/1.73 M^2
EST. GFR  (NON AFRICAN AMERICAN): 32 ML/MIN/1.73 M^2
GLUCOSE SERPL-MCNC: 95 MG/DL
GLUCOSE UR QL STRIP: NEGATIVE
HCO3 UR-SCNC: 28.8 MMOL/L (ref 24–28)
HCT VFR BLD AUTO: 29.4 %
HGB BLD-MCNC: 8.6 G/DL
HGB UR QL STRIP: ABNORMAL
INR PPP: 1.5
KETONES UR QL STRIP: ABNORMAL
LEUKOCYTE ESTERASE UR QL STRIP: ABNORMAL
LYMPHOCYTES # BLD AUTO: 0.7 K/UL
LYMPHOCYTES NFR BLD: 8.1 %
MCH RBC QN AUTO: 27 PG
MCHC RBC AUTO-ENTMCNC: 29.3 %
MCV RBC AUTO: 92 FL
MICROSCOPIC COMMENT: ABNORMAL
MONOCYTES # BLD AUTO: 1 K/UL
MONOCYTES NFR BLD: 11.7 %
NEUTROPHILS # BLD AUTO: 6.8 K/UL
NEUTROPHILS NFR BLD: 79.1 %
NITRITE UR QL STRIP: NEGATIVE
PCO2 BLDA: 44.5 MMHG (ref 35–45)
PH SMN: 7.42 [PH] (ref 7.35–7.45)
PH UR STRIP: 5 [PH] (ref 5–8)
PLATELET # BLD AUTO: 394 K/UL
PMV BLD AUTO: 10.1 FL
PO2 BLDA: 103 MMHG (ref 80–100)
POC BE: 4 MMOL/L
POC SATURATED O2: 98 % (ref 95–100)
POC TCO2: 30 MMOL/L (ref 23–27)
POTASSIUM SERPL-SCNC: 4.6 MMOL/L
PROT SERPL-MCNC: 6.7 G/DL
PROT UR QL STRIP: NEGATIVE
PROTHROMBIN TIME: 15.6 SEC
RBC # BLD AUTO: 3.19 M/UL
RBC #/AREA URNS HPF: 1 /HPF (ref 0–4)
SAMPLE: ABNORMAL
SITE: ABNORMAL
SODIUM SERPL-SCNC: 132 MMOL/L
SP GR UR STRIP: 1.02 (ref 1–1.03)
SQUAMOUS #/AREA URNS HPF: 5 /HPF
TROPONIN I SERPL DL<=0.01 NG/ML-MCNC: 0.03 NG/ML
TROPONIN I SERPL DL<=0.01 NG/ML-MCNC: 0.04 NG/ML
URN SPEC COLLECT METH UR: ABNORMAL
UROBILINOGEN UR STRIP-ACNC: ABNORMAL EU/DL
WBC # BLD AUTO: 8.64 K/UL
WBC #/AREA URNS HPF: 10 /HPF (ref 0–5)

## 2017-03-16 PROCEDURE — 85730 THROMBOPLASTIN TIME PARTIAL: CPT

## 2017-03-16 PROCEDURE — 85610 PROTHROMBIN TIME: CPT

## 2017-03-16 PROCEDURE — 25000003 PHARM REV CODE 250: Performed by: EMERGENCY MEDICINE

## 2017-03-16 PROCEDURE — 83880 ASSAY OF NATRIURETIC PEPTIDE: CPT

## 2017-03-16 PROCEDURE — 96374 THER/PROPH/DIAG INJ IV PUSH: CPT

## 2017-03-16 PROCEDURE — 84484 ASSAY OF TROPONIN QUANT: CPT | Mod: 91

## 2017-03-16 PROCEDURE — 63600175 PHARM REV CODE 636 W HCPCS: Performed by: HOSPITALIST

## 2017-03-16 PROCEDURE — 94660 CPAP INITIATION&MGMT: CPT

## 2017-03-16 PROCEDURE — 80053 COMPREHEN METABOLIC PANEL: CPT

## 2017-03-16 PROCEDURE — 93005 ELECTROCARDIOGRAM TRACING: CPT

## 2017-03-16 PROCEDURE — 99285 EMERGENCY DEPT VISIT HI MDM: CPT | Mod: 25

## 2017-03-16 PROCEDURE — 63600175 PHARM REV CODE 636 W HCPCS: Performed by: EMERGENCY MEDICINE

## 2017-03-16 PROCEDURE — 94761 N-INVAS EAR/PLS OXIMETRY MLT: CPT

## 2017-03-16 PROCEDURE — 25000003 PHARM REV CODE 250: Performed by: NURSE PRACTITIONER

## 2017-03-16 PROCEDURE — 11000001 HC ACUTE MED/SURG PRIVATE ROOM

## 2017-03-16 PROCEDURE — 81000 URINALYSIS NONAUTO W/SCOPE: CPT

## 2017-03-16 PROCEDURE — 27000190 HC CPAP FULL FACE MASK W/VALVE

## 2017-03-16 PROCEDURE — 25000003 PHARM REV CODE 250: Performed by: HOSPITALIST

## 2017-03-16 PROCEDURE — 99223 1ST HOSP IP/OBS HIGH 75: CPT | Mod: ,,, | Performed by: INTERNAL MEDICINE

## 2017-03-16 PROCEDURE — 84484 ASSAY OF TROPONIN QUANT: CPT

## 2017-03-16 PROCEDURE — 36415 COLL VENOUS BLD VENIPUNCTURE: CPT

## 2017-03-16 PROCEDURE — 85025 COMPLETE CBC W/AUTO DIFF WBC: CPT

## 2017-03-16 PROCEDURE — 36600 WITHDRAWAL OF ARTERIAL BLOOD: CPT

## 2017-03-16 PROCEDURE — 82803 BLOOD GASES ANY COMBINATION: CPT

## 2017-03-16 PROCEDURE — 96375 TX/PRO/DX INJ NEW DRUG ADDON: CPT

## 2017-03-16 PROCEDURE — 27000221 HC OXYGEN, UP TO 24 HOURS

## 2017-03-16 RX ORDER — BUMETANIDE 0.25 MG/ML
3 INJECTION INTRAMUSCULAR; INTRAVENOUS 2 TIMES DAILY
Status: DISCONTINUED | OUTPATIENT
Start: 2017-03-17 | End: 2017-03-22

## 2017-03-16 RX ORDER — ONDANSETRON 8 MG/1
8 TABLET, ORALLY DISINTEGRATING ORAL EVERY 8 HOURS PRN
Status: DISCONTINUED | OUTPATIENT
Start: 2017-03-16 | End: 2017-04-04 | Stop reason: HOSPADM

## 2017-03-16 RX ORDER — ENOXAPARIN SODIUM 100 MG/ML
40 INJECTION SUBCUTANEOUS EVERY 24 HOURS
Status: DISCONTINUED | OUTPATIENT
Start: 2017-03-16 | End: 2017-04-04 | Stop reason: HOSPADM

## 2017-03-16 RX ORDER — TRAMADOL HYDROCHLORIDE 50 MG/1
50 TABLET ORAL EVERY 6 HOURS PRN
Status: DISCONTINUED | OUTPATIENT
Start: 2017-03-16 | End: 2017-03-21

## 2017-03-16 RX ORDER — ACETAMINOPHEN 325 MG/1
650 TABLET ORAL EVERY 6 HOURS PRN
Status: DISCONTINUED | OUTPATIENT
Start: 2017-03-16 | End: 2017-04-04 | Stop reason: HOSPADM

## 2017-03-16 RX ORDER — CLOPIDOGREL BISULFATE 75 MG/1
75 TABLET ORAL DAILY
Status: DISCONTINUED | OUTPATIENT
Start: 2017-03-17 | End: 2017-04-04 | Stop reason: HOSPADM

## 2017-03-16 RX ORDER — AMIODARONE HYDROCHLORIDE 200 MG/1
200 TABLET ORAL DAILY
Status: DISCONTINUED | OUTPATIENT
Start: 2017-03-17 | End: 2017-04-04 | Stop reason: HOSPADM

## 2017-03-16 RX ORDER — NAPROXEN SODIUM 220 MG/1
81 TABLET, FILM COATED ORAL NIGHTLY
Status: DISCONTINUED | OUTPATIENT
Start: 2017-03-16 | End: 2017-04-04 | Stop reason: HOSPADM

## 2017-03-16 RX ORDER — BUMETANIDE 0.25 MG/ML
3 INJECTION INTRAMUSCULAR; INTRAVENOUS 2 TIMES DAILY
Status: DISCONTINUED | OUTPATIENT
Start: 2017-03-16 | End: 2017-03-16

## 2017-03-16 RX ORDER — PRAVASTATIN SODIUM 40 MG/1
40 TABLET ORAL DAILY
Status: DISCONTINUED | OUTPATIENT
Start: 2017-03-17 | End: 2017-04-04 | Stop reason: HOSPADM

## 2017-03-16 RX ORDER — ENOXAPARIN SODIUM 100 MG/ML
30 INJECTION SUBCUTANEOUS EVERY 24 HOURS
Status: DISCONTINUED | OUTPATIENT
Start: 2017-03-16 | End: 2017-03-16

## 2017-03-16 RX ORDER — BUMETANIDE 0.25 MG/ML
1 INJECTION INTRAMUSCULAR; INTRAVENOUS
Status: COMPLETED | OUTPATIENT
Start: 2017-03-16 | End: 2017-03-16

## 2017-03-16 RX ORDER — CYCLOBENZAPRINE HCL 10 MG
10 TABLET ORAL 3 TIMES DAILY PRN
Status: DISCONTINUED | OUTPATIENT
Start: 2017-03-16 | End: 2017-04-04 | Stop reason: HOSPADM

## 2017-03-16 RX ORDER — FERROUS SULFATE 325(65) MG
325 TABLET, DELAYED RELEASE (ENTERIC COATED) ORAL NIGHTLY
Status: DISCONTINUED | OUTPATIENT
Start: 2017-03-16 | End: 2017-04-04 | Stop reason: HOSPADM

## 2017-03-16 RX ORDER — TRAMADOL HYDROCHLORIDE 50 MG/1
100 TABLET ORAL
Status: COMPLETED | OUTPATIENT
Start: 2017-03-16 | End: 2017-03-16

## 2017-03-16 RX ORDER — FUROSEMIDE 10 MG/ML
40 INJECTION INTRAMUSCULAR; INTRAVENOUS
Status: COMPLETED | OUTPATIENT
Start: 2017-03-16 | End: 2017-03-16

## 2017-03-16 RX ORDER — SODIUM CHLORIDE 0.9 % (FLUSH) 0.9 %
3 SYRINGE (ML) INJECTION EVERY 8 HOURS
Status: DISCONTINUED | OUTPATIENT
Start: 2017-03-16 | End: 2017-04-04 | Stop reason: HOSPADM

## 2017-03-16 RX ORDER — PANTOPRAZOLE SODIUM 40 MG/1
40 TABLET, DELAYED RELEASE ORAL DAILY
Status: DISCONTINUED | OUTPATIENT
Start: 2017-03-17 | End: 2017-04-04 | Stop reason: HOSPADM

## 2017-03-16 RX ORDER — ALLOPURINOL 300 MG/1
300 TABLET ORAL DAILY
Status: DISCONTINUED | OUTPATIENT
Start: 2017-03-17 | End: 2017-04-04 | Stop reason: HOSPADM

## 2017-03-16 RX ORDER — BUMETANIDE 0.25 MG/ML
3 INJECTION INTRAMUSCULAR; INTRAVENOUS
Status: DISCONTINUED | OUTPATIENT
Start: 2017-03-16 | End: 2017-03-16 | Stop reason: DRUGHIGH

## 2017-03-16 RX ADMIN — TRAMADOL HYDROCHLORIDE 50 MG: 50 TABLET, COATED ORAL at 09:03

## 2017-03-16 RX ADMIN — ENOXAPARIN SODIUM 40 MG: 100 INJECTION SUBCUTANEOUS at 05:03

## 2017-03-16 RX ADMIN — TRAMADOL HYDROCHLORIDE 100 MG: 50 TABLET, COATED ORAL at 02:03

## 2017-03-16 RX ADMIN — FERROUS SULFATE TAB EC 325 MG (65 MG FE EQUIVALENT) 325 MG: 325 (65 FE) TABLET DELAYED RESPONSE at 09:03

## 2017-03-16 RX ADMIN — FUROSEMIDE 40 MG: 10 INJECTION, SOLUTION INTRAMUSCULAR; INTRAVENOUS at 12:03

## 2017-03-16 RX ADMIN — BUMETANIDE 1 MG: 0.25 INJECTION INTRAMUSCULAR; INTRAVENOUS at 10:03

## 2017-03-16 RX ADMIN — ASPIRIN 81 MG 81 MG: 81 TABLET ORAL at 09:03

## 2017-03-16 RX ADMIN — TRAZODONE HYDROCHLORIDE 150 MG: 100 TABLET ORAL at 09:03

## 2017-03-16 NOTE — ASSESSMENT & PLAN NOTE
COPD  Chronic hypoxic respiratory failure  Continue A/A nebs. BiPAP nightly. Continue supplemental oxygen

## 2017-03-16 NOTE — H&P
"[facility] Medicine  History & Physical    Patient Name: Jennifer Prescott  MRN: [id number]  Admission Date: 3/16/2017  Attending Physician: Anderson Badillo MD   Primary Care Provider: Lianna Mistry MD       Patient information was obtained from patient, past medical records and ER records.     Subjective:     Principal Problem:Acute on chronic combined systolic and diastolic heart failure    Chief Complaint:   Chief Complaint   Patient presents with    Shortness of Breath     accompanied by dependent edema; pale; states was discharged on 8th and has not felt well since leaving        HPI: Ms. Prescott is a 72 yo WF with ischemic cardiomyopathy, chronic combined HF, and recurrent GI bleed that [place] [facility]. She says that since her discharge last week she has gotten more short of breath. She says that the last couple days she has not been able to sleep because of the SOB. She says that she cannot lie back because she gets SOB. She had been trying to sleep in a lounge chair but had difficulty getting out of the chair. She denies any chest pain or palpitations. Her swelling has continued to increase again. She says that she has been taking her bumex as directed and has been urinating. She says that she eats ice constantly at home and probably eats a "quart" of ice a day. She says that she does not drink much fluids otherwise.     Past Medical History:   Diagnosis Date    *Atrial fibrillation     Anticoagulant long-term use     Arthritis     Cardiomyopathy     Carotid artery occlusion     CHF (congestive heart failure)     COPD (chronic obstructive pulmonary disease)     COPD exacerbation 2/19/2015    Coronary artery disease     RCA occluded with L to R collaterals and normal LAD and LCx    Hypertension     Internal bleeding     Sleep apnea     uses bipap       Past Surgical History:   Procedure Laterality Date    ABDOMINAL SURGERY      APPENDECTOMY   "    CARDIAC CATHETERIZATION      CARDIAC DEFIBRILLATOR PLACEMENT  10/9/2012    bivent AICD placed 10/2012 and revised 12/2012    CARDIAC PACEMAKER [facility]ACEMENT  9/21/2010    [facility]    CARDIOVERSION  3/6/2015    CAROTID ENDARTERECTOMY Left     CHOLECYSTECTOMY      COLONOSCOPY N/A 7/11/2016    Procedure: COLONOSCOPY;  Surgeon: Rafael Pérez MD;  Location: [facility] ENDO;  Service: Endoscopy;  Laterality: N/A;    COLONOSCOPY N/A 2/17/2017    Procedure: COLONOSCOPY;  Surgeon: Giovanni Bronson MD;  Location: [facility] ENDO ([facility] FLR);  Service: Endoscopy;  Laterality: N/A;    CORONARY ANGIOPLASTY WITH STENT PLACEMENT  1/27/2015    3 JAMAL to RCA    CRT-D implantation      ESOPHAGOGASTRODUODENOSCOPY      GALLBLADDER SURGERY      TONSILLECTOMY      TONSILLECTOMY, ADENOIDECTOMY      VASCULAR SURGERY         Review of patient's allergies indicates:   Allergen Reactions    Gabapentin        No current facility-administered medications on file prior to encounter.      Current Outpatient Prescriptions on File Prior to Encounter   Medication Sig    albuterol 90 mcg/actuation inhaler Inhale 2 puffs into the lungs every 6 (six) hours as needed for Wheezing.    albuterol-ipratropium 2.5mg-0.5mg/3mL (DUO-NEB) 0.5 mg-3 mg(2.5 mg base)/3 mL nebulizer solution USE 1 VIAL VIA NEBULIZER EVERY 6 HOURS AS NEEDED    allopurinol (ZYLOPRIM) 300 MG tablet Take 1 tablet (300 mg total) by mouth once daily.    amiodarone (PACERONE) 200 MG Tab Take 200 mg by mouth once daily.    aspirin 81 MG Chew Take 81 mg by mouth every evening.     bumetanide (BUMEX) 1 MG tablet Take 3 tablets (3 mg total) by mouth 2 (two) times daily.    carvedilol (COREG) 3.125 MG tablet Take 1 tablet (3.125 mg total) by mouth 2 (two) times daily with meals.    clopidogrel (PLAVIX) 75 mg tablet Take 75 mg by mouth once daily.    cyclobenzaprine (FLEXERIL) 10 MG tablet Take 10 mg by mouth 3 (three) times daily as needed for Muscle spasms.    ferrous sulfate 325  (65 FE) MG EC tablet Take 1 tablet (325 mg total) by mouth every evening.    fluticasone-vilanterol (BREO) 100-25 mcg/dose diskus inhaler Inhale 1 puff into the lungs once daily. (Patient taking differently: Inhale 1 puff into the lungs daily as needed. )    isosorbide mononitrate (IMDUR) 60 MG 24 hr tablet Take 1 tablet (60 mg total) by mouth once daily.    losartan (COZAAR) 25 MG tablet Take 0.5 tablets (12.5 mg total) by mouth once daily.    pantoprazole (PROTONIX) 40 MG tablet Take 1 tablet (40 mg total) by mouth once daily.    pravastatin (PRAVACHOL) 40 MG tablet Take 1 tablet (40 mg total) by mouth once daily.    tramadol (ULTRAM) 50 mg tablet TAKE 1 TABLET BY MOUTH EVERY 6 HOURS AS NEEDED FOR PAIN    trazodone (DESYREL) 150 MG tablet Take 1 tablet (150 mg total) by mouth nightly.    [DISCONTINUED] zolpidem (AMBIEN) 5 MG Tab take 1 on the night of the study; if you still can't sleep for another 60-90 min after the pill - take another pill.     Family History     Problem Relation (Age of Onset)    Breast cancer Mother    Cancer Father    Heart attack Father    Heart disease Father    Heart failure Father        Social History Main Topics    Smoking status: Former Smoker     Packs/day: 0.25     Years: 50.00     Types: Cigarettes     Start date: 1/15/1965    Smokeless tobacco: Never Used    Alcohol use No    Drug use: No    Sexual activity: No     Review of Systems   Constitutional: Positive for fatigue. Negative for appetite change, diaphoresis and fever.   HENT: Negative for congestion, nosebleeds, sinus pressure and sore throat.    Eyes: Negative for pain and itching.   Respiratory: Positive for shortness of breath. Negative for cough and wheezing.    Cardiovascular: Positive for leg swelling. Negative for chest pain and palpitations.   Gastrointestinal: Positive for constipation. Negative for abdominal pain, blood in stool, nausea and vomiting.   Endocrine: Negative for cold intolerance,  polydipsia and polyphagia.   Genitourinary: Negative for difficulty urinating, dysuria, frequency and hematuria.   Musculoskeletal: Positive for back pain. Negative for arthralgias and myalgias.   Skin: Positive for pallor. Negative for rash.   Allergic/Immunologic: Negative for environmental allergies and food allergies.   Neurological: Negative for dizziness, syncope, light-headedness and headaches.   Hematological: Bruises/bleeds easily.   Psychiatric/Behavioral: Negative for agitation and confusion. The patient is not nervous/anxious.      Objective:     Vital Signs (Most Recent):  Temp: 98.3 °F (36.8 °C) (03/16/17 0936)  Pulse: 69 (03/16/17 1551)  Resp: (!) 22 (03/16/17 1205)  BP: (!) 87/58 (03/16/17 1551)  SpO2: 97 % (03/16/17 1551) Vital Signs (24h Range):  Temp:  [98.3 °F (36.8 °C)] 98.3 °F (36.8 °C)  Pulse:  [69-80] 69  Resp:  [22-24] 22  SpO2:  [94 %-100 %] 97 %  BP: ()/(54-68) 87/58     Weight: 122.5 kg (270 lb)  Body mass index is 43.58 kg/(m^2).    Physical Exam   Constitutional: She is oriented to person, place, and time. She appears well-developed. She is cooperative. No distress.   HENT:   Head: Normocephalic and atraumatic.   Right Ear: External ear normal.   Left Ear: External ear normal.   Nose: No mucosal edema or sinus tenderness.   Mouth/Throat: Oropharynx is clear and moist and mucous membranes are normal. Abnormal dentition. No oropharyngeal exudate.   Eyes: Conjunctivae and EOM are normal. Pupils are equal, round, and reactive to light. No scleral icterus.   Neck: Phonation normal. Neck supple. JVD present. No muscular tenderness present. Carotid bruit is not present. No tracheal deviation present.   Cardiovascular: Normal rate, regular rhythm, S1 normal and S2 normal.    Murmur heard.  Pulses:       Radial pulses are 2+ on the right side, and 2+ on the left side.   Pulmonary/Chest: Effort normal. No respiratory distress. She has decreased breath sounds in the right lower field and the  left lower field. She has no wheezes. She has rales. She exhibits no tenderness and no crepitus.   Abdominal: Soft. Bowel sounds are normal. She exhibits no distension. There is no tenderness. There is no rebound and no guarding. No hernia.   Musculoskeletal: She exhibits edema (3+ to hips). She exhibits no tenderness.   Lymphadenopathy:        Right cervical: No superficial cervical adenopathy present.       Left cervical: No superficial cervical adenopathy present.        Right: No supraclavicular adenopathy present.        Left: No supraclavicular adenopathy present.   Neurological: She is alert and oriented to person, place, and time. She displays no tremor. She displays no seizure activity.   Skin: Skin is warm and dry. No rash noted. No cyanosis or erythema. Nails show no clubbing.   Psychiatric: She has a normal mood and affect. Her behavior is normal. Thought content normal.   Nursing note and vitals reviewed.       Significant Labs:   CBC:   Recent Labs  Lab 03/16/17  1005   WBC 8.64   HGB 8.6*   HCT 29.4*   *     CMP:   Recent Labs  Lab 03/16/17  1005   *   K 4.6   CL 90*   CO2 26   GLU 95   BUN 44*   CREATININE 1.6*   CALCIUM 8.9   PROT 6.7   ALBUMIN 3.0*   BILITOT 1.5*   ALKPHOS 176*   AST 45*   ALT 29   ANIONGAP 16   EGFRNONAA 32*     Cardiac Markers:   Recent Labs  Lab 03/16/17  1005   BNP 3344*     Coagulation:   Recent Labs  Lab 03/16/17  1005   INR 1.5*   APTT 27.2     Troponin:   Recent Labs  Lab 03/16/17  1005   TROPONINI 0.035*       Significant Imaging: CXR: I have reviewed all pertinent results/findings within the past 24 hours and my personal findings are:  Moderate pulmonary edema with R>L effusions that are slightly increased from prior exam.     Assessment/Plan:     * Acute on chronic combined systolic and diastolic heart failure  Ischemic cardiomyopathy  Severe TR and Severe MR  Pleural effusions  Consulted Cardiology for assistance. Has a very poor prognosis given the  severity of her valvular disease and depressed EF. Continue bumex 3 mg IV BID. Giving diuril per Cardiology. Low salt diet with fluid restriction. Has a significant amount of extra fluid. Daily weights.     Permanent atrial fibrillation  Monitor on telemetry. Had significant bleed on full anticoagulation previously, so not anticoagulated. Rate controlled.       CAD S/P percutaneous coronary angioplasty  Continue statin, ASA, and plavix.      Obesity hypoventilation syndrome on BiPAP  COPD  Chronic hypoxic respiratory failure  Continue A/A nebs. BiPAP nightly. Continue supplemental oxygen      CKD (chronic kidney disease) stage 3, GFR 30-59 ml/min  Creatinine at baseline. Monitor closely with diuresis.       Iron deficiency anemia due to chronic blood loss  Continue oral iron. Check iron, TIBC, and ferritin.       Neuropathic pain, leg, bilateral  Continue tramadol prn.       Hyponatremia  Related to volume overload most likely. Monitor.     VTE Risk Mitigation         Ordered     enoxaparin injection 40 mg  Daily     Route:  Subcutaneous        03/16/17 1559     Medium Risk of VTE  Once      03/16/17 1449        Anderson Badillo MD  Department of Hospital Medicine   [facility]

## 2017-03-16 NOTE — ASSESSMENT & PLAN NOTE
S/p LAD PCI; s/p RCA PCI for  2015; mild troponin rise due to ADHF; no complaints of chest pain; no concern for ACS; continue ASA, Plavix, ARB and statin therapy

## 2017-03-16 NOTE — ASSESSMENT & PLAN NOTE
Echo with severely depressed LV function with EF 25% with severe MR; massively volume overloaded with orthopnea, PND, peripheral edema and BAEZ; needs aggressive diuresis with plans for IV Diuril and Bumex this evening but held given SBP 80s-90s; low BP concerning for ADHF with low output state; decision to hold off on IV diuresis out of fear of further lowering BP; if decompensation occurs overnight would not be opposed to transfer to the ICU with continuous BiPap; considered use of inotropes but deferred given marginal BP; overall her severely depressed LVEF is concerning and her overall prognosis with her current symptoms is very poor; discussed this with her and her friends/family in the room this evening and will have ongoing conversations with her

## 2017-03-16 NOTE — ED NOTES
Patient has verified the spelling of their name and  on armband  LOC: The patient is awake, alert, and aware of environment with an appropriate affect, the patient is oriented x 3 and speaking appropriately.   APPEARANCE:patient is clean and well groomed, patient's clothing is properly fastened.   SKIN: bilateral hands cool to touch, edema noted, color consistent with ethnicity,  patient has normal skin turgor and moist mucus membranes,  Left arm scab that was removed by pt, draining clear fluid, lower extrem draining clear fluid as well,  bandage applied, 3+ pitting edema noted to BLE, 1 + edema to right upper extrem, 3 + edema to left upper extrem,  bruising noted, pt takes plavix at home.   : pt with some difficulty urinating per pt  MUSCULOSKELETAL: Patient moving all extremities spontaneously,  Swelling to upper and lower extrem, or deformities noted.   RESPIRATORY: Airway is open and patent, respirations are spontaneous,  no accessory muscle use noted, bilateral breath sounds clear, complaints of  SOB with and without activity  ABDOMEN: Soft and non tender to palpation, no distention noted, normoactive bowel sounds present in all four quadrants.   CARDIAC: Normal rate and rhythm, no peripheral edema noted, less then 3 second capillary refill, denies chest pain  COMPLAINT: SOB

## 2017-03-16 NOTE — ED PROVIDER NOTES
"Encounter Date: 3/16/2017       History     Chief Complaint   Patient presents with    Shortness of Breath     accompanied by dependent edema; pale; states was discharged on 8th and has not felt well since leaving     Review of patient's allergies indicates:   Allergen Reactions    Gabapentin      HPI Comments: 72yo female here for SOB.  Pt has pmhx of CHF (EF 25% from 3/1/17), chronic respiratory failure, chronic ischemic colitis, anemia, COPD, CAD, HTN, GI bleed, s/p pacemaker, and cardiomyopathy is here for SOB.  Pt was DC'd from this hospital on 3/8/17 for CHF exacerbation.  Pt reports she has taken her medications as directed except for Bumex.  Pt reports she has been feeling progressively worse SOB.  Denies CP, fever, productive cough.  She has noticed today that her skin is "leaking" clear fluid.  She reports she has had to use NC 5LPM at home.  She has missed her f/u appointments.  Denies rectal bleeding and dark stools.     Patient is a 71 y.o. female presenting with the following complaint: shortness of breath. The history is provided by the patient, the EMS personnel and medical records.   Shortness of Breath   This is a recurrent problem. The problem occurs continuously.The current episode started more than 1 week ago. The problem has been gradually worsening. Associated symptoms include cough, wheezing, orthopnea and leg swelling. Pertinent negatives include no fever, no headaches, no coryza, no rhinorrhea, no sore throat, no ear pain, no neck pain, no sputum production, no chest pain, no vomiting, no abdominal pain, no rash and no leg pain. Precipitated by: Exertion, recumbent position. She has had prior hospitalizations. She has had prior ED visits. She has had prior ICU admissions. Associated medical issues include COPD, pneumonia, chronic lung disease, CAD, heart failure and DVT. Associated medical issues do not include asthma, PE or recent surgery. Associated medical issues comments: No " anticoagulant use to due history of GI bleed.     Past Medical History:   Diagnosis Date    *Atrial fibrillation     Anticoagulant long-term use     Arthritis     Cardiomyopathy     Carotid artery occlusion     CHF (congestive heart failure)     COPD (chronic obstructive pulmonary disease)     COPD exacerbation 2/19/2015    Coronary artery disease     RCA occluded with L to R collaterals and normal LAD and LCx    Hypertension     Internal bleeding     Sleep apnea     uses bipap     Past Surgical History:   Procedure Laterality Date    ABDOMINAL SURGERY      APPENDECTOMY      CARDIAC CATHETERIZATION      CARDIAC DEFIBRILLATOR PLACEMENT  10/9/2012    bivent AICD placed 10/2012 and revised 12/2012    CARDIAC PAC[facility]AKER PLACEMENT  [facility]/2010    [facility]    CARDIOVERSION  3/6/2015    CAROTID ENDARTERECTOMY Left     CHOLECYSTECTOMY      COLONOSCOPY N/A 7/11/2016    Procedure: COLONOSCOPY;  Surgeon: Rafael Pérez MD;  Location: [facility] ENDO;  Service: Endoscopy;  Laterality: N/A;    COLONOSCOPY N/A 2/17/2017    Procedure: COLONOSCOPY;  Surgeon: Giovanni Bronson MD;  [facility]  Service: Endoscopy;  Laterality: N/A;    CORONARY ANGIOPLASTY WITH STENT PLACEMENT  1/27/2015    3 JAMAL to RCA    CRT-D implantation      ESOPHAGOGASTRODUODENOSCOPY      GALLBLADDER SURGERY      TONSILLECTOMY      TONSILLECTOMY, ADENOIDECTOMY      VASCULAR SURGERY       Family History   Problem Relation Age of Onset    Breast cancer Mother     Cancer Father     Heart failure Father     Heart disease Father     Heart attack Father     Sudden death Neg Hx      Social History   Substance Use Topics    Smoking status: Former Smoker     Packs/day: 0.25     Years: 50.00     Types: Cigarettes     Start date: 1/15/1965    Smokeless tobacco: Never Used    Alcohol use No     Review of Systems   Constitutional: Positive for activity change and appetite change. Negative for chills, fatigue and fever.    HENT: Negative for congestion, ear pain, rhinorrhea and sore throat.    Respiratory: Positive for cough, shortness of breath and wheezing. Negative for sputum production and chest tightness.    Cardiovascular: Positive for orthopnea and leg swelling. Negative for chest pain and palpitations.   Gastrointestinal: Negative for abdominal pain, blood in stool, diarrhea, nausea and vomiting.   Genitourinary: Negative for dysuria.   Musculoskeletal: Positive for back pain. Negative for neck pain.   Skin: Positive for pallor. Negative for rash.   Allergic/Immunologic: Negative for immunocompromised state.   Neurological: Positive for weakness (generalized). Negative for light-headedness and headaches.   Psychiatric/Behavioral: Negative for confusion.   All other systems reviewed and are negative.      Physical Exam   Initial Vitals   BP Pulse Resp Temp SpO2   03/16/17 0936 03/16/17 0936 03/16/17 0936 03/16/17 0936 03/16/17 0936   101/68 80 24 98.3 °F (36.8 °C) 99 %     Physical Exam    Nursing note and vitals reviewed.  Constitutional: She appears well-developed and well-nourished. She is Obese . She is active and cooperative. She is easily aroused.  Non-toxic appearance. She has a sickly appearance. She appears ill. She appears distressed. Nasal cannula in place.   HENT:   Head: Normocephalic and atraumatic.   Nose: Nose normal.   Mouth/Throat: Uvula is midline and oropharynx is clear and moist.   Eyes: Conjunctivae and EOM are normal.   Neck: Normal range of motion. Neck supple.   Cardiovascular: Normal rate. A regularly irregular rhythm present.   Murmur heard.  Pulses:       Radial pulses are 2+ on the right side, and 2+ on the left side.   Pulmonary/Chest: Effort normal. Tachypnea noted. She has decreased breath sounds. She has no wheezes. She has rhonchi. She has no rales.   Abdominal: Soft. Normal appearance and bowel sounds are normal.   Musculoskeletal:   Diffuse pitting edema   Neurological: She is alert,  oriented to person, place, and time and easily aroused. She has normal strength. GCS eye subscore is 4. GCS verbal subscore is 5. GCS motor subscore is 6.   Skin: Skin is warm and intact. No rash noted. There is pallor.   Weeping edema bilateral upper and lower extremities     Psychiatric: She has a normal mood and affect. Her speech is normal and behavior is normal. Judgment and thought content normal. Cognition and memory are normal.         ED Course   Procedures  Labs Reviewed   CBC W/ AUTO DIFFERENTIAL - Abnormal; Notable for the following:        Result Value    RBC 3.19 (*)     Hemoglobin 8.6 (*)     Hematocrit 29.4 (*)     MCHC 29.3 (*)     RDW 16.7 (*)     Platelets 394 (*)     Lymph # 0.7 (*)     Gran% 79.1 (*)     Lymph% 8.1 (*)     All other components within normal limits   COMPREHENSIVE METABOLIC PANEL - Abnormal; Notable for the following:     Sodium 132 (*)     Chloride 90 (*)     BUN, Bld 44 (*)     Creatinine 1.6 (*)     Albumin 3.0 (*)     Total Bilirubin 1.5 (*)     Alkaline Phosphatase 176 (*)     AST 45 (*)     eGFR if  37 (*)     eGFR if non  32 (*)     All other components within normal limits   PROTIME-INR - Abnormal; Notable for the following:     Prothrombin Time 15.6 (*)     INR 1.5 (*)     All other components within normal limits   TROPONIN I - Abnormal; Notable for the following:     Troponin I 0.035 (*)     All other components within normal limits   URINALYSIS - Abnormal; Notable for the following:     Ketones, UA Trace (*)     Bilirubin (UA) 1+ (*)     Occult Blood UA Trace (*)     Urobilinogen, UA 4.0-6.0 (*)     Leukocytes, UA 2+ (*)     All other components within normal limits   B-TYPE NATRIURETIC PEPTIDE - Abnormal; Notable for the following:     BNP 3344 (*)     All other components within normal limits   URINALYSIS MICROSCOPIC - Abnormal; Notable for the following:     WBC, UA 10 (*)     All other components within normal limits   ISTAT  PROCEDURE - Abnormal; Notable for the following:     POC PO2 103 (*)     POC HCO3 28.8 (*)     POC TCO2 30 (*)     All other components within normal limits   APTT        Imaging Results         X-Ray Chest 1 View (Final result) Result time:  03/16/17 11:02:04    Final result by Roman Moya MD (03/16/17 11:02:04)    Impression:      As above.      Electronically signed by: ROMAN MOYA MD  Date:     03/16/17  Time:    11:02     Narrative:    Chest AP single view.  Comparison: 3/7/17.    Left-sided pacemaker is visualized.  There is stable cardiomegaly.  Bilateral pleural effusions are visualized, right greater than left with volume loss and atelectasis/consolidation within the lower lobes.  Diffuse increased interstitial markings suggesting pulmonary edema.  Right-sided effusion is stable from previous exam, however there appears to be increased volume of left pleural fluid.  No evidence of pneumothorax.  Bones show DJD.                 Medical Decision Making:   Initial Assessment:   72yo female here for SOB. Recent admission. History of CHF, COPD.  PT appears ill, distressed. Increased oxygen requirement at home. Not taking Bumex. Nonproductive cough and diffuse upper and lower extremity edema. Afebrile.  Lungs with rhonchi diffuse bilaterally.  Upper and lower weeping edema. Pale.    Differential Diagnosis:   CHF exacerbation, STEMI/NSTEMI, COPD exacerbation, exacerbation of chronic respiratory distress, anemia, electrolyte derangement, sepsis  Clinical Tests:   Lab Tests: Ordered and Reviewed  Radiological Study: Ordered and Reviewed  Medical Tests: Ordered and Reviewed  ED Management:  Labs, CXR, EKG, IV meds  Feel the pt is in CHF exacerbation.  Will likely require admission.  Pt was turned over to  at 1000.                Attending Attestation:     Physician Attestation Statement for NP/PA:   I have conducted a face to face encounter with this patient in addition to the NP/PA, due to Medical  Complexity    Other NP/PA Attestation Additions:    History of Present Illness: 71-year-old female with CHF who presents with shortness of breath.   Physical Exam: Diminished breath sounds bilaterally with rhonchi.  Edema of both upper and lower extremities.                  ED Course     Clinical Impression:   The primary encounter diagnosis was Congestive heart failure, unspecified congestive heart failure chronicity, unspecified congestive heart failure type. A diagnosis of SOB (shortness of breath) was also pertinent to this visit.          DAVID Berry  03/16/17 [id number]       Isael Hackett MD  03/16/17 3100

## 2017-03-16 NOTE — ASSESSMENT & PLAN NOTE
Noted on CXR; appears slightly worse compared to previous CXRs; needs aggressive diuresis but will need to hold given her marginal BP

## 2017-03-16 NOTE — ASSESSMENT & PLAN NOTE
Monitor on telemetry. Had significant bleed on full anticoagulation previously, so not anticoagulated. Rate controlled.

## 2017-03-16 NOTE — ED TRIAGE NOTES
Pt arrived with EMS from home. Complaints of shortness of breath that became worse at home. Pt states it has been difficult lying down. Pt feels better sitting up right

## 2017-03-16 NOTE — SUBJECTIVE & OBJECTIVE
Past Medical History:   Diagnosis Date    *Atrial fibrillation     Anticoagulant long-term use     Arthritis     Cardiomyopathy     Carotid artery occlusion     CHF (congestive heart failure)     COPD (chronic obstructive pulmonary disease)     COPD exacerbation 2/19/2015    Coronary artery disease     RCA occluded with L to R collaterals and normal LAD and LCx    Hypertension     Internal bleeding     Sleep apnea     uses bipap       Past Surgical History:   Procedure Laterality Date    ABDOMINAL SURGERY      APPENDECTOMY      CARDIAC CATHETERIZATION      CARDIAC DEFIBRILLATOR PLACEMENT  10/9/2012    bivent AICD placed 10/2012 and revised 12/2012    CARDIAC PAC[facility]AKER [facility]ACEMENT  9/21/2010    [facility]    CARDIOVERSION  3/6/2015    CAROTID ENDARTERECTOMY Left     CHOLECYSTECTOMY      COLONOSCOPY N/A 7/11/2016    Procedure: COLONOSCOPY;  Surgeon: Rafael Pérez MD;  Location: [facility] ENDO;  Service: Endoscopy;  Laterality: N/A;    COLONOSCOPY N/A 2/17/2017    Procedure: COLONOSCOPY;  Surgeon: Giovanni Bronson MD;  Location: [facility] ENDO ([address] [place] [address]);  Service: Endoscopy;  Laterality: N/A;    CORONARY ANGIOPLASTY WITH STENT PLACEMENT  1/27/2015    3 JAMAL to RCA    CRT-D implantation      ESOPHAGOGASTRODUODENOSCOPY      GALLBLADDER SURGERY      TONSILLECTOMY      TONSILLECTOMY, ADENOIDECTOMY      VASCULAR SURGERY         Review of patient's allergies indicates:   Allergen Reactions    Gabapentin        No current facility-administered medications on file prior to encounter.      Current Outpatient Prescriptions on File Prior to Encounter   Medication Sig    albuterol 90 mcg/actuation inhaler Inhale 2 puffs into the lungs every 6 (six) hours as needed for Wheezing.    albuterol-ipratropium 2.5mg-0.5mg/3mL (DUO-NEB) 0.5 mg-3 mg(2.5 mg base)/3 mL nebulizer solution USE 1 VIAL VIA NEBULIZER EVERY 6 HOURS AS NEEDED    allopurinol (ZYLOPRIM) 300 MG tablet Take 1 tablet (300 mg total) by mouth once  daily.    amiodarone (PACERONE) 200 MG Tab Take 200 mg by mouth once daily.    aspirin 81 MG Chew Take 81 mg by mouth every evening.     bumetanide (BUMEX) 1 MG tablet Take 3 tablets (3 mg total) by mouth 2 (two) times daily.    carvedilol (COREG) 3.125 MG tablet Take 1 tablet (3.125 mg total) by mouth 2 (two) times daily with meals.    clopidogrel (PLAVIX) 75 mg tablet Take 75 mg by mouth once daily.    cyclobenzaprine (FLEXERIL) 10 MG tablet Take 10 mg by mouth 3 (three) times daily as needed for Muscle spasms.    ferrous sulfate 325 (65 FE) MG EC tablet Take 1 tablet (325 mg total) by mouth every evening.    fluticasone-vilanterol (BREO) 100-25 mcg/dose diskus inhaler Inhale 1 puff into the lungs once daily. (Patient taking differently: Inhale 1 puff into the lungs daily as needed. )    isosorbide mononitrate (IMDUR) 60 MG 24 hr tablet Take 1 tablet (60 mg total) by mouth once daily.    losartan (COZAAR) 25 MG tablet Take 0.5 tablets (12.5 mg total) by mouth once daily.    pantoprazole (PROTONIX) 40 MG tablet Take 1 tablet (40 mg total) by mouth once daily.    pravastatin (PRAVACHOL) 40 MG tablet Take 1 tablet (40 mg total) by mouth once daily.    tramadol (ULTRAM) 50 mg tablet TAKE 1 TABLET BY MOUTH EVERY 6 HOURS AS NEEDED FOR PAIN    trazodone (DESYREL) 150 MG tablet Take 1 tablet (150 mg total) by mouth nightly.    [DISCONTINUED] zolpidem (AMBIEN) 5 MG Tab take 1 on the night of the study; if you still can't sleep for another 60-90 min after the pill - take another pill.     Family History     Problem Relation (Age of Onset)    Breast cancer Mother    Cancer Father    Heart attack Father    Heart disease Father    Heart failure Father        Social History Main Topics    Smoking status: Former Smoker     Packs/day: 0.25     Years: 50.00     Types: Cigarettes     Start date: 1/15/1965    Smokeless tobacco: Never Used    Alcohol use No    Drug use: No    Sexual activity: No     Review of  Systems   Constitutional: Positive for fatigue. Negative for appetite change, diaphoresis and fever.   HENT: Negative for congestion, nosebleeds, sinus pressure and sore throat.    Eyes: Negative for pain and itching.   Respiratory: Positive for shortness of breath. Negative for cough and wheezing.    Cardiovascular: Positive for leg swelling. Negative for chest pain and palpitations.   Gastrointestinal: Positive for constipation. Negative for abdominal pain, blood in stool, nausea and vomiting.   Endocrine: Negative for cold intolerance, polydipsia and polyphagia.   Genitourinary: Negative for difficulty urinating, dysuria, frequency and hematuria.   Musculoskeletal: Positive for back pain. Negative for arthralgias and myalgias.   Skin: Positive for pallor. Negative for rash.   Allergic/Immunologic: Negative for environmental allergies and food allergies.   Neurological: Negative for dizziness, syncope, light-headedness and headaches.   Hematological: Bruises/bleeds easily.   Psychiatric/Behavioral: Negative for agitation and confusion. The patient is not nervous/anxious.      Objective:     Vital Signs (Most Recent):  Temp: 98.3 °F (36.8 °C) (03/16/17 0936)  Pulse: 69 (03/16/17 1551)  Resp: (!) 22 (03/16/17 1205)  BP: (!) 87/58 (03/16/17 1551)  SpO2: 97 % (03/16/17 1551) Vital Signs (24h Range):  Temp:  [98.3 °F (36.8 °C)] 98.3 °F (36.8 °C)  Pulse:  [69-80] 69  Resp:  [22-24] 22  SpO2:  [94 %-100 %] 97 %  BP: ()/(54-68) 87/58     Weight: 122.5 kg (270 lb)  Body mass index is 43.58 kg/(m^2).    Physical Exam   Constitutional: She is oriented to person, place, and time. She appears well-developed. She is cooperative. No distress.   HENT:   Head: Normocephalic and atraumatic.   Right Ear: External ear normal.   Left Ear: External ear normal.   Nose: No mucosal edema or sinus tenderness.   Mouth/Throat: Oropharynx is clear and moist and mucous membranes are normal. Abnormal dentition. No oropharyngeal exudate.    Eyes: Conjunctivae and EOM are normal. Pupils are equal, round, and reactive to light. No scleral icterus.   Neck: Phonation normal. Neck supple. JVD present. No muscular tenderness present. Carotid bruit is not present. No tracheal deviation present.   Cardiovascular: Normal rate, regular rhythm, S1 normal and S2 normal.    Murmur heard.  Pulses:       Radial pulses are 2+ on the right side, and 2+ on the left side.   Pulmonary/Chest: Effort normal. No respiratory distress. She has decreased breath sounds in the right lower field and the left lower field. She has no wheezes. She has rales. She exhibits no tenderness and no crepitus.   Abdominal: Soft. Bowel sounds are normal. She exhibits no distension. There is no tenderness. There is no rebound and no guarding. No hernia.   Musculoskeletal: She exhibits edema (3+ to hips). She exhibits no tenderness.   Lymphadenopathy:        Right cervical: No superficial cervical adenopathy present.       Left cervical: No superficial cervical adenopathy present.        Right: No supraclavicular adenopathy present.        Left: No supraclavicular adenopathy present.   Neurological: She is alert and oriented to person, place, and time. She displays no tremor. She displays no seizure activity.   Skin: Skin is warm and dry. No rash noted. No cyanosis or erythema. Nails show no clubbing.   Psychiatric: She has a normal mood and affect. Her behavior is normal. Thought content normal.   Nursing note and vitals reviewed.       Significant Labs:   CBC:   Recent Labs  Lab 03/16/17  1005   WBC 8.64   HGB 8.6*   HCT 29.4*   *     CMP:   Recent Labs  Lab 03/16/17  1005   *   K 4.6   CL 90*   CO2 26   GLU 95   BUN 44*   CREATININE 1.6*   CALCIUM 8.9   PROT 6.7   ALBUMIN 3.0*   BILITOT 1.5*   ALKPHOS 176*   AST 45*   ALT 29   ANIONGAP 16   EGFRNONAA 32*     Cardiac Markers:   Recent Labs  Lab 03/16/17  1005   BNP 3344*     Coagulation:   Recent Labs  Lab 03/16/17  1005   INR  1.5*   APTT 27.2     Troponin:   Recent Labs  Lab 03/16/17  1005   TROPONINI 0.035*       Significant Imaging: CXR: I have reviewed all pertinent results/findings within the past 24 hours and my personal findings are:  Moderate pulmonary edema with R>L effusions that are slightly increased from prior exam.

## 2017-03-16 NOTE — ASSESSMENT & PLAN NOTE
Ischemic cardiomyopathy  Severe TR and Severe MR  Pleural effusions  Consulted Cardiology for assistance. Has a very poor prognosis given the severity of her valvular disease and depressed EF. Continue bumex 3 mg IV BID. Giving diuril per Cardiology. Low salt diet with fluid restriction. Has a significant amount of extra fluid. Daily weights.

## 2017-03-16 NOTE — CONSULTS
[facility]  Cardiology  Consult Note    Patient Name: Jennifer Prescott  MRN: [id number]  Admission Date: 3/16/2017  Hospital Length of Stay: 0 days  Code Status: Full Code   Attending Provider: Anderson Jolly MD   Consulting Provider: SEJAL Strickland ANP  Primary Care Physician: Lianna Mistry MD  Principal Problem:Acute on chronic combined systolic and diastolic heart failure    Patient information was obtained from patient and past medical records.     Inpatient consult to [facility]  Consult performed by: JEANIE SPEARS  Consult ordered by: ANDERSON JOLLY  Reason for consult: ADHF        Subjective:     Chief Complaint:  SOB    Consult Reason: ADHF     HPI:   70yo female with history of CAD s/p LAD PCI and RCA PCI for  1/2015, permanent afib, COPD, ICM EF 25%, MR, OHVS, CKD stage III, NHI, GIB and ischemic colitis who presented to the ER with complaints of SOB and LE swelling for the past several days. She was recently discharged from the hospital last week with similar symptoms. She reports continued BAEZ, decreased appetite, orthopnea, PND and LE swelling. She denies chest pain, dizziness or sycnope. She is compliant with her dietary restrictions and medication regimen    Summary of hospital course since January 2017 obtained from previous admission   She was admitted on 2/15/2017 for GIB with dark tarry stools. Her Hgb at that time was 5.1 therefore she received 3 units PRBCs and was evaluated by GI. Underwent EGD with nonbleeding duodenal diverticulum, portal hypertension with no biospy due to recent Plavix and Eliquis and hiatal hernia. Colonoscopy performed as well with no acute abnormalites. She required additional one unit of PRBC due to Hgb of 7 with slight trend upward of H&H to 8&28 (baseline 9-11/32-39). Discharged with discontinuation of Eliquis and Plavix due to GIB and HAS BLED score of 4 with recommendation to follow up with Cardiology for  evaluation for resumption. Additionally her Bumex, Digoxin, Imdur and Losartan were discontinued as well. She reports feeling okay but not her usual self upon discharge with recurrent SOB requiring re admission last week at [facility]. At that time, she was admitted to [facility] and was given Bumex 1mg IV with minimal response therefore she was given Diuril 250mg IV x 1 along with Bumex 2mg IV TID with 4 liters out and was discharged with recommendation for Bumex 2mg BID x 3 days then to decrease to 1mg po BID. She reports at that time her SOB was not back to her baseline and continued with recommended medication regimen. However, her SOB persisted and progressively worsened prompting her to present to the ER       Past Medical History:   Diagnosis Date    *Atrial fibrillation     Anticoagulant long-term use     Arthritis     Cardiomyopathy     Carotid artery occlusion     CHF (congestive heart failure)     COPD (chronic obstructive pulmonary disease)     COPD exacerbation 2/19/2015    Coronary artery disease     RCA occluded with L to R collaterals and normal LAD and LCx    Hypertension     Internal bleeding     Sleep apnea     uses bipap       Past Surgical History:   Procedure Laterality Date    ABDOMINAL SURGERY      APPENDECTOMY      CARDIAC CATHETERIZATION      CARDIAC DEFIBRILLATOR PLACEMENT  10/9/2012    bivent AICD placed 10/2012 and revised 12/2012    CARDIAC [facility]AKER [facility]EMENT  [facility]/2010    [facility]    CARDIOVERSION  3/6/2015    CAROTID ENDARTERECTOMY Left     CHOLECYSTECTOMY      COLONOSCOPY N/A 7/11/2016    Procedure: COLONOSCOPY;  Surgeon: Rafael Pérez MD;  Location: [facility];  Service: Endoscopy;  Laterality: N/A;    COLONOSCOPY N/A 2/17/2017    Procedure: COLONOSCOPY;  Surgeon: Giovanni Bronson MD;  Location: [facility] ([address] [place] [address]);  Service: Endoscopy;  Laterality: N/A;    CORONARY ANGIOPLASTY WITH STENT PLACEMENT  1/27/2015    3 JAMAL to RCA    CRT-D  implantation      ESOPHAGOGASTRODUODENOSCOPY      GALLBLADDER SURGERY      TONSILLECTOMY      TONSILLECTOMY, ADENOIDECTOMY      VASCULAR SURGERY         Review of patient's allergies indicates:   Allergen Reactions    Gabapentin        No current facility-administered medications on file prior to encounter.      Current Outpatient Prescriptions on File Prior to Encounter   Medication Sig    albuterol 90 mcg/actuation inhaler Inhale 2 puffs into the lungs every 6 (six) hours as needed for Wheezing.    albuterol-ipratropium 2.5mg-0.5mg/3mL (DUO-NEB) 0.5 mg-3 mg(2.5 mg base)/3 mL nebulizer solution USE 1 VIAL VIA NEBULIZER EVERY 6 HOURS AS NEEDED    allopurinol (ZYLOPRIM) 300 MG tablet Take 1 tablet (300 mg total) by mouth once daily.    amiodarone (PACERONE) 200 MG Tab Take 200 mg by mouth once daily.    aspirin 81 MG Chew Take 81 mg by mouth every evening.     bumetanide (BUMEX) 1 MG tablet Take 3 tablets (3 mg total) by mouth 2 (two) times daily.    carvedilol (COREG) 3.125 MG tablet Take 1 tablet (3.125 mg total) by mouth 2 (two) times daily with meals.    clopidogrel (PLAVIX) 75 mg tablet Take 75 mg by mouth once daily.    cyclobenzaprine (FLEXERIL) 10 MG tablet Take 10 mg by mouth 3 (three) times daily as needed for Muscle spasms.    ferrous sulfate 325 (65 FE) MG EC tablet Take 1 tablet (325 mg total) by mouth every evening.    fluticasone-vilanterol (BREO) 100-25 mcg/dose diskus inhaler Inhale 1 puff into the lungs once daily. (Patient taking differently: Inhale 1 puff into the lungs daily as needed. )    isosorbide mononitrate (IMDUR) 60 MG 24 hr tablet Take 1 tablet (60 mg total) by mouth once daily.    losartan (COZAAR) 25 MG tablet Take 0.5 tablets (12.5 mg total) by mouth once daily.    pantoprazole (PROTONIX) 40 MG tablet Take 1 tablet (40 mg total) by mouth once daily.    pravastatin (PRAVACHOL) 40 MG tablet Take 1 tablet (40 mg total) by mouth once daily.    tramadol (ULTRAM) 50 mg  tablet TAKE 1 TABLET BY MOUTH EVERY 6 HOURS AS NEEDED FOR PAIN    trazodone (DESYREL) 150 MG tablet Take 1 tablet (150 mg total) by mouth nightly.    [DISCONTINUED] zolpidem (AMBIEN) 5 MG Tab take 1 on the night of the study; if you still can't sleep for another 60-90 min after the pill - take another pill.     Family History     Problem Relation (Age of Onset)    Breast cancer Mother    Cancer Father    Heart attack Father    Heart disease Father    Heart failure Father        Social History Main Topics    Smoking status: Former Smoker     Packs/day: 0.25     Years: 50.00     Types: Cigarettes     Start date: 1/15/1965    Smokeless tobacco: Never Used    Alcohol use No    Drug use: No    Sexual activity: No     Review of Systems   Constitution: Positive for decreased appetite. Negative for fever and weakness.   Cardiovascular: Positive for dyspnea on exertion, leg swelling, orthopnea and paroxysmal nocturnal dyspnea. Negative for chest pain, claudication, irregular heartbeat, near-syncope and syncope.   Respiratory: Positive for cough and shortness of breath. Negative for wheezing.      Objective:     Vital Signs (Most Recent):  Temp: 98.3 °F (36.8 °C) (03/16/17 0936)  Pulse: 69 (03/16/17 1551)  Resp: (!) 22 (03/16/17 1205)  BP: (!) 87/58 (03/16/17 1551)  SpO2: 97 % (03/16/17 1551) Vital Signs (24h Range):  Temp:  [98.3 °F (36.8 °C)] 98.3 °F (36.8 °C)  Pulse:  [69-80] 69  Resp:  [22-24] 22  SpO2:  [94 %-100 %] 97 %  BP: ()/(54-68) 87/58     Weight: 122.5 kg (270 lb)  Body mass index is 43.58 kg/(m^2).    SpO2: 97 %  O2 Device (Oxygen Therapy): nasal cannula    No intake or output data in the 24 hours ending 03/16/17 1654    Lines/Drains/Airways     Peripheral Intravenous Line                 Peripheral IV - Single Lumen 03/16/17 1015 Right Antecubital less than 1 day                Physical Exam   Constitutional: She is oriented to person, place, and time. She appears well-developed and well-nourished.  No distress.   Cardiovascular: Normal rate and regular rhythm.  Exam reveals no gallop.    Murmur heard.  Pulmonary/Chest: Effort normal. She has rales.   Abdominal: Soft. Bowel sounds are normal. She exhibits distension. There is no tenderness.   Musculoskeletal: She exhibits edema (4+ BLE edema ).   Neurological: She is alert and oriented to person, place, and time.       Significant Labs:       Recent Labs  Lab 03/16/17  1005   *   K 4.6   CL 90*   CO2 26   BUN 44*   CREATININE 1.6*       Recent Labs  Lab 03/16/17  1005   WBC 8.64   RBC 3.19*   HGB 8.6*   HCT 29.4*   *   MCV 92   MCH 27.0   MCHC 29.3*       Recent Labs  Lab 03/16/17  1005   TROPONINI 0.035*       Significant Imaging: Echocardiogram:   2D echo with color flow doppler:   Results for orders placed or performed during the hospital encounter of 03/01/17   2D echo with color flow doppler   Result Value Ref Range    EF 25 (A) 55 - 65    Mitral Valve Regurgitation SEVERE (A)     Diastolic Dysfunction Yes (A)     Est. PA Systolic Pressure 50.28 (A)     Pericardial Effusion NONE     Tricuspid Valve Regurgitation MODERATE TO SEVERE (A)      Assessment and Plan:     Permanent atrial fibrillation  Paced currently; monitor on telemetry; continue BB and Amiodarone; NOACS on hold for recent GIB    CAD S/P percutaneous coronary angioplasty  S/p LAD PCI; s/p RCA PCI for  2015; mild troponin rise due to ADHF; no complaints of chest pain; no concern for ACS; continue ASA, Plavix, ARB and statin therapy     Ischemic cardiomyopathy  Echo with severely depressed LV function with EF 25% with severe MR; massively volume overloaded with orthopnea, PND, peripheral edema and BAEZ; needs aggressive diuresis with plans for IV Diuril and Bumex this evening but held given SBP 80s-90s; low BP concerning for ADHF with low output state; decision to hold off on IV diuresis out of fear of further lowering BP; if decompensation occurs overnight would not be opposed to  transfer to the ICU with continuous BiPap; considered use of inotropes but deferred given marginal BP; overall her severely depressed LVEF is concerning and her overall prognosis with her current symptoms is very poor; discussed this with her and her friends/family in the room this evening and will have ongoing conversations with her    CKD (chronic kidney disease) stage 3, GFR 30-59 ml/min  Creatinine 1.6; stable at baseline 1.0-1.6; monitor closely with aggressive diuresis    Pleural effusion, right  Noted on CXR; appears slightly worse compared to previous CXRs; needs aggressive diuresis but will need to hold given her marginal BP      VTE Risk Mitigation         Ordered     enoxaparin injection 40 mg  Daily     Route:  Subcutaneous        03/16/17 1559     Medium Risk of VTE  Once      03/16/17 1449          Thank you for your consult. I will follow-up with patient. Please contact us if you have any additional questions.    SEJAL Strickland, ANP  Cardiology   [facility]

## 2017-03-17 LAB
ANION GAP SERPL CALC-SCNC: 10 MMOL/L
BASOPHILS # BLD AUTO: 0.02 K/UL
BASOPHILS NFR BLD: 0.2 %
BUN SERPL-MCNC: 45 MG/DL
CALCIUM SERPL-MCNC: 9.2 MG/DL
CHLORIDE SERPL-SCNC: 89 MMOL/L
CO2 SERPL-SCNC: 33 MMOL/L
CREAT SERPL-MCNC: 1.5 MG/DL
DIFFERENTIAL METHOD: ABNORMAL
EOSINOPHIL # BLD AUTO: 0.2 K/UL
EOSINOPHIL NFR BLD: 2.3 %
ERYTHROCYTE [DISTWIDTH] IN BLOOD BY AUTOMATED COUNT: 16.6 %
EST. GFR  (AFRICAN AMERICAN): 40 ML/MIN/1.73 M^2
EST. GFR  (NON AFRICAN AMERICAN): 35 ML/MIN/1.73 M^2
FERRITIN SERPL-MCNC: 64 NG/ML
GLUCOSE SERPL-MCNC: 96 MG/DL
HCT VFR BLD AUTO: 28.9 %
HGB BLD-MCNC: 8.7 G/DL
IRON SERPL-MCNC: 24 UG/DL
LYMPHOCYTES # BLD AUTO: 0.7 K/UL
LYMPHOCYTES NFR BLD: 8.8 %
MAGNESIUM SERPL-MCNC: 1.9 MG/DL
MCH RBC QN AUTO: 27.2 PG
MCHC RBC AUTO-ENTMCNC: 30.1 %
MCV RBC AUTO: 90 FL
MONOCYTES # BLD AUTO: 0.6 K/UL
MONOCYTES NFR BLD: 7.9 %
NEUTROPHILS # BLD AUTO: 6.5 K/UL
NEUTROPHILS NFR BLD: 80.6 %
PHOSPHATE SERPL-MCNC: 4.1 MG/DL
PLATELET # BLD AUTO: 373 K/UL
PMV BLD AUTO: 9.5 FL
POTASSIUM SERPL-SCNC: 4.2 MMOL/L
RBC # BLD AUTO: 3.2 M/UL
SATURATED IRON: 5 %
SODIUM SERPL-SCNC: 132 MMOL/L
TOTAL IRON BINDING CAPACITY: 499 UG/DL
TRANSFERRIN SERPL-MCNC: 337 MG/DL
TROPONIN I SERPL DL<=0.01 NG/ML-MCNC: 0.1 NG/ML
VIT B12 SERPL-MCNC: 716 PG/ML
WBC # BLD AUTO: 8.1 K/UL

## 2017-03-17 PROCEDURE — 63600175 PHARM REV CODE 636 W HCPCS: Performed by: HOSPITALIST

## 2017-03-17 PROCEDURE — 82728 ASSAY OF FERRITIN: CPT

## 2017-03-17 PROCEDURE — 99233 SBSQ HOSP IP/OBS HIGH 50: CPT | Mod: ,,, | Performed by: INTERNAL MEDICINE

## 2017-03-17 PROCEDURE — 11000001 HC ACUTE MED/SURG PRIVATE ROOM

## 2017-03-17 PROCEDURE — 82607 VITAMIN B-12: CPT

## 2017-03-17 PROCEDURE — 25000003 PHARM REV CODE 250: Performed by: NURSE PRACTITIONER

## 2017-03-17 PROCEDURE — 80048 BASIC METABOLIC PNL TOTAL CA: CPT

## 2017-03-17 PROCEDURE — 94761 N-INVAS EAR/PLS OXIMETRY MLT: CPT

## 2017-03-17 PROCEDURE — 94660 CPAP INITIATION&MGMT: CPT

## 2017-03-17 PROCEDURE — 94640 AIRWAY INHALATION TREATMENT: CPT

## 2017-03-17 PROCEDURE — 83540 ASSAY OF IRON: CPT

## 2017-03-17 PROCEDURE — 27000221 HC OXYGEN, UP TO 24 HOURS

## 2017-03-17 PROCEDURE — 83735 ASSAY OF MAGNESIUM: CPT

## 2017-03-17 PROCEDURE — 25000003 PHARM REV CODE 250: Performed by: HOSPITALIST

## 2017-03-17 PROCEDURE — 84100 ASSAY OF PHOSPHORUS: CPT

## 2017-03-17 PROCEDURE — 36415 COLL VENOUS BLD VENIPUNCTURE: CPT

## 2017-03-17 PROCEDURE — 85025 COMPLETE CBC W/AUTO DIFF WBC: CPT

## 2017-03-17 RX ORDER — FLUTICASONE FUROATE AND VILANTEROL 200; 25 UG/1; UG/1
1 POWDER RESPIRATORY (INHALATION) DAILY
Status: DISCONTINUED | OUTPATIENT
Start: 2017-03-17 | End: 2017-04-04 | Stop reason: HOSPADM

## 2017-03-17 RX ORDER — MELATONIN 10 MG
2 CAPSULE ORAL NIGHTLY PRN
COMMUNITY

## 2017-03-17 RX ORDER — IPRATROPIUM BROMIDE AND ALBUTEROL SULFATE 2.5; .5 MG/3ML; MG/3ML
3 SOLUTION RESPIRATORY (INHALATION) EVERY 4 HOURS PRN
Status: DISCONTINUED | OUTPATIENT
Start: 2017-03-17 | End: 2017-04-04 | Stop reason: HOSPADM

## 2017-03-17 RX ADMIN — PANTOPRAZOLE SODIUM 40 MG: 40 TABLET, DELAYED RELEASE ORAL at 08:03

## 2017-03-17 RX ADMIN — TRAZODONE HYDROCHLORIDE 150 MG: 100 TABLET ORAL at 09:03

## 2017-03-17 RX ADMIN — AMIODARONE HYDROCHLORIDE 200 MG: 200 TABLET ORAL at 08:03

## 2017-03-17 RX ADMIN — BUMETANIDE 3 MG: 0.25 INJECTION INTRAMUSCULAR; INTRAVENOUS at 04:03

## 2017-03-17 RX ADMIN — BUMETANIDE 3 MG: 0.25 INJECTION INTRAMUSCULAR; INTRAVENOUS at 08:03

## 2017-03-17 RX ADMIN — ALLOPURINOL 300 MG: 300 TABLET ORAL at 08:03

## 2017-03-17 RX ADMIN — ASPIRIN 81 MG 81 MG: 81 TABLET ORAL at 09:03

## 2017-03-17 RX ADMIN — TRAMADOL HYDROCHLORIDE 50 MG: 50 TABLET, COATED ORAL at 09:03

## 2017-03-17 RX ADMIN — TRAMADOL HYDROCHLORIDE 50 MG: 50 TABLET, COATED ORAL at 01:03

## 2017-03-17 RX ADMIN — FLUTICASONE FUROATE AND VILANTEROL TRIFENATATE 1 PUFF: 200; 25 POWDER RESPIRATORY (INHALATION) at 01:03

## 2017-03-17 RX ADMIN — ENOXAPARIN SODIUM 40 MG: 100 INJECTION SUBCUTANEOUS at 04:03

## 2017-03-17 RX ADMIN — CLOPIDOGREL BISULFATE 75 MG: 75 TABLET ORAL at 08:03

## 2017-03-17 RX ADMIN — FERROUS SULFATE TAB EC 325 MG (65 MG FE EQUIVALENT) 325 MG: 325 (65 FE) TABLET DELAYED RESPONSE at 09:03

## 2017-03-17 RX ADMIN — SODIUM CHLORIDE, PRESERVATIVE FREE 3 ML: 5 INJECTION INTRAVENOUS at 09:03

## 2017-03-17 RX ADMIN — PRAVASTATIN SODIUM 40 MG: 40 TABLET ORAL at 08:03

## 2017-03-17 NOTE — ASSESSMENT & PLAN NOTE
Paced currently; continue Amiodarone; no BB due to marginal BP; NOAC on hold due to GIB earlier this year and continued anemia

## 2017-03-17 NOTE — PLAN OF CARE
03/17/17 1344   Readmission Questionnaire   At the time of your discharge, did someone talk to you about what your health problems were? Yes   At the time of discharge, did someone talk to you about what to watch out for regarding worsening of your health problem? Yes   At the time of discharge, did someone talk to you about what to do if you experienced worsening of your health problem? Yes   At the time of discharge, did someone talk to you about which medication to take when you left the hospital and which ones to stop taking? Yes   At the time of discharge, did someone talk to you about when and where to follow up with a doctor after you left the hospital? Yes   What do you believe caused you to be sick enough to be re-admitted? couldn't breathe   How often do you need to have someone help you when you read instructions, pamphlets, or other written material from your doctor or pharmacy? Always   Do you have problems taking your medications as prescribed? No   Do you have any problems affording any of  your prescribed medications? No   Do you have problems obtaining/receiving your medications? No   Does the patient have transportation to healthcare appointments? Yes   Lives With child(vanita), adult   Living Arrangements house   Does the patient have family/friends to help with healtcare needs after discharge? yes   Who are your caregiver(s) and their phone number(s)? Peri Prescott Daughter [phone]    Does your caregiver provide all the help you need? Yes   Are you currently feeling confused? No   Are you currently having problems thinking? No   Are you currently having memory problems? No   Have you felt down, depressed, or hopeless? 0   Have you felt little interest or pleasure in doing things? 0   In the last 7 days, my sleep quality was: very poor   Rishabh Drake RN  Transitional Navigator/Case Management  [phone]

## 2017-03-17 NOTE — PROGRESS NOTES
[facility]  Cardiology  Progress Note    Patient Name: Jennifer Prescott  MRN: [id number]  Admission Date: 3/16/2017  Hospital Length of Stay: 1 days  Code Status: Full Code   Attending Physician: Anderson Badillo MD   Primary Care Physician: Lianna Mistry MD  Expected Discharge Date:   Principal Problem:Acute on chronic combined systolic and diastolic heart failure    Subjective:     Hospital Course:   3/16/2017 Presented to the ER with complaints of BAEZ, orthopnea, LE edema and decreased appetite. Admitted to [facility] for management of decompensated HF. 4+ BLE edema present with abdominal distension. Creatinine 1.6 and BNP 3344 with elevated total bilirubin. Continued on current home CHF medication regimen with IV Bumex ordered with plans for IV Diuril prior to Bumex dose yesterday evening-held due to marginal BP of 80s. 3/17/2017 Continues to complain of SOB but reports she was able to lie in a bed to rest overnight for the first time in a while. No IV diuresis overnight due to marginal BP. Only 350cc urine out overnight. SBP up to 110s this AM and IV Bumex given. Labs pending. Abdominal distension and LE edema remains unchanged. Long discussion yesterday by Dr. Braga in regards to the severity of her heart failure and the overall concern given her recurrent decompensation despite aggressive treatment. Repeat discussion briefly by me today with encouragement to determine her wishes if she were to further decompensate ie intubation, CPR.         Review of Systems   Constitution: Positive for decreased appetite. Negative for chills, diaphoresis and fever.   Cardiovascular: Positive for dyspnea on exertion and leg swelling. Negative for chest pain, claudication, orthopnea, paroxysmal nocturnal dyspnea and syncope.   Respiratory: Negative for cough, shortness of breath and wheezing.    Gastrointestinal: Positive for bloating. Negative for constipation, diarrhea, nausea and  "vomiting.     Objective:     Vital Signs (Most Recent):  Temp: 97.8 °F (36.6 °C) (03/17/17 0502)  Pulse: 69 (03/17/17 0800)  Resp: 20 (03/17/17 0800)  BP: (!) 115/57 (03/17/17 0800)  SpO2: 98 % (03/17/17 1046) Vital Signs (24h Range):  Temp:  [97.4 °F (36.3 °C)-97.8 °F (36.6 °C)] 97.8 °F (36.6 °C)  Pulse:  [69-80] 69  Resp:  [20-22] 20  SpO2:  [94 %-100 %] 98 %  BP: ()/(50-58) 115/57     Weight: 122.5 kg (270 lb)  Body mass index is 43.58 kg/(m^2).     SpO2: 98 %  O2 Device (Oxygen Therapy): nasal cannula      Intake/Output Summary (Last 24 hours) at 03/17/17 1121  Last data filed at 03/17/17 0241   Gross per 24 hour   Intake                0 ml   Output              350 ml   Net             -350 ml       Lines/Drains/Airways     Peripheral Intravenous Line                 Peripheral IV - Single Lumen 03/16/17 1015 Right Antecubital 1 day                Physical Exam   Constitutional: She appears well-developed and well-nourished. No distress.   Cardiovascular: Normal rate and regular rhythm.    Murmur heard.  Pulmonary/Chest: Effort normal. She has decreased breath sounds.   Abdominal: Soft. Bowel sounds are normal. She exhibits distension. There is no tenderness.   Musculoskeletal: She exhibits edema (3-4+ BLE edema present ).   Skin: Skin is warm and dry.       Significant Labs:     Labs reviewed from yesterday; today labs pending      Significant Imaging: Echocardiogram:   2D echo with color flow doppler:   Results for orders placed or performed during the hospital encounter of 03/01/17   2D echo with color flow doppler   Result Value Ref Range    EF 25 (A) 55 - 65    Mitral Valve Regurgitation SEVERE (A)     Diastolic Dysfunction Yes (A)     Est. PA Systolic Pressure 50.28 (A)     Pericardial Effusion NONE     Tricuspid Valve Regurgitation MODERATE TO SEVERE (A)      Assessment and Plan:     Brief HPI: Seen this morning on AM NP rounds while sitting on the edge of the bed. States "I had a good night and " "was able to lie down and sleep in a bed finally". Discussed POC with patient to include need to hold diuretics yesterday due to her BP with plan today for continued attempted diuresis today. Patient had a long discussion yesterday by Dr. Braga in regards to the severity of cardiac issues and concern for continued decline with overall poor outcomes. Patient appeared to understand but with minimal eye contact which is expected. Brief discussion this AM again with patient with review of plan above with encouragement of her to consider her wishes she further decline occur     * Acute on chronic combined systolic and diastolic heart failure  Echo with severely depressed LV function with EF 25% with severe MR; massively volume overloaded with orthopnea, PND, peripheral edema and BAEZ; aggressive diuresis held yesterday due to marginal BP; SBP 110s this AM with IV Bumex given; ARB and BB held due to marginal BP; total bili and BNP elevated on admit labs with creatinine 1.6-labs today pending; will continue with strict I&Os and daily weights and attempt of aggressive diuresis; will evaluate BP this afternoon to determine if Diuril can be given prior to second dose of IV Bumex today; considered use of inotropes yesterday due to concern for low output state but deferred due to marginal BP; overall very concerned about the severity of her cardiac issues and concerned about overall poor prognosis-discussed with patient yesterday by Dr. Braga and again this morning briefly on NP rounds      Permanent atrial fibrillation  Paced currently; continue Amiodarone; no BB due to marginal BP; NOAC on hold due to GIB earlier this year and continued anemia    CAD S/P percutaneous coronary angioplasty  S/p LAD PCI and s/p RCA PCI for  2015; no concern for ACS currently; continue ASA, Plavix and Pravastatin; no BB or ARB due to marginal BP     Ischemic cardiomyopathy  As detailed below     CKD (chronic kidney disease) stage 3, GFR " 30-59 ml/min  Creatinine 1.6 upon admit; stable at baseline 1.0-1.6; repeat labs pending today     Pleural effusion, right  Chronic; slightly worse compared to previous CXR     Mitral regurgitation  Severe per echo; severity of MR further compromises CO; continue with plan as detailed above; not a candidate for intervention either surgical and nonsurgical due to high risk nature and ADHF      VTE Risk Mitigation         Ordered     enoxaparin injection 40 mg  Daily     Route:  Subcutaneous        03/16/17 1559     Medium Risk of VTE  Once      03/16/17 1449          SEJAL Strickland, ANP  Cardiology  [facility]    I have seen patient with Nurse Practitioner Augusta De La Vega. I agree with her assessment and plan as written in this note.        Jamel Putnam MD, FACC, CCDS  Interventional Cardiology  [facility]

## 2017-03-17 NOTE — PLAN OF CARE
Problem: Fluid Volume Excess (Adult,Obstetrics,Pediatric)  Goal: Identify Related Risk Factors and Signs and Symptoms  Related risk factors and signs and symptoms are identified upon initiation of Human Response Clinical Practice Guideline (CPG)  Outcome: Ongoing (interventions implemented as appropriate)  Alteration in comfort due to fluid overload. Patient presently sitting up on side of bed, alert and oriented. Patient instructed to elevate legs in recliner at this time. Patient has generalized edema upper and lower extremities, patient has poor skin integrity with bruising and skin tears to arms. Patient tolerated her medications and pain for low back pain.  Patient continues to have problems with lead placement. Patient denies any symptoms at this time.

## 2017-03-17 NOTE — PLAN OF CARE
"  Patient endorses compliance with diet and fluids at home. States she has not made her last two follow up appointments that were made on her last admission, states "I just didn't feel like I could get into the car".  She states her daughter is willing to bring her but that she just doesn't feel able. States she has and takes all meds. [place] offered bedside pharmacy.  Denies ever being in SNF or nursing home, states she is still working. States she uses her home o2 appropriately and bipap every night.       03/17/17 1326   Discharge Assessment   Assessment Type Discharge Planning Assessment   Confirmed/corrected address and phone number on facesheet? Yes   Assessment information obtained from? Patient   Prior to hospitilization cognitive status: Alert/Oriented   Prior to hospitalization functional status: Assistive Equipment;Needs Assistance   Current cognitive status: Alert/Oriented   Current Functional Status: Assistive Equipment;Needs Assistance   Arrived From home or self-care   Lives With child(vanita), adult   Able to Return to Prior Arrangements unable to determine at this time (comments)   Is patient able to care for self after discharge? Unable to determine at this time (comments)   How many people do you have in your home that can help with your care after discharge? 1   Who are your caregiver(s) and their phone number(s)? Peri Prescott Daughter [phone]    Patient's perception of discharge disposition home or selfcare   Readmission Within The Last 30 Days previous discharge plan unsuccessful   Patient currently being followed by outpatient case management? Yes   Patient currently receives home health services? No   Does the patient currently use HME? Yes   Patient currently receives private duty nursing? No   Patient currently receives any other outside agency services? No   Equipment Currently Used at Home bedside commode;shower chair;cane, quad;wheelchair;rollator  (power chair, 2-3Lo2nc at home, " nebulizer, bipap used nightly, home concentrator and portable tanks)   Do you have any problems affording any of your prescribed medications? No   Is the patient taking medications as prescribed? yes   Do you have any financial concerns preventing you from receiving the healthcare you need? No   Does the patient have transportation to healthcare appointments? Yes   Transportation Available family or friend will provide   On Dialysis? No   Does the patient receive services at the Coumadin Clinic? No   Discharge Plan A Home with family   Discharge Plan B Home with family;Home Health   Patient/Family In Agreement With Plan yes   Rishabh Drake RN  Transitional Navigator/Case Management  [phone]

## 2017-03-17 NOTE — ASSESSMENT & PLAN NOTE
Severe per echo; severity of MR further compromises CO; continue with plan as detailed above; not a candidate for intervention either surgical and nonsurgical due to high risk nature and ADHF

## 2017-03-17 NOTE — ASSESSMENT & PLAN NOTE
S/p LAD PCI and s/p RCA PCI for  2015; no concern for ACS currently; continue ASA, Plavix and Pravastatin; no BB or ARB due to marginal BP

## 2017-03-17 NOTE — SUBJECTIVE & OBJECTIVE
Review of Systems   Constitution: Positive for decreased appetite. Negative for chills, diaphoresis and fever.   Cardiovascular: Positive for dyspnea on exertion and leg swelling. Negative for chest pain, claudication, orthopnea, paroxysmal nocturnal dyspnea and syncope.   Respiratory: Negative for cough, shortness of breath and wheezing.    Gastrointestinal: Positive for bloating. Negative for constipation, diarrhea, nausea and vomiting.     Objective:     Vital Signs (Most Recent):  Temp: 97.8 °F (36.6 °C) (03/17/17 0502)  Pulse: 69 (03/17/17 0800)  Resp: 20 (03/17/17 0800)  BP: (!) 115/57 (03/17/17 0800)  SpO2: 98 % (03/17/17 1046) Vital Signs (24h Range):  Temp:  [97.4 °F (36.3 °C)-97.8 °F (36.6 °C)] 97.8 °F (36.6 °C)  Pulse:  [69-80] 69  Resp:  [20-22] 20  SpO2:  [94 %-100 %] 98 %  BP: ()/(50-58) 115/57     Weight: 122.5 kg (270 lb)  Body mass index is 43.58 kg/(m^2).     SpO2: 98 %  O2 Device (Oxygen Therapy): nasal cannula      Intake/Output Summary (Last 24 hours) at 03/17/17 1121  Last data filed at 03/17/17 0241   Gross per 24 hour   Intake                0 ml   Output              350 ml   Net             -350 ml       Lines/Drains/Airways     Peripheral Intravenous Line                 Peripheral IV - Single Lumen 03/16/17 1015 Right Antecubital 1 day                Physical Exam   Constitutional: She appears well-developed and well-nourished. No distress.   Cardiovascular: Normal rate and regular rhythm.    Murmur heard.  Pulmonary/Chest: Effort normal. She has decreased breath sounds.   Abdominal: Soft. Bowel sounds are normal. She exhibits distension. There is no tenderness.   Musculoskeletal: She exhibits edema (3-4+ BLE edema present ).   Skin: Skin is warm and dry.       Significant Labs:     Labs reviewed from yesterday; today labs pending      Significant Imaging: Echocardiogram:   2D echo with color flow doppler:   Results for orders placed or performed during the hospital encounter of  03/01/17   2D echo with color flow doppler   Result Value Ref Range    EF 25 (A) 55 - 65    Mitral Valve Regurgitation SEVERE (A)     Diastolic Dysfunction Yes (A)     Est. PA Systolic Pressure 50.28 (A)     Pericardial Effusion NONE     Tricuspid Valve Regurgitation MODERATE TO SEVERE (A)

## 2017-03-17 NOTE — ASSESSMENT & PLAN NOTE
Echo with severely depressed LV function with EF 25% with severe MR; massively volume overloaded with orthopnea, PND, peripheral edema and BAEZ; aggressive diuresis held yesterday due to marginal BP; SBP 110s this AM with IV Bumex given; ARB and BB held due to marginal BP; total bili and BNP elevated on admit labs with creatinine 1.6-labs today pending; will continue with strict I&Os and daily weights and attempt of aggressive diuresis; will evaluate BP this afternoon to determine if Diuril can be given prior to second dose of IV Bumex today; considered use of inotropes yesterday due to concern for low output state but deferred due to marginal BP; overall very concerned about the severity of her cardiac issues and concerned about overall poor prognosis-discussed with patient yesterday by Dr. Braga and again this morning briefly on NP rounds

## 2017-03-18 LAB
ANION GAP SERPL CALC-SCNC: 8 MMOL/L
BASOPHILS # BLD AUTO: 0.02 K/UL
BASOPHILS NFR BLD: 0.2 %
BUN SERPL-MCNC: 45 MG/DL
CALCIUM SERPL-MCNC: 9.1 MG/DL
CHLORIDE SERPL-SCNC: 90 MMOL/L
CO2 SERPL-SCNC: 34 MMOL/L
CREAT SERPL-MCNC: 1.4 MG/DL
DIFFERENTIAL METHOD: ABNORMAL
EOSINOPHIL # BLD AUTO: 0.1 K/UL
EOSINOPHIL NFR BLD: 1.2 %
ERYTHROCYTE [DISTWIDTH] IN BLOOD BY AUTOMATED COUNT: 16.4 %
EST. GFR  (AFRICAN AMERICAN): 44 ML/MIN/1.73 M^2
EST. GFR  (NON AFRICAN AMERICAN): 38 ML/MIN/1.73 M^2
GLUCOSE SERPL-MCNC: 119 MG/DL
HCT VFR BLD AUTO: 29.1 %
HGB BLD-MCNC: 8.6 G/DL
LYMPHOCYTES # BLD AUTO: 0.4 K/UL
LYMPHOCYTES NFR BLD: 4.4 %
MAGNESIUM SERPL-MCNC: 1.8 MG/DL
MCH RBC QN AUTO: 26.8 PG
MCHC RBC AUTO-ENTMCNC: 29.6 %
MCV RBC AUTO: 91 FL
MONOCYTES # BLD AUTO: 0.7 K/UL
MONOCYTES NFR BLD: 7.8 %
NEUTROPHILS # BLD AUTO: 7.4 K/UL
NEUTROPHILS NFR BLD: 86.2 %
PHOSPHATE SERPL-MCNC: 3.9 MG/DL
PLATELET # BLD AUTO: 374 K/UL
PMV BLD AUTO: 9.7 FL
POCT GLUCOSE: 116 MG/DL (ref 70–110)
POCT GLUCOSE: 149 MG/DL (ref 70–110)
POCT GLUCOSE: 162 MG/DL (ref 70–110)
POTASSIUM SERPL-SCNC: 3.8 MMOL/L
RBC # BLD AUTO: 3.21 M/UL
SODIUM SERPL-SCNC: 132 MMOL/L
WBC # BLD AUTO: 8.57 K/UL

## 2017-03-18 PROCEDURE — 36415 COLL VENOUS BLD VENIPUNCTURE: CPT

## 2017-03-18 PROCEDURE — 11000001 HC ACUTE MED/SURG PRIVATE ROOM

## 2017-03-18 PROCEDURE — 25000003 PHARM REV CODE 250: Performed by: HOSPITALIST

## 2017-03-18 PROCEDURE — 80048 BASIC METABOLIC PNL TOTAL CA: CPT

## 2017-03-18 PROCEDURE — 63600175 PHARM REV CODE 636 W HCPCS: Performed by: HOSPITALIST

## 2017-03-18 PROCEDURE — 85025 COMPLETE CBC W/AUTO DIFF WBC: CPT

## 2017-03-18 PROCEDURE — 94640 AIRWAY INHALATION TREATMENT: CPT

## 2017-03-18 PROCEDURE — 84100 ASSAY OF PHOSPHORUS: CPT

## 2017-03-18 PROCEDURE — 83735 ASSAY OF MAGNESIUM: CPT

## 2017-03-18 PROCEDURE — 25000003 PHARM REV CODE 250: Performed by: NURSE PRACTITIONER

## 2017-03-18 PROCEDURE — 94761 N-INVAS EAR/PLS OXIMETRY MLT: CPT

## 2017-03-18 PROCEDURE — 99233 SBSQ HOSP IP/OBS HIGH 50: CPT | Mod: ,,, | Performed by: INTERNAL MEDICINE

## 2017-03-18 PROCEDURE — 27000221 HC OXYGEN, UP TO 24 HOURS

## 2017-03-18 RX ADMIN — ALLOPURINOL 300 MG: 300 TABLET ORAL at 09:03

## 2017-03-18 RX ADMIN — TRAMADOL HYDROCHLORIDE 50 MG: 50 TABLET, COATED ORAL at 09:03

## 2017-03-18 RX ADMIN — FLUTICASONE FUROATE AND VILANTEROL TRIFENATATE 1 PUFF: 200; 25 POWDER RESPIRATORY (INHALATION) at 09:03

## 2017-03-18 RX ADMIN — BUMETANIDE 3 MG: 0.25 INJECTION INTRAMUSCULAR; INTRAVENOUS at 04:03

## 2017-03-18 RX ADMIN — ASPIRIN 81 MG 81 MG: 81 TABLET ORAL at 09:03

## 2017-03-18 RX ADMIN — TRAZODONE HYDROCHLORIDE 150 MG: 100 TABLET ORAL at 09:03

## 2017-03-18 RX ADMIN — PRAVASTATIN SODIUM 40 MG: 40 TABLET ORAL at 09:03

## 2017-03-18 RX ADMIN — PANTOPRAZOLE SODIUM 40 MG: 40 TABLET, DELAYED RELEASE ORAL at 09:03

## 2017-03-18 RX ADMIN — FERROUS SULFATE TAB EC 325 MG (65 MG FE EQUIVALENT) 325 MG: 325 (65 FE) TABLET DELAYED RESPONSE at 09:03

## 2017-03-18 RX ADMIN — TRAMADOL HYDROCHLORIDE 50 MG: 50 TABLET, COATED ORAL at 04:03

## 2017-03-18 RX ADMIN — SODIUM CHLORIDE, PRESERVATIVE FREE 3 ML: 5 INJECTION INTRAVENOUS at 06:03

## 2017-03-18 RX ADMIN — ENOXAPARIN SODIUM 40 MG: 100 INJECTION SUBCUTANEOUS at 04:03

## 2017-03-18 RX ADMIN — AMIODARONE HYDROCHLORIDE 200 MG: 200 TABLET ORAL at 09:03

## 2017-03-18 RX ADMIN — SODIUM CHLORIDE, PRESERVATIVE FREE 3 ML: 5 INJECTION INTRAVENOUS at 09:03

## 2017-03-18 RX ADMIN — CLOPIDOGREL BISULFATE 75 MG: 75 TABLET ORAL at 09:03

## 2017-03-18 NOTE — PROGRESS NOTES
[facility]  Cardiology  Progress Note    Patient Name: Jennifer Prescott  MRN: [id number]  Admission Date: 3/16/2017  Hospital Length of Stay: 2 days  Code Status: Full Code   Attending Physician: Anderson Badillo MD   Primary Care Physician: Lianna Mistry MD  Expected Discharge Date:   Principal Problem:Acute on chronic combined systolic and diastolic heart failure    Subjective:     Hospital Course:   3/16/2017 Presented to the ER with complaints of BAEZ, orthopnea, LE edema and decreased appetite. Admitted to [facility] Medicine for management of decompensated HF. 4+ BLE edema present with abdominal distension. Creatinine 1.6 and BNP 3344 with elevated total bilirubin. Continued on current home CHF medication regimen with IV Bumex  Labs pending. Abdominal distension and LE edema remains unchanged. Long discussion by Dr. Braga on 3/16/17 in regards to the severity of her heart failure and the overall concern given her recurrent decompensation despite aggressive treatment. Repeat discussion yesterday with encouragement to determine her wishes if she were to further decompensate ie intubation, CPR. Ovenite minimal diuresis, in part due to lack of IV access - no Bumex this AM.      Interval History: no new events    Review of Systems   Cardiovascular: Positive for dyspnea on exertion and leg swelling. Negative for chest pain, claudication, cyanosis, irregular heartbeat, near-syncope, orthopnea, palpitations, paroxysmal nocturnal dyspnea and syncope.     Objective:     Vital Signs (Most Recent):  Temp: 98.7 °F (37.1 °C) (03/18/17 1100)  Pulse: 69 (03/18/17 1100)  Resp: 18 (03/18/17 1100)  BP: (!) 118/59 (03/18/17 1100)  SpO2: 99 % (03/18/17 0900) Vital Signs (24h Range):  Temp:  [97.5 °F (36.4 °C)-98.7 °F (37.1 °C)] 98.7 °F (37.1 °C)  Pulse:  [67-70] 69  Resp:  [16-20] 18  SpO2:  [97 %-100 %] 99 %  BP: (105-120)/(51-61) 118/59     Weight: 122.5 kg (270 lb)  Body mass index is 43.58 kg/(m^2).      SpO2: 99 %  O2 Device (Oxygen Therapy): nasal cannula      Intake/Output Summary (Last 24 hours) at 03/18/17 1536  Last data filed at 03/18/17 1500   Gross per 24 hour   Intake             1000 ml   Output             1600 ml   Net             -600 ml       Lines/Drains/Airways          No matching active lines, drains, or airways          Physical Exam   Constitutional: She is oriented to person, place, and time. She appears well-developed and well-nourished. No distress.   HENT:   Head: Normocephalic and atraumatic.   Neck: No JVD present.   Cardiovascular: Normal rate, regular rhythm and intact distal pulses.  Exam reveals no gallop and no friction rub.    Murmur heard.  Pulmonary/Chest: Effort normal and breath sounds normal. No respiratory distress. She has no wheezes. She has no rales. She exhibits no tenderness.   Abdominal: Soft. Bowel sounds are normal.   Neurological: She is alert and oriented to person, place, and time.   Skin: Skin is warm and dry. She is not diaphoretic.   Psychiatric: She has a normal mood and affect. Her behavior is normal. Judgment and thought content normal.   Vitals reviewed.      Significant Labs:   BMP:   Recent Labs  Lab 03/17/17  1150 03/18/17  1126   GLU 96 119*   * 132*   K 4.2 3.8   CL 89* 90*   CO2 33* 34*   BUN 45* 45*   CREATININE 1.5* 1.4   CALCIUM 9.2 9.1   MG 1.9 1.8   , CBC   Recent Labs  Lab 03/17/17  1150 03/18/17  1126   WBC 8.10 8.57   HGB 8.7* 8.6*   HCT 28.9* 29.1*   * 374*    and INR No results for input(s): INR, PROTIME in the last 48 hours.    Significant Imaging: no new studies    Assessment and Plan:     Brief HPI: no new events.    * Acute on chronic combined systolic and diastolic heart failure  Echo with severely depressed LV function with EF 25% with severe MR; massively volume overloaded with orthopnea, PND, peripheral edema and BAEZ; aggressive diuresis held yesterday due to marginal BP; SBP 110s this AM with IV Bumex given; ARB and BB  held due to marginal BP; total bili and BNP elevated on admit labs with creatinine 1.6-labs  will continue with strict I&Os and daily weights and attempt of aggressive diuresis; considered use of inotropes yesterday due to concern for low output state but deferred due to marginal BP; CRT-D in place with adequate BiV pacing.    Permanent atrial fibrillation  Paced currently; continue Amiodarone; no BB due to marginal BP; NOAC on hold due to GIB earlier this year and continued anemia    CAD S/P percutaneous coronary angioplasty  S/p LAD PCI and s/p RCA PCI for  2015; no concern for ACS currently; continue ASA, Plavix and Pravastatin; no BB or ARB due to marginal BP     Ischemic cardiomyopathy  As detailed below     CKD (chronic kidney disease) stage 3, GFR 30-59 ml/min  Creatinine 1.6 upon admit; stable at baseline 1.0-1.6;    Pleural effusion, right  Chronic; slightly worse compared to previous CXR     Mitral regurgitation  Severe per echo; severity of MR further compromises CO; continue with plan as detailed above; not a candidate for intervention either surgical and nonsurgical at the moment due to high risk nature and ADHF      VTE Risk Mitigation         Ordered     enoxaparin injection 40 mg  Daily     Route:  Subcutaneous        03/16/17 1556     Medium Risk of VTE  Once      03/16/17 1449          Jamel Putnam MD  Cardiology  [facility]

## 2017-03-18 NOTE — NURSING
(2438) Pt lost IV access. Nurse Martinez attempted an IV; however pt is a very hard stick. ICU notified about attempting to start an IV.   (1414) Dr. Badillo notified about loss of IV access.

## 2017-03-18 NOTE — PLAN OF CARE
Problem: Patient Care Overview  Goal: Plan of Care Review  Outcome: Ongoing (interventions implemented as appropriate)  Plan of care reviewed with patient. Call light within reach, fall precautions maintained, bed in lowest position, wheels locked, bed alarm set, side rails up x's 2, non skid socks on. Patient aware. Nurse instructed patient to call if needs assistance. Patient verbalized complete understanding.     Telemetry monitor NSR throughout shift. Pt on 2L NC. NAD noted. Pt on 2L fluids restriction; pt aware. Will continue to monitor and continue plan of care.

## 2017-03-18 NOTE — ASSESSMENT & PLAN NOTE
Ischemic cardiomyopathy  Severe TR and Severe MR  Pleural effusions  Appreciate Cardiology assistance. Has a very poor prognosis given the severity of her valvular disease and depressed EF. Continue bumex 3 mg IV BID with diuril daily. Working to diuresis as much as possible. Daily weights.

## 2017-03-18 NOTE — ASSESSMENT & PLAN NOTE
Echo with severely depressed LV function with EF 25% with severe MR; massively volume overloaded with orthopnea, PND, peripheral edema and BAEZ; aggressive diuresis held yesterday due to marginal BP; SBP 110s this AM with IV Bumex given; ARB and BB held due to marginal BP; total bili and BNP elevated on admit labs with creatinine 1.6-labs  will continue with strict I&Os and daily weights and attempt of aggressive diuresis; considered use of inotropes yesterday due to concern for low output state but deferred due to marginal BP; CRT-D in place with adequate BiV pacing.

## 2017-03-18 NOTE — PLAN OF CARE
Problem: Patient Care Overview  Goal: Plan of Care Review  Outcome: Ongoing (interventions implemented as appropriate)  NO acute distress noted. Tele monitor continue. C/o hip pain. Tramadol PRN given. Patient verbalized understanding the plan of care. Fall precautions maintained. Room near nursing station, bed alarm set, bed in low and locked position, side rails up x 2, call light within reach. Report given to Noni to continue the patient care.

## 2017-03-18 NOTE — SUBJECTIVE & OBJECTIVE
Interval History: Able to lie back in the bed this afternoon without getting SOB. Says that she has been urinating well.     Review of Systems   Constitutional: Negative for fever.   Cardiovascular: Negative for chest pain and palpitations.   Gastrointestinal: Negative for nausea and vomiting.     Objective:     Vital Signs (Most Recent):  Temp: 97.9 °F (36.6 °C) (03/17/17 2000)  Pulse: 70 (03/17/17 2000)  Resp: 16 (03/17/17 2000)  BP: (!) 112/56 (03/17/17 2000)  SpO2: 100 % (03/17/17 2342) Vital Signs (24h Range):  Temp:  [97.4 °F (36.3 °C)-97.9 °F (36.6 °C)] 97.9 °F (36.6 °C)  Pulse:  [68-72] 70  Resp:  [16-22] 16  SpO2:  [97 %-100 %] 100 %  BP: ()/(53-58) 112/56     Weight: 122.5 kg (270 lb)  Body mass index is 43.58 kg/(m^2).    Intake/Output Summary (Last 24 hours) at 03/18/17 0010  Last data filed at 03/17/17 1800   Gross per 24 hour   Intake              320 ml   Output             1050 ml   Net             -730 ml      Physical Exam   Constitutional: She is oriented to person, place, and time. She appears well-developed and well-nourished. No distress.   Cardiovascular: Normal rate and regular rhythm.    Murmur heard.  Pulmonary/Chest: Effort normal and breath sounds normal. No respiratory distress. She has no wheezes.   Abdominal: Soft. Bowel sounds are normal. She exhibits no distension. There is no tenderness.   Musculoskeletal: She exhibits edema.   Neurological: She is alert and oriented to person, place, and time.   Skin: Skin is warm and dry.   Psychiatric: She has a normal mood and affect. Her behavior is normal.   Nursing note and vitals reviewed.      Significant Labs:   BMP:   Recent Labs  Lab 03/17/17  1150   GLU 96   *   K 4.2   CL 89*   CO2 33*   BUN 45*   CREATININE 1.5*   CALCIUM 9.2   MG 1.9     CBC:   Recent Labs  Lab 03/16/17  1005 03/17/17  1150   WBC 8.64 8.10   HGB 8.6* 8.7*   HCT 29.4* 28.9*   * 373*     Troponin:   Recent Labs  Lab 03/16/17  1005 03/16/17  1748  03/16/17  7849   TROPONINI 0.035* 0.029* 0.099*       Significant Imaging: I have reviewed all pertinent imaging results/findings within the past 24 hours.

## 2017-03-18 NOTE — PROGRESS NOTES
"[facility] Hospital Medicine  Progress Note    Patient Name: Jennifer Prescott  MRN: [id number]  Patient Class: IP- Inpatient   Admission Date: 3/16/2017  Length of Stay: 2 days  Attending Physician: Anderson Badillo MD  Primary Care Provider: Lianna Mistry MD        Subjective:     Principal Problem:Acute on chronic combined systolic and diastolic heart failure    HPI:  Ms. Prescott is a 70 yo WF with ischemic cardiomyopathy, chronic combined HF, and recurrent GI bleed that presented to [facility] complaining of SOB. She says that since her discharge last week she has gotten more short of breath. She says that the last couple days she has not been able to sleep because of the SOB. She says that she cannot lie back because she gets SOB. She had been trying to sleep in a lounge chair but had difficulty getting out of the chair. She denies any chest pain or palpitations. Her swelling has continued to increase again. She says that she has been taking her bumex as directed and has been urinating. She says that she eats ice constantly at home and probably eats a "quart" of ice a day. She says that she does not drink much fluids otherwise.     Hospital Course:       Interval History: Able to lie back in the bed this afternoon without getting SOB. Says that she has been urinating well.     Review of Systems   Constitutional: Negative for fever.   Cardiovascular: Negative for chest pain and palpitations.   Gastrointestinal: Negative for nausea and vomiting.     Objective:     Vital Signs (Most Recent):  Temp: 97.9 °F (36.6 °C) (03/17/17 2000)  Pulse: 70 (03/17/17 2000)  Resp: 16 (03/17/17 2000)  BP: (!) 112/56 (03/17/17 2000)  SpO2: 100 % (03/17/17 2342) Vital Signs (24h Range):  Temp:  [97.4 °F (36.3 °C)-97.9 °F (36.6 °C)] 97.9 °F (36.6 °C)  Pulse:  [68-72] 70  Resp:  [16-22] 16  SpO2:  [97 %-100 %] 100 %  BP: ()/(53-58) 112/56     Weight: 122.5 kg (270 lb)  Body mass index is 43.58 " kg/(m^2).    Intake/Output Summary (Last 24 hours) at 03/18/17 0010  Last data filed at 03/17/17 1800   Gross per 24 hour   Intake              320 ml   Output             1050 ml   Net             -730 ml      Physical Exam   Constitutional: She is oriented to person, place, and time. She appears well-developed and well-nourished. No distress.   Cardiovascular: Normal rate and regular rhythm.    Murmur heard.  Pulmonary/Chest: Effort normal and breath sounds normal. No respiratory distress. She has no wheezes.   Abdominal: Soft. Bowel sounds are normal. She exhibits no distension. There is no tenderness.   Musculoskeletal: She exhibits edema.   Neurological: She is alert and oriented to person, place, and time.   Skin: Skin is warm and dry.   Psychiatric: She has a normal mood and affect. Her behavior is normal.   Nursing note and vitals reviewed.      Significant Labs:   BMP:   Recent Labs  Lab 03/17/17  1150   GLU 96   *   K 4.2   CL 89*   CO2 33*   BUN 45*   CREATININE 1.5*   CALCIUM 9.2   MG 1.9     CBC:   Recent Labs  Lab 03/16/17  1005 03/17/17  1150   WBC 8.64 8.10   HGB 8.6* 8.7*   HCT 29.4* 28.9*   * 373*     Troponin:   Recent Labs  Lab 03/16/17  1005 03/16/17  1749 03/16/17  2359   TROPONINI 0.035* 0.029* 0.099*       Significant Imaging: I have reviewed all pertinent imaging results/findings within the past 24 hours.    Assessment/Plan:      * Acute on chronic combined systolic and diastolic heart failure  Ischemic cardiomyopathy  Severe TR and Severe MR  Pleural effusions  Appreciate Cardiology assistance. Has a very poor prognosis given the severity of her valvular disease and depressed EF. Continue bumex 3 mg IV BID with diuril daily. Working to diuresis as much as possible. Daily weights.     Permanent atrial fibrillation  Monitor on telemetry. Rate controlled. Had bleed when anticoagulated before.    CKD (chronic kidney disease) stage 3, GFR 30-59 ml/min  Creatinine at baseline.  Monitor closely with diuresis.     Iron deficiency anemia due to chronic blood loss  Continue oral iron.     VTE Risk Mitigation         Ordered     enoxaparin injection 40 mg  Daily     Route:  Subcutaneous        03/16/17 1559     Medium Risk of VTE  Once      03/16/17 1449          Anderson Badillo MD  Department of Hospital Medicine   [facility]

## 2017-03-18 NOTE — SUBJECTIVE & OBJECTIVE
Interval History: no new events    Review of Systems   Cardiovascular: Positive for dyspnea on exertion and leg swelling. Negative for chest pain, claudication, cyanosis, irregular heartbeat, near-syncope, orthopnea, palpitations, paroxysmal nocturnal dyspnea and syncope.     Objective:     Vital Signs (Most Recent):  Temp: 98.7 °F (37.1 °C) (03/18/17 1100)  Pulse: 69 (03/18/17 1100)  Resp: 18 (03/18/17 1100)  BP: (!) 118/59 (03/18/17 1100)  SpO2: 99 % (03/18/17 0900) Vital Signs (24h Range):  Temp:  [97.5 °F (36.4 °C)-98.7 °F (37.1 °C)] 98.7 °F (37.1 °C)  Pulse:  [67-70] 69  Resp:  [16-20] 18  SpO2:  [97 %-100 %] 99 %  BP: (105-120)/(51-61) 118/59     Weight: 122.5 kg (270 lb)  Body mass index is 43.58 kg/(m^2).     SpO2: 99 %  O2 Device (Oxygen Therapy): nasal cannula      Intake/Output Summary (Last 24 hours) at 03/18/17 1536  Last data filed at 03/18/17 1500   Gross per 24 hour   Intake             1000 ml   Output             1600 ml   Net             -600 ml       Lines/Drains/Airways          No matching active lines, drains, or airways          Physical Exam   Constitutional: She is oriented to person, place, and time. She appears well-developed and well-nourished. No distress.   HENT:   Head: Normocephalic and atraumatic.   Neck: No JVD present.   Cardiovascular: Normal rate, regular rhythm and intact distal pulses.  Exam reveals no gallop and no friction rub.    Murmur heard.  Pulmonary/Chest: Effort normal and breath sounds normal. No respiratory distress. She has no wheezes. She has no rales. She exhibits no tenderness.   Abdominal: Soft. Bowel sounds are normal.   Neurological: She is alert and oriented to person, place, and time.   Skin: Skin is warm and dry. She is not diaphoretic.   Psychiatric: She has a normal mood and affect. Her behavior is normal. Judgment and thought content normal.   Vitals reviewed.      Significant Labs:   BMP:   Recent Labs  Lab 03/17/17  1150 03/18/17  1126   GLU 96 119*    * 132*   K 4.2 3.8   CL 89* 90*   CO2 33* 34*   BUN 45* 45*   CREATININE 1.5* 1.4   CALCIUM 9.2 9.1   MG 1.9 1.8   , CBC   Recent Labs  Lab 03/17/17  1150 03/18/17  1126   WBC 8.10 8.57   HGB 8.7* 8.6*   HCT 28.9* 29.1*   * 374*    and INR No results for input(s): INR, PROTIME in the last 48 hours.    Significant Imaging: no new studies

## 2017-03-18 NOTE — ASSESSMENT & PLAN NOTE
Severe per echo; severity of MR further compromises CO; continue with plan as detailed above; not a candidate for intervention either surgical and nonsurgical at the moment due to high risk nature and ADHF

## 2017-03-19 LAB
ANION GAP SERPL CALC-SCNC: 10 MMOL/L
BASOPHILS # BLD AUTO: 0.01 K/UL
BASOPHILS NFR BLD: 0.1 %
BUN SERPL-MCNC: 46 MG/DL
CALCIUM SERPL-MCNC: 9.1 MG/DL
CHLORIDE SERPL-SCNC: 91 MMOL/L
CO2 SERPL-SCNC: 32 MMOL/L
CREAT SERPL-MCNC: 1.4 MG/DL
DIFFERENTIAL METHOD: ABNORMAL
EOSINOPHIL # BLD AUTO: 0.1 K/UL
EOSINOPHIL NFR BLD: 1.5 %
ERYTHROCYTE [DISTWIDTH] IN BLOOD BY AUTOMATED COUNT: 16.8 %
EST. GFR  (AFRICAN AMERICAN): 44 ML/MIN/1.73 M^2
EST. GFR  (NON AFRICAN AMERICAN): 38 ML/MIN/1.73 M^2
GLUCOSE SERPL-MCNC: 97 MG/DL
HCT VFR BLD AUTO: 28.9 %
HGB BLD-MCNC: 8.4 G/DL
LYMPHOCYTES # BLD AUTO: 0.7 K/UL
LYMPHOCYTES NFR BLD: 8.5 %
MAGNESIUM SERPL-MCNC: 1.9 MG/DL
MCH RBC QN AUTO: 26.4 PG
MCHC RBC AUTO-ENTMCNC: 29.1 %
MCV RBC AUTO: 91 FL
MONOCYTES # BLD AUTO: 0.6 K/UL
MONOCYTES NFR BLD: 7.4 %
NEUTROPHILS # BLD AUTO: 7 K/UL
NEUTROPHILS NFR BLD: 82.3 %
PHOSPHATE SERPL-MCNC: 3.7 MG/DL
PLATELET # BLD AUTO: 375 K/UL
PMV BLD AUTO: 10 FL
POCT GLUCOSE: 113 MG/DL (ref 70–110)
POTASSIUM SERPL-SCNC: 4.2 MMOL/L
RBC # BLD AUTO: 3.18 M/UL
SODIUM SERPL-SCNC: 133 MMOL/L
WBC # BLD AUTO: 8.49 K/UL

## 2017-03-19 PROCEDURE — 25000003 PHARM REV CODE 250: Performed by: NURSE PRACTITIONER

## 2017-03-19 PROCEDURE — 94640 AIRWAY INHALATION TREATMENT: CPT

## 2017-03-19 PROCEDURE — 83735 ASSAY OF MAGNESIUM: CPT

## 2017-03-19 PROCEDURE — 36415 COLL VENOUS BLD VENIPUNCTURE: CPT

## 2017-03-19 PROCEDURE — 85025 COMPLETE CBC W/AUTO DIFF WBC: CPT

## 2017-03-19 PROCEDURE — 84100 ASSAY OF PHOSPHORUS: CPT

## 2017-03-19 PROCEDURE — 94761 N-INVAS EAR/PLS OXIMETRY MLT: CPT

## 2017-03-19 PROCEDURE — 25000003 PHARM REV CODE 250: Performed by: INTERNAL MEDICINE

## 2017-03-19 PROCEDURE — 63600175 PHARM REV CODE 636 W HCPCS: Performed by: HOSPITALIST

## 2017-03-19 PROCEDURE — 94660 CPAP INITIATION&MGMT: CPT

## 2017-03-19 PROCEDURE — 11000001 HC ACUTE MED/SURG PRIVATE ROOM

## 2017-03-19 PROCEDURE — 25000003 PHARM REV CODE 250: Performed by: HOSPITALIST

## 2017-03-19 PROCEDURE — 99233 SBSQ HOSP IP/OBS HIGH 50: CPT | Mod: ,,, | Performed by: INTERNAL MEDICINE

## 2017-03-19 PROCEDURE — 80048 BASIC METABOLIC PNL TOTAL CA: CPT

## 2017-03-19 PROCEDURE — 27000221 HC OXYGEN, UP TO 24 HOURS

## 2017-03-19 RX ORDER — METOLAZONE 5 MG/1
5 TABLET ORAL ONCE
Status: COMPLETED | OUTPATIENT
Start: 2017-03-19 | End: 2017-03-19

## 2017-03-19 RX ADMIN — TRAMADOL HYDROCHLORIDE 50 MG: 50 TABLET, COATED ORAL at 08:03

## 2017-03-19 RX ADMIN — PANTOPRAZOLE SODIUM 40 MG: 40 TABLET, DELAYED RELEASE ORAL at 08:03

## 2017-03-19 RX ADMIN — SODIUM CHLORIDE, PRESERVATIVE FREE 3 ML: 5 INJECTION INTRAVENOUS at 09:03

## 2017-03-19 RX ADMIN — CLOPIDOGREL BISULFATE 75 MG: 75 TABLET ORAL at 08:03

## 2017-03-19 RX ADMIN — TRAMADOL HYDROCHLORIDE 50 MG: 50 TABLET, COATED ORAL at 02:03

## 2017-03-19 RX ADMIN — PRAVASTATIN SODIUM 40 MG: 40 TABLET ORAL at 08:03

## 2017-03-19 RX ADMIN — ASPIRIN 81 MG 81 MG: 81 TABLET ORAL at 09:03

## 2017-03-19 RX ADMIN — FERROUS SULFATE TAB EC 325 MG (65 MG FE EQUIVALENT) 325 MG: 325 (65 FE) TABLET DELAYED RESPONSE at 09:03

## 2017-03-19 RX ADMIN — SODIUM CHLORIDE, PRESERVATIVE FREE 3 ML: 5 INJECTION INTRAVENOUS at 06:03

## 2017-03-19 RX ADMIN — BUMETANIDE 3 MG: 0.25 INJECTION INTRAMUSCULAR; INTRAVENOUS at 05:03

## 2017-03-19 RX ADMIN — FLUTICASONE FUROATE AND VILANTEROL TRIFENATATE 1 PUFF: 200; 25 POWDER RESPIRATORY (INHALATION) at 08:03

## 2017-03-19 RX ADMIN — METOLAZONE 5 MG: 5 TABLET ORAL at 04:03

## 2017-03-19 RX ADMIN — TRAMADOL HYDROCHLORIDE 50 MG: 50 TABLET, COATED ORAL at 05:03

## 2017-03-19 RX ADMIN — AMIODARONE HYDROCHLORIDE 200 MG: 200 TABLET ORAL at 08:03

## 2017-03-19 RX ADMIN — ALLOPURINOL 300 MG: 300 TABLET ORAL at 08:03

## 2017-03-19 RX ADMIN — ENOXAPARIN SODIUM 40 MG: 100 INJECTION SUBCUTANEOUS at 04:03

## 2017-03-19 RX ADMIN — TRAZODONE HYDROCHLORIDE 150 MG: 100 TABLET ORAL at 09:03

## 2017-03-19 RX ADMIN — SODIUM CHLORIDE, PRESERVATIVE FREE 3 ML: 5 INJECTION INTRAVENOUS at 02:03

## 2017-03-19 RX ADMIN — BUMETANIDE 3 MG: 0.25 INJECTION INTRAMUSCULAR; INTRAVENOUS at 08:03

## 2017-03-19 NOTE — ASSESSMENT & PLAN NOTE
Severe per echo; severity of MR further compromises CO; continue with plan as detailed above; not a candidate for intervention either surgical and nonsurgical at the moment due to high risk nature and ADHF.  At least afterload is reduced.

## 2017-03-19 NOTE — PLAN OF CARE
Problem: Patient Care Overview  Goal: Plan of Care Review  Outcome: Ongoing (interventions implemented as appropriate)  Tele monitor continue. Bilateral lower extremities edema and erythema noted. Patient sits up at bed side most the time. She said she felt breathless when lying down. C/o chronic hip pain. Tramadol PRN Q6h given. Patient verbalized understanding the plan of care. Fall precautions maintained, bed side commode provided. Bed in low and locked position. Side rails up x 2, call light within reach. Continue to monitor.

## 2017-03-19 NOTE — ASSESSMENT & PLAN NOTE
Ischemic cardiomyopathy  Severe TR and Severe MR  Pleural effusions  Appreciate Cardiology assistance. Has a very poor prognosis given the severity of her valvular disease and depressed EF. Continue bumex 3 mg IV BID with diuril daily. Daily weights. Low salt diet.

## 2017-03-19 NOTE — PLAN OF CARE
Problem: Patient Care Overview  Goal: Plan of Care Review  Outcome: Ongoing (interventions implemented as appropriate)  Pt. Says she is experiencing some shortness of breath this a.m.  SpO2=92% on NC @ 2 lpm.  Increased to 3lpm.

## 2017-03-19 NOTE — SUBJECTIVE & OBJECTIVE
Interval History: net + overnite, otherwise no notable events.    Review of Systems   Cardiovascular: Positive for dyspnea on exertion, leg swelling and orthopnea. Negative for chest pain, claudication, cyanosis, irregular heartbeat, near-syncope, palpitations, paroxysmal nocturnal dyspnea and syncope.     Objective:     Vital Signs (Most Recent):  Temp: 97.6 °F (36.4 °C) (03/19/17 0802)  Pulse: 68 (03/19/17 0821)  Resp: (!) 22 (03/19/17 0821)  BP: 118/65 (03/19/17 0802)  SpO2: (!) 92 % (03/19/17 0821) Vital Signs (24h Range):  Temp:  [97.1 °F (36.2 °C)-99.5 °F (37.5 °C)] 97.6 °F (36.4 °C)  Pulse:  [68-73] 68  Resp:  [18-22] 22  SpO2:  [92 %-99 %] 92 %  BP: (105-118)/(53-65) 118/65     Weight: 132.2 kg (291 lb 7.2 oz)  Body mass index is 47.04 kg/(m^2).     SpO2: (!) 92 %  O2 Device (Oxygen Therapy): nasal cannula      Intake/Output Summary (Last 24 hours) at 03/19/17 1147  Last data filed at 03/19/17 0900   Gross per 24 hour   Intake             1945 ml   Output             2275 ml   Net             -330 ml       Lines/Drains/Airways     Peripheral Intravenous Line                 Peripheral IV - Single Lumen 03/18/17 1600 Right Forearm less than 1 day                Physical Exam   Constitutional: She is oriented to person, place, and time. She appears well-developed and well-nourished. No distress.   HENT:   Head: Normocephalic and atraumatic.   Cardiovascular: Normal rate, regular rhythm and intact distal pulses.  Exam reveals no gallop and no friction rub.    Murmur heard.  Pulmonary/Chest: Effort normal. No respiratory distress. She has no wheezes. She has rales.   Abdominal: Soft. Bowel sounds are normal.   Musculoskeletal: She exhibits edema.   Neurological: She is alert and oriented to person, place, and time.   Skin: She is not diaphoretic.   Psychiatric: She has a normal mood and affect. Her behavior is normal. Judgment and thought content normal.   Vitals reviewed.      Significant Labs:   BMP:   Recent  Labs  Lab 03/17/17  1150 03/18/17  1126 03/19/17  0444   GLU 96 119* 97   * 132* 133*   K 4.2 3.8 4.2   CL 89* 90* 91*   CO2 33* 34* 32*   BUN 45* 45* 46*   CREATININE 1.5* 1.4 1.4   CALCIUM 9.2 9.1 9.1   MG 1.9 1.8 1.9    and CBC   Recent Labs  Lab 03/17/17  1150 03/18/17  1126 03/19/17  0444   WBC 8.10 8.57 8.49   HGB 8.7* 8.6* 8.4*   HCT 28.9* 29.1* 28.9*   * 374* 375*       Significant Imaging: Echocardiogram:   2D echo with color flow doppler:   Results for orders placed or performed during the hospital encounter of 03/01/17   2D echo with color flow doppler   Result Value Ref Range    EF 25 (A) 55 - 65    Mitral Valve Regurgitation SEVERE (A)     Diastolic Dysfunction Yes (A)     Est. PA Systolic Pressure 50.28 (A)     Pericardial Effusion NONE     Tricuspid Valve Regurgitation MODERATE TO SEVERE (A)

## 2017-03-19 NOTE — ASSESSMENT & PLAN NOTE
Echo with severely depressed LV function with EF 25% with severe MR; massively volume overloaded with orthopnea, PND, peripheral edema and BAEZ; aggressive diuresis held yesterday due to marginal BP; SBP 110s this AM with IV Bumex given; ARB and BB held due to marginal BP; total bili and BNP elevated on admit labs with creatinine 1.4. will continue with strict I&Os and daily weights and attempt of aggressive diuresis; CRT-D in place with adequate BiV pacing.  Will give a dose of metolazone with afternoon dose of bumex today.  If no progress in diuresis over next 24-48 hours, recommend transfer to ICU, placement of central line, and initiation of dobutamine, unless patient desires no escalation of care.

## 2017-03-19 NOTE — PLAN OF CARE
Problem: Patient Care Overview  Goal: Plan of Care Review  Outcome: Ongoing (interventions implemented as appropriate)  Plan of care reviewed with the patient, verbalized understanding, no other distress noted at this time, will continue to monitor.

## 2017-03-19 NOTE — PROGRESS NOTES
[facility]  Cardiology  Progress Note    Patient Name: Jennifer Prescott  MRN: [id number]  Admission Date: 3/16/2017  Hospital Length of Stay: 3 days  Code Status: Full Code   Attending Physician: Anderson Badillo MD   Primary Care Physician: Lianna Mistry MD  Expected Discharge Date:   Principal Problem:Acute on chronic combined systolic and diastolic heart failure    Subjective:     Hospital Course:   3/16/2017 Presented to the ER with complaints of BAEZ, orthopnea, LE edema and decreased appetite. Admitted to [facility] for management of decompensated HF. 4+ BLE edema present with abdominal distension. Creatinine 1.6 and BNP 3344 with elevated total bilirubin. Continued on current home CHF medication regimen with IV Bumex  Labs pending. Abdominal distension and LE edema remains unchanged. Long discussion by Dr. Braga on 3/16/17 in regards to the severity of her heart failure and the overall concern given her recurrent decompensation despite aggressive treatment. Repeat discussion yesterday with encouragement to determine her wishes if she were to further decompensate ie intubation, CPR.  Past 24 hours failure of diuresis - may be due to loss of IV access yesterday.  Cr stable.  Patient still has dyspnea when recumbent.  BP stable.      Interval History: net + overnite, otherwise no notable events.    Review of Systems   Cardiovascular: Positive for dyspnea on exertion, leg swelling and orthopnea. Negative for chest pain, claudication, cyanosis, irregular heartbeat, near-syncope, palpitations, paroxysmal nocturnal dyspnea and syncope.     Objective:     Vital Signs (Most Recent):  Temp: 97.6 °F (36.4 °C) (03/19/17 0802)  Pulse: 68 (03/19/17 0821)  Resp: (!) 22 (03/19/17 0821)  BP: 118/65 (03/19/17 0802)  SpO2: (!) 92 % (03/19/17 0821) Vital Signs (24h Range):  Temp:  [97.1 °F (36.2 °C)-99.5 °F (37.5 °C)] 97.6 °F (36.4 °C)  Pulse:  [68-73] 68  Resp:  [18-22] 22  SpO2:  [92 %-99 %]  92 %  BP: (105-118)/(53-65) 118/65     Weight: 132.2 kg (291 lb 7.2 oz)  Body mass index is 47.04 kg/(m^2).     SpO2: (!) 92 %  O2 Device (Oxygen Therapy): nasal cannula      Intake/Output Summary (Last 24 hours) at 03/19/17 1147  Last data filed at 03/19/17 0900   Gross per 24 hour   Intake             1945 ml   Output             2275 ml   Net             -330 ml       Lines/Drains/Airways     Peripheral Intravenous Line                 Peripheral IV - Single Lumen 03/18/17 1600 Right Forearm less than 1 day                Physical Exam   Constitutional: She is oriented to person, place, and time. She appears well-developed and well-nourished. No distress.   HENT:   Head: Normocephalic and atraumatic.   Cardiovascular: Normal rate, regular rhythm and intact distal pulses.  Exam reveals no gallop and no friction rub.    Murmur heard.  Pulmonary/Chest: Effort normal. No respiratory distress. She has no wheezes. She has rales.   Abdominal: Soft. Bowel sounds are normal.   Musculoskeletal: She exhibits edema.   Neurological: She is alert and oriented to person, place, and time.   Skin: She is not diaphoretic.   Psychiatric: She has a normal mood and affect. Her behavior is normal. Judgment and thought content normal.   Vitals reviewed.      Significant Labs:   BMP:   Recent Labs  Lab 03/17/17  1150 03/18/17  1126 03/19/17  0444   GLU 96 119* 97   * 132* 133*   K 4.2 3.8 4.2   CL 89* 90* 91*   CO2 33* 34* 32*   BUN 45* 45* 46*   CREATININE 1.5* 1.4 1.4   CALCIUM 9.2 9.1 9.1   MG 1.9 1.8 1.9    and CBC   Recent Labs  Lab 03/17/17  1150 03/18/17  1126 03/19/17  0444   WBC 8.10 8.57 8.49   HGB 8.7* 8.6* 8.4*   HCT 28.9* 29.1* 28.9*   * 374* 375*       Significant Imaging: Echocardiogram:   2D echo with color flow doppler:   Results for orders placed or performed during the hospital encounter of 03/01/17   2D echo with color flow doppler   Result Value Ref Range    EF 25 (A) 55 - 65    Mitral Valve  Regurgitation SEVERE (A)     Diastolic Dysfunction Yes (A)     Est. PA Systolic Pressure 50.28 (A)     Pericardial Effusion NONE     Tricuspid Valve Regurgitation MODERATE TO SEVERE (A)      Assessment and Plan:     Brief HPI:     * Acute on chronic combined systolic and diastolic heart failure  Echo with severely depressed LV function with EF 25% with severe MR; massively volume overloaded with orthopnea, PND, peripheral edema and BAEZ; aggressive diuresis held yesterday due to marginal BP; SBP 110s this AM with IV Bumex given; ARB and BB held due to marginal BP; total bili and BNP elevated on admit labs with creatinine 1.4. will continue with strict I&Os and daily weights and attempt of aggressive diuresis; CRT-D in place with adequate BiV pacing.  Will give a dose of metolazone with afternoon dose of bumex today.  If no progress in diuresis over next 24-48 hours, recommend transfer to ICU, placement of central line, and initiation of dobutamine, unless patient desires no escalation of care.    Permanent atrial fibrillation  Paced currently; continue Amiodarone; no BB due to marginal BP; NOAC on hold due to GIB earlier this year and continued anemia    CAD S/P percutaneous coronary angioplasty  S/p LAD PCI and s/p RCA PCI for  2015; no concern for ACS currently; continue ASA, Plavix and Pravastatin; no BB or ARB due to marginal BP     Ischemic cardiomyopathy  As detailed above.  Currently stable.      CKD (chronic kidney disease) stage 3, GFR 30-59 ml/min  Creatinine 1.6 upon admit; stable at baseline 1.0-1.6; today 1.4    Pleural effusion, right  Chronic; slightly worse compared to previous CXR.  Reassess with CXR tomorrow.    Mitral regurgitation  Severe per echo; severity of MR further compromises CO; continue with plan as detailed above; not a candidate for intervention either surgical and nonsurgical at the moment due to high risk nature and ADHF.  At least afterload is reduced.      VTE Risk Mitigation          Ordered     enoxaparin injection 40 mg  Daily     Route:  Subcutaneous        03/16/17 1554     Medium Risk of VTE  Once      03/16/17 1449          Jamel Putnam MD  Cardiology  [facility]

## 2017-03-19 NOTE — PROGRESS NOTES
"[facility] Hospital Medicine  Progress Note    Patient Name: Jennifer Prescott  MRN: [id number]  Patient Class: IP- Inpatient   Admission Date: 3/16/2017  Length of Stay: 2 days  Attending Physician: Anderson Badillo MD  Primary Care Provider: Lianna Mistry MD        Subjective:     Principal Problem:Acute on chronic combined systolic and diastolic heart failure    HPI:  Ms. Prescott is a 72 yo WF with ischemic cardiomyopathy, chronic combined HF, and recurrent GI bleed that presented to [facility] complaining of SOB. She says that since her discharge last week she has gotten more short of breath. She says that the last couple days she has not been able to sleep because of the SOB. She says that she cannot lie back because she gets SOB. She had been trying to sleep in a lounge chair but had difficulty getting out of the chair. She denies any chest pain or palpitations. Her swelling has continued to increase again. She says that she has been taking her bumex as directed and has been urinating. She says that she eats ice constantly at home and probably eats a "quart" of ice a day. She says that she does not drink much fluids otherwise.     Hospital Course:       Interval History: Urinating okay. Able to lay in the bed now and get some sleep. Swelling in the hands is improved.     Review of Systems   Constitutional: Negative for fever.   Respiratory: Positive for cough and shortness of breath.    Cardiovascular: Positive for leg swelling. Negative for chest pain.   Gastrointestinal: Negative for nausea and vomiting.     Objective:     Vital Signs (Most Recent):  Temp: 98.2 °F (36.8 °C) (03/18/17 2010)  Pulse: 73 (03/18/17 2010)  Resp: (!) 21 (03/18/17 2010)  BP: 108/60 (03/18/17 2010)  SpO2: 99 % (03/18/17 2341) Vital Signs (24h Range):  Temp:  [97.5 °F (36.4 °C)-98.7 °F (37.1 °C)] 98.2 °F (36.8 °C)  Pulse:  [69-73] 73  Resp:  [16-21] 21  SpO2:  [96 %-99 %] 99 %  BP: (105-120)/(51-61) 108/60     Weight: " 122.5 kg (270 lb)  Body mass index is 43.58 kg/(m^2).    Intake/Output Summary (Last 24 hours) at 03/18/17 2349  Last data filed at 03/18/17 1900   Gross per 24 hour   Intake             1040 ml   Output             2325 ml   Net            -1285 ml      Physical Exam   Constitutional: She is oriented to person, place, and time. She appears well-developed and well-nourished. No distress.   Cardiovascular: Normal rate and regular rhythm.    Murmur heard.  Pulmonary/Chest: Effort normal. No respiratory distress. She has no wheezes. She has rales.   Abdominal: Soft. Bowel sounds are normal. She exhibits no distension. There is no tenderness.   Musculoskeletal: She exhibits edema.   Neurological: She is alert and oriented to person, place, and time.   Skin: Skin is warm and dry.   Psychiatric: She has a normal mood and affect. Her behavior is normal.   Nursing note and vitals reviewed.      Significant Labs:   BMP:   Recent Labs  Lab 03/18/17  1126   *   *   K 3.8   CL 90*   CO2 34*   BUN 45*   CREATININE 1.4   CALCIUM 9.1   MG 1.8     CBC:   Recent Labs  Lab 03/17/17  1150 03/18/17  1126   WBC 8.10 8.57   HGB 8.7* 8.6*   HCT 28.9* 29.1*   * 374*       Significant Imaging: I have reviewed all pertinent imaging results/findings within the past 24 hours.    Assessment/Plan:      * Acute on chronic combined systolic and diastolic heart failure  Ischemic cardiomyopathy  Severe TR and Severe MR  Pleural effusions  Appreciate Cardiology assistance. Has a very poor prognosis given the severity of her valvular disease and depressed EF. Continue bumex 3 mg IV BID with diuril daily. Daily weights. Low salt diet.     Permanent atrial fibrillation  Monitor on telemetry. Rate controlled. Had bleed when anticoagulated before.    Obesity hypoventilation syndrome on BiPAP  COPD  Chronic hypoxic respiratory failure  Continue A/A nebs. BiPAP nightly. Continue supplemental oxygen      CKD (chronic kidney disease) stage  3, GFR 30-59 ml/min  Creatinine at baseline and stable with the diruresis.    VTE Risk Mitigation         Ordered     enoxaparin injection 40 mg  Daily     Route:  Subcutaneous        03/16/17 5304     Medium Risk of VTE  Once      03/16/17 1441          Anderson Badillo MD  Department of Hospital Medicine   [facility]

## 2017-03-19 NOTE — SUBJECTIVE & OBJECTIVE
Interval History: Urinating okay. Able to lay in the bed now and get some sleep. Swelling in the hands is improved.     Review of Systems   Constitutional: Negative for fever.   Respiratory: Positive for cough and shortness of breath.    Cardiovascular: Positive for leg swelling. Negative for chest pain.   Gastrointestinal: Negative for nausea and vomiting.     Objective:     Vital Signs (Most Recent):  Temp: 98.2 °F (36.8 °C) (03/18/17 2010)  Pulse: 73 (03/18/17 2010)  Resp: (!) 21 (03/18/17 2010)  BP: 108/60 (03/18/17 2010)  SpO2: 99 % (03/18/17 2341) Vital Signs (24h Range):  Temp:  [97.5 °F (36.4 °C)-98.7 °F (37.1 °C)] 98.2 °F (36.8 °C)  Pulse:  [69-73] 73  Resp:  [16-21] 21  SpO2:  [96 %-99 %] 99 %  BP: (105-120)/(51-61) 108/60     Weight: 122.5 kg (270 lb)  Body mass index is 43.58 kg/(m^2).    Intake/Output Summary (Last 24 hours) at 03/18/17 2349  Last data filed at 03/18/17 1900   Gross per 24 hour   Intake             1040 ml   Output             2325 ml   Net            -1285 ml      Physical Exam   Constitutional: She is oriented to person, place, and time. She appears well-developed and well-nourished. No distress.   Cardiovascular: Normal rate and regular rhythm.    Murmur heard.  Pulmonary/Chest: Effort normal. No respiratory distress. She has no wheezes. She has rales.   Abdominal: Soft. Bowel sounds are normal. She exhibits no distension. There is no tenderness.   Musculoskeletal: She exhibits edema.   Neurological: She is alert and oriented to person, place, and time.   Skin: Skin is warm and dry.   Psychiatric: She has a normal mood and affect. Her behavior is normal.   Nursing note and vitals reviewed.      Significant Labs:   BMP:   Recent Labs  Lab 03/18/17  1126   *   *   K 3.8   CL 90*   CO2 34*   BUN 45*   CREATININE 1.4   CALCIUM 9.1   MG 1.8     CBC:   Recent Labs  Lab 03/17/17  1150 03/18/17  1126   WBC 8.10 8.57   HGB 8.7* 8.6*   HCT 28.9* 29.1*   * 374*        Significant Imaging: I have reviewed all pertinent imaging results/findings within the past 24 hours.

## 2017-03-20 LAB
ANION GAP SERPL CALC-SCNC: 12 MMOL/L
BUN SERPL-MCNC: 44 MG/DL
CALCIUM SERPL-MCNC: 8.9 MG/DL
CHLORIDE SERPL-SCNC: 90 MMOL/L
CO2 SERPL-SCNC: 29 MMOL/L
CREAT SERPL-MCNC: 1.3 MG/DL
EST. GFR  (AFRICAN AMERICAN): 48 ML/MIN/1.73 M^2
EST. GFR  (NON AFRICAN AMERICAN): 41 ML/MIN/1.73 M^2
GLUCOSE SERPL-MCNC: 81 MG/DL
POTASSIUM SERPL-SCNC: 3.6 MMOL/L
SODIUM SERPL-SCNC: 131 MMOL/L

## 2017-03-20 PROCEDURE — 97166 OT EVAL MOD COMPLEX 45 MIN: CPT

## 2017-03-20 PROCEDURE — 97161 PT EVAL LOW COMPLEX 20 MIN: CPT

## 2017-03-20 PROCEDURE — 27000221 HC OXYGEN, UP TO 24 HOURS

## 2017-03-20 PROCEDURE — 63600175 PHARM REV CODE 636 W HCPCS: Performed by: HOSPITALIST

## 2017-03-20 PROCEDURE — 80048 BASIC METABOLIC PNL TOTAL CA: CPT

## 2017-03-20 PROCEDURE — 25000003 PHARM REV CODE 250: Performed by: HOSPITALIST

## 2017-03-20 PROCEDURE — 36415 COLL VENOUS BLD VENIPUNCTURE: CPT

## 2017-03-20 PROCEDURE — G8979 MOBILITY GOAL STATUS: HCPCS | Mod: CJ

## 2017-03-20 PROCEDURE — 97535 SELF CARE MNGMENT TRAINING: CPT

## 2017-03-20 PROCEDURE — G8978 MOBILITY CURRENT STATUS: HCPCS | Mod: CK

## 2017-03-20 PROCEDURE — 25000003 PHARM REV CODE 250: Performed by: NURSE PRACTITIONER

## 2017-03-20 PROCEDURE — 25000003 PHARM REV CODE 250: Performed by: INTERNAL MEDICINE

## 2017-03-20 PROCEDURE — 94640 AIRWAY INHALATION TREATMENT: CPT

## 2017-03-20 PROCEDURE — 94664 DEMO&/EVAL PT USE INHALER: CPT

## 2017-03-20 PROCEDURE — 94761 N-INVAS EAR/PLS OXIMETRY MLT: CPT

## 2017-03-20 PROCEDURE — 99233 SBSQ HOSP IP/OBS HIGH 50: CPT | Mod: ,,, | Performed by: INTERNAL MEDICINE

## 2017-03-20 PROCEDURE — 11000001 HC ACUTE MED/SURG PRIVATE ROOM

## 2017-03-20 RX ORDER — POTASSIUM CHLORIDE 20 MEQ/1
40 TABLET, EXTENDED RELEASE ORAL ONCE
Status: DISCONTINUED | OUTPATIENT
Start: 2017-03-20 | End: 2017-03-20

## 2017-03-20 RX ORDER — POTASSIUM CHLORIDE 20 MEQ/1
60 TABLET, EXTENDED RELEASE ORAL 2 TIMES DAILY
Status: DISCONTINUED | OUTPATIENT
Start: 2017-03-20 | End: 2017-03-22

## 2017-03-20 RX ORDER — METOLAZONE 5 MG/1
5 TABLET ORAL ONCE
Status: COMPLETED | OUTPATIENT
Start: 2017-03-20 | End: 2017-03-20

## 2017-03-20 RX ADMIN — POTASSIUM CHLORIDE 60 MEQ: 20 TABLET, EXTENDED RELEASE ORAL at 08:03

## 2017-03-20 RX ADMIN — SODIUM CHLORIDE, PRESERVATIVE FREE 3 ML: 5 INJECTION INTRAVENOUS at 06:03

## 2017-03-20 RX ADMIN — TRAMADOL HYDROCHLORIDE 50 MG: 50 TABLET, COATED ORAL at 05:03

## 2017-03-20 RX ADMIN — TRAMADOL HYDROCHLORIDE 50 MG: 50 TABLET, COATED ORAL at 12:03

## 2017-03-20 RX ADMIN — SODIUM CHLORIDE, PRESERVATIVE FREE 3 ML: 5 INJECTION INTRAVENOUS at 05:03

## 2017-03-20 RX ADMIN — BUMETANIDE 3 MG: 0.25 INJECTION INTRAMUSCULAR; INTRAVENOUS at 06:03

## 2017-03-20 RX ADMIN — ENOXAPARIN SODIUM 40 MG: 100 INJECTION SUBCUTANEOUS at 06:03

## 2017-03-20 RX ADMIN — TRAZODONE HYDROCHLORIDE 150 MG: 100 TABLET ORAL at 08:03

## 2017-03-20 RX ADMIN — PRAVASTATIN SODIUM 40 MG: 40 TABLET ORAL at 08:03

## 2017-03-20 RX ADMIN — ALLOPURINOL 300 MG: 300 TABLET ORAL at 08:03

## 2017-03-20 RX ADMIN — CLOPIDOGREL BISULFATE 75 MG: 75 TABLET ORAL at 08:03

## 2017-03-20 RX ADMIN — PANTOPRAZOLE SODIUM 40 MG: 40 TABLET, DELAYED RELEASE ORAL at 08:03

## 2017-03-20 RX ADMIN — METOLAZONE 5 MG: 5 TABLET ORAL at 08:03

## 2017-03-20 RX ADMIN — BUMETANIDE 3 MG: 0.25 INJECTION INTRAMUSCULAR; INTRAVENOUS at 08:03

## 2017-03-20 RX ADMIN — ASPIRIN 81 MG 81 MG: 81 TABLET ORAL at 08:03

## 2017-03-20 RX ADMIN — FERROUS SULFATE TAB EC 325 MG (65 MG FE EQUIVALENT) 325 MG: 325 (65 FE) TABLET DELAYED RESPONSE at 08:03

## 2017-03-20 RX ADMIN — AMIODARONE HYDROCHLORIDE 200 MG: 200 TABLET ORAL at 08:03

## 2017-03-20 RX ADMIN — FLUTICASONE FUROATE AND VILANTEROL TRIFENATATE 1 PUFF: 200; 25 POWDER RESPIRATORY (INHALATION) at 08:03

## 2017-03-20 NOTE — ASSESSMENT & PLAN NOTE
Creatinine 1.6 upon admit; creatinine 1.3 today; baseline .9-1.4; elevation likely cardiorenal; improving with IV diuresis; will continue to monitor closely

## 2017-03-20 NOTE — ASSESSMENT & PLAN NOTE
Chronic; slightly worse compared to previous CXR; continues with rales; will continue with IV diuresis and reassess with CXR once more euvolemic

## 2017-03-20 NOTE — PROGRESS NOTES
[facility]  Cardiology  Progress Note    Patient Name: Jennifer Prescott  MRN: [id number]  Admission Date: 3/16/2017  Hospital Length of Stay: 4 days  Code Status: Full Code   Attending Physician: Anderson Badillo MD   Primary Care Physician: Lianna Mistry MD  Expected Discharge Date:   Principal Problem:Acute on chronic combined systolic and diastolic heart failure    Subjective:     Hospital Course:   3/16/2017 Presented to the ER with complaints of BAEZ, orthopnea, LE edema and decreased appetite. Admitted to [facility] for management of decompensated HF. 4+ BLE edema present with abdominal distension. Creatinine 1.6 and BNP 3344 with elevated total bilirubin. Continued on current home CHF medication regimen with IV Bumex  Labs pending. Abdominal distension and LE edema remains unchanged. Long discussion by Dr. Braga on 3/16/17 in regards to the severity of her heart failure and the overall concern given her recurrent decompensation despite aggressive treatment. IV diuretics held due to marginal BP 3/17/17 Oral CHF medications held with initiation of IV Bumex with 1.4 liters out. LE edema and orthopnea unchanged 3/18/17-3/19/17 Repeat discussion yesterday with encouragement to determine her wishes if she were to further decompensate ie intubation, CPR.   24 hours failure of diuresis  Likely due to loss of IV access.  Cr stable.  BAEZ and orthopnea persist. Continued on IV Bumex with oral Metolazone given 30minutes prior to AM dose. 3/20/2017 Aggressive diuresis remains in place with Bumex 3mg IV BID with Metolazone yesterday with 3.8 liters out negative 2.7 liters in past 24 hours and negative 4.6 liters since admission. Peripheral edema along with ascites and orthopnea remain. Working with PT/OT to prevent debility. Patient stated discussion with her daughter in the past in regards to her wishes consistent with living will and wishes to continue with aggressive treatment          Review of Systems   Constitution: Positive for malaise/fatigue. Negative for chills, fever and weakness.   Cardiovascular: Positive for dyspnea on exertion, leg swelling and orthopnea. Negative for chest pain.   Respiratory: Positive for shortness of breath. Negative for cough.    Musculoskeletal: Negative for back pain.   Gastrointestinal: Positive for bloating. Negative for constipation, diarrhea, nausea and vomiting.   Neurological: Negative for dizziness and light-headedness.     Objective:     Vital Signs (Most Recent):  Temp: 97.8 °F (36.6 °C) (03/20/17 0800)  Pulse: 69 (03/20/17 0805)  Resp: 18 (03/20/17 0805)  BP: (!) 111/55 (03/20/17 0800)  SpO2: 98 % (03/20/17 1137) Vital Signs (24h Range):  Temp:  [97.5 °F (36.4 °C)-98.6 °F (37 °C)] 97.8 °F (36.6 °C)  Pulse:  [69-71] 69  Resp:  [18-20] 18  SpO2:  [97 %-100 %] 98 %  BP: (103-135)/(55-58) 111/55     Weight: 132.2 kg (291 lb 7.2 oz)  Body mass index is 47.04 kg/(m^2).     SpO2: 98 %  O2 Device (Oxygen Therapy): nasal cannula      Intake/Output Summary (Last 24 hours) at 03/20/17 1227  Last data filed at 03/20/17 0800   Gross per 24 hour   Intake              905 ml   Output             4050 ml   Net            -3145 ml       Lines/Drains/Airways     Peripheral Intravenous Line                 Peripheral IV - Single Lumen 03/18/17 1600 Right Forearm 1 day                Physical Exam   Constitutional: She is oriented to person, place, and time. She appears well-developed and well-nourished. No distress.   Cardiovascular: Normal rate and regular rhythm.  Exam reveals no gallop.    No murmur heard.  Pulmonary/Chest: Effort normal. She has rales in the right middle field, the right lower field, the left middle field and the left lower field.   Abdominal: Soft. Bowel sounds are normal. She exhibits distension. There is no tenderness.   Neurological: She is alert and oriented to person, place, and time.   Skin: Skin is warm and dry.       Significant Labs:          Recent Labs  Lab 03/20/17  0450   *   K 3.6   CL 90*   CO2 29   BUN 44*   CREATININE 1.3       Significant Imaging: Echocardiogram:   2D echo with color flow doppler:   Results for orders placed or performed during the hospital encounter of 03/01/17   2D echo with color flow doppler   Result Value Ref Range    EF 25 (A) 55 - 65    Mitral Valve Regurgitation SEVERE (A)     Diastolic Dysfunction Yes (A)     Est. PA Systolic Pressure 50.28 (A)     Pericardial Effusion NONE     Tricuspid Valve Regurgitation MODERATE TO SEVERE (A)      Assessment and Plan:     Brief HPI: Seen this morning on AM NP rounds with family at the bedside. Denies any complaints currently. Continues to complain of LE swelling along with SOB with lying flat. Discussed POC with patient to include continued volume removal with IV medications and close monitoring-patient verbalized understanding of POC    * Acute on chronic combined systolic and diastolic heart failure  Echo with severely depressed LV function with EF 25% with severe MR; massively volume overloaded with orthopnea, PND, peripheral edema and BAEZ; aggressive diuresis initially held due to marginal BP; ARB and BB on hold due to marginal BP as well; BP improved with initiation of aggressive IV diuresis with mild response; started on Metolazone along with IV Bumex yesterday with good response; 3.8 liters out overnight and negative 2.7 liters in 24 hours and negative 4.6 liters since admission; remains overload with continued LE edema, orthopnea and BAEZ; will continue with aggressive diuresis; continue to hold ARB and BB; suspect 30-40 lbs of fluid needed to be removed; continue with aggressive diuresis with IV Bumex and oral Metolazone with strict I&O and daily weights     Permanent atrial fibrillation  Paced currently; continue Amiodarone; no BB due to marginal BP; NOAC on hold due to GIB earlier this year and continued anemia    CAD S/P percutaneous coronary  angioplasty  S/p LAD PCI and s/p RCA PCI for  2015; no concern for ACS currently; continue ASA, Plavix and Pravastatin; no BB or ARB due to marginal BP     Ischemic cardiomyopathy  As detailed above    CKD (chronic kidney disease) stage 3, GFR 30-59 ml/min  Creatinine 1.6 upon admit; creatinine 1.3 today; baseline .9-1.4; elevation likely cardiorenal; improving with IV diuresis; will continue to monitor closely     Pleural effusion, right  Chronic; slightly worse compared to previous CXR; continues with rales; will continue with IV diuresis and reassess with CXR once more euvolemic    Mitral regurgitation  Severe per echo; severity of MR further compromises CO; continue with plan as detailed above; not a candidate for intervention either surgical and nonsurgical at the moment due to high risk nature and ADHF; decent afterload reduction currently; will continue to monitor closely and will resume BB and/or ARB if BP rises and better afterload reduction       VTE Risk Mitigation         Ordered     enoxaparin injection 40 mg  Daily     Route:  Subcutaneous        03/16/17 1559     Medium Risk of VTE  Once      03/16/17 1449          SEJAL Strickland, ANP  Cardiology  [facility]    I have seen patient with Nurse Practitioner Augusta De La Vega. I agree with her assessment and plan as written in this note.  Responding well to Metolazone/Bumex combination with copious urine output.  Renal function stable.        Jamel Putnam MD, FACC, CCDS  Interventional Cardiology  [facility]

## 2017-03-20 NOTE — PLAN OF CARE
Problem: Occupational Therapy Goal  Goal: Occupational Therapy Goal  Goals to be met by: 3/27/2017    Patient will increase functional independence with ADLs by performing:    UE Dressing with Set-up Assistance.  LE Dressing with Minimal Assistance with AD as needed for energy conservation and limited lower body bending.  Grooming while standing at sink with Stand-by Assistance demonstrating use of energy conservation tech with 1 seated rest break.  Toileting from bedside commode with Supervision for hygiene and clothing management.   Toilet transfer to bedside commode with Supervision.  Increased functional strength to WNL for BUE use for ADLS and fx mobility..  Outcome: Ongoing (interventions implemented as appropriate)  Pt. limited by SOB on minimal exertion, edema legs and edema in LUE >RUE, decreased endurance, impaired dynamic standing and sitting balance/reach, weakness and decreased strength limiting indep and safety in ADLS and fx mobility.  Pt. performed feeding indep, grooming setup.sup seated, UB dressing min assist to reach around shoulders, LB dressing dep socks 2/2 limited lower body bending and SOB, toileting CGA with RW for balance safety; BSC stand pivot t/f with CGA and RW.  Purse lip breathing instruction with transfers and transitional movements.  HHOT/HHPT recommended; pt would also benefit from hip kit for home use.  Continue with OT POC.

## 2017-03-20 NOTE — SUBJECTIVE & OBJECTIVE
Interval History: Says that she is not feeling as good today as yesterday. Still urinating well. Still able to lie down in the bed with the head elevated for sleeping.     Review of Systems   Constitutional: Negative for fever.   Respiratory: Positive for cough and shortness of breath.    Cardiovascular: Positive for leg swelling. Negative for chest pain and palpitations.   Gastrointestinal: Negative for diarrhea, nausea and vomiting.     Objective:     Vital Signs (Most Recent):  Temp: 97.8 °F (36.6 °C) (03/19/17 1920)  Pulse: 70 (03/19/17 1920)  Resp: 18 (03/19/17 1920)  BP: (!) 103/57 (03/19/17 1920)  SpO2: 99 % (03/19/17 1958) Vital Signs (24h Range):  Temp:  [97.1 °F (36.2 °C)-99.5 °F (37.5 °C)] 97.8 °F (36.6 °C)  Pulse:  [68-70] 70  Resp:  [18-22] 18  SpO2:  [92 %-100 %] 99 %  BP: (103-118)/(53-65) 103/57     Weight: 132.2 kg (291 lb 7.2 oz)  Body mass index is 47.04 kg/(m^2).    Intake/Output Summary (Last 24 hours) at 03/19/17 2239  Last data filed at 03/19/17 2148   Gross per 24 hour   Intake             1585 ml   Output             3000 ml   Net            -1415 ml      Physical Exam   Constitutional: She is oriented to person, place, and time. She appears well-developed and well-nourished. No distress.   Cardiovascular: Normal rate and regular rhythm.    Murmur heard.  Pulmonary/Chest: Effort normal. No respiratory distress. She has no wheezes. She has rales.   Abdominal: Soft. Bowel sounds are normal. She exhibits no distension. There is no tenderness.   Musculoskeletal: She exhibits edema.   Neurological: She is alert and oriented to person, place, and time.   Skin: Skin is warm and dry.   Psychiatric: She has a normal mood and affect. Her behavior is normal.   Nursing note and vitals reviewed.      Significant Labs:   CBC:   Recent Labs  Lab 03/18/17  1126 03/19/17  0444   WBC 8.57 8.49   HGB 8.6* 8.4*   HCT 29.1* 28.9*   * 375*     CMP:   Recent Labs  Lab 03/18/17  1126 03/19/17  0444   *  133*   K 3.8 4.2   CL 90* 91*   CO2 34* 32*   * 97   BUN 45* 46*   CREATININE 1.4 1.4   CALCIUM 9.1 9.1   ANIONGAP 8 10   EGFRNONAA 38* 38*     POCT Glucose:   Recent Labs  Lab 03/18/17  1712 03/18/17  2137 03/19/17  0605   POCTGLUCOSE 162* 116* 113*       Significant Imaging: I have reviewed all pertinent imaging results/findings within the past 24 hours.

## 2017-03-20 NOTE — ASSESSMENT & PLAN NOTE
Severe per echo; severity of MR further compromises CO; continue with plan as detailed above; not a candidate for intervention either surgical and nonsurgical at the moment due to high risk nature and ADHF; decent afterload reduction currently; will continue to monitor closely and will resume BB and/or ARB if BP rises and better afterload reduction

## 2017-03-20 NOTE — PLAN OF CARE
Problem: Patient Care Overview  Goal: Plan of Care Review  Outcome: Ongoing (interventions implemented as appropriate)  Plan of care reviewed with pt who verbalize understanding. Diuretic therapy completed as per MD order.Pt tolerated well. O2 remains in place at 2-3 L. Safety maintained.Bed locked in lowest position.Pt up in chair during most of shift.Will cont to monitor pt

## 2017-03-20 NOTE — PROGRESS NOTES
"[facility] Medicine  Progress Note    Patient Name: Jennifer Prescott  MRN: [id number]  Patient Class: IP- Inpatient   Admission Date: 3/16/2017  Length of Stay: 4 days  Attending Physician: Anderson Badillo MD  Primary Care Provider: Lianna Mistry MD      Subjective:     Principal Problem:Acute on chronic combined systolic and diastolic heart failure    HPI:  Ms. Prescott is a 72 yo WF with ischemic cardiomyopathy, chronic combined HF, and recurrent GI bleed that presented to [facility] complaining of SOB. She says that since her discharge last week she has gotten more short of breath. She says that the last couple days she has not been able to sleep because of the SOB. She says that she cannot lie back because she gets SOB. She had been trying to sleep in a lounge chair but had difficulty getting out of the chair. She denies any chest pain or palpitations. Her swelling has continued to increase again. She says that she has been taking her bumex as directed and has been urinating. She says that she eats ice constantly at home and probably eats a "quart" of ice a day. She says that she does not drink much fluids otherwise.     Hospital Course:  Ms. Prescott was admitted to [facility] for diuresis. She was started on IV bumex and daily diuril with slow diuresis. Her renal function has remained stable with this diuresis. She was then given metolazone instead of diuril prior to the bumex and had better diuresis. She remains very symptomatic and volume overloaded.     Interval History: Slept in the bedside chair last PM. Still having SOB. Urinated the most over the last 24 hours. Still with orthopnea and BAEZ.     Review of Systems   Constitutional: Negative for fever.   Respiratory: Positive for cough and shortness of breath.    Cardiovascular: Positive for leg swelling. Negative for chest pain and palpitations.   Gastrointestinal: Negative for abdominal pain, nausea and vomiting.     Objective: "     Vital Signs (Most Recent):  Temp: 98.3 °F (36.8 °C) (03/20/17 1200)  Pulse: 80 (03/20/17 1200)  Resp: 18 (03/20/17 1200)  BP: (!) 119/55 (03/20/17 1200)  SpO2: 98 % (03/20/17 1137) Vital Signs (24h Range):  Temp:  [97.5 °F (36.4 °C)-98.6 °F (37 °C)] 98.3 °F (36.8 °C)  Pulse:  [69-80] 80  Resp:  [18-20] 18  SpO2:  [97 %-99 %] 98 %  BP: (103-135)/(55-58) 119/55     Weight: 132.2 kg (291 lb 7.2 oz)  Body mass index is 47.04 kg/(m^2).    Intake/Output Summary (Last 24 hours) at 03/20/17 1338  Last data filed at 03/20/17 1200   Gross per 24 hour   Intake             1210 ml   Output             4050 ml   Net            -2840 ml      Physical Exam   Constitutional: She is oriented to person, place, and time. She appears well-developed and well-nourished. No distress.   Cardiovascular: Normal rate and regular rhythm.    Murmur heard.  Pulmonary/Chest: Effort normal. No respiratory distress. She has no wheezes. She has rales.   Abdominal: Soft. Bowel sounds are normal. She exhibits no distension. There is no tenderness.   Musculoskeletal: She exhibits edema.   Neurological: She is alert and oriented to person, place, and time.   Skin: Skin is warm and dry.   Psychiatric: She has a normal mood and affect. Her behavior is normal.   Nursing note and vitals reviewed.      Significant Labs:   BMP:   Recent Labs  Lab 03/19/17  0444 03/20/17  0450   GLU 97 81   * 131*   K 4.2 3.6   CL 91* 90*   CO2 32* 29   BUN 46* 44*   CREATININE 1.4 1.3   CALCIUM 9.1 8.9   MG 1.9  --      CBC:   Recent Labs  Lab 03/19/17  0444   WBC 8.49   HGB 8.4*   HCT 28.9*   *       Significant Imaging: None    Assessment/Plan:      * Acute on chronic combined systolic and diastolic heart failure  Ischemic cardiomyopathy  Severe TR and Severe MR  Pleural effusions  Appreciate Cardiology assistance and will transfer her to their primary service. Has a very poor prognosis given the severity of her valvular disease and depressed EF, but wants  to continue aggressive measures. Continue bumex 3 mg IV BID with metolazone daily. Daily weights. Low salt diet. UOP of 3.8 liters in last 24 hours.     Permanent atrial fibrillation  Monitor on telemetry. Rate controlled. Had bleed when fully anticoagulated before, so no longer anticoagulated.     Obesity hypoventilation syndrome on BiPAP  COPD  Chronic hypoxic respiratory failure  Continue A/A nebs. BiPAP nightly. Continue supplemental oxygen      CKD (chronic kidney disease) stage 3, GFR 30-59 ml/min  Creatinine at baseline of 1.3 and stable with the diruresis. Avoid nephrotoxins. Continue to monitor.     Hyponatremia  Essentially stable. Related to volume overload. Monitor.     VTE Risk Mitigation         Ordered     enoxaparin injection 40 mg  Daily     Route:  Subcutaneous        03/16/17 1559     Medium Risk of VTE  Once      03/16/17 1449          Anderson Badillo MD  Department of Hospital Medicine   [facility]

## 2017-03-20 NOTE — ASSESSMENT & PLAN NOTE
Monitor on telemetry. Rate controlled. Had bleed when fully anticoagulated before, so no longer anticoagulated.

## 2017-03-20 NOTE — ASSESSMENT & PLAN NOTE
Echo with severely depressed LV function with EF 25% with severe MR; massively volume overloaded with orthopnea, PND, peripheral edema and BAEZ; aggressive diuresis initially held due to marginal BP; ARB and BB on hold due to marginal BP as well; BP improved with initiation of aggressive IV diuresis with mild response; started on Metolazone along with IV Bumex yesterday with good response; 3.8 liters out overnight and negative 2.7 liters in 24 hours and negative 4.6 liters since admission; remains overload with continued LE edema, orthopnea and BAEZ; will continue with aggressive diuresis; continue to hold ARB and BB; suspect 30-40 lbs of fluid needed to be removed; continue with aggressive diuresis with IV Bumex and oral Metolazone with strict I&O and daily weights

## 2017-03-20 NOTE — PLAN OF CARE
Problem: Patient Care Overview  Goal: Plan of Care Review  Outcome: Ongoing (interventions implemented as appropriate)  Patient on oxygen with documented flow.  Will attempt to wean per O2 order protocol. The proper method of use, as well as anticipated side effects, of this metered-dose inhaler are discussed and demonstrated to the patient. Will continue to monitor.

## 2017-03-20 NOTE — SUBJECTIVE & OBJECTIVE
Interval History: Slept in the bedside chair last PM. Still having SOB. Urinated the most over the last 24 hours. Still with orthopnea and BAEZ.     Review of Systems   Constitutional: Negative for fever.   Respiratory: Positive for cough and shortness of breath.    Cardiovascular: Positive for leg swelling. Negative for chest pain and palpitations.   Gastrointestinal: Negative for abdominal pain, nausea and vomiting.     Objective:     Vital Signs (Most Recent):  Temp: 98.3 °F (36.8 °C) (03/20/17 1200)  Pulse: 80 (03/20/17 1200)  Resp: 18 (03/20/17 1200)  BP: (!) 119/55 (03/20/17 1200)  SpO2: 98 % (03/20/17 1137) Vital Signs (24h Range):  Temp:  [97.5 °F (36.4 °C)-98.6 °F (37 °C)] 98.3 °F (36.8 °C)  Pulse:  [69-80] 80  Resp:  [18-20] 18  SpO2:  [97 %-99 %] 98 %  BP: (103-135)/(55-58) 119/55     Weight: 132.2 kg (291 lb 7.2 oz)  Body mass index is 47.04 kg/(m^2).    Intake/Output Summary (Last 24 hours) at 03/20/17 1338  Last data filed at 03/20/17 1200   Gross per 24 hour   Intake             1210 ml   Output             4050 ml   Net            -2840 ml      Physical Exam   Constitutional: She is oriented to person, place, and time. She appears well-developed and well-nourished. No distress.   Cardiovascular: Normal rate and regular rhythm.    Murmur heard.  Pulmonary/Chest: Effort normal. No respiratory distress. She has no wheezes. She has rales.   Abdominal: Soft. Bowel sounds are normal. She exhibits no distension. There is no tenderness.   Musculoskeletal: She exhibits edema.   Neurological: She is alert and oriented to person, place, and time.   Skin: Skin is warm and dry.   Psychiatric: She has a normal mood and affect. Her behavior is normal.   Nursing note and vitals reviewed.      Significant Labs:   BMP:   Recent Labs  Lab 03/19/17  0444 03/20/17  0450   GLU 97 81   * 131*   K 4.2 3.6   CL 91* 90*   CO2 32* 29   BUN 46* 44*   CREATININE 1.4 1.3   CALCIUM 9.1 8.9   MG 1.9  --      CBC:   Recent  Labs  Lab 03/19/17  0444   WBC 8.49   HGB 8.4*   HCT 28.9*   *       Significant Imaging: None

## 2017-03-20 NOTE — NURSING
Patient in bed resting. Telemetry: NSR, 60s-70s. Denies any SOB or pain. Patient refuses the bed alarm, bedside commode and call bell in reach. Will continue to monitor.

## 2017-03-20 NOTE — PT/OT/SLP EVAL
Occupational Therapy  Evaluation/Treatment    Jennifer Prescott   MRN: [id number]   Admitting Diagnosis: Acute on chronic combined systolic and diastolic heart failure    OT Date of Treatment: 03/20/17   OT Start Time: 1046  OT Stop Time: 1114  OT Total Time (min): 28 min    Billable Minutes:  Evaluation 10  Self Care/Home Management 8  Total Time overlap eval with PT.    Diagnosis: Acute on chronic combined systolic and diastolic heart failure   The primary encounter diagnosis was Congestive heart failure, unspecified congestive heart failure chronicity, unspecified congestive heart failure type. Diagnoses of SOB (shortness of breath) and Acute on chronic combined systolic and diastolic heart failure were also pertinent to this visit.      Past Medical History:   Diagnosis Date    *Atrial fibrillation     Anticoagulant long-term use     Arthritis     Cardiomyopathy     Carotid artery occlusion     CHF (congestive heart failure)     COPD (chronic obstructive pulmonary disease)     COPD exacerbation 2/19/2015    Coronary artery disease     RCA occluded with L to R collaterals and normal LAD and LCx    Hypertension     Internal bleeding     Sleep apnea     uses bipap      Past Surgical History:   Procedure Laterality Date    ABDOMINAL SURGERY      APPENDECTOMY      CARDIAC CATHETERIZATION      CARDIAC DEFIBRILLATOR PLACEMENT  10/9/2012    bivent AICD placed 10/2012 and revised 12/2012    CARDIAC PAC[facility]ER [facility]  9/21/2010    [facility]    CARDIOVERSION  3/6/2015    CAROTID ENDARTERECTOMY Left     CHOLECYSTECTOMY      COLONOSCOPY N/A 7/11/2016    Procedure: COLONOSCOPY;  Surgeon: Rafael Pérez MD;  Location: [facility] ENDO;  Service: Endoscopy;  Laterality: N/A;    COLONOSCOPY N/A 2/17/2017    Procedure: COLONOSCOPY;  Surgeon: Giovanni Bronson MD;  Location: [facility] ENDO ([address] [place] [address]);  Service: Endoscopy;  Laterality: N/A;    CORONARY ANGIOPLASTY WITH STENT PLACEMENT  1/27/2015    3 JAMAL to RCA    CRT-D  implantation      ESOPHAGOGASTRODUODENOSCOPY      GALLBLADDER SURGERY      TONSILLECTOMY      TONSILLECTOMY, ADENOIDECTOMY      VASCULAR SURGERY         Referring physician: Aby  Date referred to OT: 3/19/2017    General Precautions: Standard, fall  Orthopedic Precautions: N/A  Braces: N/A    Do you have any cultural, spiritual, Church conflicts, given your current situation?: none     Patient History:  Living Environment  Lives With: child(vanita), adult  Living Arrangements: house  Home Accessibility: stairs to enter home  Number of Stairs to Enter Home: 1  Stair Railings at Home: none  Transportation Available: family or friend will provide  Living Environment Comment: Pt. lives with daughter in [facility] with threshold entry with walk n shower with shower seat and grab bars; Has a BSC but says she does not use it; pt. ambulates without a device but has a rollator and 4PC; uses scooter in community; performs ADLS with assist for socks via daughter;  Daughter does all IADLS and drives.  Pt. works fulltime  as a .  Equipment Currently Used at Home: walker, rolling, power chair, 3-in-1 commode, shower chair, grab bar, rollator, cane, quad, BIPAP, oxygen    Prior level of function:   Bed Mobility/Transfers: needs device  Grooming: independent  Bathing: needs device  Upper Body Dressing: independent  Lower Body Dressing: needs assist  Toileting: needs device  Home Management Skills: needs device and assist  Homemaking Responsibilities: No  Driving License: No  Mode of Transportation: Family  Occupation: Full time employment  Type of Occupation: career speacialist     Dominant hand: right    Subjective:  Communicated with nurse prior to session.    Chief Complaint: R hip pain with standing  Patient/Family stated goals: none    Pain Ratin/10  Location - Side: Right  Location - Orientation: generalized  Location: hip  Pain Addressed: Pre-medicate for activity, Reposition, Distraction  Pain Rating  Post-Intervention: 8/10    Objective:  Patient found with: oxygen, telemetry    Cognitive Exam:  Oriented to: Person, Place, Time and Situation  Follows Commands/attention: Follows multistep  commands  Communication: clear/fluent  Memory:  No Deficits noted  Safety awareness/insight to disability: impaired  Coping skills/emotional control: Appropriate to situation    Visual/perceptual:  Intact    Physical Exam:  Postural examination/scapula alignment: Rounded shoulder  Skin integrity: Dry and red areas on anterior lower legs/shins  Edema: Moderate BLE and LUE; mild RUE    Sensation:   Intact    Upper Extremity Range of Motion:  Right Upper Extremity: WFL  Left Upper Extremity: WFL    Upper Extremity Strength:  Right Upper Extremity: WFL except shld 4-5  Left Upper Extremity: WFL except shld 4-/5   Strength: WFL    Fine motor coordination:   Intact    Gross motor coordination: WFL    Functional Mobility:  Bed Mobility:       Transfers:  Sit <> Stand Assistance: Minimum Assistance  Sit <> Stand Assistive Device: Rolling Walker  Bed <> Chair Technique: Stand Pivot  Bed <> Chair Transfer Assistance: Contact Guard Assistance  Toilet Transfer Assistive Device: Rolling Walker, bedside commode    Functional Ambulation: CGA with RW few feet to BSc; SOB    Activities of Daily Living:  Feeding Level of Assistance: Independent    UE Dressing Level of Assistance: Minimum assistance    LE Dressing Level of Assistance: Total assistance (socks)    Grooming Position: Seated  Grooming Level of Assistance: Supervision  Toileting Where Assessed: Bedside commode  Toileting Level of Assistance: Contact guard (with RW)            Balance:   Static Sit: NORMAL: No deviations seen in posture held statically  Dynamic Sit: FAIR+: Maintains balance through MINIMAL excursions of active trunk motion  Static Stand: FAIR: Maintains without assist but unable to take challenges  Dynamic stand: FAIR: Needs CONTACT GUARD during gait    Therapeutic  "Activities and Exercises:  Purse lip breathing on exertion with ADLS and transitional movement; exhale on exertion with toileting and BSC stand pivot with RW.    AM-PAC 6 CLICK ADL  How much help from another person does this patient currently need?  1 = Unable, Total/Dependent Assistance  2 = A lot, Maximum/Moderate Assistance  3 = A little, Minimum/Contact Guard/Supervision  4 = None, Modified [place]/Independent    Putting on and taking off regular lower body clothing? : 2  Bathing (including washing, rinsing, drying)?: 2  Toileting, which includes using toilet, bedpan, or urinal? : 3  Putting on and taking off regular upper body clothing?: 3  Taking care of personal grooming such as brushing teeth?: 3  Eating meals?: 4  Total Score: 17    AM-PAC Raw Score CMS "G-Code Modifier Level of Impairment Assistance   6 % Total / Unable   7 - 9 CM 80 - 100% Maximal Assist   10 - 14 CL 60 - 80% Moderate Assist   15 - 19 CK 40 - 60% Moderate Assist   20 - 22 CJ 20 - 40% Minimal Assist   23 CI 1-20% SBA / CGA   24 CH 0% Independent/ Mod I       Patient left up in chair with all lines intact, call button in reach and nurse notified    Assessment:  Jennifer Prescott is a 71 y.o. female with a medical diagnosis of Acute on chronic combined systolic and diastolic heart failure and presents with The primary encounter diagnosis was Congestive heart failure, unspecified congestive heart failure chronicity, unspecified congestive heart failure type. Diagnoses of SOB (shortness of breath) and Acute on chronic combined systolic and diastolic heart failure were also pertinent to this visit. Pt. limited by SOB on minimal exertion, edema legs and edema in LUE >RUE, decreased endurance, impaired dynamic standing and sitting balance/reach, weakness and decreased strength limiting indep and safety in ADLS and fx mobility. HHOT/HHPT recommended; pt would also benefit from hip kit for home use.  Continue with OT POC.      Rehab " identified problem list/impairments: Rehab identified problem list/impairments: weakness, gait instability, impaired endurance, impaired balance, decreased safety awareness, impaired self care skills, impaired functional mobilty, impaired skin, pain, impaired cardiopulmonary response to activity, edema    Rehab potential is good.    Activity tolerance: Fair    Discharge recommendations: Discharge Facility/Level Of Care Needs: home with home health, home health PT, home health OT     Barriers to discharge: Barriers to Discharge: None    Equipment recommendations: none     GOALS:   Occupational Therapy Goals        Problem: Occupational Therapy Goal    Goal Priority Disciplines Outcome Interventions   Occupational Therapy Goal     OT, PT/OT Ongoing (interventions implemented as appropriate)    Description:  Goals to be met by: 3/27/2017    Patient will increase functional independence with ADLs by performing:    UE Dressing with Set-up Assistance.  LE Dressing with Minimal Assistance with AD as needed for energy conservation and limited lower body bending.  Grooming while standing at sink with Stand-by Assistance demonstrating use of energy conservation tech with 1 seated rest break.  Toileting from bedside commode with Supervision for hygiene and clothing management.   Toilet transfer to bedside commode with Supervision.  Increased functional strength to WNL for BUE use for ADLS and fx mobility..                PLAN:  Patient to be seen 5 x/week to address the above listed problems via self-care/home management, therapeutic activities, therapeutic exercises  Plan of Care expires: 04/20/17  Plan of Care reviewed with: patient         Tiffanie Brownlee, OT  03/20/2017

## 2017-03-20 NOTE — SUBJECTIVE & OBJECTIVE
Review of Systems   Constitution: Positive for malaise/fatigue. Negative for chills, fever and weakness.   Cardiovascular: Positive for dyspnea on exertion, leg swelling and orthopnea. Negative for chest pain.   Respiratory: Positive for shortness of breath. Negative for cough.    Musculoskeletal: Negative for back pain.   Gastrointestinal: Positive for bloating. Negative for constipation, diarrhea, nausea and vomiting.   Neurological: Negative for dizziness and light-headedness.     Objective:     Vital Signs (Most Recent):  Temp: 97.8 °F (36.6 °C) (03/20/17 0800)  Pulse: 69 (03/20/17 0805)  Resp: 18 (03/20/17 0805)  BP: (!) 111/55 (03/20/17 0800)  SpO2: 98 % (03/20/17 1137) Vital Signs (24h Range):  Temp:  [97.5 °F (36.4 °C)-98.6 °F (37 °C)] 97.8 °F (36.6 °C)  Pulse:  [69-71] 69  Resp:  [18-20] 18  SpO2:  [97 %-100 %] 98 %  BP: (103-135)/(55-58) 111/55     Weight: 132.2 kg (291 lb 7.2 oz)  Body mass index is 47.04 kg/(m^2).     SpO2: 98 %  O2 Device (Oxygen Therapy): nasal cannula      Intake/Output Summary (Last 24 hours) at 03/20/17 1227  Last data filed at 03/20/17 0800   Gross per 24 hour   Intake              905 ml   Output             4050 ml   Net            -3145 ml       Lines/Drains/Airways     Peripheral Intravenous Line                 Peripheral IV - Single Lumen 03/18/17 1600 Right Forearm 1 day                Physical Exam   Constitutional: She is oriented to person, place, and time. She appears well-developed and well-nourished. No distress.   Cardiovascular: Normal rate and regular rhythm.  Exam reveals no gallop.    No murmur heard.  Pulmonary/Chest: Effort normal. She has rales in the right middle field, the right lower field, the left middle field and the left lower field.   Abdominal: Soft. Bowel sounds are normal. She exhibits distension. There is no tenderness.   Neurological: She is alert and oriented to person, place, and time.   Skin: Skin is warm and dry.       Significant Labs:          Recent Labs  Lab 03/20/17  0450   *   K 3.6   CL 90*   CO2 29   BUN 44*   CREATININE 1.3       Significant Imaging: Echocardiogram:   2D echo with color flow doppler:   Results for orders placed or performed during the hospital encounter of 03/01/17   2D echo with color flow doppler   Result Value Ref Range    EF 25 (A) 55 - 65    Mitral Valve Regurgitation SEVERE (A)     Diastolic Dysfunction Yes (A)     Est. PA Systolic Pressure 50.28 (A)     Pericardial Effusion NONE     Tricuspid Valve Regurgitation MODERATE TO SEVERE (A)

## 2017-03-20 NOTE — PROGRESS NOTES
"[facility] Hospital Medicine  Progress Note    Patient Name: Jennifer Prescott  MRN: [id number]  Patient Class: IP- Inpatient   Admission Date: 3/16/2017  Length of Stay: 3 days  Attending Physician: Anderson Badillo MD  Primary Care Provider: Lianna Mistry MD      Subjective:     Principal Problem:Acute on chronic combined systolic and diastolic heart failure    HPI:  Ms. Prescott is a 72 yo WF with ischemic cardiomyopathy, chronic combined HF, and recurrent GI bleed that presented to [facility] complaining of SOB. She says that since her discharge last week she has gotten more short of breath. She says that the last couple days she has not been able to sleep because of the SOB. She says that she cannot lie back because she gets SOB. She had been trying to sleep in a lounge chair but had difficulty getting out of the chair. She denies any chest pain or palpitations. Her swelling has continued to increase again. She says that she has been taking her bumex as directed and has been urinating. She says that she eats ice constantly at home and probably eats a "quart" of ice a day. She says that she does not drink much fluids otherwise.     Hospital Course:       Interval History: Says that she is not feeling as good today as yesterday. Still urinating well. Still able to lie down in the bed with the head elevated for sleeping.     Review of Systems   Constitutional: Negative for fever.   Respiratory: Positive for cough and shortness of breath.    Cardiovascular: Positive for leg swelling. Negative for chest pain and palpitations.   Gastrointestinal: Negative for diarrhea, nausea and vomiting.     Objective:     Vital Signs (Most Recent):  Temp: 97.8 °F (36.6 °C) (03/19/17 1920)  Pulse: 70 (03/19/17 1920)  Resp: 18 (03/19/17 1920)  BP: (!) 103/57 (03/19/17 1920)  SpO2: 99 % (03/19/17 1958) Vital Signs (24h Range):  Temp:  [97.1 °F (36.2 °C)-99.5 °F (37.5 °C)] 97.8 °F (36.6 °C)  Pulse:  [68-70] 70  Resp:  " [18-22] 18  SpO2:  [92 %-100 %] 99 %  BP: (103-118)/(53-65) 103/57     Weight: 132.2 kg (291 lb 7.2 oz)  Body mass index is 47.04 kg/(m^2).    Intake/Output Summary (Last 24 hours) at 03/19/17 2239  Last data filed at 03/19/17 2148   Gross per 24 hour   Intake             1585 ml   Output             3000 ml   Net            -1415 ml      Physical Exam   Constitutional: She is oriented to person, place, and time. She appears well-developed and well-nourished. No distress.   Cardiovascular: Normal rate and regular rhythm.    Murmur heard.  Pulmonary/Chest: Effort normal. No respiratory distress. She has no wheezes. She has rales.   Abdominal: Soft. Bowel sounds are normal. She exhibits no distension. There is no tenderness.   Musculoskeletal: She exhibits edema.   Neurological: She is alert and oriented to person, place, and time.   Skin: Skin is warm and dry.   Psychiatric: She has a normal mood and affect. Her behavior is normal.   Nursing note and vitals reviewed.      Significant Labs:   CBC:   Recent Labs  Lab 03/18/17  1126 03/19/17  0444   WBC 8.57 8.49   HGB 8.6* 8.4*   HCT 29.1* 28.9*   * 375*     CMP:   Recent Labs  Lab 03/18/17  1126 03/19/17  0444   * 133*   K 3.8 4.2   CL 90* 91*   CO2 34* 32*   * 97   BUN 45* 46*   CREATININE 1.4 1.4   CALCIUM 9.1 9.1   ANIONGAP 8 10   EGFRNONAA 38* 38*     POCT Glucose:   Recent Labs  Lab 03/18/17  1712 03/18/17  2137 03/19/17  0605   POCTGLUCOSE 162* 116* 113*       Significant Imaging: I have reviewed all pertinent imaging results/findings within the past 24 hours.    Assessment/Plan:      * Acute on chronic combined systolic and diastolic heart failure  Ischemic cardiomyopathy  Severe TR and Severe MR  Pleural effusions  Appreciate Cardiology assistance. Has a very poor prognosis given the severity of her valvular disease and depressed EF. Continue bumex 3 mg IV BID with diuril daily. Daily weights. Low salt diet. UOP of 2 liters in last 24  hours.     CAD S/P percutaneous coronary angioplasty  Continue statin, ASA, and plavix.      CKD (chronic kidney disease) stage 3, GFR 30-59 ml/min  Creatinine at baseline and stable with the diruresis.    VTE Risk Mitigation         Ordered     enoxaparin injection 40 mg  Daily     Route:  Subcutaneous        03/16/17 5623     Medium Risk of VTE  Once      03/16/17 1447          Anderson Badillo MD  Department of Hospital Medicine   [facility]

## 2017-03-20 NOTE — PT/OT/SLP EVAL
Physical Therapy  Evaluation    Jennifer Prescott   MRN: [id number]   Admitting Diagnosis: Acute on chronic combined systolic and diastolic heart failure    PT Received On: 03/20/17  PT Start Time: 1040     PT Stop Time: 1110    PT Total Time (min): 30 min       Billable Minutes:  Evaluation 15 co-treat with OT    Diagnosis: Acute on chronic combined systolic and diastolic heart failure      Past Medical History:   Diagnosis Date    *Atrial fibrillation     Anticoagulant long-term use     Arthritis     Cardiomyopathy     Carotid artery occlusion     CHF (congestive heart failure)     COPD (chronic obstructive pulmonary disease)     COPD exacerbation 2/19/2015    Coronary artery disease     RCA occluded with L to R collaterals and normal LAD and LCx    Hypertension     Internal bleeding     Sleep apnea     uses bipap      Past Surgical History:   Procedure Laterality Date    ABDOMINAL SURGERY      APPENDECTOMY      CARDIAC CATHETERIZATION      CARDIAC DEFIBRILLATOR PLACEMENT  10/9/2012    bivent AICD placed 10/2012 and revised 12/2012    CARDIAC [facility]AKER PLACEMENT  9/21/2010    [facility]    CARDIOVERSION  3/6/2015    CAROTID ENDARTERECTOMY Left     CHOLECYSTECTOMY      COLONOSCOPY N/A 7/11/2016    Procedure: COLONOSCOPY;  Surgeon: Rafael Pérez MD;  Location: [facility];  Service: Endoscopy;  Laterality: N/A;    COLONOSCOPY N/A 2/17/2017    Procedure: COLONOSCOPY;  Surgeon: Giovanni Bronson MD;  Location: [facility] ([address] [place] [address]);  Service: Endoscopy;  Laterality: N/A;    CORONARY ANGIOPLASTY WITH STENT PLACEMENT  1/27/2015    3 JAMAL to RCA    CRT-D implantation      ESOPHAGOGASTRODUODENOSCOPY      GALLBLADDER SURGERY      TONSILLECTOMY      TONSILLECTOMY, ADENOIDECTOMY      VASCULAR SURGERY           General Precautions: Standard, fall  Orthopedic Precautions: N/A   Braces: N/A       Do you have any cultural, spiritual, Jainism conflicts, given your current situation?: none    Patient  History:  Lives With: child(vanita), adult  Living Arrangements: house  Home Accessibility: stairs to enter home  Number of Stairs to Enter Home: 1  Stair Railings at Home: none  Transportation Available: family or friend will provide  Equipment Currently Used at Home: bedside commode, shower chair, cane, quad, walker, rolling, rollator, wheelchair, power chair  DME owned (not currently used): Quad cane    Previous Level of Function:  Ambulation Skills: independent  Transfer Skills: independent  ADL Skills: independent  Work/Leisure Activity: independent    Subjective:  Communicated with nsg prior to session.  Pt agreeable to eval  Chief Complaint: SOB  Patient goals: to be able to breathe and PLOF    Pain Ratin10         Location:  (R hip)     Pain Rating Post-Intervention: 8/10    Objective:   Patient found with: telemetry, oxygen     Cognitive Exam:  Oriented to: Person, Place, Time and Situation    Follows Commands/attention: Follows one-step commands  Communication: clear/fluent  Safety awareness/insight to disability: impaired    Physical Exam:  Postural examination/scapula alignment: Rounded shoulder and Head forward    Skin integrity: erythema B LE's  Edema: Moderate B LE's    Sensation:   Intact  light/touch B Le's    Upper Extremity Range of Motion:      Lower Extremity Range of Motion:  Right Lower Extremity: WFL  Left Lower Extremity: WFL    Lower Extremity Strength:  Right Lower Extremity: WFL  Left Lower Extremity: WFL     Fine motor coordination:  N/T    Gross motor coordination: WFL    Functional Mobility:  Bed Mobility:  Rolling/Turning to Left:  (ACtivity did not occur )    Transfers:  Sit <> Stand Assistance: Minimum Assistance (VC for hand placement and safety)  Sit <> Stand Assistive Device: Rolling Walker  Toilet Transfer Technique: Stand Pivot  Toilet Transfer Assistance: Contact Guard Assistance  Toilet Transfer Assistive Device: Rolling Walker    Gait:   Gait Distance: 2x4'  Assistance 1:  Contact Guard Assistance  Gait Assistive Device: Rolling walker  Gait Pattern: reciprocal  Gait Deviation(s): decreased loreto, decreased step length, decreased toe-to-floor clearance    Stairs:      Balance:   Static Sit: FAIR+: Able to take MINIMAL challenges from all directions  Dynamic Sit: FAIR+: Maintains balance through MINIMAL excursions of active trunk motion  Static Stand: FAIR: Maintains without assist but unable to take challenges  Dynamic stand: FAIR: Needs CONTACT GUARD during gait    Therapeutic Activities and Exercises:  eval only    AM-PAC 6 CLICK MOBILITY  How much help from another person does this patient currently need?   1 = Unable, Total/Dependent Assistance  2 = A lot, Maximum/Moderate Assistance  3 = A little, Minimum/Contact Guard/Supervision  4 = None, Modified [place]/Independent    Turning over in bed (including adjusting bedclothes, sheets and blankets)?: 3  Sitting down on and standing up from a chair with arms (e.g., wheelchair, bedside commode, etc.): 3  Moving from lying on back to sitting on the side of the bed?: 3  Moving to and from a bed to a chair (including a wheelchair)?: 3  Need to walk in hospital room?: 3  Climbing 3-5 steps with a railing?: 2  Total Score: 17     AM-PAC Raw Score CMS G-Code Modifier Level of Impairment Assistance   6 % Total / Unable   7 - 9 CM 80 - 100% Maximal Assist   10 - 14 CL 60 - 80% Moderate Assist   15 - 19 CK 40 - 60% Moderate Assist   20 - 22 CJ 20 - 40% Minimal Assist   23 CI 1-20% SBA / CGA   24 CH 0% Independent/ Mod I     Patient left up in chair with all lines intact, call button in reach and nsg notified.    Assessment:   Jennifer Prescott is a 71 y.o. female with a medical diagnosis of Acute on chronic combined systolic and diastolic heart failure and presents with decreased functional independence 2/2 deconditioning and gen weakness.  Pt could benefit from skilled PT services to address deficits below in order to maximize  function prior to D/C .    Rehab identified problem list/impairments: Rehab identified problem list/impairments: weakness, impaired endurance, impaired self care skills, impaired functional mobilty, impaired balance, decreased safety awareness, pain, edema, impaired skin, impaired cardiopulmonary response to activity    Rehab potential is fair.    Activity tolerance: Fair    Discharge recommendations: Discharge Facility/Level Of Care Needs: home health PT     Barriers to discharge:  none    Equipment recommendations: Equipment Needed After Discharge: none     GOALS:   Physical Therapy Goals        Problem: Physical Therapy Goal    Goal Priority Disciplines Outcome Goal Variances Interventions   Physical Therapy Goal     PT/OT, PT Ongoing (interventions implemented as appropriate)               PLAN:    Patient to be seen 5 x/week to address the above listed problems via gait training, therapeutic activities, therapeutic exercises  Plan of Care expires: 04/03/17  Plan of Care reviewed with: patient    Functional Assessment Tool Used: AM-PAC  Score: 17  Functional Limitation: Mobility: Walking and moving around  Mobility: Walking and Moving Around Current Status (): CK  Mobility: Walking and Moving Around Goal Status (): HUGH Vazquez, PT  03/20/2017

## 2017-03-20 NOTE — ASSESSMENT & PLAN NOTE
Creatinine at baseline of 1.3 and stable with the diruresis. Avoid nephrotoxins. Continue to monitor.

## 2017-03-20 NOTE — ASSESSMENT & PLAN NOTE
Ischemic cardiomyopathy  Severe TR and Severe MR  Pleural effusions  Appreciate Cardiology assistance. Has a very poor prognosis given the severity of her valvular disease and depressed EF. Continue bumex 3 mg IV BID with diuril daily. Daily weights. Low salt diet. UOP of 2 liters in last 24 hours.

## 2017-03-20 NOTE — PLAN OF CARE
Problem: Physical Therapy Goal  Goal: Physical Therapy Goal  Outcome: Ongoing (interventions implemented as appropriate)  Initial PT evaluation performed.  Pt could benefit from skilled PT services 5x/wk in order to maximize function prior to D/C.  HHPT recommended.

## 2017-03-20 NOTE — ASSESSMENT & PLAN NOTE
Ischemic cardiomyopathy  Severe TR and Severe MR  Pleural effusions  Appreciate Cardiology assistance and will transfer her to their primary service. Has a very poor prognosis given the severity of her valvular disease and depressed EF, but wants to continue aggressive measures. Continue bumex 3 mg IV BID with metolazone daily. Daily weights. Low salt diet. UOP of 3.8 liters in last 24 hours.

## 2017-03-21 LAB
ANION GAP SERPL CALC-SCNC: 11 MMOL/L
BASOPHILS # BLD AUTO: 0.04 K/UL
BASOPHILS NFR BLD: 0.5 %
BUN SERPL-MCNC: 44 MG/DL
CALCIUM SERPL-MCNC: 9.1 MG/DL
CHLORIDE SERPL-SCNC: 87 MMOL/L
CO2 SERPL-SCNC: 36 MMOL/L
CREAT SERPL-MCNC: 1.3 MG/DL
DIFFERENTIAL METHOD: ABNORMAL
EOSINOPHIL # BLD AUTO: 0.1 K/UL
EOSINOPHIL NFR BLD: 1.4 %
ERYTHROCYTE [DISTWIDTH] IN BLOOD BY AUTOMATED COUNT: 16.8 %
EST. GFR  (AFRICAN AMERICAN): 48 ML/MIN/1.73 M^2
EST. GFR  (NON AFRICAN AMERICAN): 41 ML/MIN/1.73 M^2
GGT SERPL-CCNC: 51 U/L
GLUCOSE SERPL-MCNC: 103 MG/DL
HCT VFR BLD AUTO: 27.5 %
HGB BLD-MCNC: 8.1 G/DL
LYMPHOCYTES # BLD AUTO: 0.7 K/UL
LYMPHOCYTES NFR BLD: 8.4 %
MAGNESIUM SERPL-MCNC: 1.8 MG/DL
MCH RBC QN AUTO: 27 PG
MCHC RBC AUTO-ENTMCNC: 29.5 %
MCV RBC AUTO: 92 FL
MONOCYTES # BLD AUTO: 0.9 K/UL
MONOCYTES NFR BLD: 9.9 %
NEUTROPHILS # BLD AUTO: 6.9 K/UL
NEUTROPHILS NFR BLD: 79.6 %
PHOSPHATE SERPL-MCNC: 3.8 MG/DL
PLATELET # BLD AUTO: 329 K/UL
PMV BLD AUTO: 9.8 FL
POTASSIUM SERPL-SCNC: 3.6 MMOL/L
RBC # BLD AUTO: 3 M/UL
SODIUM SERPL-SCNC: 134 MMOL/L
WBC # BLD AUTO: 8.69 K/UL

## 2017-03-21 PROCEDURE — 80048 BASIC METABOLIC PNL TOTAL CA: CPT

## 2017-03-21 PROCEDURE — 97530 THERAPEUTIC ACTIVITIES: CPT

## 2017-03-21 PROCEDURE — 99233 SBSQ HOSP IP/OBS HIGH 50: CPT | Mod: ,,, | Performed by: INTERNAL MEDICINE

## 2017-03-21 PROCEDURE — 94640 AIRWAY INHALATION TREATMENT: CPT

## 2017-03-21 PROCEDURE — 83735 ASSAY OF MAGNESIUM: CPT

## 2017-03-21 PROCEDURE — 97535 SELF CARE MNGMENT TRAINING: CPT

## 2017-03-21 PROCEDURE — 25000003 PHARM REV CODE 250: Performed by: INTERNAL MEDICINE

## 2017-03-21 PROCEDURE — 11000001 HC ACUTE MED/SURG PRIVATE ROOM

## 2017-03-21 PROCEDURE — 36415 COLL VENOUS BLD VENIPUNCTURE: CPT

## 2017-03-21 PROCEDURE — 97110 THERAPEUTIC EXERCISES: CPT

## 2017-03-21 PROCEDURE — 25000003 PHARM REV CODE 250: Performed by: NURSE PRACTITIONER

## 2017-03-21 PROCEDURE — 63600175 PHARM REV CODE 636 W HCPCS: Performed by: HOSPITALIST

## 2017-03-21 PROCEDURE — 84100 ASSAY OF PHOSPHORUS: CPT

## 2017-03-21 PROCEDURE — 94761 N-INVAS EAR/PLS OXIMETRY MLT: CPT

## 2017-03-21 PROCEDURE — 25000003 PHARM REV CODE 250: Performed by: HOSPITALIST

## 2017-03-21 PROCEDURE — 27000221 HC OXYGEN, UP TO 24 HOURS

## 2017-03-21 PROCEDURE — 94664 DEMO&/EVAL PT USE INHALER: CPT

## 2017-03-21 PROCEDURE — 85025 COMPLETE CBC W/AUTO DIFF WBC: CPT

## 2017-03-21 PROCEDURE — 82977 ASSAY OF GGT: CPT

## 2017-03-21 RX ORDER — BENZONATATE 100 MG/1
100 CAPSULE ORAL 3 TIMES DAILY PRN
Status: DISCONTINUED | OUTPATIENT
Start: 2017-03-21 | End: 2017-04-04 | Stop reason: HOSPADM

## 2017-03-21 RX ORDER — METOLAZONE 5 MG/1
5 TABLET ORAL DAILY
Status: DISCONTINUED | OUTPATIENT
Start: 2017-03-21 | End: 2017-03-21

## 2017-03-21 RX ORDER — TRAMADOL HYDROCHLORIDE 50 MG/1
100 TABLET ORAL EVERY 6 HOURS PRN
Status: DISCONTINUED | OUTPATIENT
Start: 2017-03-21 | End: 2017-04-04 | Stop reason: HOSPADM

## 2017-03-21 RX ADMIN — ENOXAPARIN SODIUM 40 MG: 100 INJECTION SUBCUTANEOUS at 05:03

## 2017-03-21 RX ADMIN — ASPIRIN 81 MG 81 MG: 81 TABLET ORAL at 09:03

## 2017-03-21 RX ADMIN — FERROUS SULFATE TAB EC 325 MG (65 MG FE EQUIVALENT) 325 MG: 325 (65 FE) TABLET DELAYED RESPONSE at 09:03

## 2017-03-21 RX ADMIN — BUMETANIDE 3 MG: 0.25 INJECTION INTRAMUSCULAR; INTRAVENOUS at 05:03

## 2017-03-21 RX ADMIN — SODIUM CHLORIDE, PRESERVATIVE FREE 3 ML: 5 INJECTION INTRAVENOUS at 06:03

## 2017-03-21 RX ADMIN — TRAMADOL HYDROCHLORIDE 100 MG: 50 TABLET, COATED ORAL at 07:03

## 2017-03-21 RX ADMIN — BUMETANIDE 3 MG: 0.25 INJECTION INTRAMUSCULAR; INTRAVENOUS at 08:03

## 2017-03-21 RX ADMIN — TRAZODONE HYDROCHLORIDE 150 MG: 100 TABLET ORAL at 09:03

## 2017-03-21 RX ADMIN — POTASSIUM CHLORIDE 60 MEQ: 20 TABLET, EXTENDED RELEASE ORAL at 09:03

## 2017-03-21 RX ADMIN — SODIUM CHLORIDE, PRESERVATIVE FREE 3 ML: 5 INJECTION INTRAVENOUS at 09:03

## 2017-03-21 RX ADMIN — PANTOPRAZOLE SODIUM 40 MG: 40 TABLET, DELAYED RELEASE ORAL at 08:03

## 2017-03-21 RX ADMIN — AMIODARONE HYDROCHLORIDE 200 MG: 200 TABLET ORAL at 08:03

## 2017-03-21 RX ADMIN — TRAMADOL HYDROCHLORIDE 100 MG: 50 TABLET, COATED ORAL at 11:03

## 2017-03-21 RX ADMIN — CLOPIDOGREL BISULFATE 75 MG: 75 TABLET ORAL at 08:03

## 2017-03-21 RX ADMIN — POTASSIUM CHLORIDE 60 MEQ: 20 TABLET, EXTENDED RELEASE ORAL at 08:03

## 2017-03-21 RX ADMIN — BENZONATATE 100 MG: 100 CAPSULE ORAL at 11:03

## 2017-03-21 RX ADMIN — PRAVASTATIN SODIUM 40 MG: 40 TABLET ORAL at 08:03

## 2017-03-21 RX ADMIN — TRAMADOL HYDROCHLORIDE 50 MG: 50 TABLET, COATED ORAL at 04:03

## 2017-03-21 RX ADMIN — ALLOPURINOL 300 MG: 300 TABLET ORAL at 08:03

## 2017-03-21 RX ADMIN — FLUTICASONE FUROATE AND VILANTEROL TRIFENATATE 1 PUFF: 200; 25 POWDER RESPIRATORY (INHALATION) at 07:03

## 2017-03-21 NOTE — PLAN OF CARE
Problem: Physical Therapy Goal  Goal: Physical Therapy Goal  Outcome: Ongoing (interventions implemented as appropriate)  Continue working toward goals.

## 2017-03-21 NOTE — ASSESSMENT & PLAN NOTE
Echo with severely depressed LV function with EF 25% with severe MR; aggressive diuresis since admission with addition of Metolazone prior to Bumex over the weekend; 3.8 liters out overnight and negative 7.1 liters since admission; remains volume overload; will hold off on Metolazone today due to concern for contraction alkalosis; will continue IV Bumex; continue strict I&Os; no daily weights documented and will discuss with nursing staff; continue to hold ACEI/ARB or BB

## 2017-03-21 NOTE — ASSESSMENT & PLAN NOTE
Paced currently; continue Amiodarone; no BB due to marginal BP; NOAC on hold due to GIB earlier this year and continued anemia; H&H stable; will consult GI for evaluation and input in regards to AC; currently on DAPT but could likely change to single agent since PCI greater than one year ago

## 2017-03-21 NOTE — PROGRESS NOTES
Pharmacy New Medication Education    Patient accepted medication education.    Pharmacy educated patient on the following medications, using the teach-back method.   Duoneb  Allopurinol  Amiodarone  Asa  Bumetanide  Clopidogrel  Cyclobenzaprine  Lovenox  Ferrous sulfate  Breo  Zofran  Protonix  Kdur  Pravastatin  Phenergan  Tramadol  Trazodone    Learners of pharmacy medication education included:  patient    Patient +/- learner response:  verbalize understanding

## 2017-03-21 NOTE — PLAN OF CARE
Problem: Occupational Therapy Goal  Goal: Occupational Therapy Goal  Goals to be met by: 3/27/2017    Patient will increase functional independence with ADLs by performing:    UE Dressing with Set-up Assistance.  LE Dressing with Minimal Assistance with AD as needed for energy conservation and limited lower body bending.  Grooming while standing at sink with Stand-by Assistance demonstrating use of energy conservation tech with 1 seated rest break.  Toileting from bedside commode with Supervision for hygiene and clothing management.   Toilet transfer to bedside commode with Supervision.  Increased functional strength to WNL for BUE use for ADLS and fx mobility..   Outcome: Ongoing (interventions implemented as appropriate)  Patient presents with decreased strength and endurance for ax. Completed toileting on BSC with SBA. Will benefit from continued OT to address functional deficits.

## 2017-03-21 NOTE — PLAN OF CARE
Problem: Patient Care Overview  Goal: Plan of Care Review  Outcome: Ongoing (interventions implemented as appropriate)  Received pt on 3L NC; no distress noted. Will cont to monitor.

## 2017-03-21 NOTE — PT/OT/SLP PROGRESS
Physical Therapy  Treatment    Jennifer Prescott   MRN: [id number]   Admitting Diagnosis: Acute on chronic combined systolic and diastolic heart failure    PT Received On: 17  PT Start Time: 1605     PT Stop Time: 161    PT Total Time (min): 12 min       Billable Minutes:  Therapeutic Exercise 12    Treatment Type: Treatment  PT/PTA: PTA     PTA Visit Number: 1       General Precautions: Standard, fall  Orthopedic Precautions: N/A   Braces:      Do you have any cultural, spiritual, Scientologist conflicts, given your current situation?: none    Subjective:  Communicated with RN (Sandi) prior to session.  Pt declined sitting EOB or OOB activities secondary to just getting back in bed.  Pt agreed to BLE therex in bed.    Pain Ratin/10  Location - Side: Right  Location - Orientation: generalized  Location: hip  Pain Addressed: Nurse notified, Reposition, Distraction  Pain Rating Post-Intervention: 6/10    Objective:        Functional Mobility:  Bed Mobility:   Scooting/Bridging: Supervision (scooting in supine to center of bed)      Therapeutic Activities and Exercises:  NSG (Sandi) reports that she recently assisted pt BTB from sitting on EOB.  Pt reports not sleeping well last night and is very tired.  Supine BLE therex: AP, SAQ, heelslides, hip ABD/ADD, QS, and glut sets x 15 reps with vc's and Bobby/ModA.  Scoot over to center of bed with S.  Scoot up to HOB with Dep A of 2.     AM-PAC 6 CLICK MOBILITY  How much help from another person does this patient currently need?   1 = Unable, Total/Dependent Assistance  2 = A lot, Maximum/Moderate Assistance  3 = A little, Minimum/Contact Guard/Supervision  4 = None, Modified [place]/Independent    Turning over in bed (including adjusting bedclothes, sheets and blankets)?: 3  Sitting down on and standing up from a chair with arms (e.g., wheelchair, bedside commode, etc.): 3  Moving from lying on back to sitting on the side of the bed?: 3  Moving to and from a bed  to a chair (including a wheelchair)?: 3  Need to walk in hospital room?: 3  Climbing 3-5 steps with a railing?: 2  Total Score: 17    AM-PAC Raw Score CMS G-Code Modifier Level of Impairment Assistance   6 % Total / Unable   7 - 9 CM 80 - 100% Maximal Assist   10 - 14 CL 60 - 80% Moderate Assist   15 - 19 CK 40 - 60% Moderate Assist   20 - 22 CJ 20 - 40% Minimal Assist   23 CI 1-20% SBA / CGA   24 CH 0% Independent/ Mod I     Patient left supine with all lines intact, call button in reach, bed alarm on and RN notified.    Assessment:  Jennifer Prescott is a 71 y.o. female with a medical diagnosis of Acute on chronic combined systolic and diastolic heart failure and presents with decreased strength, endurance, and edema.  Pt participated well in BLE exercises with assistance, but was having difficulty staying awake toward the end of tx.  Pt would continue to benefit from P.T. To address impairments listed below.    Rehab identified problem list/impairments: Rehab identified problem list/impairments: weakness, impaired endurance, impaired functional mobilty, impaired self care skills, decreased lower extremity function, impaired cardiopulmonary response to activity, edema    Rehab potential is fair.    Activity tolerance: Fair    Discharge recommendations: Discharge Facility/Level Of Care Needs: home with home health, home health PT, home health OT     Barriers to discharge: Barriers to Discharge: None    Equipment recommendations: Equipment Needed After Discharge: none     GOALS:   Physical Therapy Goals        Problem: Physical Therapy Goal    Goal Priority Disciplines Outcome Goal Variances Interventions   Physical Therapy Goal     PT/OT, PT Ongoing (interventions implemented as appropriate)               PLAN:    Patient to be seen 5 x/week  to address the above listed problems via gait training, therapeutic activities, therapeutic exercises  Plan of Care expires: 04/03/17  Plan of Care reviewed with:  patient         Tere MartinezJose G, PTA  03/21/2017

## 2017-03-21 NOTE — ASSESSMENT & PLAN NOTE
H&H stable but remains below baseline; admission in January for GIB with Hgb down to 5; no acute finding on UGI or LGI; will continue to monitor H&H; no indication for transfusion currently; will consult GI

## 2017-03-21 NOTE — PT/OT/SLP PROGRESS
"Occupational Therapy  Treatment    Jennifer Prescott   MRN: [id number]   Admitting Diagnosis: Acute on chronic combined systolic and diastolic heart failure    OT Date of Treatment: 17   OT Start Time: 1028  OT Stop Time: 1052  OT Total Time (min): 24 min    Billable Minutes:  Self Care/Home Management 14 and Therapeutic Activity 10    General Precautions: Standard, fall, respiratory  Orthopedic Precautions: N/A  Braces: N/A    Do you have any cultural, spiritual, Sabianist conflicts, given your current situation?: none    Subjective:  Communicated with nurseSandi prior to session. "My hip is hurting me"    Pain Ratin/10  Location - Side: Right  Location - Orientation: generalized  Location: hip  Pain Addressed: Reposition, Distraction, Cessation of Activity  Pain Rating Post-Intervention: 6/10    Objective:  Patient found with: oxygen, telemetry     Functional Mobility:  Bed Mobility: N/A - patient sitting EOB on arrival    Transfers:   Sit <> Stand Assistance: Stand By Assistance  Sit <> Stand Assistive Device: No Assistive Device  Toilet Transfer Assistance: Stand By Assistance  Toilet Transfer Assistive Device: No Assistive Device, bedside commode    Functional Ambulation: Able to take 5-6 steps to BSC, without AD, with SBA    Activities of Daily Living:  Toileting Where Assessed: Bedside commode  Toileting Level of Assistance: Stand by assistance    Balance:   Static Sit: GOOD: Takes MODERATE challenges from all directions  Dynamic Sit: FAIR+: Maintains balance through MINIMAL excursions of active trunk motion  Static Stand: FAIR+: Takes MINIMAL challenges from all directions  Dynamic stand: FAIR+: Needs CLOSE SUPERVISION during gait and is able to right self with minor LOB    Therapeutic Activities and Exercises:  Patient sitting EOB on arrival. Declined t/f to bedside chair -- "It's so low for me to get out of". Patient did t/f to BSC with SBA, no AD. Toileting completed SBA. Patient returned to EOB " SBA. Completed BUE AROM therex, 1 x 10 reps: bicep curls, sh flexion, punches.     AM-PAC 6 CLICK ADL   How much help from another person does this patient currently need?   1 = Unable, Total/Dependent Assistance  2 = A lot, Maximum/Moderate Assistance  3 = A little, Minimum/Contact Guard/Supervision  4 = None, Modified [place]/Independent    Putting on and taking off regular lower body clothing? : 2  Bathing (including washing, rinsing, drying)?: 2  Toileting, which includes using toilet, bedpan, or urinal? : 3  Putting on and taking off regular upper body clothing?: 3  Taking care of personal grooming such as brushing teeth?: 4  Eating meals?: 4  Total Score: 18     AM-PAC Raw Score CMS G-Code Modifier Level of Impairment Assistance   6 % Total / Unable   7 - 9 CM 80 - 100% Maximal Assist   10 - 14 CL 60 - 80% Moderate Assist   15 - 19 CK 40 - 60% Moderate Assist   20 - 22 CJ 20 - 40% Minimal Assist   23 CI 1-20% SBA / CGA   24 CH 0% Independent/ Mod I     Patient left sitting EOB  with all lines intact, call button in reach and nsg notified    ASSESSMENT:  Jennifer Prescott is a 71 y.o. female with a medical diagnosis of Acute on chronic combined systolic and diastolic heart failure   Patient presents with decreased strength and endurance for ax. Completed toileting on BSC with SBA. Will benefit from continued OT to address functional deficits.    Rehab identified problem list/impairments: Rehab identified problem list/impairments: weakness, impaired endurance, impaired self care skills, impaired functional mobilty, gait instability, impaired balance, edema, pain, decreased safety awareness, impaired cardiopulmonary response to activity    Rehab potential is fair.    Activity tolerance: Fair    Discharge recommendations: Discharge Facility/Level Of Care Needs: home with home health, home health PT, home health OT     Barriers to discharge: Barriers to Discharge: None    Equipment recommendations: none      GOALS:   Occupational Therapy Goals        Problem: Occupational Therapy Goal    Goal Priority Disciplines Outcome Interventions   Occupational Therapy Goal     OT, PT/OT Ongoing (interventions implemented as appropriate)    Description:  Goals to be met by: 3/27/2017    Patient will increase functional independence with ADLs by performing:    UE Dressing with Set-up Assistance.  LE Dressing with Minimal Assistance with AD as needed for energy conservation and limited lower body bending.  Grooming while standing at sink with Stand-by Assistance demonstrating use of energy conservation tech with 1 seated rest break.  Toileting from bedside commode with Supervision for hygiene and clothing management.   Toilet transfer to bedside commode with Supervision.  Increased functional strength to WNL for BUE use for ADLS and fx mobility..                Plan:  Patient to be seen 5 x/week to address the above listed problems via self-care/home management, therapeutic activities, therapeutic exercises  Plan of Care expires: 04/20/17  Plan of Care reviewed with: patient         ABBEY Vital  03/21/2017

## 2017-03-21 NOTE — PROGRESS NOTES
[facility]  Cardiology  Progress Note    Patient Name: Jennifer Prescott  MRN: [id number]  Admission Date: 3/16/2017  Hospital Length of Stay: 5 days  Code Status: Full Code   Attending Physician: Jamel Putnam MD   Primary Care Physician: Lianna Mistry MD  Expected Discharge Date:   Principal Problem:Acute on chronic combined systolic and diastolic heart failure    Subjective:     Hospital Course:   3/16/2017 Presented to the ER with complaints of BAEZ, orthopnea, LE edema and decreased appetite. Admitted to [facility] for management of decompensated HF. 4+ BLE edema present with abdominal distension. Creatinine 1.6 and BNP 3344 with elevated total bilirubin. Continued on current home CHF medication regimen with IV Bumex  Labs pending. Abdominal distension and LE edema remains unchanged. Long discussion by Dr. Braga on 3/16/17 in regards to the severity of her heart failure and the overall concern given her recurrent decompensation despite aggressive treatment. IV diuretics held due to marginal BP 3/17/17 Oral CHF medications held with initiation of IV Bumex with 1.4 liters out. LE edema and orthopnea unchanged 3/18/17-3/19/17 Repeat discussion yesterday with encouragement to determine her wishes if she were to further decompensate ie intubation, CPR.   24 hours failure of diuresis  Likely due to loss of IV access.  Cr stable.  BAEZ and orthopnea persist. Continued on IV Bumex with oral Metolazone given 30minutes prior to AM dose. 3/20/2017 Aggressive diuresis remains in place with Bumex 3mg IV BID with Metolazone yesterday with 3.8 liters out negative 2.7 liters in past 24 hours and negative 4.6 liters since admission. Peripheral edema along with ascites and orthopnea remain. Working with PT/OT to prevent debility. Patient stated discussion with her daughter in the past in regards to her wishes consistent with living will and wishes to continue with aggressive treatment 3/21/2017  Repeat Metolazone yesterday along with IV Bumex BID yesterday with good response and 3.8 liters out overnight and negative 7.1 liters since admission. Orthopnea and BLE edema remains. Will hold off off Metolazone today due to rise in CO2 and decrease in Cl concerning for contraction alkalosis and will continue IV Bumex BID today         Review of Systems   Constitution: Positive for decreased appetite. Negative for chills, diaphoresis and fever.   Cardiovascular: Positive for dyspnea on exertion, leg swelling and orthopnea. Negative for chest pain, claudication, cyanosis, irregular heartbeat, near-syncope, palpitations, paroxysmal nocturnal dyspnea and syncope.   Gastrointestinal: Negative for abdominal pain, constipation, diarrhea, nausea and vomiting.     Objective:     Vital Signs (Most Recent):  Temp: 97.9 °F (36.6 °C) (03/21/17 1200)  Pulse: 70 (03/21/17 1200)  Resp: 20 (03/21/17 1200)  BP: (!) 143/64 (03/21/17 1200)  SpO2: 100 % (03/21/17 1100) Vital Signs (24h Range):  Temp:  [97.6 °F (36.4 °C)-98.6 °F (37 °C)] 97.9 °F (36.6 °C)  Pulse:  [63-75] 70  Resp:  [16-20] 20  SpO2:  [76 %-100 %] 100 %  BP: ()/(53-64) 143/64     Weight: 132.2 kg (291 lb 7.2 oz)  Body mass index is 47.04 kg/(m^2).     SpO2: 100 %  O2 Device (Oxygen Therapy): nasal cannula w/ humidification      Intake/Output Summary (Last 24 hours) at 03/21/17 1449  Last data filed at 03/21/17 1130   Gross per 24 hour   Intake             1040 ml   Output             4150 ml   Net            -3110 ml       Lines/Drains/Airways     Peripheral Intravenous Line                 Peripheral IV - Single Lumen 03/18/17 1600 Right Forearm 2 days                Physical Exam   Constitutional: She appears well-developed and well-nourished. No distress.   Neck: JVD present.   Cardiovascular: Normal rate and regular rhythm.    No murmur heard.  Pulmonary/Chest: Effort normal. She has rales in the right middle field, the right lower field, the left middle  field and the left lower field.   Abdominal: Soft. Bowel sounds are normal. She exhibits no distension. There is no tenderness.   Musculoskeletal: She exhibits edema.   Skin: Skin is warm and dry.       Significant Labs:       Recent Labs  Lab 03/21/17  0827   WBC 8.69   RBC 3.00*   HGB 8.1*   HCT 27.5*      MCV 92   MCH 27.0   MCHC 29.5*       Recent Labs  Lab 03/21/17  0522   CALCIUM 9.1   *   K 3.6   CO2 36*   CL 87*   BUN 44*   CREATININE 1.3       Significant Imaging: Echocardiogram:   2D echo with color flow doppler:   Results for orders placed or performed during the hospital encounter of 03/01/17   2D echo with color flow doppler   Result Value Ref Range    EF 25 (A) 55 - 65    Mitral Valve Regurgitation SEVERE (A)     Diastolic Dysfunction Yes (A)     Est. PA Systolic Pressure 50.28 (A)     Pericardial Effusion NONE     Tricuspid Valve Regurgitation MODERATE TO SEVERE (A)      Assessment and Plan:     Brief HPI: Seen this morning on AM NP rounds and again later this afternoon on rounds with Dr. Putnam. Continues to complain of LE swelling, BAEZ, cough and SOB with lying flat. Discussed POC with continued aggressive IV diuresis with close monitoring of labs and lytes-verbalized understanding of POC. Last weight obtained was 291 with reports of baseline weight 240lbs mid to late last year. Discussed with patient suspicion of need for 40-50 lbs of fluid removal before discharge   could be considered.     * Acute on chronic combined systolic and diastolic heart failure  Echo with severely depressed LV function with EF 25% with severe MR; aggressive diuresis since admission with addition of Metolazone prior to Bumex over the weekend; 3.8 liters out overnight and negative 7.1 liters since admission; remains volume overload; will hold off on Metolazone today due to concern for contraction alkalosis; will continue IV Bumex; continue strict I&Os; no daily weights documented and will discuss with nursing  staff; continue to hold ACEI/ARB or BB     Permanent atrial fibrillation  Paced currently; continue Amiodarone; no BB due to marginal BP; NOAC on hold due to GIB earlier this year and continued anemia; H&H stable; will consult GI for evaluation and input in regards to AC; currently on DAPT but could likely change to single agent since PCI greater than one year ago    CAD S/P percutaneous coronary angioplasty  S/p LAD PCI and s/p RCA PCI for  2015; no concern for ACS currently; continue ASA, Plavix and Pravastatin; no BB or ARB due to marginal BP     CKD (chronic kidney disease) stage 3, GFR 30-59 ml/min  Creatinine 1.3; improved with IV diuresis; back to baseline and stable; continue to montior    Iron deficiency anemia due to chronic blood loss  H&H stable but remains below baseline; admission in January for GIB with Hgb down to 5; no acute finding on UGI or LGI; will continue to monitor H&H; no indication for transfusion currently; will consult GI     Pleural effusion, right  Will repeat CXR once more euvolemic for re evaluation of pleural effusion     Mitral regurgitation  Severe per echo; severity of MR further compromises CO; continue with aggressive diuresis      VTE Risk Mitigation         Ordered     enoxaparin injection 40 mg  Daily     Route:  Subcutaneous        03/16/17 1559     Medium Risk of VTE  Once      03/16/17 1449          SEJAL Strickland, ANP  Cardiology  [facility]    I have seen patient with Nurse Practitioner Augusta De La Vega. I agree with her assessment and plan as written in this note.  Responding well to metolazone/bumex combination - contraction alkalosis today - will hold off on metolazone today.        Jamel Putnam MD, FACC, CCDS  Interventional Cardiology  [facility]

## 2017-03-21 NOTE — PLAN OF CARE
Report given to nurse ROB Tejeda during bedside report.NAD noted.Pt resting quietly in bed with O2 in place

## 2017-03-21 NOTE — PLAN OF CARE
Problem: Patient Care Overview  Goal: Plan of Care Review  Outcome: Ongoing (interventions implemented as appropriate)  Patient resting comfortably in bed. NSR on monitor HR in 60s-70 with no true alarms. Patient on 3L nasal canula, no signs of distress. Safety precautions maintained. Will continue to monitor.

## 2017-03-21 NOTE — PLAN OF CARE
Problem: Patient Care Overview  Goal: Plan of Care Review  Outcome: Ongoing (interventions implemented as appropriate)  Patient on oxygen with documented flow.  Will attempt to wean per O2 order protocol.

## 2017-03-21 NOTE — PLAN OF CARE
TN suggested the idea of possibly retiring to the patient.  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  TN noted several open containers of Coke and other fluids in the patient's room.     03/21/17 1039   Discharge Reassessment   Assessment Type Discharge Planning Reassessment   Can the patient answer the patient profile reliably? Yes, cognitively intact   How does the patient rate their overall health at the present time? Poor   Describe the patient's ability to walk at the present time. Walks with the help of equipment   How often would a person be available to care for the patient? Often   During the past month, has the patient often been bothered by feeling down, depressed or hopeless? No   During the past month, has the patient often been bothered by little interest or pleasure in doing things? No   Discharge plan remains the same: Yes   Discharge Plan A Home with family   Discharge Plan B Home with family;Home Health   Rishabh Drake RN  Transitional Navigator/Case Management  [phone]

## 2017-03-21 NOTE — PLAN OF CARE
Problem: Patient Care Overview  Goal: Plan of Care Review  Outcome: Ongoing (interventions implemented as appropriate)  Plan of care reviewed with the patient. Verbalized clear understanding. Bed alarm set. Bed in lowest position. Call light within reach. Instructed pt to call when getting out of the bed.NSR HR between 60's-70's on telemetry monitor. Monitoring I's & Os.. Will continue to monitor.

## 2017-03-21 NOTE — SUBJECTIVE & OBJECTIVE
Review of Systems   Constitution: Positive for decreased appetite. Negative for chills, diaphoresis and fever.   Cardiovascular: Positive for dyspnea on exertion, leg swelling and orthopnea. Negative for chest pain, claudication, cyanosis, irregular heartbeat, near-syncope, palpitations, paroxysmal nocturnal dyspnea and syncope.   Gastrointestinal: Negative for abdominal pain, constipation, diarrhea, nausea and vomiting.     Objective:     Vital Signs (Most Recent):  Temp: 97.9 °F (36.6 °C) (03/21/17 1200)  Pulse: 70 (03/21/17 1200)  Resp: 20 (03/21/17 1200)  BP: (!) 143/64 (03/21/17 1200)  SpO2: 100 % (03/21/17 1100) Vital Signs (24h Range):  Temp:  [97.6 °F (36.4 °C)-98.6 °F (37 °C)] 97.9 °F (36.6 °C)  Pulse:  [63-75] 70  Resp:  [16-20] 20  SpO2:  [76 %-100 %] 100 %  BP: ()/(53-64) 143/64     Weight: 132.2 kg (291 lb 7.2 oz)  Body mass index is 47.04 kg/(m^2).     SpO2: 100 %  O2 Device (Oxygen Therapy): nasal cannula w/ humidification      Intake/Output Summary (Last 24 hours) at 03/21/17 1449  Last data filed at 03/21/17 1130   Gross per 24 hour   Intake             1040 ml   Output             4150 ml   Net            -3110 ml       Lines/Drains/Airways     Peripheral Intravenous Line                 Peripheral IV - Single Lumen 03/18/17 1600 Right Forearm 2 days                Physical Exam   Constitutional: She appears well-developed and well-nourished. No distress.   Neck: JVD present.   Cardiovascular: Normal rate and regular rhythm.    No murmur heard.  Pulmonary/Chest: Effort normal. She has rales in the right middle field, the right lower field, the left middle field and the left lower field.   Abdominal: Soft. Bowel sounds are normal. She exhibits no distension. There is no tenderness.   Musculoskeletal: She exhibits edema.   Skin: Skin is warm and dry.       Significant Labs:       Recent Labs  Lab 03/21/17  0827   WBC 8.69   RBC 3.00*   HGB 8.1*   HCT 27.5*      MCV 92   MCH 27.0    MCHC 29.5*       Recent Labs  Lab 03/21/17  0522   CALCIUM 9.1   *   K 3.6   CO2 36*   CL 87*   BUN 44*   CREATININE 1.3       Significant Imaging: Echocardiogram:   2D echo with color flow doppler:   Results for orders placed or performed during the hospital encounter of 03/01/17   2D echo with color flow doppler   Result Value Ref Range    EF 25 (A) 55 - 65    Mitral Valve Regurgitation SEVERE (A)     Diastolic Dysfunction Yes (A)     Est. PA Systolic Pressure 50.28 (A)     Pericardial Effusion NONE     Tricuspid Valve Regurgitation MODERATE TO SEVERE (A)

## 2017-03-22 LAB
ANION GAP SERPL CALC-SCNC: 11 MMOL/L
BUN SERPL-MCNC: 50 MG/DL
CALCIUM SERPL-MCNC: 8.9 MG/DL
CHLORIDE SERPL-SCNC: 88 MMOL/L
CO2 SERPL-SCNC: 33 MMOL/L
CREAT SERPL-MCNC: 1.5 MG/DL
EST. GFR  (AFRICAN AMERICAN): 40 ML/MIN/1.73 M^2
EST. GFR  (NON AFRICAN AMERICAN): 35 ML/MIN/1.73 M^2
GLUCOSE SERPL-MCNC: 95 MG/DL
POTASSIUM SERPL-SCNC: 3.7 MMOL/L
SODIUM SERPL-SCNC: 132 MMOL/L

## 2017-03-22 PROCEDURE — 97535 SELF CARE MNGMENT TRAINING: CPT

## 2017-03-22 PROCEDURE — 36415 COLL VENOUS BLD VENIPUNCTURE: CPT

## 2017-03-22 PROCEDURE — 94761 N-INVAS EAR/PLS OXIMETRY MLT: CPT

## 2017-03-22 PROCEDURE — 94640 AIRWAY INHALATION TREATMENT: CPT

## 2017-03-22 PROCEDURE — 25000003 PHARM REV CODE 250: Performed by: NURSE PRACTITIONER

## 2017-03-22 PROCEDURE — 97530 THERAPEUTIC ACTIVITIES: CPT

## 2017-03-22 PROCEDURE — 99222 1ST HOSP IP/OBS MODERATE 55: CPT | Mod: ,,, | Performed by: INTERNAL MEDICINE

## 2017-03-22 PROCEDURE — 11000001 HC ACUTE MED/SURG PRIVATE ROOM

## 2017-03-22 PROCEDURE — 80048 BASIC METABOLIC PNL TOTAL CA: CPT

## 2017-03-22 PROCEDURE — 27000221 HC OXYGEN, UP TO 24 HOURS

## 2017-03-22 PROCEDURE — 63600175 PHARM REV CODE 636 W HCPCS: Performed by: HOSPITALIST

## 2017-03-22 PROCEDURE — 25000003 PHARM REV CODE 250: Performed by: INTERNAL MEDICINE

## 2017-03-22 PROCEDURE — 97110 THERAPEUTIC EXERCISES: CPT

## 2017-03-22 PROCEDURE — 25000003 PHARM REV CODE 250: Performed by: HOSPITALIST

## 2017-03-22 PROCEDURE — 99233 SBSQ HOSP IP/OBS HIGH 50: CPT | Mod: ,,, | Performed by: INTERNAL MEDICINE

## 2017-03-22 RX ORDER — POTASSIUM CHLORIDE 20 MEQ/15ML
60 SOLUTION ORAL 2 TIMES DAILY
Status: DISCONTINUED | OUTPATIENT
Start: 2017-03-22 | End: 2017-03-23

## 2017-03-22 RX ADMIN — SODIUM CHLORIDE, PRESERVATIVE FREE 3 ML: 5 INJECTION INTRAVENOUS at 09:03

## 2017-03-22 RX ADMIN — AMIODARONE HYDROCHLORIDE 200 MG: 200 TABLET ORAL at 08:03

## 2017-03-22 RX ADMIN — ALLOPURINOL 300 MG: 300 TABLET ORAL at 08:03

## 2017-03-22 RX ADMIN — PRAVASTATIN SODIUM 40 MG: 40 TABLET ORAL at 08:03

## 2017-03-22 RX ADMIN — POTASSIUM CHLORIDE 60 MEQ: 20 TABLET, EXTENDED RELEASE ORAL at 08:03

## 2017-03-22 RX ADMIN — FLUTICASONE FUROATE AND VILANTEROL TRIFENATATE 1 PUFF: 200; 25 POWDER RESPIRATORY (INHALATION) at 08:03

## 2017-03-22 RX ADMIN — TRAMADOL HYDROCHLORIDE 100 MG: 50 TABLET, COATED ORAL at 09:03

## 2017-03-22 RX ADMIN — ASPIRIN 81 MG 81 MG: 81 TABLET ORAL at 09:03

## 2017-03-22 RX ADMIN — TRAZODONE HYDROCHLORIDE 150 MG: 100 TABLET ORAL at 09:03

## 2017-03-22 RX ADMIN — SODIUM CHLORIDE, PRESERVATIVE FREE 3 ML: 5 INJECTION INTRAVENOUS at 05:03

## 2017-03-22 RX ADMIN — CLOPIDOGREL BISULFATE 75 MG: 75 TABLET ORAL at 08:03

## 2017-03-22 RX ADMIN — ENOXAPARIN SODIUM 40 MG: 100 INJECTION SUBCUTANEOUS at 05:03

## 2017-03-22 RX ADMIN — FERROUS SULFATE TAB EC 325 MG (65 MG FE EQUIVALENT) 325 MG: 325 (65 FE) TABLET DELAYED RESPONSE at 09:03

## 2017-03-22 RX ADMIN — PANTOPRAZOLE SODIUM 40 MG: 40 TABLET, DELAYED RELEASE ORAL at 08:03

## 2017-03-22 RX ADMIN — POTASSIUM CHLORIDE 60 MEQ: 20 SOLUTION ORAL at 09:03

## 2017-03-22 RX ADMIN — BUMETANIDE 3 MG: 0.25 INJECTION INTRAMUSCULAR; INTRAVENOUS at 08:03

## 2017-03-22 RX ADMIN — SODIUM CHLORIDE, PRESERVATIVE FREE 3 ML: 5 INJECTION INTRAVENOUS at 07:03

## 2017-03-22 NOTE — PLAN OF CARE
Problem: Patient Care Overview  Goal: Plan of Care Review  Outcome: Ongoing (interventions implemented as appropriate)  Tele monitor continue. No acute distress noted. Patient c/o special air bed was to high for her to get up at the edge of bed whenever she needed to. Patient stayed in reclined chair most of the night. Fall precautions maintained. Room near nursing station, bed side commode provided, call light within reach. Continue to monitor

## 2017-03-22 NOTE — PROGRESS NOTES
[facility]  Cardiology  Progress Note    Patient Name: Jennifer Prescott  MRN: [id number]  Admission Date: 3/16/2017  Hospital Length of Stay: 6 days  Code Status: Full Code   Attending Physician: Jamel Putnam MD   Primary Care Physician: Lianna Mistry MD  Expected Discharge Date:   Principal Problem:Acute on chronic combined systolic and diastolic heart failure    Subjective:     Hospital Course:   3/16/2017 Presented to the ER with complaints of BAEZ, orthopnea, LE edema and decreased appetite. Admitted to [facility] for management of decompensated HF. 4+ BLE edema present with abdominal distension. Creatinine 1.6 and BNP 3344 with elevated total bilirubin. Continued on current home CHF medication regimen with IV Bumex  Labs pending. Abdominal distension and LE edema remains unchanged. Long discussion by Dr. Braga on 3/16/17 in regards to the severity of her heart failure and the overall concern given her recurrent decompensation despite aggressive treatment. IV diuretics held due to marginal BP 3/17/17 Oral CHF medications held with initiation of IV Bumex with 1.4 liters out. LE edema and orthopnea unchanged 3/18/17-3/19/17 Repeat discussion yesterday with encouragement to determine her wishes if she were to further decompensate ie intubation, CPR.   24 hours failure of diuresis  Likely due to loss of IV access.  Cr stable.  BAEZ and orthopnea persist. Continued on IV Bumex with oral Metolazone given 30minutes prior to AM dose. 3/20/2017 Aggressive diuresis remains in place with Bumex 3mg IV BID with Metolazone yesterday with 3.8 liters out negative 2.7 liters in past 24 hours and negative 4.6 liters since admission. Peripheral edema along with ascites and orthopnea remain. Working with PT/OT to prevent debility. Patient stated discussion with her daughter in the past in regards to her wishes consistent with living will and wishes to continue with aggressive treatment 3/21/2017  Repeat Metolazone yesterday along with IV Bumex BID yesterday with good response and 3.8 liters out overnight and negative 7.1 liters since admission. Orthopnea and BLE edema remains. Will hold off off Metolazone today due to rise in CO2 (36) and decrease in Cl concerning for contraction alkalosis and will continue IV Bumex BID today 3/22/2017 IV Bumex BID yesterday with 1.8 liters out overnight and negative 9 liters since admission. Orthopnea and peripheral edema essentially unchanged. CO2 down to 33 today.         Review of Systems   Constitution: Negative for chills, diaphoresis and fever.   Cardiovascular: Positive for dyspnea on exertion, leg swelling and orthopnea. Negative for chest pain, claudication, cyanosis, near-syncope, palpitations, paroxysmal nocturnal dyspnea and syncope.   Respiratory: Positive for cough and shortness of breath.      Objective:     Vital Signs (Most Recent):  Temp: 98.6 °F (37 °C) (03/22/17 1200)  Pulse: 70 (03/22/17 1200)  Resp: 18 (03/22/17 1200)  BP: (!) 106/53 (03/22/17 1200)  SpO2: 98 % (03/22/17 1245) Vital Signs (24h Range):  Temp:  [97.7 °F (36.5 °C)-98.6 °F (37 °C)] 98.6 °F (37 °C)  Pulse:  [69-80] 70  Resp:  [18-20] 18  SpO2:  [94 %-98 %] 98 %  BP: ()/(51-64) 106/53     Weight: 126.3 kg (278 lb 7.1 oz) (Standing.)  Body mass index is 44.94 kg/(m^2).     SpO2: 98 %  O2 Device (Oxygen Therapy): nasal cannula w/ humidification      Intake/Output Summary (Last 24 hours) at 03/22/17 1246  Last data filed at 03/22/17 1200   Gross per 24 hour   Intake             1035 ml   Output             3125 ml   Net            -2090 ml       Lines/Drains/Airways     Peripheral Intravenous Line                 Peripheral IV - Single Lumen 03/18/17 1600 Right Forearm 3 days                Physical Exam    Significant Labs:       Recent Labs  Lab 03/22/17  0506   *   K 3.7   CL 88*   CO2 33*   BUN 50*   CREATININE 1.5*         Significant Imaging: Echocardiogram:   2D echo with  color flow doppler:   Results for orders placed or performed during the hospital encounter of 03/01/17   2D echo with color flow doppler   Result Value Ref Range    EF 25 (A) 55 - 65    Mitral Valve Regurgitation SEVERE (A)     Diastolic Dysfunction Yes (A)     Est. PA Systolic Pressure 50.28 (A)     Pericardial Effusion NONE     Tricuspid Valve Regurgitation MODERATE TO SEVERE (A)      Assessment and Plan:     Brief HPI: Seen earlier today on AM rounds while sitting in the bedside chair. Continues to complain of orthopnea and LE swelling with no major improvement. Discussed POC with patient for continued attempted aggressive diuresis-verbalized understanding     * Acute on chronic combined systolic and diastolic heart failure  Echo with severely depressed LV function with EF 25% with severe MR; aggressive diuresis since admission with addition of Metolazone prior to Bumex- held yesterday due to contraction alkalosis; CO2 33 this AM; will continue with IV Bumex BID today and consider repeat Metolazone later today vs tomorrow; 1.8 liters out overnight and negative 9.0 liters since admission; remains volume overload; continue strict I&Os;  daily weight done today ? Accuracy; continue to hold ACEI/ARB or BB     Permanent atrial fibrillation  Paced currently; continue Amiodarone; no BB due to marginal BP; NOAC on hold due to GIB earlier this year and continued anemia; GI consulted for further evaluation of anemia     CAD S/P percutaneous coronary angioplasty  S/p LAD PCI and s/p RCA PCI for  2015; no concern for ACS currently; continue ASA, Plavix and Pravastatin; no BB or ARB due to marginal BP     CKD (chronic kidney disease) stage 3, GFR 30-59 ml/min  Creatinine 1.5; slightly higher than yesterday; stable at baseline; continue to monitor     Iron deficiency anemia due to chronic blood loss  H&H stable but remains below baseline; GI consulted for further evaluation of anemia and for input on AC use      Hyponatremia  Na 132 today slightly lower than previous 133-134; continue to monitor with IV diuresis       VTE Risk Mitigation         Ordered     enoxaparin injection 40 mg  Daily     Route:  Subcutaneous        03/16/17 1559     Medium Risk of VTE  Once      03/16/17 1449          SEJAL Strickland, ANP  Cardiology  [facility]    I have seen patient with Nurse Practitioner Augusta De La Vega. I agree with her assessment and plan as written in this note.        Jamel Putnam MD, FACC, CCDS  Interventional Cardiology  [facility]

## 2017-03-22 NOTE — PT/OT/SLP PROGRESS
Physical Therapy  Treatment    Jennifer Prescott   MRN: [id number]   Admitting Diagnosis: Acute on chronic combined systolic and diastolic heart failure    PT Received On: 17  PT Start Time: 1330     PT Stop Time: 1354    PT Total Time (min): 24 min       Billable Minutes:  Therapeutic Activity 14 and Therapeutic Exercise 10    Treatment Type: Treatment  PT/PTA: PTA     PTA Visit Number: 2       General Precautions: Standard, fall, respiratory  Orthopedic Precautions: N/A   Braces:      Do you have any cultural, spiritual, Taoist conflicts, given your current situation?: none    Subjective:  Communicated with ns prior to session.  Pt agreeable to tx., though refusing to try and walk any further.     Pain Ratin/10  Location - Side: Right  Location - Orientation: generalized  Location: hip (and back)  Pain Addressed: Reposition, Distraction  Pain Rating Post-Intervention: 6/10    Objective:   Patient found with: oxygen, telemetry    Functional Mobility:  Bed Mobility:        Transfers:  Sit <> Stand Assistance: Minimum Assistance (from chair on third attempt, pt max A at first)  Sit <> Stand Assistive Device: Rolling Walker    Gait:   Gait Distance: ~4 steps from chair to EOB (air mattress deflated)  Assistance 1: Contact Guard Assistance  Gait Assistive Device: Rolling walker  Gait Deviation(s): decreased loreto, decreased step length, decreased toe-to-floor clearance    Stairs:  n/a    Balance:   Static Sit: GOOD: Takes MODERATE challenges from all directions  Dynamic Sit: GOOD: Maintains balance through MODERATE excursions of active trunk movement  Static Stand: FAIR: Maintains without assist but unable to take challenges  Dynamic stand: FAIR: Needs CONTACT GUARD during gait     Therapeutic Activities and Exercises:  Pt perf'd seated LE ex's of AP's, LAQ's, hip flex x 10 ea. Pt sitting up in chair, sit to stand max.A on first two attempts, then John on third attempt w/ RW. Pt able to take a few sm steps  chair to bed w/ RW and CGA.   O2:98% on 3L  HR:71    AM-PAC 6 CLICK MOBILITY  How much help from another person does this patient currently need?   1 = Unable, Total/Dependent Assistance  2 = A lot, Maximum/Moderate Assistance  3 = A little, Minimum/Contact Guard/Supervision  4 = None, Modified [place]/Independent    Turning over in bed (including adjusting bedclothes, sheets and blankets)?: 3  Sitting down on and standing up from a chair with arms (e.g., wheelchair, bedside commode, etc.): 3 (from high surfaces)  Moving from lying on back to sitting on the side of the bed?: 3  Moving to and from a bed to a chair (including a wheelchair)?: 3  Need to walk in hospital room?: 3  Climbing 3-5 steps with a railing?: 2  Total Score: 17    AM-PAC Raw Score CMS G-Code Modifier Level of Impairment Assistance   6 % Total / Unable   7 - 9 CM 80 - 100% Maximal Assist   10 - 14 CL 60 - 80% Moderate Assist   15 - 19 CK 40 - 60% Moderate Assist   20 - 22 CJ 20 - 40% Minimal Assist   23 CI 1-20% SBA / CGA   24 CH 0% Independent/ Mod I     Patient left sitting up EOB with all lines intact, call button in reach and ns notified.    Assessment:  Jennifer Prescott is a 71 y.o. female with a medical diagnosis of Acute on chronic combined systolic and diastolic heart failure and presents with dec mobility, dec strength/endurance. Pt needs encouragement to participate more in therapy.    Rehab identified problem list/impairments: Rehab identified problem list/impairments: weakness, impaired endurance, impaired functional mobilty, impaired self care skills, gait instability, impaired balance, pain, edema, impaired skin    Rehab potential is good.    Activity tolerance: Fair    Discharge recommendations: Discharge Facility/Level Of Care Needs: home with home health, home health PT, home health OT     Barriers to discharge: Barriers to Discharge: None    Equipment recommendations: Equipment Needed After Discharge: none     GOALS:    Physical Therapy Goals        Problem: Physical Therapy Goal    Goal Priority Disciplines Outcome Goal Variances Interventions   Physical Therapy Goal     PT/OT, PT Ongoing (interventions implemented as appropriate)               PLAN:    Patient to be seen 5 x/week  to address the above listed problems via gait training, therapeutic activities, therapeutic exercises  Plan of Care expires: 04/03/17  Plan of Care reviewed with: patient         Ny Blanca, PTA  03/22/2017

## 2017-03-22 NOTE — PROGRESS NOTES
.Pharmacy New Medication Education    Patient accepted medication education.    Pharmacy educated patient on the following medications, using the teach-back method.   Tessalon  Tramadol    Learners of pharmacy medication education included:  patient    Patient +/- learner response:  verbalize understanding

## 2017-03-22 NOTE — PLAN OF CARE
Problem: Occupational Therapy Goal  Goal: Occupational Therapy Goal  Goals to be met by: 3/27/2017    Patient will increase functional independence with ADLs by performing:    UE Dressing with Set-up Assistance.  LE Dressing with Minimal Assistance with AD as needed for energy conservation and limited lower body bending.  Grooming while standing at sink with Stand-by Assistance demonstrating use of energy conservation tech with 1 seated rest break.  Toileting from bedside commode with Supervision for hygiene and clothing management. --Goal met 3/22/17  Toilet transfer to bedside commode with Supervision. -- Goal met 3/22/17  Increased functional strength to WNL for BUE use for ADLS and fx mobility..   Outcome: Ongoing (interventions implemented as appropriate)  Patient with increased ax tolerance this date -- OOB to bedside chair most of morning. Patient still remains with decreased strength and endurance limiting performance in functional self-care tasks. Cont OT efforts per POC

## 2017-03-22 NOTE — ASSESSMENT & PLAN NOTE
Paced currently; continue Amiodarone; no BB due to marginal BP; NOAC on hold due to GIB earlier this year and continued anemia; GI consulted for further evaluation of anemia

## 2017-03-22 NOTE — PT/OT/SLP PROGRESS
Occupational Therapy  Treatment    Jennifer Prescott   MRN: [id number]   Admitting Diagnosis: Acute on chronic combined systolic and diastolic heart failure    OT Date of Treatment: 03/22/17   OT Start Time: 0908  OT Stop Time: 0933  OT Total Time (min): 25 min    Billable Minutes:  Self Care/Home Management 25    General Precautions: Standard, fall, respiratory  Orthopedic Precautions: N/A  Braces: N/A    Do you have any cultural, spiritual, Gnosticism conflicts, given your current situation?: none    Subjective:  Communicated with nurseSandi prior to session.    Pain Rating:  (c/o LBP, but did not rate)    Objective:  Patient found with: oxygen, telemetry     Functional Mobility:  Bed Mobility: Patient on bedside commode on arrival    Transfers:   Sit <> Stand Assistance: Supervision  Sit <> Stand Assistive Device: No Assistive Device  Bed <> Chair Transfer Assistance: Supervision  Bed <> Chair Assistive Device: No Assistive Device  Toilet Transfer Assistance: Supervision  Toilet Transfer Assistive Device: No Assistive Device, bedside commode    Functional Ambulation: N/A    Activities of Daily Living:  UE Dressing Level of Assistance: Contact guard  Grooming Position: Seated, bedside chair  Grooming Level of Assistance: Modified independent  Toileting Where Assessed: Bedside commode  Toileting Level of Assistance: Set-up Assistance    Balance:   Static Sit: GOOD: Takes MODERATE challenges from all directions  Dynamic Sit: FAIR+: Maintains balance through MINIMAL excursions of active trunk motion  Static Stand: FAIR+: Takes MINIMAL challenges from all directions  Dynamic stand: FAIR+: Needs CLOSE SUPERVISION during gait and is able to right self with minor LOB    Therapeutic Activities and Exercises:  Patient on BSC on arrival. Completed toileting with s/u (A). Patient took 4-5 steps to bedside chair with SBA. Doffed soiled gown and donned clean with CGA. Performed oral care and facial hygiene with Mod (I) from  bedside chair. Patient education on importance of ambulating with P.T. To improve strength and endurance to amb household distances.     AM-PAC 6 CLICK ADL   How much help from another person does this patient currently need?   1 = Unable, Total/Dependent Assistance  2 = A lot, Maximum/Moderate Assistance  3 = A little, Minimum/Contact Guard/Supervision  4 = None, Modified [place]/Independent    Putting on and taking off regular lower body clothing? : 2  Bathing (including washing, rinsing, drying)?: 2  Toileting, which includes using toilet, bedpan, or urinal? : 3  Putting on and taking off regular upper body clothing?: 3  Taking care of personal grooming such as brushing teeth?: 4  Eating meals?: 4  Total Score: 18     AM-PAC Raw Score CMS G-Code Modifier Level of Impairment Assistance   6 % Total / Unable   7 - 9 CM 80 - 100% Maximal Assist   10 - 14 CL 60 - 80% Moderate Assist   15 - 19 CK 40 - 60% Moderate Assist   20 - 22 CJ 20 - 40% Minimal Assist   23 CI 1-20% SBA / CGA   24 CH 0% Independent/ Mod I     Patient left up in chair with all lines intact, call button in reach and nsg notified    ASSESSMENT:  Jennifer Prescott is a 71 y.o. female with a medical diagnosis of Acute on chronic combined systolic and diastolic heart failure   Patient with increased ax tolerance this date -- OOB to bedside chair most of morning. Patient still remains with decreased strength and endurance limiting performance in functional self-care tasks. Cont OT efforts per POC    Rehab identified problem list/impairments: Rehab identified problem list/impairments: weakness, impaired endurance, impaired cardiopulmonary response to activity, impaired functional mobilty, impaired self care skills, pain, impaired skin, edema    Rehab potential is good.    Activity tolerance: Fair    Discharge recommendations: Discharge Facility/Level Of Care Needs: home with home health, home health PT, home health OT     Barriers to discharge:  Barriers to Discharge: None    Equipment recommendations: none     GOALS:   Occupational Therapy Goals        Problem: Occupational Therapy Goal    Goal Priority Disciplines Outcome Interventions   Occupational Therapy Goal     OT, PT/OT Ongoing (interventions implemented as appropriate)    Description:  Goals to be met by: 3/27/2017    Patient will increase functional independence with ADLs by performing:    UE Dressing with Set-up Assistance.  LE Dressing with Minimal Assistance with AD as needed for energy conservation and limited lower body bending.  Grooming while standing at sink with Stand-by Assistance demonstrating use of energy conservation tech with 1 seated rest break.  Toileting from bedside commode with Supervision for hygiene and clothing management. --Goal met 3/22/17  Toilet transfer to bedside commode with Supervision. -- Goal met 3/22/17  Increased functional strength to WNL for BUE use for ADLS and fx mobility..                 Plan:  Patient to be seen 5 x/week to address the above listed problems via self-care/home management, therapeutic activities, therapeutic exercises  Plan of Care expires: 04/20/17  Plan of Care reviewed with: patient         ABBEY Vital  03/22/2017

## 2017-03-22 NOTE — PLAN OF CARE
Problem: Patient Care Overview  Goal: Plan of Care Review  Outcome: Ongoing (interventions implemented as appropriate)  Plan of care reviewed with the patient. Verbalized clear understanding. Bed alarm set. Bed in lowest position. Call light within reach. Instructed to call for assistance when needed. Denies any pain or discomfort. NSR HR 60's on telemetry monitor. Monitoring I's & O's the entire shift. Will continue to monitor.

## 2017-03-22 NOTE — ASSESSMENT & PLAN NOTE
Echo with severely depressed LV function with EF 25% with severe MR; aggressive diuresis since admission with addition of Metolazone prior to Bumex- held yesterday due to contraction alkalosis; CO2 33 this AM; will continue with IV Bumex BID today and consider repeat Metolazone later today vs tomorrow; 1.8 liters out overnight and negative 9.0 liters since admission; remains volume overload; continue strict I&Os;  daily weight done today ? Accuracy; continue to hold ACEI/ARB or BB

## 2017-03-22 NOTE — ASSESSMENT & PLAN NOTE
H&H stable but remains below baseline; GI consulted for further evaluation of anemia and for input on AC use

## 2017-03-22 NOTE — CONSULTS
Consulted for venous stasis issues. Bilateral legs with swelling and erythema. No open wounds at this time. Palpable pedal pulses. Bilateral feet warm/dry. Applied lotion to bilateral legs/feet prior to wrapping with kerlix/ace from below toes to below knees. Change daily. Low air loss overlay ordered for patient per primary team. Pt unable to push up in bed. Frequently sliding down. Pt also insists on sitting on side of bed with legs dangling despite instruction to keep legs elevated. Charge nurse, Janelle, has already spoken with patient about using call light so nursing can de-flat mattress prior to patient attempting to sit on side of bed to ensure safety.

## 2017-03-22 NOTE — SUBJECTIVE & OBJECTIVE
Review of Systems   Constitution: Negative for chills, diaphoresis and fever.   Cardiovascular: Positive for dyspnea on exertion, leg swelling and orthopnea. Negative for chest pain, claudication, cyanosis, near-syncope, palpitations, paroxysmal nocturnal dyspnea and syncope.   Respiratory: Positive for cough and shortness of breath.      Objective:     Vital Signs (Most Recent):  Temp: 98.6 °F (37 °C) (03/22/17 1200)  Pulse: 70 (03/22/17 1200)  Resp: 18 (03/22/17 1200)  BP: (!) 106/53 (03/22/17 1200)  SpO2: 98 % (03/22/17 1245) Vital Signs (24h Range):  Temp:  [97.7 °F (36.5 °C)-98.6 °F (37 °C)] 98.6 °F (37 °C)  Pulse:  [69-80] 70  Resp:  [18-20] 18  SpO2:  [94 %-98 %] 98 %  BP: ()/(51-64) 106/53     Weight: 126.3 kg (278 lb 7.1 oz) (Standing.)  Body mass index is 44.94 kg/(m^2).     SpO2: 98 %  O2 Device (Oxygen Therapy): nasal cannula w/ humidification      Intake/Output Summary (Last 24 hours) at 03/22/17 1246  Last data filed at 03/22/17 1200   Gross per 24 hour   Intake             1035 ml   Output             3125 ml   Net            -2090 ml       Lines/Drains/Airways     Peripheral Intravenous Line                 Peripheral IV - Single Lumen 03/18/17 1600 Right Forearm 3 days                Physical Exam    Significant Labs:       Recent Labs  Lab 03/22/17  0506   *   K 3.7   CL 88*   CO2 33*   BUN 50*   CREATININE 1.5*         Significant Imaging: Echocardiogram:   2D echo with color flow doppler:   Results for orders placed or performed during the hospital encounter of 03/01/17   2D echo with color flow doppler   Result Value Ref Range    EF 25 (A) 55 - 65    Mitral Valve Regurgitation SEVERE (A)     Diastolic Dysfunction Yes (A)     Est. PA Systolic Pressure 50.28 (A)     Pericardial Effusion NONE     Tricuspid Valve Regurgitation MODERATE TO SEVERE (A)

## 2017-03-22 NOTE — PLAN OF CARE
Problem: Physical Therapy Goal  Goal: Physical Therapy Goal  Goals to be met by: 4/3/2017     Patient will increase functional independence with mobility by performin. Supine to sit with Stand-by Assistance  2. Sit to stand transfer with Supervision  3. Gait x 15-25 feet with Supervision using Rolling Walker.   Outcome: Ongoing (interventions implemented as appropriate)  Initial PT evaluation performed 3/20/2017.  Pt could benefit from skilled PT services 5x/wk in order to maximize function prior to D/C.  HHPT recommended upon D/C.

## 2017-03-22 NOTE — PLAN OF CARE
Problem: Patient Care Overview  Goal: Plan of Care Review  Outcome: Ongoing (interventions implemented as appropriate)  Received pt on 3L NC; SAT 94%

## 2017-03-23 ENCOUNTER — ANESTHESIA (OUTPATIENT)
Dept: INTENSIVE CARE | Facility: HOSPITAL | Age: 72
DRG: 291 | End: 2017-03-23
Payer: COMMERCIAL

## 2017-03-23 ENCOUNTER — ANESTHESIA EVENT (OUTPATIENT)
Dept: INTENSIVE CARE | Facility: HOSPITAL | Age: 72
DRG: 291 | End: 2017-03-23
Payer: COMMERCIAL

## 2017-03-23 PROBLEM — I50.9 ACUTE DECOMPENSATED HEART FAILURE: Status: ACTIVE | Noted: 2017-03-23

## 2017-03-23 LAB
ALLENS TEST: ABNORMAL
ANION GAP SERPL CALC-SCNC: 13 MMOL/L
BASOPHILS # BLD AUTO: 0.04 K/UL
BASOPHILS NFR BLD: 0.5 %
BUN SERPL-MCNC: 50 MG/DL
CALCIUM SERPL-MCNC: 9.1 MG/DL
CHLORIDE SERPL-SCNC: 87 MMOL/L
CO2 SERPL-SCNC: 30 MMOL/L
CREAT SERPL-MCNC: 1.5 MG/DL
DELSYS: ABNORMAL
DIFFERENTIAL METHOD: ABNORMAL
EOSINOPHIL # BLD AUTO: 0.1 K/UL
EOSINOPHIL NFR BLD: 1.1 %
ERYTHROCYTE [DISTWIDTH] IN BLOOD BY AUTOMATED COUNT: 16.9 %
EST. GFR  (AFRICAN AMERICAN): 40 ML/MIN/1.73 M^2
EST. GFR  (NON AFRICAN AMERICAN): 35 ML/MIN/1.73 M^2
GLUCOSE SERPL-MCNC: 89 MG/DL
HCO3 UR-SCNC: 36.6 MMOL/L (ref 24–28)
HCT VFR BLD AUTO: 26.3 %
HGB BLD-MCNC: 8 G/DL
LYMPHOCYTES # BLD AUTO: 0.6 K/UL
LYMPHOCYTES NFR BLD: 8.1 %
MAGNESIUM SERPL-MCNC: 1.8 MG/DL
MCH RBC QN AUTO: 26.9 PG
MCHC RBC AUTO-ENTMCNC: 30.4 %
MCV RBC AUTO: 89 FL
MONOCYTES # BLD AUTO: 0.6 K/UL
MONOCYTES NFR BLD: 8.3 %
NEUTROPHILS # BLD AUTO: 6.1 K/UL
NEUTROPHILS NFR BLD: 81.3 %
PCO2 BLDA: 62.8 MMHG (ref 35–45)
PH SMN: 7.37 [PH] (ref 7.35–7.45)
PHOSPHATE SERPL-MCNC: 4.3 MG/DL
PLATELET # BLD AUTO: 309 K/UL
PMV BLD AUTO: 10.4 FL
PO2 BLDA: 21 MMHG (ref 40–60)
POC BE: 11 MMOL/L
POC SATURATED O2: 30 % (ref 95–100)
POC TCO2: 39 MMOL/L (ref 24–29)
POCT GLUCOSE: 109 MG/DL (ref 70–110)
POCT GLUCOSE: 111 MG/DL (ref 70–110)
POTASSIUM SERPL-SCNC: 4.2 MMOL/L
RBC # BLD AUTO: 2.97 M/UL
SAMPLE: ABNORMAL
SITE: ABNORMAL
SODIUM SERPL-SCNC: 130 MMOL/L
WBC # BLD AUTO: 7.55 K/UL

## 2017-03-23 PROCEDURE — 36415 COLL VENOUS BLD VENIPUNCTURE: CPT

## 2017-03-23 PROCEDURE — 02HV33Z INSERTION OF INFUSION DEVICE INTO SUPERIOR VENA CAVA, PERCUTANEOUS APPROACH: ICD-10-PCS | Performed by: INTERNAL MEDICINE

## 2017-03-23 PROCEDURE — 99900035 HC TECH TIME PER 15 MIN (STAT)

## 2017-03-23 PROCEDURE — 25000003 PHARM REV CODE 250: Performed by: HOSPITALIST

## 2017-03-23 PROCEDURE — 63600175 PHARM REV CODE 636 W HCPCS: Performed by: NURSE PRACTITIONER

## 2017-03-23 PROCEDURE — 27000221 HC OXYGEN, UP TO 24 HOURS

## 2017-03-23 PROCEDURE — 63600175 PHARM REV CODE 636 W HCPCS: Performed by: HOSPITALIST

## 2017-03-23 PROCEDURE — 20000000 HC ICU ROOM

## 2017-03-23 PROCEDURE — 85025 COMPLETE CBC W/AUTO DIFF WBC: CPT

## 2017-03-23 PROCEDURE — 97530 THERAPEUTIC ACTIVITIES: CPT

## 2017-03-23 PROCEDURE — 82803 BLOOD GASES ANY COMBINATION: CPT

## 2017-03-23 PROCEDURE — 99233 SBSQ HOSP IP/OBS HIGH 50: CPT | Mod: ,,, | Performed by: INTERNAL MEDICINE

## 2017-03-23 PROCEDURE — 84100 ASSAY OF PHOSPHORUS: CPT

## 2017-03-23 PROCEDURE — 83735 ASSAY OF MAGNESIUM: CPT

## 2017-03-23 PROCEDURE — 25000003 PHARM REV CODE 250: Performed by: NURSE PRACTITIONER

## 2017-03-23 PROCEDURE — 76937 US GUIDE VASCULAR ACCESS: CPT | Performed by: ANESTHESIOLOGY

## 2017-03-23 PROCEDURE — 80048 BASIC METABOLIC PNL TOTAL CA: CPT

## 2017-03-23 PROCEDURE — 94761 N-INVAS EAR/PLS OXIMETRY MLT: CPT

## 2017-03-23 PROCEDURE — 94640 AIRWAY INHALATION TREATMENT: CPT

## 2017-03-23 RX ORDER — POTASSIUM CHLORIDE 20 MEQ/1
60 TABLET, EXTENDED RELEASE ORAL 2 TIMES DAILY
Status: DISCONTINUED | OUTPATIENT
Start: 2017-03-24 | End: 2017-03-28

## 2017-03-23 RX ORDER — POTASSIUM CHLORIDE 20 MEQ/1
60 TABLET, EXTENDED RELEASE ORAL 2 TIMES DAILY
Status: DISCONTINUED | OUTPATIENT
Start: 2017-03-23 | End: 2017-03-23

## 2017-03-23 RX ORDER — BUMETANIDE 0.25 MG/ML
3 INJECTION INTRAMUSCULAR; INTRAVENOUS ONCE
Status: COMPLETED | OUTPATIENT
Start: 2017-03-23 | End: 2017-03-23

## 2017-03-23 RX ORDER — DOBUTAMINE HYDROCHLORIDE 400 MG/100ML
5 INJECTION, SOLUTION INTRAVENOUS CONTINUOUS
Status: DISCONTINUED | OUTPATIENT
Start: 2017-03-23 | End: 2017-03-28

## 2017-03-23 RX ADMIN — DOBUTAMINE IN DEXTROSE 5 MCG/KG/MIN: 200 INJECTION, SOLUTION INTRAVENOUS at 09:03

## 2017-03-23 RX ADMIN — SODIUM CHLORIDE, PRESERVATIVE FREE 3 ML: 5 INJECTION INTRAVENOUS at 01:03

## 2017-03-23 RX ADMIN — FERROUS SULFATE TAB EC 325 MG (65 MG FE EQUIVALENT) 325 MG: 325 (65 FE) TABLET DELAYED RESPONSE at 11:03

## 2017-03-23 RX ADMIN — TRAMADOL HYDROCHLORIDE 100 MG: 50 TABLET, COATED ORAL at 08:03

## 2017-03-23 RX ADMIN — SODIUM CHLORIDE, PRESERVATIVE FREE 3 ML: 5 INJECTION INTRAVENOUS at 10:03

## 2017-03-23 RX ADMIN — PANTOPRAZOLE SODIUM 40 MG: 40 TABLET, DELAYED RELEASE ORAL at 09:03

## 2017-03-23 RX ADMIN — ASPIRIN 81 MG 81 MG: 81 TABLET ORAL at 11:03

## 2017-03-23 RX ADMIN — CLOPIDOGREL BISULFATE 75 MG: 75 TABLET ORAL at 09:03

## 2017-03-23 RX ADMIN — TRAMADOL HYDROCHLORIDE 100 MG: 50 TABLET, COATED ORAL at 11:03

## 2017-03-23 RX ADMIN — BUMETANIDE 3 MG: 0.25 INJECTION INTRAMUSCULAR; INTRAVENOUS at 01:03

## 2017-03-23 RX ADMIN — PRAVASTATIN SODIUM 40 MG: 40 TABLET ORAL at 09:03

## 2017-03-23 RX ADMIN — ENOXAPARIN SODIUM 40 MG: 100 INJECTION SUBCUTANEOUS at 05:03

## 2017-03-23 RX ADMIN — ALLOPURINOL 300 MG: 300 TABLET ORAL at 09:03

## 2017-03-23 RX ADMIN — TRAZODONE HYDROCHLORIDE 150 MG: 100 TABLET ORAL at 11:03

## 2017-03-23 RX ADMIN — SODIUM CHLORIDE, PRESERVATIVE FREE 3 ML: 5 INJECTION INTRAVENOUS at 08:03

## 2017-03-23 RX ADMIN — FLUTICASONE FUROATE AND VILANTEROL TRIFENATATE 1 PUFF: 200; 25 POWDER RESPIRATORY (INHALATION) at 08:03

## 2017-03-23 RX ADMIN — BENZONATATE 100 MG: 100 CAPSULE ORAL at 01:03

## 2017-03-23 RX ADMIN — AMIODARONE HYDROCHLORIDE 200 MG: 200 TABLET ORAL at 09:03

## 2017-03-23 RX ADMIN — POTASSIUM CHLORIDE 60 MEQ: 20 TABLET, EXTENDED RELEASE ORAL at 10:03

## 2017-03-23 RX ADMIN — SODIUM CHLORIDE, PRESERVATIVE FREE 3 ML: 5 INJECTION INTRAVENOUS at 06:03

## 2017-03-23 NOTE — ASSESSMENT & PLAN NOTE
H&H qt-Th-Sat; stable at baseline; GI consulted with plans for VEC for further evaluation; continue to hold AC

## 2017-03-23 NOTE — PLAN OF CARE
Problem: Patient Care Overview  Goal: Plan of Care Review  SpO2   99% on   3lpm NC. No apparent distress noted. Will continue to monitor.

## 2017-03-23 NOTE — SUBJECTIVE & OBJECTIVE
Review of Systems   Constitution: Negative for chills, diaphoresis and fever.   Cardiovascular: Positive for dyspnea on exertion, leg swelling and orthopnea. Negative for chest pain, claudication, cyanosis, irregular heartbeat, near-syncope, paroxysmal nocturnal dyspnea and syncope.   Respiratory: Positive for cough and shortness of breath. Negative for wheezing.    Gastrointestinal: Negative for abdominal pain, constipation, diarrhea, nausea and vomiting.     Objective:     Vital Signs (Most Recent):  Temp: 98 °F (36.7 °C) (03/23/17 1200)  Pulse: 70 (03/23/17 1200)  Resp: 20 (03/23/17 1200)  BP: (!) 128/59 (03/23/17 1200)  SpO2: 95 % (03/23/17 1221) Vital Signs (24h Range):  Temp:  [96.7 °F (35.9 °C)-98.4 °F (36.9 °C)] 98 °F (36.7 °C)  Pulse:  [69-89] 70  Resp:  [18-20] 20  SpO2:  [95 %-99 %] 95 %  BP: (110-128)/(53-59) 128/59     Weight: 126.3 kg (278 lb 7.1 oz) (Standing.)  Body mass index is 44.94 kg/(m^2).     SpO2: 95 %  O2 Device (Oxygen Therapy): nasal cannula w/ humidification      Intake/Output Summary (Last 24 hours) at 03/23/17 1313  Last data filed at 03/23/17 0947   Gross per 24 hour   Intake              665 ml   Output             1200 ml   Net             -535 ml       Lines/Drains/Airways     Peripheral Intravenous Line                 Peripheral IV - Single Lumen 03/18/17 1600 Right Forearm 4 days                Physical Exam   Constitutional: She appears well-developed and well-nourished. No distress.   Cardiovascular: Normal rate and regular rhythm.  Exam reveals no gallop.    No murmur heard.  Pulmonary/Chest: Effort normal. She has decreased breath sounds in the right lower field. She has rales in the right middle field, the left middle field and the left lower field.   Abdominal: Soft. Bowel sounds are normal. She exhibits no distension. There is no tenderness.   Musculoskeletal: She exhibits edema (4+ BLE edema present from abdomen to ankles wtih anasarca ).   Skin: Skin is warm and dry.        Significant Labs:       Recent Labs  Lab 03/23/17  0509 03/23/17  0908   *  --    K 4.2  --    CL 87*  --    CO2 30*  --    BUN 50*  --    CREATININE 1.5*  --    MG  --  1.8       Significant Imaging: Echocardiogram:   2D echo with color flow doppler:   Results for orders placed or performed during the hospital encounter of 03/01/17   2D echo with color flow doppler   Result Value Ref Range    EF 25 (A) 55 - 65    Mitral Valve Regurgitation SEVERE (A)     Diastolic Dysfunction Yes (A)     Est. PA Systolic Pressure 50.28 (A)     Pericardial Effusion NONE     Tricuspid Valve Regurgitation MODERATE TO SEVERE (A)

## 2017-03-23 NOTE — PLAN OF CARE
Problem: Patient Care Overview  Goal: Plan of Care Review  Outcome: Ongoing (interventions implemented as appropriate)  Absence of falls.  No acute distress noted. Tele monitor continue. Patient stated that she felt much better than yesterday and was able to walk to the bathroom.  Dressing changed daily per MD's order. Patient verbalized understanding the plan of care. Room near nursing station. Bed in low and locked position, side rails up x 2, call light within reach. Continue to monitor

## 2017-03-23 NOTE — PT/OT/SLP PROGRESS
"Occupational Therapy  Treatment    Jennifer Prescott   MRN: [id number]   Admitting Diagnosis: Acute on chronic combined systolic and diastolic heart failure    OT Date of Treatment: 03/23/17   OT Start Time: 1200  OT Stop Time: 1210  OT Total Time (min): 10 min    Billable Minutes:  Therapeutic Activity 10    General Precautions: Standard, fall, respiratory  Orthopedic Precautions:    Braces:      Do you have any cultural, spiritual, Pentecostalism conflicts, given your current situation?: none    Subjective:  Communicated with RN prior to session.  "I'd really like to eat then go back to bed."                        Objective:  Patient found with: telemetry, peripheral IV          Transfers:   Sit <> Stand Assistance: Contact Guard Assistance  Sit <> Stand Assistive Device: Rolling Walker  Bed <> Chair Technique: Stand Pivot  Bed <> Chair Transfer Assistance: Contact Guard Assistance  Bed <> Chair Assistive Device: Rolling Walker      Activities of Daily Living:   Feeding Mod I                      Balance:   Static Sit: GOOD: Takes MODERATE challenges from all directions  Dynamic Sit: GOOD: Maintains balance through MODERATE excursions of active trunk movement  Static Stand: FAIR: Maintains without assist but unable to take challenges  Dynamic stand: FAIR: Needs CONTACT GUARD during gait    Therapeutic Activities and Exercises:  Pt transferred chair>w/c with CGA using RW.  W/C>EOB with CGA using RW.    AM-PAC 6 CLICK ADL   How much help from another person does this patient currently need?   1 = Unable, Total/Dependent Assistance  2 = A lot, Maximum/Moderate Assistance  3 = A little, Minimum/Contact Guard/Supervision  4 = None, Modified [place]/Independent    Putting on and taking off regular lower body clothing? : 2  Bathing (including washing, rinsing, drying)?: 2  Toileting, which includes using toilet, bedpan, or urinal? : 3  Putting on and taking off regular upper body clothing?: 3  Taking care of personal " grooming such as brushing teeth?: 4  Eating meals?: 4  Total Score: 18     AM-PAC Raw Score CMS G-Code Modifier Level of Impairment Assistance   6 % Total / Unable   7 - 9 CM 80 - 100% Maximal Assist   10 - 14 CL 60 - 80% Moderate Assist   15 - 19 CK 40 - 60% Moderate Assist   20 - 22 CJ 20 - 40% Minimal Assist   23 CI 1-20% SBA / CGA   24 CH 0% Independent/ Mod I     Patient left EOB eating lunch with all lines intact, call button in reach and family present    ASSESSMENT:  Jennifer Prescott is a 71 y.o. female with a medical diagnosis of Acute on chronic combined systolic and diastolic heart failure and presents with decreased overall strength, endurance, balance and [place] with ADL's and fx mobility. Pt transferring with CGA using RW.  Continue OT services to address functional goals    .    Rehab identified problem list/impairments: Rehab identified problem list/impairments: weakness, impaired endurance, impaired self care skills, impaired functional mobilty, gait instability, impaired balance, pain, decreased safety awareness, impaired cardiopulmonary response to activity, edema, impaired skin    Rehab potential is good.    Activity tolerance: Fair    Discharge recommendations:       Barriers to discharge:      Equipment recommendations:       GOALS:   Occupational Therapy Goals        Problem: Occupational Therapy Goal    Goal Priority Disciplines Outcome Interventions   Occupational Therapy Goal     OT, PT/OT Ongoing (interventions implemented as appropriate)    Description:  Goals to be met by: 3/27/2017    Patient will increase functional independence with ADLs by performing:    UE Dressing with Set-up Assistance.  LE Dressing with Minimal Assistance with AD as needed for energy conservation and limited lower body bending.  Grooming while standing at sink with Stand-by Assistance demonstrating use of energy conservation tech with 1 seated rest break.  Toileting from bedside commode with  Supervision for hygiene and clothing management. --Goal met 3/22/17  Toilet transfer to bedside commode with Supervision. -- Goal met 3/22/17  Increased functional strength to WNL for BUE use for ADLS and fx mobility..                 Plan:  Patient to be seen 5 x/week to address the above listed problems via self-care/home management, therapeutic exercises, therapeutic activities  Plan of Care expires: 04/20/17  Plan of Care reviewed with: patient         ABBEY Monteiro  03/23/2017

## 2017-03-23 NOTE — CONSULTS
Gastroenterology  CC: Anemia    HPI 71 y.o. female with below listed PMHx presents complaining of SOB, being treated for heart failure. She is noted to have a history of recurrent, obscure GI bleeding. Over the past 12 months she has undergone multiple endoscopic evaluations with egds and colonoscopies without a source identified. A capsule study was planned as the next step. No current overt bleeding. She is noted to have moderate anemia, normocytic with low iron and normal ferritin. Currently she has associated sob, some fatigue. No PICA symptoms. No other exacerbating/relieving factors or other associated symptoms.       Past Medical History:   Diagnosis Date    *Atrial fibrillation     Anticoagulant long-term use     Arthritis     Cardiomyopathy     Carotid artery occlusion     CHF (congestive heart failure)     COPD (chronic obstructive pulmonary disease)     COPD exacerbation 2/19/2015    Coronary artery disease     RCA occluded with L to R collaterals and normal LAD and LCx    Hypertension     Internal bleeding     Sleep apnea     uses bipap         Past Surgical History:   Procedure Laterality Date    ABDOMINAL SURGERY      APPENDECTOMY      CARDIAC CATHETERIZATION      CARDIAC DEFIBRILLATOR PLACEMENT  10/9/2012    bivent AICD placed 10/2012 and revised 12/2012    CARDIAC PACEMAKER PLACEMENT  9/21/2010    [facility]    CARDIOVERSION  3/6/2015    CAROTID ENDARTERECTOMY Left     CHOLECYSTECTOMY      COLONOSCOPY N/A 7/11/2016    Procedure: COLONOSCOPY;  Surgeon: Rafael Pérez MD;  Location: [facility] ENDO;  Service: Endoscopy;  Laterality: N/A;    COLONOSCOPY N/A 2/17/2017    Procedure: COLONOSCOPY;  Surgeon: Giovanni Bronson MD;  Location: [facility] ENDO ([address] [place] [address]);  Service: Endoscopy;  Laterality: N/A;    CORONARY ANGIOPLASTY WITH STENT PLACEMENT  1/27/2015    3 JAMAL to RCA    CRT-D implantation      ESOPHAGOGASTRODUODENOSCOPY      GALLBLADDER SURGERY      TONSILLECTOMY      TONSILLECTOMY,  "ADENOIDECTOMY      VASCULAR SURGERY         Social History  Social History   Substance Use Topics    Smoking status: Former Smoker     Packs/day: 0.25     Years: 50.00     Types: Cigarettes     Start date: 1/15/1965    Smokeless tobacco: Never Used    Alcohol use No         Family History   Problem Relation Age of Onset    Breast cancer Mother     Cancer Father     Heart failure Father     Heart disease Father     Heart attack Father     Sudden death Neg Hx        Review of Systems  General ROS: negative for chills, fever or weight loss  Psychological ROS: negative for hallucination, depression or suicidal ideation  Ophthalmic ROS: negative for blurry vision, photophobia or eye pain  ENT ROS: negative for epistaxis, sore throat or rhinorrhea  Respiratory ROS: no cough, +shortness of breath,  wheezing  Cardiovascular ROS: no chest pain + dyspnea on exertion  Gastrointestinal ROS: no abdominal pain, change in bowel habits, or black/ bloody stools  Genito-Urinary ROS: no dysuria, trouble voiding, or hematuria  Musculoskeletal ROS: negative for gait disturbance + muscular weakness  Neurological ROS: no syncope or seizures; no ataxia  Dermatological ROS: negative for pruritis, rash and jaundice    Physical Examination  BP (!) 119/53 (BP Location: Right arm, Patient Position: Sitting, BP Method: Automatic)  Pulse 81  Temp 98.3 °F (36.8 °C) (Oral)   Resp 18  Ht 5' 6" (1.676 m)  Wt 126.3 kg (278 lb 7.1 oz) Comment: Standing.  LMP  (LMP Unknown)  SpO2 99%  Breastfeeding? No  BMI 44.94 kg/m2  General appearance: alert, cooperative, no distress  HENT: Normocephalic, atraumatic, neck symmetrical, no nasal discharge   Eyes: conjunctivae/corneas clear, PERRL, EOM's intact  Lungs: dull at the bases, fair air movement throughout  Heart: regular rate and rhythm without rub; no displacement of the PMI   Abdomen: soft, non-tender; bowel sounds normoactive; no organomegaly  Extremities: extremities symmetric; no " clubbing, cyanosis, + edema  Integument: Skin color, texture, turgor normal; no rashes; hair distrubution normal  Neurologic: Alert and oriented X 3, normal strength, normal coordination and gait  Psychiatric: no pressured speech; normal affect; no evidence of impaired cognition     Labs: Hct 27    Imaging: CXR reviewed    Assessment/Plan: 72yo female with numerous comorbidities with:    1. Anemia/Obscure GI bleed   - monitor hct   - d/w cardiology, will plan capsule endoscopy   - monitor on telemetry   - discussed with pt this procedure in detail and indications/contraindications    - One contraindications are patients who have defibrillators or pacemakers (this is a    recommendation in the package insert, but does not appear to be a significant clinical problem      And the 2017 [place] guidelines have removed this contraindication    - d/w pt/cardiology, agree to proceed with caution, will keep on telemetry

## 2017-03-23 NOTE — ASSESSMENT & PLAN NOTE
CXR this AM with appearance of worsening right pleural effusion related to ADHF; will continue with attempt of aggressive diuresis for improvement

## 2017-03-23 NOTE — PROGRESS NOTES
[facility]  Cardiology  Progress Note    Patient Name: Jennifer Prescott  MRN: [id number]  Admission Date: 3/16/2017  Hospital Length of Stay: 7 days  Code Status: Full Code   Attending Physician: Jamel Putnam MD   Primary Care Physician: Lianna Mistry MD  Expected Discharge Date:   Principal Problem:Acute on chronic combined systolic and diastolic heart failure    Subjective:     Hospital Course:   3/16/2017 Presented to the ER with complaints of BAEZ, orthopnea, LE edema and decreased appetite. Admitted to [facility] for management of decompensated HF. 4+ BLE edema present with abdominal distension. Creatinine 1.6 and BNP 3344 with elevated total bilirubin. Continued on current home CHF medication regimen with IV Bumex  Labs pending. Abdominal distension and LE edema remains unchanged. Long discussion by Dr. Braga on 3/16/17 in regards to the severity of her heart failure and the overall concern given her recurrent decompensation despite aggressive treatment. IV diuretics held due to marginal BP 3/17/17 Oral CHF medications held with initiation of IV Bumex with 1.4 liters out. LE edema and orthopnea unchanged 3/18/17-3/19/17 Repeat discussion yesterday with encouragement to determine her wishes if she were to further decompensate ie intubation, CPR.   24 hours failure of diuresis  Likely due to loss of IV access.  Cr stable.  BAEZ and orthopnea persist. Continued on IV Bumex with oral Metolazone given 30minutes prior to AM dose. 3/20/2017 Aggressive diuresis remains in place with Bumex 3mg IV BID with Metolazone yesterday with 3.8 liters out negative 2.7 liters in past 24 hours and negative 4.6 liters since admission. Peripheral edema along with ascites and orthopnea remain. Working with PT/OT to prevent debility. Patient stated discussion with her daughter in the past in regards to her wishes consistent with living will and wishes to continue with aggressive treatment 3/21/2017  Repeat Metolazone yesterday along with IV Bumex BID yesterday with good response and 3.8 liters out overnight and negative 7.1 liters since admission. Orthopnea and BLE edema remains. Will hold off off Metolazone today due to rise in CO2 (36) and decrease in Cl concerning for contraction alkalosis and will continue IV Bumex BID today 3/22/2017 IV Bumex BID yesterday with 1.8 liters out overnight and negative 9 liters since admission. Orthopnea and peripheral edema essentially unchanged. CO2 down to 33 today. 3/23/2017 IV Bumex x 1 yesterday due to elevated CO2. CO2 improved today with repeat CXR with worsening right pleural effusion on comparison to admit CXR. SOB and peripheral edema essentially unchanged. Will give IV Bumex today with strict I&Os and daily weights. Discuss continued aggressive diuresis with staff vs initiation of inotropes for further CHF management         Review of Systems   Constitution: Negative for chills, diaphoresis and fever.   Cardiovascular: Positive for dyspnea on exertion, leg swelling and orthopnea. Negative for chest pain, claudication, cyanosis, irregular heartbeat, near-syncope, paroxysmal nocturnal dyspnea and syncope.   Respiratory: Positive for cough and shortness of breath. Negative for wheezing.    Gastrointestinal: Negative for abdominal pain, constipation, diarrhea, nausea and vomiting.     Objective:     Vital Signs (Most Recent):  Temp: 98 °F (36.7 °C) (03/23/17 1200)  Pulse: 70 (03/23/17 1200)  Resp: 20 (03/23/17 1200)  BP: (!) 128/59 (03/23/17 1200)  SpO2: 95 % (03/23/17 1221) Vital Signs (24h Range):  Temp:  [96.7 °F (35.9 °C)-98.4 °F (36.9 °C)] 98 °F (36.7 °C)  Pulse:  [69-89] 70  Resp:  [18-20] 20  SpO2:  [95 %-99 %] 95 %  BP: (110-128)/(53-59) 128/59     Weight: 126.3 kg (278 lb 7.1 oz) (Standing.)  Body mass index is 44.94 kg/(m^2).     SpO2: 95 %  O2 Device (Oxygen Therapy): nasal cannula w/ humidification      Intake/Output Summary (Last 24 hours) at 03/23/17  1313  Last data filed at 03/23/17 0947   Gross per 24 hour   Intake              665 ml   Output             1200 ml   Net             -535 ml       Lines/Drains/Airways     Peripheral Intravenous Line                 Peripheral IV - Single Lumen 03/18/17 1600 Right Forearm 4 days                Physical Exam   Constitutional: She appears well-developed and well-nourished. No distress.   Cardiovascular: Normal rate and regular rhythm.  Exam reveals no gallop.    No murmur heard.  Pulmonary/Chest: Effort normal. She has decreased breath sounds in the right lower field. She has rales in the right middle field, the left middle field and the left lower field.   Abdominal: Soft. Bowel sounds are normal. She exhibits no distension. There is no tenderness.   Musculoskeletal: She exhibits edema (4+ BLE edema present from abdomen to ankles wtih anasarca ).   Skin: Skin is warm and dry.       Significant Labs:       Recent Labs  Lab 03/23/17  0509 03/23/17  0908   *  --    K 4.2  --    CL 87*  --    CO2 30*  --    BUN 50*  --    CREATININE 1.5*  --    MG  --  1.8       Significant Imaging: Echocardiogram:   2D echo with color flow doppler:   Results for orders placed or performed during the hospital encounter of 03/01/17   2D echo with color flow doppler   Result Value Ref Range    EF 25 (A) 55 - 65    Mitral Valve Regurgitation SEVERE (A)     Diastolic Dysfunction Yes (A)     Est. PA Systolic Pressure 50.28 (A)     Pericardial Effusion NONE     Tricuspid Valve Regurgitation MODERATE TO SEVERE (A)      Assessment and Plan:     Brief HPI: Seen this morning on AM rounds and again this afternoon on rounds after her CXR. Continues to complain of SOB and LE swelling with no major improvement. Long discussion with patient again in regards to her severe MR and CHF with minimal improvement. Discussed limitation of aggressive diuresis due to elevated creatinine and lyte abnormalities with slight worsening of right pleural  effusion on repeat CXR. Overall very concerned given her severe CHF with minimal improvement. Discussed POC with patient to include resumption of aggressive IV diuresis with Bumex vs transfer to ICU with inotropic therapy-this to be decided after discussion with staff    * Acute on chronic combined systolic and diastolic heart failure  Echo with severely depressed LV function with EF 25% with severe MR; aggressive diuresis since admission with addition of Metolazone prior to Bumex- held yesterday due to contraction alkalosis; CO2 down to 30 this AM with CXR with worsening right pleural effusion; will give dose of IV Bumex now; negative 9 liters since admission with no dramatic improvement; will discuss with staff re: POC with continued aggressive IV diuresis vs initiation of inotropic therapy; discussed with patient overall concern given her severe HF     CAD S/P percutaneous coronary angioplasty  S/p LAD PCI and s/p RCA PCI for  2015; stable; continue ASA, Plavix and Pravastatin only      CKD (chronic kidney disease) stage 3, GFR 30-59 ml/min  Creatinine 1.5; stable at baseline     Iron deficiency anemia due to chronic blood loss  H&H qt-Th-Sat; stable at baseline; GI consulted with plans for VEC for further evaluation; continue to hold AC     Pleural effusion, right  CXR this AM with appearance of worsening right pleural effusion related to ADHF; will continue with attempt of aggressive diuresis for improvement       VTE Risk Mitigation         Ordered     enoxaparin injection 40 mg  Daily     Route:  Subcutaneous        03/16/17 1559     Medium Risk of VTE  Once      03/16/17 1449          SEJAL Strickland, ANP  Cardiology  [facility]    I have seen patient with Nurse Practitioner Augusta De La Vega. I agree with her assessment and plan as written in this note.        Jamel Putnam MD, FACC, CCDS  Interventional Cardiology  [facility]

## 2017-03-23 NOTE — PT/OT/SLP PROGRESS
Physical Therapy      Jennifer Prescott  MRN: [id number]    Patient not seen today secondary to X-ray in AM then declined 2/2 fatigue in PM.  Pt sitting up at EOB and states that she is ambulating to bathroom. Will follow-up 3/24/2017.    Peri Vazquez, PT

## 2017-03-23 NOTE — PROGRESS NOTES
Pt. Refuses to wear BiPAP/CPAP. Pt stated that our masks are uncomfortable and her mask from home won't stay connected to our machines

## 2017-03-23 NOTE — PLAN OF CARE
Problem: Occupational Therapy Goal  Goal: Occupational Therapy Goal  Goals to be met by: 3/27/2017    Patient will increase functional independence with ADLs by performing:    UE Dressing with Set-up Assistance.  LE Dressing with Minimal Assistance with AD as needed for energy conservation and limited lower body bending.  Grooming while standing at sink with Stand-by Assistance demonstrating use of energy conservation tech with 1 seated rest break.  Toileting from bedside commode with Supervision for hygiene and clothing management. --Goal met 3/22/17  Toilet transfer to bedside commode with Supervision. -- Goal met 3/22/17  Increased functional strength to WNL for BUE use for ADLS and fx mobility..   Outcome: Ongoing (interventions implemented as appropriate)  Jennifer Prescott is a 71 y.o. female with a medical diagnosis of Acute on chronic combined systolic and diastolic heart failure and presents with decreased overall strength, endurance, balance and Lynn with ADL's and fx mobility. Pt transferring with CGA using RW.  Continue OT services to address functional goals  KASSANDRA Monteiro/GILMAR

## 2017-03-24 ENCOUNTER — TELEPHONE (OUTPATIENT)
Dept: GASTROENTEROLOGY | Facility: CLINIC | Age: 72
End: 2017-03-24

## 2017-03-24 PROBLEM — I50.9 CONGESTIVE HEART FAILURE: Status: RESOLVED | Noted: 2017-03-16 | Resolved: 2017-03-24

## 2017-03-24 LAB
ALBUMIN SERPL BCP-MCNC: 2.9 G/DL
ALP SERPL-CCNC: 133 U/L
ALT SERPL W/O P-5'-P-CCNC: 17 U/L
ANION GAP SERPL CALC-SCNC: 11 MMOL/L
AST SERPL-CCNC: 19 U/L
BASOPHILS # BLD AUTO: 0.03 K/UL
BASOPHILS NFR BLD: 0.4 %
BILIRUB SERPL-MCNC: 1.1 MG/DL
BUN SERPL-MCNC: 49 MG/DL
CALCIUM SERPL-MCNC: 9.2 MG/DL
CHLORIDE SERPL-SCNC: 85 MMOL/L
CO2 SERPL-SCNC: 36 MMOL/L
CREAT SERPL-MCNC: 1.3 MG/DL
DIFFERENTIAL METHOD: ABNORMAL
EOSINOPHIL # BLD AUTO: 0 K/UL
EOSINOPHIL NFR BLD: 0.5 %
ERYTHROCYTE [DISTWIDTH] IN BLOOD BY AUTOMATED COUNT: 16.6 %
EST. GFR  (AFRICAN AMERICAN): 48 ML/MIN/1.73 M^2
EST. GFR  (NON AFRICAN AMERICAN): 41 ML/MIN/1.73 M^2
GLUCOSE SERPL-MCNC: 95 MG/DL
HCT VFR BLD AUTO: 26.8 %
HGB BLD-MCNC: 8 G/DL
LYMPHOCYTES # BLD AUTO: 0.7 K/UL
LYMPHOCYTES NFR BLD: 9.1 %
MCH RBC QN AUTO: 26.8 PG
MCHC RBC AUTO-ENTMCNC: 29.9 %
MCV RBC AUTO: 90 FL
MONOCYTES # BLD AUTO: 0.8 K/UL
MONOCYTES NFR BLD: 10.2 %
NEUTROPHILS # BLD AUTO: 6.1 K/UL
NEUTROPHILS NFR BLD: 79.5 %
PLATELET # BLD AUTO: 269 K/UL
PMV BLD AUTO: 9.6 FL
POTASSIUM SERPL-SCNC: 3.4 MMOL/L
PROT SERPL-MCNC: 6.7 G/DL
RBC # BLD AUTO: 2.98 M/UL
SODIUM SERPL-SCNC: 132 MMOL/L
WBC # BLD AUTO: 7.67 K/UL

## 2017-03-24 PROCEDURE — 63600175 PHARM REV CODE 636 W HCPCS: Performed by: HOSPITALIST

## 2017-03-24 PROCEDURE — 25000003 PHARM REV CODE 250: Performed by: HOSPITALIST

## 2017-03-24 PROCEDURE — 99233 SBSQ HOSP IP/OBS HIGH 50: CPT | Mod: ,,, | Performed by: INTERNAL MEDICINE

## 2017-03-24 PROCEDURE — 85025 COMPLETE CBC W/AUTO DIFF WBC: CPT

## 2017-03-24 PROCEDURE — 94660 CPAP INITIATION&MGMT: CPT

## 2017-03-24 PROCEDURE — 20000000 HC ICU ROOM

## 2017-03-24 PROCEDURE — 94761 N-INVAS EAR/PLS OXIMETRY MLT: CPT

## 2017-03-24 PROCEDURE — 94640 AIRWAY INHALATION TREATMENT: CPT

## 2017-03-24 PROCEDURE — 27000221 HC OXYGEN, UP TO 24 HOURS

## 2017-03-24 PROCEDURE — 99291 CRITICAL CARE FIRST HOUR: CPT | Mod: ,,, | Performed by: INTERNAL MEDICINE

## 2017-03-24 PROCEDURE — 25000003 PHARM REV CODE 250: Performed by: NURSE PRACTITIONER

## 2017-03-24 PROCEDURE — 63600175 PHARM REV CODE 636 W HCPCS: Performed by: NURSE PRACTITIONER

## 2017-03-24 PROCEDURE — 80053 COMPREHEN METABOLIC PANEL: CPT

## 2017-03-24 RX ORDER — BUMETANIDE 0.25 MG/ML
3 INJECTION INTRAMUSCULAR; INTRAVENOUS ONCE
Status: COMPLETED | OUTPATIENT
Start: 2017-03-24 | End: 2017-03-24

## 2017-03-24 RX ADMIN — PANTOPRAZOLE SODIUM 40 MG: 40 TABLET, DELAYED RELEASE ORAL at 12:03

## 2017-03-24 RX ADMIN — PRAVASTATIN SODIUM 40 MG: 40 TABLET ORAL at 12:03

## 2017-03-24 RX ADMIN — TRAZODONE HYDROCHLORIDE 150 MG: 100 TABLET ORAL at 09:03

## 2017-03-24 RX ADMIN — FERROUS SULFATE TAB EC 325 MG (65 MG FE EQUIVALENT) 325 MG: 325 (65 FE) TABLET DELAYED RESPONSE at 09:03

## 2017-03-24 RX ADMIN — CLOPIDOGREL BISULFATE 75 MG: 75 TABLET ORAL at 12:03

## 2017-03-24 RX ADMIN — BENZONATATE 100 MG: 100 CAPSULE ORAL at 05:03

## 2017-03-24 RX ADMIN — POTASSIUM CHLORIDE 60 MEQ: 20 TABLET, EXTENDED RELEASE ORAL at 12:03

## 2017-03-24 RX ADMIN — DOBUTAMINE IN DEXTROSE 5 MCG/KG/MIN: 200 INJECTION, SOLUTION INTRAVENOUS at 08:03

## 2017-03-24 RX ADMIN — ALLOPURINOL 300 MG: 300 TABLET ORAL at 12:03

## 2017-03-24 RX ADMIN — ENOXAPARIN SODIUM 40 MG: 100 INJECTION SUBCUTANEOUS at 05:03

## 2017-03-24 RX ADMIN — FLUTICASONE FUROATE AND VILANTEROL TRIFENATATE 1 PUFF: 200; 25 POWDER RESPIRATORY (INHALATION) at 11:03

## 2017-03-24 RX ADMIN — SODIUM CHLORIDE, PRESERVATIVE FREE 3 ML: 5 INJECTION INTRAVENOUS at 12:03

## 2017-03-24 RX ADMIN — BUMETANIDE 3 MG: 0.25 INJECTION INTRAMUSCULAR; INTRAVENOUS at 01:03

## 2017-03-24 RX ADMIN — TRAMADOL HYDROCHLORIDE 100 MG: 50 TABLET, COATED ORAL at 05:03

## 2017-03-24 RX ADMIN — DOBUTAMINE IN DEXTROSE 5 MCG/KG/MIN: 200 INJECTION, SOLUTION INTRAVENOUS at 09:03

## 2017-03-24 RX ADMIN — AMIODARONE HYDROCHLORIDE 200 MG: 200 TABLET ORAL at 09:03

## 2017-03-24 RX ADMIN — ASPIRIN 81 MG 81 MG: 81 TABLET ORAL at 09:03

## 2017-03-24 RX ADMIN — POTASSIUM CHLORIDE 60 MEQ: 20 TABLET, EXTENDED RELEASE ORAL at 09:03

## 2017-03-24 RX ADMIN — BENZONATATE 100 MG: 100 CAPSULE ORAL at 12:03

## 2017-03-24 RX ADMIN — ACETAMINOPHEN 650 MG: 325 TABLET ORAL at 09:03

## 2017-03-24 NOTE — TELEPHONE ENCOUNTER
----- Message from Winnie Olivares sent at 3/24/2017  9:40 AM CDT -----  Contact: Bessy from [facility]/[phone]  Patient swallowed a camera this morning. Bessy needs to know if she can eat now or at 12:00. Please advise

## 2017-03-24 NOTE — ASSESSMENT & PLAN NOTE
Intermittent paced with underlying rhythm ?afib difficult to determine; no RVR noted; continue Amiodarone; no BB due to marginal BP and Dobutamine use; chronic AC on hold due to GIB earlier this year; GI consulted with [facility] in process

## 2017-03-24 NOTE — PLAN OF CARE
Problem: Patient Care Overview  Goal: Plan of Care Review  Outcome: Ongoing (interventions implemented as appropriate)  Pt is responsive to tx interventions.     CNS: Pt AAO x4, tremble in left hand intermittently, stand by assistance up to commode. Denies pain save for during central line placement. Pt has very strong self discipline in regard to managing her pain and anxiety, is able to withstand extreme discomfort with concerted effort to meet goal- like utilizing deep breathing and visualization to overcome SOB while central line was being placed. Takes proactive role in her plan of care and maintains autonomy in any way possible.  CVS: Pt has a permanent ventricular pacemaker, is in a ventricular rhythm that is consistently in the low 70's. Blood pressure maintained 100/60's both prior to dobutamine drip and after. Weight based non-titrating dobutamine drip, pt reports some resolution of SOB and breathlessness following initiation of drip.  Pulmonary: Pt has significant distributive obstacles towards oxygenation; cardiomyopathy, valvular incompetency as well as an extremely low HandH from a GI-bleed. So although pt consistently saturates ~%, she has stated SOB at rest, activity intolerance, cannot tolerate being in anything other than tripod position, etc. Some improvement with dobutamine drip.  GI: NPO at midnight in anticipation of capsule GI scope tomorrow to isolate site of GI bleed. Had been scoped both upper and lower with inconclusive results prior to arriving to ICU this admission. 4U RBC's transfused prior to admission to ICU, Hand H remains low, pt has not had any bloody bowel movement in over 24 hours per her disclosure. Pt's chief complaint upon admission was bloody stool and fatigue so this has resolved for the time being, peptic ulcer prophylaxis continued per order. Due to pt's already significantly impaired cardiac function further compromise to the oxygen carrying capacity of the blood  puts patient at significantly increased risk, continue to monitor H and H closely for improvement as well as signs/sx of of bleeding.  : Pt voids per bedside commode, urine output is good thus far following diuretic administration earlier this day.  Integumentary: Pt has very poor skin integrity, bilateral venous stasis ulcers to legs, dressings changed, weeping serous fluid with pitting 3+ edema, pt's skin is very pale and cyanotic at lips and fingertips, some improvement with dobutamine initiation. Bleeding precautions, repositioning supports and specialty bed utilized, normothermia maintained.  Safety precautions maintained: Bed in low locked position with HOB elevated 30. Pt and family familiarized with nurse call button. Room adjacent to nurses station. Suction and ambu bag in room and functioning properly.   Family updated of changes in plan of care as appropriate. Modifications made in consideration of pt's privacy and right to confidentiality.  Hourly rounding performed to ensure pt safety and ascertain needs.     ABCDE bundle

## 2017-03-24 NOTE — ANESTHESIA PROCEDURE NOTES
Central Line    Diagnosis: CHF  Patient location during procedure: ICU  Timeout: 3/23/2017 8:00 PM  Staffing  Anesthesiologist: GRACE BACK  Performed by: anesthesiologist   Anesthesiologist was present at the time of the procedure.  Preanesthetic Checklist  Completed: patient identified, site marked, surgical consent, pre-op evaluation, timeout performed, IV checked, risks and benefits discussed, monitors and equipment checked and anesthesia consent given  Indication  Indication: hemodynamic monitoring, med administration, vascular access     Anesthesia   general anesthesia    Central Line  Skin Prep: skin prepped with ChloraPrep, skin prep agent completely dried prior to procedure  maximum sterile barriers used during central venous catheter insertion  hand hygiene performed prior to central venous catheter insertion  Location: right internal jugular,   Catheter type: triple lumen  Catheter Size: 7 Fr  Inserted Catheter Length: 16 cm  Ultrasound: vascular probe with ultrasound  Vessel Caliber: medium, patent, compressibility normal  Needle advanced into vessel with real time Ultrasound guidance.  Guidewire confirmed in vessel.  Sterile sheath used.  Image recorded and saved.   Manometry: Venous cannualation confirmed by visual estimation of blood vessel pressure using manometry.  Insertion Attempts: 1   Securement:line sutured, chlorhexidine patch, sterile dressing applied and blood return through all ports     Post-Procedure  X-Ray: successful placement  Adverse Events:none

## 2017-03-24 NOTE — PLAN OF CARE
Problem: Patient Care Overview  Goal: Plan of Care Review  Outcome: Ongoing (interventions implemented as appropriate)  Patient understands, and agrees with plan of care. ABCDEF Bundle. Not intubated nor sedated. Breathing on 4 liters nasal canula, respirations 30, lungs course pulse ox 90. Cam score negative, patient alert and oriented times 4 and can follow commands. Patient can move independently in bed, and move all extremities. No signs of skin breakdown. Patient remain free from fall in injury throughout the shift. Family at bedside and involved with patient care. Patient denies chest pain throughout the shift. Patient ate 50% of meals and voided appropriately each hour. No BM. Side rails up times 2, call light in reach, will continue to monitor.

## 2017-03-24 NOTE — PLAN OF CARE
Patient received to ICU room 257 via bed. Requesting a bedside commode, nonskid socks placed. Ambulated x1 assist to BSC, voided 400mls. 02 3LNC in place, SOB noted with exertion. All monitors functioning properly. C/O discomfort of leg wraps requesting that they be removed. Educated on the need of dressings,moisture noted on dressing. Patient continues to insist that she does not want dressings on BLE. Removed per her request. Plan of care discussed, verbalized understanding. Sitting on side of bed per her request. Report given to oncoming nurseNara.

## 2017-03-24 NOTE — ASSESSMENT & PLAN NOTE
K+ 3.4 this AM; on KDur 60mEq po BID; only one dose given yesterday since Bumex decreased to once yesterday; continue BID dosing today and recheck BMP in AM

## 2017-03-24 NOTE — ASSESSMENT & PLAN NOTE
H&H stable since admission but remains below baseline; GIB in January this year; GI consulted with [facility] in process currently-will follow GI recommendations

## 2017-03-24 NOTE — ASSESSMENT & PLAN NOTE
CXR yesterday with worsening of right pleural effusion; improved on CXR post TLC placement; will continue with aggressive diuresis; no need for thoracentesis currently

## 2017-03-24 NOTE — SUBJECTIVE & OBJECTIVE
Review of Systems   Constitution: Negative for chills, diaphoresis and fever.   Cardiovascular: Positive for dyspnea on exertion, leg swelling and orthopnea. Negative for chest pain, claudication, cyanosis, irregular heartbeat, near-syncope, palpitations, paroxysmal nocturnal dyspnea and syncope.   Respiratory: Negative for cough, shortness of breath and wheezing.    Gastrointestinal: Negative for bloating, abdominal pain, constipation, diarrhea, nausea and vomiting.     Objective:     Vital Signs (Most Recent):  Temp: 97.8 °F (36.6 °C) (03/24/17 1130)  Pulse: 69 (03/24/17 1145)  Resp: (!) 21 (03/24/17 1145)  BP: 114/71 (03/24/17 1130)  SpO2: 99 % (03/24/17 1145) Vital Signs (24h Range):  Temp:  [97.1 °F (36.2 °C)-98.4 °F (36.9 °C)] 97.8 °F (36.6 °C)  Pulse:  [69-88] 69  Resp:  [11-30] 21  SpO2:  [92 %-100 %] 99 %  BP: (111-148)/(55-71) 114/71     Weight: 135.8 kg (299 lb 6.2 oz)  Body mass index is 48.32 kg/(m^2).     SpO2: 99 %  O2 Device (Oxygen Therapy): nasal cannula      Intake/Output Summary (Last 24 hours) at 03/24/17 1336  Last data filed at 03/24/17 1200   Gross per 24 hour   Intake           905.14 ml   Output             3900 ml   Net         -2994.86 ml       Lines/Drains/Airways     Central Venous Catheter Line                 Percutaneous Central Line Insertion/Assessment - triple lumen  03/23/17 2018 right internal jugular less than 1 day          Drain                 Urethral Catheter 03/24/17 0618 14 Fr. less than 1 day          Peripheral Intravenous Line                 Peripheral IV - Single Lumen 03/18/17 1600 Right Forearm 5 days         Peripheral IV - Single Lumen 03/23/17 1600 Left Antecubital less than 1 day                Physical Exam   Constitutional: She is oriented to person, place, and time. She appears well-developed and well-nourished. No distress.   Cardiovascular: Normal rate and regular rhythm.  Exam reveals no gallop.    No murmur heard.  Pulmonary/Chest: Effort normal.  She has decreased breath sounds in the right lower field and the left lower field. She has rales.   Abdominal: Soft. Bowel sounds are normal. She exhibits no distension. There is no tenderness.   Musculoskeletal: She exhibits edema (marked BLE edema-4+ with anasarca ).   Neurological: She is alert and oriented to person, place, and time.   Skin: Skin is warm and dry.       Significant Labs:       Recent Labs  Lab 03/24/17  0437   *   K 3.4*   CL 85*   CO2 36*   BUN 49*   CREATININE 1.3         Recent Labs  Lab 03/24/17  0437   WBC 7.67   RBC 2.98*   HGB 8.0*   HCT 26.8*      MCV 90   MCH 26.8*   MCHC 29.9*       Significant Imaging: Echocardiogram:   2D echo with color flow doppler:   Results for orders placed or performed during the hospital encounter of 03/01/17   2D echo with color flow doppler   Result Value Ref Range    EF 25 (A) 55 - 65    Mitral Valve Regurgitation SEVERE (A)     Diastolic Dysfunction Yes (A)     Est. PA Systolic Pressure 50.28 (A)     Pericardial Effusion NONE     Tricuspid Valve Regurgitation MODERATE TO SEVERE (A)

## 2017-03-24 NOTE — ASSESSMENT & PLAN NOTE
Creatinine 1.3 this AM; continuing to trend down with diuresis; stable at baseline currently; will continue to monitor closely

## 2017-03-24 NOTE — ASSESSMENT & PLAN NOTE
Echo with severely depressed LV function with EF 25% with severe MR; aggressive diuresis since admission with moderate response but no marked improvement in SOB/BAEZ, edema and orthopnea; CXR worsened yesterday with pO2 on venous blood gas 21; CVP 23-27 difficult to obtain completely accurate reading given inability to lie flat but feel it is elevated given her sx and PE finding; started on IV Dobutamine drip with IV Bumex 3mg x 1 given yesterday; good response with 3.3 liters out and negative 2.2 liters in 24 hours and negative 12.7 liters since admission; still continues to have a significant amount of fluid on board; will continue IV Dobutamine drip and repeat IV Bumex dose today; CO2 up to 36 today from 30 yesterday and will monitor closely; discussed plan for continuous Dobutamine until more euvolemic; also discussed risk of recurrent CHF exacerbation despite aggressive treatment given the severity of her MR and severely depressed LVEF; no BB while on Dobutamine; will hold ACEI/ARB due to marginal BP

## 2017-03-24 NOTE — ASSESSMENT & PLAN NOTE
Severe on most recent echo with previous echos with mild TR; likely related to severely depressed LVEF and MR; TR contributes to significant LE swelling and appearance of JVD; will continue to diurese aggressively as tolerated; will follow symptoms of BAEZ as well as orthopnea since unlikely her LE will completely resolve

## 2017-03-24 NOTE — ASSESSMENT & PLAN NOTE
Severe per echo; severity of MR further compromises CO; felt to be functional due to severely depressed LVEF; not a candidate for MVR or mitiral clip; will continue to treat medically and aggressively diurese as tolerated

## 2017-03-24 NOTE — PROGRESS NOTES
[facility]  Cardiology  Progress Note    Patient Name: Jennifer Prescott  MRN: [id number]  Admission Date: 3/16/2017  Hospital Length of Stay: 8 days  Code Status: Full Code   Attending Physician: Jamel Putnam MD   Primary Care Physician: Lianna Mistry MD  Expected Discharge Date:   Principal Problem:Acute on chronic combined systolic and diastolic heart failure    Subjective:     Hospital Course:   3/16/2017 Presented to the ER with complaints of BAEZ, orthopnea, LE edema and decreased appetite. Admitted to [facility] for management of decompensated HF. 4+ BLE edema present with abdominal distension. Creatinine 1.6 and BNP 3344 with elevated total bilirubin. Continued on current home CHF medication regimen with IV Bumex  Labs pending. Abdominal distension and LE edema remains unchanged. Long discussion by Dr. Braga on 3/16/17 in regards to the severity of her heart failure and the overall concern given her recurrent decompensation despite aggressive treatment. IV diuretics held due to marginal BP 3/17/17 Oral CHF medications held with initiation of IV Bumex with 1.4 liters out. LE edema and orthopnea unchanged 3/18/17-3/19/17 Repeat discussion yesterday with encouragement to determine her wishes if she were to further decompensate ie intubation, CPR.   24 hours failure of diuresis  Likely due to loss of IV access.  Cr stable.  BAEZ and orthopnea persist. Continued on IV Bumex with oral Metolazone given 30minutes prior to AM dose. 3/20/2017 Aggressive diuresis remains in place with Bumex 3mg IV BID with Metolazone yesterday with 3.8 liters out negative 2.7 liters in past 24 hours and negative 4.6 liters since admission. Peripheral edema along with ascites and orthopnea remain. Working with PT/OT to prevent debility. Patient stated discussion with her daughter in the past in regards to her wishes consistent with living will and wishes to continue with aggressive treatment 3/21/2017  Repeat Metolazone yesterday along with IV Bumex BID yesterday with good response and 3.8 liters out overnight and negative 7.1 liters since admission. Orthopnea and BLE edema remains. Will hold off off Metolazone today due to rise in CO2 (36) and decrease in Cl concerning for contraction alkalosis and will continue IV Bumex BID today 3/22/2017 IV Bumex BID yesterday with 1.8 liters out overnight and negative 9 liters since admission. Orthopnea and peripheral edema essentially unchanged. CO2 down to 33 today. 3/23/2017 IV Bumex x 1 yesterday due to elevated CO2. CO2 improved today with repeat CXR with worsening right pleural effusion on comparison to admit CXR. SOB and peripheral edema essentially unchanged. Will give IV Bumex today with strict I&Os and daily weights. Discuss continued aggressive diuresis with staff vs initiation of inotropes for further CHF management 3/24/2017 Transferred to ICU yesterday with initiation of Dobutamine along with Bumex 3mg IV x 1. 3.3 liters out overnight and negative 2.2 in 24 hours and negative 12.7 liters since admission. Creatinine trending down and CO2 up to 36 today from 30. Reports feeling slightly better and able to lie in bed to sleep overnight although unable to lie flat. CVP 23-27 but unable to lie completely flat. Will continue IV Dobutamine drip and given additional dose of Bumex IV for continued diuresis-monitor labs closely         Review of Systems   Constitution: Negative for chills, diaphoresis and fever.   Cardiovascular: Positive for dyspnea on exertion, leg swelling and orthopnea. Negative for chest pain, claudication, cyanosis, irregular heartbeat, near-syncope, palpitations, paroxysmal nocturnal dyspnea and syncope.   Respiratory: Negative for cough, shortness of breath and wheezing.    Gastrointestinal: Negative for bloating, abdominal pain, constipation, diarrhea, nausea and vomiting.     Objective:     Vital Signs (Most Recent):  Temp: 97.8 °F (36.6 °C)  (03/24/17 1130)  Pulse: 69 (03/24/17 1145)  Resp: (!) 21 (03/24/17 1145)  BP: 114/71 (03/24/17 1130)  SpO2: 99 % (03/24/17 1145) Vital Signs (24h Range):  Temp:  [97.1 °F (36.2 °C)-98.4 °F (36.9 °C)] 97.8 °F (36.6 °C)  Pulse:  [69-88] 69  Resp:  [11-30] 21  SpO2:  [92 %-100 %] 99 %  BP: (111-148)/(55-71) 114/71     Weight: 135.8 kg (299 lb 6.2 oz)  Body mass index is 48.32 kg/(m^2).     SpO2: 99 %  O2 Device (Oxygen Therapy): nasal cannula      Intake/Output Summary (Last 24 hours) at 03/24/17 1336  Last data filed at 03/24/17 1200   Gross per 24 hour   Intake           905.14 ml   Output             3900 ml   Net         -2994.86 ml       Lines/Drains/Airways     Central Venous Catheter Line                 Percutaneous Central Line Insertion/Assessment - triple lumen  03/23/17 2018 right internal jugular less than 1 day          Drain                 Urethral Catheter 03/24/17 0618 14 Fr. less than 1 day          Peripheral Intravenous Line                 Peripheral IV - Single Lumen 03/18/17 1600 Right Forearm 5 days         Peripheral IV - Single Lumen 03/23/17 1600 Left Antecubital less than 1 day                Physical Exam   Constitutional: She is oriented to person, place, and time. She appears well-developed and well-nourished. No distress.   Cardiovascular: Normal rate and regular rhythm.  Exam reveals no gallop.    No murmur heard.  Pulmonary/Chest: Effort normal. She has decreased breath sounds in the right lower field and the left lower field. She has rales.   Abdominal: Soft. Bowel sounds are normal. She exhibits no distension. There is no tenderness.   Musculoskeletal: She exhibits edema (marked BLE edema-4+ with anasarca ).   Neurological: She is alert and oriented to person, place, and time.   Skin: Skin is warm and dry.       Significant Labs:       Recent Labs  Lab 03/24/17  0437   *   K 3.4*   CL 85*   CO2 36*   BUN 49*   CREATININE 1.3         Recent Labs  Lab 03/24/17  0437   WBC 7.67  "  RBC 2.98*   HGB 8.0*   HCT 26.8*      MCV 90   MCH 26.8*   MCHC 29.9*       Significant Imaging: Echocardiogram:   2D echo with color flow doppler:   Results for orders placed or performed during the hospital encounter of 03/01/17   2D echo with color flow doppler   Result Value Ref Range    EF 25 (A) 55 - 65    Mitral Valve Regurgitation SEVERE (A)     Diastolic Dysfunction Yes (A)     Est. PA Systolic Pressure 50.28 (A)     Pericardial Effusion NONE     Tricuspid Valve Regurgitation MODERATE TO SEVERE (A)      Assessment and Plan:     Brief HPI: Seen this morning on AM NP rounds and again later in the morning on rounds with Dr. Robert. Sitting on the bedside and states "I feel a little better than yesterday and was able to sleep in the bed with head elevated last night". Discussed POC with patient to include plan for continuation of IV Dobutamine with IV Bumex for continued aggressive diuresis as well as plan for Dobutamine drip until a significant amount of fluid is removed-patient verbalized understanding of POC    * Acute on chronic combined systolic and diastolic heart failure  Echo with severely depressed LV function with EF 25% with severe MR; aggressive diuresis since admission with moderate response but no marked improvement in SOB/BAEZ, edema and orthopnea; CXR worsened yesterday with pO2 on venous blood gas 21; CVP 23-27 difficult to obtain completely accurate reading given inability to lie flat but feel it is elevated given her sx and PE finding; started on IV Dobutamine drip with IV Bumex 3mg x 1 given yesterday; good response with 3.3 liters out and negative 2.2 liters in 24 hours and negative 12.7 liters since admission; still continues to have a significant amount of fluid on board; will continue IV Dobutamine drip and repeat IV Bumex dose today; CO2 up to 36 today from 30 yesterday and will monitor closely; discussed plan for continuous Dobutamine until more euvolemic; also discussed risk of " recurrent CHF exacerbation despite aggressive treatment given the severity of her MR and severely depressed LVEF; no BB while on Dobutamine; will hold ACEI/ARB due to marginal BP     Permanent atrial fibrillation  Intermittent paced with underlying rhythm ?afib difficult to determine; no RVR noted; continue Amiodarone; no BB due to marginal BP and Dobutamine use; chronic AC on hold due to GIB earlier this year; GI consulted with [facility] in process     CAD S/P percutaneous coronary angioplasty  S/p LAD PCI and s/p RCA PCI for  2015; stable; continue ASA, Plavix and Pravastatin only      CKD (chronic kidney disease) stage 3, GFR 30-59 ml/min  Creatinine 1.3 this AM; continuing to trend down with diuresis; stable at baseline currently; will continue to monitor closely     Iron deficiency anemia due to chronic blood loss  H&H stable since admission but remains below baseline; GIB in January this year; GI consulted with [facility] in process currently-will follow GI recommendations     Pleural effusion, right  CXR yesterday with worsening of right pleural effusion; improved on CXR post TLC placement; will continue with aggressive diuresis; no need for thoracentesis currently     Hypokalemia  K+ 3.4 this AM; on KDur 60mEq po BID; only one dose given yesterday since Bumex decreased to once yesterday; continue BID dosing today and recheck BMP in AM     Tricuspid valve disease  Severe on most recent echo with previous echos with mild TR; likely related to severely depressed LVEF and MR; TR contributes to significant LE swelling and appearance of JVD; will continue to diurese aggressively as tolerated; will follow symptoms of BAEZ as well as orthopnea since unlikely her LE will completely resolve     Mitral regurgitation  Severe per echo; severity of MR further compromises CO; felt to be functional due to severely depressed LVEF; not a candidate for MVR or mitiral clip; will continue to treat medically and aggressively diurese as  tolerated     Hyponatremia  Na 132 unchanged today; related to hypervolemia; continue to monitor NA closely     Acute decompensated heart failure  As detailed above      VTE Risk Mitigation         Ordered     enoxaparin injection 40 mg  Daily     Route:  Subcutaneous        03/16/17 1557     Medium Risk of VTE  Once      03/16/17 1440          SEJAL Strickland, ANP  Cardiology  [facility]

## 2017-03-24 NOTE — PROGRESS NOTES
Occupational Therapy   Discharge Note    Jennifer Prescott   MRN: [id number]       Pt t/f'ed to ICU 3/23.  Orders discontinued.  Please reconsult when medically appropriate.    Atif Ruelas OT 3/24/2017

## 2017-03-24 NOTE — PROGRESS NOTES
Results for BAILEY WHITLEY (MRN [id number]) as of 3/24/2017 02:36   Ref. Range 3/23/2017 21:26   POC PH Latest Ref Range: 7.35 - 7.45  7.374   POC PCO2 Latest Ref Range: 35 - 45 mmHg 62.8 (H)   POC PO2 Latest Ref Range: 40 - 60 mmHg 21 (LL)   POC BE Latest Ref Range: -2 to 2 mmol/L 11   POC HCO3 Latest Ref Range: 24 - 28 mmol/L 36.6 (H)   POC SATURATED O2 Latest Ref Range: 95 - 100 % 30 (L)   POC TCO2 Latest Ref Range: 24 - 29 mmol/L 39 (H)   Sample Unknown VENOUS   DelSys Unknown Nasal Can   Allens Test Unknown N/A   Site Unknown [facility]       Results provided to nurse.

## 2017-03-24 NOTE — TELEPHONE ENCOUNTER
I called Bessy. None of the doctors here in this clinic have seen or consulted this patient. Bessy said she will investigate further to find the correct doctor.

## 2017-03-25 LAB
ANION GAP SERPL CALC-SCNC: 11 MMOL/L
BASOPHILS # BLD AUTO: 0.02 K/UL
BASOPHILS NFR BLD: 0.2 %
BUN SERPL-MCNC: 43 MG/DL
CALCIUM SERPL-MCNC: 9.1 MG/DL
CHLORIDE SERPL-SCNC: 87 MMOL/L
CO2 SERPL-SCNC: 36 MMOL/L
CREAT SERPL-MCNC: 1.1 MG/DL
DIFFERENTIAL METHOD: ABNORMAL
EOSINOPHIL # BLD AUTO: 0.1 K/UL
EOSINOPHIL NFR BLD: 0.6 %
ERYTHROCYTE [DISTWIDTH] IN BLOOD BY AUTOMATED COUNT: 16.8 %
EST. GFR  (AFRICAN AMERICAN): 58 ML/MIN/1.73 M^2
EST. GFR  (NON AFRICAN AMERICAN): 51 ML/MIN/1.73 M^2
GLUCOSE SERPL-MCNC: 107 MG/DL
HCT VFR BLD AUTO: 27.4 %
HGB BLD-MCNC: 8 G/DL
LYMPHOCYTES # BLD AUTO: 0.4 K/UL
LYMPHOCYTES NFR BLD: 5.1 %
MAGNESIUM SERPL-MCNC: 1.8 MG/DL
MCH RBC QN AUTO: 26.4 PG
MCHC RBC AUTO-ENTMCNC: 29.2 %
MCV RBC AUTO: 90 FL
MONOCYTES # BLD AUTO: 0.9 K/UL
MONOCYTES NFR BLD: 10.1 %
NEUTROPHILS # BLD AUTO: 7.1 K/UL
NEUTROPHILS NFR BLD: 83.8 %
PHOSPHATE SERPL-MCNC: 3.9 MG/DL
PLATELET # BLD AUTO: 281 K/UL
PMV BLD AUTO: 10 FL
POTASSIUM SERPL-SCNC: 3.5 MMOL/L
RBC # BLD AUTO: 3.03 M/UL
SODIUM SERPL-SCNC: 134 MMOL/L
WBC # BLD AUTO: 8.5 K/UL

## 2017-03-25 PROCEDURE — 25000003 PHARM REV CODE 250: Performed by: NURSE PRACTITIONER

## 2017-03-25 PROCEDURE — 99233 SBSQ HOSP IP/OBS HIGH 50: CPT | Mod: ,,, | Performed by: INTERNAL MEDICINE

## 2017-03-25 PROCEDURE — 94640 AIRWAY INHALATION TREATMENT: CPT

## 2017-03-25 PROCEDURE — 85025 COMPLETE CBC W/AUTO DIFF WBC: CPT

## 2017-03-25 PROCEDURE — 84100 ASSAY OF PHOSPHORUS: CPT

## 2017-03-25 PROCEDURE — 27000221 HC OXYGEN, UP TO 24 HOURS

## 2017-03-25 PROCEDURE — 63600175 PHARM REV CODE 636 W HCPCS: Performed by: HOSPITALIST

## 2017-03-25 PROCEDURE — 83735 ASSAY OF MAGNESIUM: CPT

## 2017-03-25 PROCEDURE — 63600175 PHARM REV CODE 636 W HCPCS: Performed by: NURSE PRACTITIONER

## 2017-03-25 PROCEDURE — 36415 COLL VENOUS BLD VENIPUNCTURE: CPT

## 2017-03-25 PROCEDURE — 25000003 PHARM REV CODE 250: Performed by: INTERNAL MEDICINE

## 2017-03-25 PROCEDURE — 94660 CPAP INITIATION&MGMT: CPT

## 2017-03-25 PROCEDURE — 25000242 PHARM REV CODE 250 ALT 637 W/ HCPCS: Performed by: HOSPITALIST

## 2017-03-25 PROCEDURE — 94761 N-INVAS EAR/PLS OXIMETRY MLT: CPT

## 2017-03-25 PROCEDURE — 25000003 PHARM REV CODE 250: Performed by: HOSPITALIST

## 2017-03-25 PROCEDURE — 80048 BASIC METABOLIC PNL TOTAL CA: CPT

## 2017-03-25 PROCEDURE — 20000000 HC ICU ROOM

## 2017-03-25 RX ORDER — BUMETANIDE 0.25 MG/ML
3 INJECTION INTRAMUSCULAR; INTRAVENOUS ONCE
Status: COMPLETED | OUTPATIENT
Start: 2017-03-25 | End: 2017-03-25

## 2017-03-25 RX ADMIN — SODIUM CHLORIDE, PRESERVATIVE FREE 3 ML: 5 INJECTION INTRAVENOUS at 10:03

## 2017-03-25 RX ADMIN — FLUTICASONE FUROATE AND VILANTEROL TRIFENATATE 1 PUFF: 200; 25 POWDER RESPIRATORY (INHALATION) at 08:03

## 2017-03-25 RX ADMIN — SODIUM CHLORIDE, PRESERVATIVE FREE 3 ML: 5 INJECTION INTRAVENOUS at 02:03

## 2017-03-25 RX ADMIN — ENOXAPARIN SODIUM 40 MG: 100 INJECTION SUBCUTANEOUS at 05:03

## 2017-03-25 RX ADMIN — AMIODARONE HYDROCHLORIDE 200 MG: 200 TABLET ORAL at 08:03

## 2017-03-25 RX ADMIN — CLOPIDOGREL BISULFATE 75 MG: 75 TABLET ORAL at 08:03

## 2017-03-25 RX ADMIN — DOBUTAMINE IN DEXTROSE 5 MCG/KG/MIN: 200 INJECTION, SOLUTION INTRAVENOUS at 01:03

## 2017-03-25 RX ADMIN — PRAVASTATIN SODIUM 40 MG: 40 TABLET ORAL at 08:03

## 2017-03-25 RX ADMIN — IPRATROPIUM BROMIDE AND ALBUTEROL SULFATE 3 ML: .5; 3 SOLUTION RESPIRATORY (INHALATION) at 08:03

## 2017-03-25 RX ADMIN — BUMETANIDE 3 MG: 0.25 INJECTION INTRAMUSCULAR; INTRAVENOUS at 10:03

## 2017-03-25 RX ADMIN — PANTOPRAZOLE SODIUM 40 MG: 40 TABLET, DELAYED RELEASE ORAL at 08:03

## 2017-03-25 RX ADMIN — ASPIRIN 81 MG 81 MG: 81 TABLET ORAL at 08:03

## 2017-03-25 RX ADMIN — FERROUS SULFATE TAB EC 325 MG (65 MG FE EQUIVALENT) 325 MG: 325 (65 FE) TABLET DELAYED RESPONSE at 08:03

## 2017-03-25 RX ADMIN — POTASSIUM CHLORIDE 60 MEQ: 20 TABLET, EXTENDED RELEASE ORAL at 08:03

## 2017-03-25 RX ADMIN — POTASSIUM CHLORIDE 60 MEQ: 20 TABLET, EXTENDED RELEASE ORAL at 11:03

## 2017-03-25 RX ADMIN — ALLOPURINOL 300 MG: 300 TABLET ORAL at 08:03

## 2017-03-25 RX ADMIN — TRAMADOL HYDROCHLORIDE 100 MG: 50 TABLET, COATED ORAL at 08:03

## 2017-03-25 NOTE — PLAN OF CARE
Problem: Patient Care Overview  Goal: Plan of Care Review  Outcome: Ongoing (interventions implemented as appropriate)  Not intubated nor sedated. Cam score negative. AAO, follows commands, On 4 liters NC, BiPaP QHS. SpO2 90%. SOB on exertion, V-paced HR 70's. MAP >65. Remains on Dobutamine gtt. Denies CP. No n/v. Cardiac diet with 1500 ml fluid restriction. Pt gets up to bedside commode with minimal assist and supervision. Fall risk reviewed with pt. Verbalizes and demonstrates understanding by calling to get up to BSC. RIJ TLC and PIV x1. Complete bath given, gown and linen changed. Pt sitting up side of bed. Safety precautions in place.

## 2017-03-25 NOTE — PROGRESS NOTES
Progress Note            [facility]    Subjective: Patient sitting up in bed doing better. BP is stable. Still having orthopnea. No PND. No sustain arrhythmias and no shocks by AICD        Review of patient's allergies indicates:   Allergen Reactions    Gabapentin           allopurinol  300 mg Oral Daily    amiodarone  200 mg Oral Daily    aspirin  81 mg Oral QHS    clopidogrel  75 mg Oral Daily    enoxaparin  40 mg Subcutaneous Daily    ferrous sulfate  325 mg Oral QHS    fluticasone-vilanterol  1 puff Inhalation Daily    pantoprazole  40 mg Oral Daily    potassium chloride  60 mEq Oral BID    pravastatin  40 mg Oral Daily    sodium chloride 0.9%  3 mL Intravenous Q8H    trazodone  150 mg Oral Nightly        DOBUTamine 5 mcg/kg/min (03/24/17 2129)       acetaminophen, albuterol-ipratropium 2.5mg-0.5mg/3mL, benzonatate, cyclobenzaprine, ondansetron, promethazine (PHENERGAN) IVPB, tramadol    Vitals:    03/25/17 0700 03/25/17 0736 03/25/17 0800 03/25/17 0817   BP: (!) 117/56  122/67    BP Location:       Patient Position:       BP Method:       Pulse: 90  71 90   Resp: (!) 24  (!) 28 (!) 24   Temp:  98.4 °F (36.9 °C)     TempSrc:  Oral     SpO2: 97%  97% 96%   Weight:       Height:           Physical Examination:  General Appearance: No acute distress  Mental: Alert to person, time and place  HEENT: Perrl  Chest: No pain with palpitations, no chest deformities  Heart: RRR, no murmurs, no gallops or rubs  Lung: decrease breath sounds with fine rales bilaterally  ABDOMEN: Soft, nontender, nondistended with good bowel sounds heard. No mass or hepatosplenomegaly  EXTREMITIES: Without cyanosis, clubbing or edema.   NEUROLOGICAL: Gross nonfocal. Skin: Warm and dry without any rash. There is no costovertebral angle tenderness.   Skin: no rashes lesions or ulcers    Lab Results   Component Value Date     (L) 03/25/2017    K 3.5 03/25/2017    CL 87 (L) 03/25/2017    CO2 36 (H)  03/25/2017    BUN 43 (H) 03/25/2017    CREATININE 1.1 03/25/2017     03/25/2017    HGBA1C 6.5 (H) 01/30/2015    MG 1.8 03/25/2017    AST 19 03/24/2017    ALT 17 03/24/2017    ALBUMIN 2.9 (L) 03/24/2017    PROT 6.7 03/24/2017    BILITOT 1.1 (H) 03/24/2017    WBC 8.50 03/25/2017    HGB 8.0 (L) 03/25/2017    HCT 27.4 (L) 03/25/2017    HCT 28 (L) 02/22/2015    MCV 90 03/25/2017     03/25/2017    INR 1.5 (H) 03/16/2017    INR 2.8 05/18/2016    INR 2.0 (H) 02/13/2012    TSH 2.040 02/20/2015    CHOL 74 (L) 09/24/2016    HDL 22 (L) 09/24/2016    LDLCALC 37.8 (L) 09/24/2016    TRIG 71 09/24/2016       No results for input(s): CPK, CPKMB, TROPONINI, MB in the last 24 hours.    No results for input(s): CPK, CPKMB, TROPONINI, MB in the last 168 hours.      Lab Results   Component Value Date    TSH 2.040 02/20/2015       Lab Results   Component Value Date    HGBA1C 6.5 (H) 01/30/2015           Images and labs reviewed        Assessment/Plan    1. Acute Decompensated systolic/diastolic HF in patient with non ischemic CM. Continue dobutamine drip with bolus lasix.   2. Severe MR likely functional, continue diuresis  3. PH, WHO type 2, continue diuresis  4. HTN- controled    Noah Robert MD  [facility]

## 2017-03-26 LAB
ANION GAP SERPL CALC-SCNC: 7 MMOL/L
BUN SERPL-MCNC: 40 MG/DL
CALCIUM SERPL-MCNC: 9.1 MG/DL
CHLORIDE SERPL-SCNC: 88 MMOL/L
CO2 SERPL-SCNC: 39 MMOL/L
CREAT SERPL-MCNC: 1.1 MG/DL
EST. GFR  (AFRICAN AMERICAN): 58 ML/MIN/1.73 M^2
EST. GFR  (NON AFRICAN AMERICAN): 51 ML/MIN/1.73 M^2
GLUCOSE SERPL-MCNC: 95 MG/DL
POTASSIUM SERPL-SCNC: 4.2 MMOL/L
SODIUM SERPL-SCNC: 134 MMOL/L

## 2017-03-26 PROCEDURE — 63600175 PHARM REV CODE 636 W HCPCS: Performed by: HOSPITALIST

## 2017-03-26 PROCEDURE — 99233 SBSQ HOSP IP/OBS HIGH 50: CPT | Mod: ,,, | Performed by: INTERNAL MEDICINE

## 2017-03-26 PROCEDURE — 20000000 HC ICU ROOM

## 2017-03-26 PROCEDURE — 63600175 PHARM REV CODE 636 W HCPCS: Performed by: INTERNAL MEDICINE

## 2017-03-26 PROCEDURE — 25000003 PHARM REV CODE 250: Performed by: NURSE PRACTITIONER

## 2017-03-26 PROCEDURE — 94640 AIRWAY INHALATION TREATMENT: CPT

## 2017-03-26 PROCEDURE — 63600175 PHARM REV CODE 636 W HCPCS: Performed by: NURSE PRACTITIONER

## 2017-03-26 PROCEDURE — 94761 N-INVAS EAR/PLS OXIMETRY MLT: CPT

## 2017-03-26 PROCEDURE — 25000003 PHARM REV CODE 250: Performed by: INTERNAL MEDICINE

## 2017-03-26 PROCEDURE — 27000221 HC OXYGEN, UP TO 24 HOURS

## 2017-03-26 PROCEDURE — 25000003 PHARM REV CODE 250: Performed by: HOSPITALIST

## 2017-03-26 PROCEDURE — 25000242 PHARM REV CODE 250 ALT 637 W/ HCPCS: Performed by: HOSPITALIST

## 2017-03-26 PROCEDURE — 80048 BASIC METABOLIC PNL TOTAL CA: CPT

## 2017-03-26 RX ADMIN — CHLOROTHIAZIDE SODIUM 250 MG: 500 INJECTION, POWDER, LYOPHILIZED, FOR SOLUTION INTRAVENOUS at 10:03

## 2017-03-26 RX ADMIN — PRAVASTATIN SODIUM 40 MG: 40 TABLET ORAL at 09:03

## 2017-03-26 RX ADMIN — POTASSIUM CHLORIDE 60 MEQ: 20 TABLET, EXTENDED RELEASE ORAL at 10:03

## 2017-03-26 RX ADMIN — DOBUTAMINE IN DEXTROSE 5 MCG/KG/MIN: 200 INJECTION, SOLUTION INTRAVENOUS at 03:03

## 2017-03-26 RX ADMIN — SODIUM CHLORIDE, PRESERVATIVE FREE 3 ML: 5 INJECTION INTRAVENOUS at 02:03

## 2017-03-26 RX ADMIN — DOBUTAMINE IN DEXTROSE 5 MCG/KG/MIN: 200 INJECTION, SOLUTION INTRAVENOUS at 06:03

## 2017-03-26 RX ADMIN — ACETAZOLAMIDE SODIUM 250 MG: 500 INJECTION, POWDER, LYOPHILIZED, FOR SOLUTION INTRAVENOUS at 10:03

## 2017-03-26 RX ADMIN — IPRATROPIUM BROMIDE AND ALBUTEROL SULFATE 3 ML: .5; 3 SOLUTION RESPIRATORY (INHALATION) at 04:03

## 2017-03-26 RX ADMIN — SODIUM CHLORIDE, PRESERVATIVE FREE 3 ML: 5 INJECTION INTRAVENOUS at 05:03

## 2017-03-26 RX ADMIN — FLUTICASONE FUROATE AND VILANTEROL TRIFENATATE 1 PUFF: 200; 25 POWDER RESPIRATORY (INHALATION) at 08:03

## 2017-03-26 RX ADMIN — FERROUS SULFATE TAB EC 325 MG (65 MG FE EQUIVALENT) 325 MG: 325 (65 FE) TABLET DELAYED RESPONSE at 08:03

## 2017-03-26 RX ADMIN — TRAZODONE HYDROCHLORIDE 150 MG: 100 TABLET ORAL at 12:03

## 2017-03-26 RX ADMIN — ASPIRIN 81 MG 81 MG: 81 TABLET ORAL at 08:03

## 2017-03-26 RX ADMIN — IPRATROPIUM BROMIDE AND ALBUTEROL SULFATE 3 ML: .5; 3 SOLUTION RESPIRATORY (INHALATION) at 11:03

## 2017-03-26 RX ADMIN — POTASSIUM CHLORIDE 60 MEQ: 20 TABLET, EXTENDED RELEASE ORAL at 08:03

## 2017-03-26 RX ADMIN — TRAMADOL HYDROCHLORIDE 100 MG: 50 TABLET, COATED ORAL at 12:03

## 2017-03-26 RX ADMIN — ALLOPURINOL 300 MG: 300 TABLET ORAL at 09:03

## 2017-03-26 RX ADMIN — AMIODARONE HYDROCHLORIDE 200 MG: 200 TABLET ORAL at 09:03

## 2017-03-26 RX ADMIN — SODIUM CHLORIDE, PRESERVATIVE FREE 3 ML: 5 INJECTION INTRAVENOUS at 11:03

## 2017-03-26 RX ADMIN — PANTOPRAZOLE SODIUM 40 MG: 40 TABLET, DELAYED RELEASE ORAL at 09:03

## 2017-03-26 RX ADMIN — CLOPIDOGREL BISULFATE 75 MG: 75 TABLET ORAL at 09:03

## 2017-03-26 RX ADMIN — TRAMADOL HYDROCHLORIDE 100 MG: 50 TABLET, COATED ORAL at 08:03

## 2017-03-26 RX ADMIN — ENOXAPARIN SODIUM 40 MG: 100 INJECTION SUBCUTANEOUS at 05:03

## 2017-03-26 RX ADMIN — TRAZODONE HYDROCHLORIDE 150 MG: 100 TABLET ORAL at 08:03

## 2017-03-26 NOTE — PLAN OF CARE
Problem: Patient Care Overview  Goal: Plan of Care Review  Outcome: Ongoing (interventions implemented as appropriate)  Mrs. Prescott, will call for assistance when wanting to get up to the bedside commode; call light within reach, bed in low position, skid resistance socks on, bed alarms on, she will be offered assistance every hour to prevent injury from a fall. She will shift her weight, change her position to prevent pressure areas to the skin. Pain will be controlled with positioning and pain medication that is prescribed.She will work physical therapy and occupational therapy for strengthening, safe gait and fine motor skills. Her fluid intake will be monitored and not exceed 1500 mL in 24 hours.  ABCDEF bundle: Oxygen at 5 liters per nasal canula with SATs 100%; becomes shortness of breath with minimal exertion; breath sounds with crackles through out  the lung fields; alert, oriented, follows commands, moves all extremities; family visited, attentive and supportive. She will be able to advance to the next  3 days.

## 2017-03-26 NOTE — PROGRESS NOTES
Progress Note            [facility]    Subjective: Patient continues to improve slowly. She does not not thing she is getting better fast enough but said she is not feeling worse. No discharge by AICD. No chest pain or SOB. BP is stable.        Review of patient's allergies indicates:   Allergen Reactions    Gabapentin           allopurinol  300 mg Oral Daily    amiodarone  200 mg Oral Daily    aspirin  81 mg Oral QHS    chlorothiazide (DIURIL) IVPB  250 mg Intravenous Q12H    clopidogrel  75 mg Oral Daily    enoxaparin  40 mg Subcutaneous Daily    ferrous sulfate  325 mg Oral QHS    fluticasone-vilanterol  1 puff Inhalation Daily    pantoprazole  40 mg Oral Daily    potassium chloride  60 mEq Oral BID    pravastatin  40 mg Oral Daily    sodium chloride 0.9%  3 mL Intravenous Q8H    trazodone  150 mg Oral Nightly        DOBUTamine 5 mcg/kg/min (03/26/17 0332)       acetaminophen, albuterol-ipratropium 2.5mg-0.5mg/3mL, benzonatate, cyclobenzaprine, ondansetron, promethazine (PHENERGAN) IVPB, tramadol    Vitals:    03/26/17 0400 03/26/17 0451 03/26/17 0454 03/26/17 0500   BP: (!) 146/94   135/75   BP Location:       Patient Position:       BP Method:       Pulse: 86 69 69 69   Resp: (!) 21 (!) 21 (!) 28 (!) 23   Temp:       TempSrc:       SpO2: 100%  99% 100%   Weight:       Height:           Physical Examination:  General Appearance: No acute distress  Mental: Alert to person, time and place  HEENT: Perrl  Chest: No pain with palpitations, no chest deformities  Heart: RRR, no murmurs, no gallops or rubs  Lung: decrease breath sound bilaterally  ABDOMEN: Soft, nontender, nondistended with good bowel sounds heard. No mass or hepatosplenomegaly  EXTREMITIES: Without cyanosis, clubbing or edema.   NEUROLOGICAL: Gross nonfocal. Skin: Warm and dry without any rash. There is no costovertebral angle tenderness.   Skin: no rashes lesions or ulcers    Lab Results   Component Value  Date     (L) 03/26/2017    K 4.2 03/26/2017    CL 88 (L) 03/26/2017    CO2 39 (H) 03/26/2017    BUN 40 (H) 03/26/2017    CREATININE 1.1 03/26/2017    GLU 95 03/26/2017    HGBA1C 6.5 (H) 01/30/2015    MG 1.8 03/25/2017    AST 19 03/24/2017    ALT 17 03/24/2017    ALBUMIN 2.9 (L) 03/24/2017    PROT 6.7 03/24/2017    BILITOT 1.1 (H) 03/24/2017    WBC 8.50 03/25/2017    HGB 8.0 (L) 03/25/2017    HCT 27.4 (L) 03/25/2017    HCT 28 (L) 02/22/2015    MCV 90 03/25/2017     03/25/2017    INR 1.5 (H) 03/16/2017    INR 2.8 05/18/2016    INR 2.0 (H) 02/13/2012    TSH 2.040 02/20/2015    CHOL 74 (L) 09/24/2016    HDL 22 (L) 09/24/2016    LDLCALC 37.8 (L) 09/24/2016    TRIG 71 09/24/2016       No results for input(s): CPK, CPKMB, TROPONINI, MB in the last 24 hours.    No results for input(s): CPK, CPKMB, TROPONINI, MB in the last 168 hours.      Lab Results   Component Value Date    TSH 2.040 02/20/2015       Lab Results   Component Value Date    HGBA1C 6.5 (H) 01/30/2015           Images and labs reviewed      Assessment/Plan     1. Acute Decompensated systolic/diastolic HF in patient with non ischemic CM. Continue dobutamine drip with some diuril today.  2. Severe MR likely functional, continue diuresis  3. PH, WHO type 2, continue diuresis  4. Metabolic Alkalosis likely from diuresis, Will monitor closely. Patient still acutely decompensated so will try Acetazolamide    Noah Robert MD  [facility]

## 2017-03-27 LAB
ALBUMIN SERPL BCP-MCNC: 3.1 G/DL
ALP SERPL-CCNC: 138 U/L
ALT SERPL W/O P-5'-P-CCNC: 17 U/L
ANION GAP SERPL CALC-SCNC: 12 MMOL/L
AST SERPL-CCNC: 24 U/L
BILIRUB DIRECT SERPL-MCNC: 0.9 MG/DL
BILIRUB SERPL-MCNC: 1.2 MG/DL
BUN SERPL-MCNC: 43 MG/DL
CALCIUM SERPL-MCNC: 9.3 MG/DL
CHLORIDE SERPL-SCNC: 88 MMOL/L
CO2 SERPL-SCNC: 33 MMOL/L
CREAT SERPL-MCNC: 1.2 MG/DL
EST. GFR  (AFRICAN AMERICAN): 53 ML/MIN/1.73 M^2
EST. GFR  (NON AFRICAN AMERICAN): 46 ML/MIN/1.73 M^2
GLUCOSE SERPL-MCNC: 85 MG/DL
MAGNESIUM SERPL-MCNC: 1.9 MG/DL
PHOSPHATE SERPL-MCNC: 4.5 MG/DL
POTASSIUM SERPL-SCNC: 4.8 MMOL/L
PROT SERPL-MCNC: 7.1 G/DL
SODIUM SERPL-SCNC: 133 MMOL/L

## 2017-03-27 PROCEDURE — 20000000 HC ICU ROOM

## 2017-03-27 PROCEDURE — 63600175 PHARM REV CODE 636 W HCPCS: Performed by: HOSPITALIST

## 2017-03-27 PROCEDURE — 83735 ASSAY OF MAGNESIUM: CPT

## 2017-03-27 PROCEDURE — 63600175 PHARM REV CODE 636 W HCPCS: Performed by: NURSE PRACTITIONER

## 2017-03-27 PROCEDURE — 99233 SBSQ HOSP IP/OBS HIGH 50: CPT | Mod: ,,, | Performed by: INTERNAL MEDICINE

## 2017-03-27 PROCEDURE — 94640 AIRWAY INHALATION TREATMENT: CPT

## 2017-03-27 PROCEDURE — 25000242 PHARM REV CODE 250 ALT 637 W/ HCPCS: Performed by: HOSPITALIST

## 2017-03-27 PROCEDURE — 25000003 PHARM REV CODE 250: Performed by: HOSPITALIST

## 2017-03-27 PROCEDURE — 84100 ASSAY OF PHOSPHORUS: CPT

## 2017-03-27 PROCEDURE — 94761 N-INVAS EAR/PLS OXIMETRY MLT: CPT

## 2017-03-27 PROCEDURE — 80048 BASIC METABOLIC PNL TOTAL CA: CPT

## 2017-03-27 PROCEDURE — 27000221 HC OXYGEN, UP TO 24 HOURS

## 2017-03-27 PROCEDURE — 25000003 PHARM REV CODE 250: Performed by: NURSE PRACTITIONER

## 2017-03-27 PROCEDURE — 80076 HEPATIC FUNCTION PANEL: CPT

## 2017-03-27 RX ORDER — BUMETANIDE 0.25 MG/ML
3 INJECTION INTRAMUSCULAR; INTRAVENOUS DAILY
Status: DISCONTINUED | OUTPATIENT
Start: 2017-03-27 | End: 2017-03-28

## 2017-03-27 RX ADMIN — AMIODARONE HYDROCHLORIDE 200 MG: 200 TABLET ORAL at 09:03

## 2017-03-27 RX ADMIN — ENOXAPARIN SODIUM 40 MG: 100 INJECTION SUBCUTANEOUS at 04:03

## 2017-03-27 RX ADMIN — ACETAZOLAMIDE 250 MG: 500 INJECTION, POWDER, LYOPHILIZED, FOR SOLUTION INTRAVENOUS at 12:03

## 2017-03-27 RX ADMIN — FLUTICASONE FUROATE AND VILANTEROL TRIFENATATE 1 PUFF: 200; 25 POWDER RESPIRATORY (INHALATION) at 11:03

## 2017-03-27 RX ADMIN — TRAMADOL HYDROCHLORIDE 100 MG: 50 TABLET, COATED ORAL at 08:03

## 2017-03-27 RX ADMIN — IPRATROPIUM BROMIDE AND ALBUTEROL SULFATE 3 ML: .5; 3 SOLUTION RESPIRATORY (INHALATION) at 10:03

## 2017-03-27 RX ADMIN — TRAZODONE HYDROCHLORIDE 150 MG: 100 TABLET ORAL at 08:03

## 2017-03-27 RX ADMIN — DOBUTAMINE IN DEXTROSE 5 MCG/KG/MIN: 200 INJECTION, SOLUTION INTRAVENOUS at 11:03

## 2017-03-27 RX ADMIN — IPRATROPIUM BROMIDE AND ALBUTEROL SULFATE 3 ML: .5; 3 SOLUTION RESPIRATORY (INHALATION) at 09:03

## 2017-03-27 RX ADMIN — POTASSIUM CHLORIDE 60 MEQ: 20 TABLET, EXTENDED RELEASE ORAL at 08:03

## 2017-03-27 RX ADMIN — BENZONATATE 100 MG: 100 CAPSULE ORAL at 08:03

## 2017-03-27 RX ADMIN — PRAVASTATIN SODIUM 40 MG: 40 TABLET ORAL at 09:03

## 2017-03-27 RX ADMIN — POTASSIUM CHLORIDE 60 MEQ: 20 TABLET, EXTENDED RELEASE ORAL at 09:03

## 2017-03-27 RX ADMIN — SODIUM CHLORIDE, PRESERVATIVE FREE 3 ML: 5 INJECTION INTRAVENOUS at 01:03

## 2017-03-27 RX ADMIN — ASPIRIN 81 MG 81 MG: 81 TABLET ORAL at 08:03

## 2017-03-27 RX ADMIN — SODIUM CHLORIDE, PRESERVATIVE FREE 3 ML: 5 INJECTION INTRAVENOUS at 09:03

## 2017-03-27 RX ADMIN — CLOPIDOGREL BISULFATE 75 MG: 75 TABLET ORAL at 09:03

## 2017-03-27 RX ADMIN — ALLOPURINOL 300 MG: 300 TABLET ORAL at 09:03

## 2017-03-27 RX ADMIN — PANTOPRAZOLE SODIUM 40 MG: 40 TABLET, DELAYED RELEASE ORAL at 09:03

## 2017-03-27 RX ADMIN — BUMETANIDE 3 MG: 0.25 INJECTION INTRAMUSCULAR; INTRAVENOUS at 01:03

## 2017-03-27 RX ADMIN — TRAMADOL HYDROCHLORIDE 100 MG: 50 TABLET, COATED ORAL at 11:03

## 2017-03-27 NOTE — PROGRESS NOTES
[facility]  [facility]iology  Progress Note    Patient Name: Jennifer Prescott  MRN: [id number]  Admission Date: 3/16/2017  Hospital Length of Stay: 11 days  Code Status: Full Code   Attending Physician: Jamel Putnam MD   Primary Care Physician: Lianna Mistry MD  Expected Discharge Date:   Principal Problem:Acute on chronic combined systolic and diastolic heart failure    Subjective:     Hospital Course:   3/16/2017 Presented to the ER with complaints of BAEZ, orthopnea, LE edema and decreased appetite. Admitted to [facility] for management of decompensated HF. 4+ BLE edema present with abdominal distension. Creatinine 1.6 and BNP 3344 with elevated total bilirubin. Continued on current home CHF medication regimen with IV Bumex  Labs pending. Abdominal distension and LE edema remains unchanged. Long discussion by Dr. Braga on 3/16/17 in regards to the severity of her heart failure and the overall concern given her recurrent decompensation despite aggressive treatment. IV diuretics held due to marginal BP 3/17/17 Oral CHF medications held with initiation of IV Bumex with 1.4 liters out. LE edema and orthopnea unchanged 3/18/17-3/19/17 Repeat discussion yesterday with encouragement to determine her wishes if she were to further decompensate ie intubation, CPR.   24 hours failure of diuresis  Likely due to loss of IV access.  Cr stable.  BAEZ and orthopnea persist. Continued on IV Bumex with oral Metolazone given 30minutes prior to AM dose. 3/20/2017 Aggressive diuresis remains in place with Bumex 3mg IV BID with Metolazone yesterday with 3.8 liters out negative 2.7 liters in past 24 hours and negative 4.6 liters since admission. Peripheral edema along with ascites and orthopnea remain. Working with PT/OT to prevent debility. Patient stated discussion with her daughter in the past in regards to her wishes consistent with living will and wishes to continue with aggressive treatment  3/21/2017 Repeat Metolazone yesterday along with IV Bumex BID yesterday with good response and 3.8 liters out overnight and negative 7.1 liters since admission. Orthopnea and BLE edema remains. Will hold off off Metolazone today due to rise in CO2 (36) and decrease in Cl concerning for contraction alkalosis and will continue IV Bumex BID today 3/22/2017 IV Bumex BID yesterday with 1.8 liters out overnight and negative 9 liters since admission. Orthopnea and peripheral edema essentially unchanged. CO2 down to 33 today. 3/23/2017 IV Bumex x 1 yesterday due to elevated CO2. CO2 improved today with repeat CXR with worsening right pleural effusion on comparison to admit CXR. SOB and peripheral edema essentially unchanged. Will give IV Bumex today with strict I&Os and daily weights. Discuss continued aggressive diuresis with staff vs initiation of inotropes for further CHF management 3/24/2017 Transferred to ICU yesterday with initiation of Dobutamine along with Bumex 3mg IV x 1. 3.3 liters out overnight and negative 2.2 in 24 hours and negative 12.7 liters since admission. Creatinine trending down and CO2 up to 36 today from 30. Reports feeling slightly better and able to lie in bed to sleep overnight although unable to lie flat. CVP 23-27 but unable to lie completely flat. Will continue IV Dobutamine drip and given additional dose of Bumex IV for continued diuresis-monitor labs closely 3/25/2017-3/26/2017 Continued on IV Dobutamine drip throughout the weekend with IV Diamox and IV Diuril with total of 4 liters out. Bumex held due to rising CO2. CXR with no marked improvement to pleural effusions 3/27/2017 1.4 liters out overnight with 1.39 liters in and essentially net zero in 24hours; negative 13 liters since admission. LE swelling and SOB persist however states she feels slightly better. Noted to be extremely weak with increased WOB with minimal activity (moving to the edge of the chair). Plans for additional dose of  Diamox today with IV Bumex since CO2 trended down. Will place rivas due to acute distress with activity and use BiPap prn        Review of Systems   Constitution: Negative for chills, diaphoresis, fever, weakness and malaise/fatigue.   Cardiovascular: Positive for dyspnea on exertion, leg swelling and orthopnea. Negative for chest pain, claudication, cyanosis, irregular heartbeat, near-syncope, palpitations, paroxysmal nocturnal dyspnea and syncope.   Respiratory: Positive for shortness of breath. Negative for cough and wheezing.    Gastrointestinal: Negative for abdominal pain, constipation, diarrhea, nausea and vomiting.     Objective:     Vital Signs (Most Recent):  Temp: 97.7 °F (36.5 °C) (03/27/17 0735)  Pulse: 70 (03/27/17 1102)  Resp: (!) 23 (03/27/17 1102)  BP: 130/79 (03/27/17 0900)  SpO2: 100 % (03/27/17 1102) Vital Signs (24h Range):  Temp:  [97.6 °F (36.4 °C)-98.1 °F (36.7 °C)] 97.7 °F (36.5 °C)  Pulse:  [69-91] 70  Resp:  [13-34] 23  SpO2:  [84 %-100 %] 100 %  BP: ()/(54-82) 130/79     Weight: 106.5 kg (234 lb 12.6 oz)  Body mass index is 37.9 kg/(m^2).     SpO2: 100 %  O2 Device (Oxygen Therapy): BiPAP      Intake/Output Summary (Last 24 hours) at 03/27/17 1424  Last data filed at 03/27/17 1118   Gross per 24 hour   Intake           936.73 ml   Output             1225 ml   Net          -288.27 ml       Lines/Drains/Airways     Central Venous Catheter Line                 Percutaneous Central Line Insertion/Assessment - triple lumen  03/23/17 2018 right internal jugular 3 days          Drain                 Urethral Catheter 03/27/17 1333 less than 1 day                Physical Exam   Constitutional: She is oriented to person, place, and time. She appears well-developed and well-nourished. No distress.   Cardiovascular: Normal rate and regular rhythm.  Exam reveals no gallop.    No murmur heard.  Pulmonary/Chest: She has decreased breath sounds in the right lower field and the left lower field.  She has rales.   Abdominal: Soft. Bowel sounds are normal. She exhibits no distension. There is no tenderness.   Musculoskeletal: She exhibits edema (4+ BLE edema present wtih anasarca present ).   Neurological: She is alert and oriented to person, place, and time.   Skin: Skin is warm and dry.       Significant Labs:       Recent Labs  Lab 03/27/17  0520   *   K 4.8   CL 88*   CO2 33*   BUN 43*   CREATININE 1.2   MG 1.9         Significant Imaging: Echocardiogram:   2D echo with color flow doppler:   Results for orders placed or performed during the hospital encounter of 03/01/17   2D echo with color flow doppler   Result Value Ref Range    EF 25 (A) 55 - 65    Mitral Valve Regurgitation SEVERE (A)     Diastolic Dysfunction Yes (A)     Est. PA Systolic Pressure 50.28 (A)     Pericardial Effusion NONE     Tricuspid Valve Regurgitation MODERATE TO SEVERE (A)      Assessment and Plan:     Brief HPI: Seen this morning on AM rounds while sitting in bedside chair. Reports her SOB is somewhat better but has been sleeping the chair still. Discussed POC with patient to include continuation of IV Dobutamine along with continued attempt for aggressively fluid removal-verbalized understanding. Long discussion with daughter in regards to overall hospitalization and concern for persistent ADHF with only moderate response to current treatment.    * Acute on chronic combined systolic and diastolic heart failure  Echo with severely depressed LV function with EF 25% with severe MR; aggressive diuresis since admission with moderate response but no marked improvement in SOB/BAEZ, negative 13.4 liters since admission; CXR with no marked improvement in pleural effusion and persistent edema and orthopnea; CO2 improved; will continue IV Dobutamine drip and give Diamox 250mg IVPB followed by Bumex 3mg IV x 1 today; will place rivas due to increased WOB with minimal movement; will use BiPap prn throughout the day; continue strict I&Os  and daily weights; discussed RHC with staff and feels not necessary at this time given her elevated CVP and continued ADHF and feels continued aggressive volume removal needed; continue to hold BB since Dobutamine in use; no ACEI/ARB due to marginal BP     Permanent atrial fibrillation  Paced rhythm; no afib noted in underlying rhythm this AM; no RVR noted on monitor; no BB while on Dobutamine; continue to monitor on telemetry; follow up on VEC results from GI to guide AC use; continue with DVT prophylaxis    CAD S/P percutaneous coronary angioplasty  S/p LAD PCI and s/p RCA PCI for  2015; stable; continue ASA, Plavix and Pravastatin only      CKD (chronic kidney disease) stage 3, GFR 30-59 ml/min  Creatinine 1.3 this AM; continuing to trend down with diuresis; stable at baseline currently; will continue to monitor closely     Iron deficiency anemia due to chronic blood loss  H&H stable since admission but remains below baseline; follow up on VEC results from last week     Pleural effusion, right  Unchanged on repeat CXR; continue with aggressive diuresis      Hypokalemia  Resolved; improvement with K+ replacement; continue BID KDur       VTE Risk Mitigation         Ordered     enoxaparin injection 40 mg  Daily     Route:  Subcutaneous        03/16/17 1559     Medium Risk of VTE  Once      03/16/17 1449          SEJAL Strickland, ANP  Cardiology  [facility]

## 2017-03-27 NOTE — PLAN OF CARE
03/27/17 1108   Discharge Reassessment   Assessment Type Discharge Planning Reassessment   Can the patient answer the patient profile reliably? Yes, cognitively intact   How does the patient rate their overall health at the present time? Poor   Describe the patient's ability to walk at the present time. Walks with the help of equipment   How often would a person be available to care for the patient? Often   Number of comorbid conditions (as recorded on the chart) Five or more   During the past month, has the patient often been bothered by feeling down, depressed or hopeless? No   During the past month, has the patient often been bothered by little interest or pleasure in doing things? No   Discharge plan remains the same: Yes   Provided patient/caregiver education on the expected discharge date and the discharge plan Yes   Discharge Plan A Home with family   Discharge Plan B Home Health

## 2017-03-27 NOTE — ASSESSMENT & PLAN NOTE
Echo with severely depressed LV function with EF 25% with severe MR; aggressive diuresis since admission with moderate response but no marked improvement in SOB/BAEZ, negative 13.4 liters since admission; CXR with no marked improvement in pleural effusion and persistent edema and orthopnea; CO2 improved; will continue IV Dobutamine drip and give Diamox 250mg IVPB followed by Bumex 3mg IV x 1 today; will place rivas due to increased WOB with minimal movement; will use BiPap prn throughout the day; continue strict I&Os and daily weights; discussed RHC with staff and feels not necessary at this time given her elevated CVP and continued ADHF and feels continued aggressive volume removal needed; continue to hold BB since Dobutamine in use; no ACEI/ARB due to marginal BP

## 2017-03-27 NOTE — CHAPLAIN
Patient was sitting on the edge of the bed with a breathing mask in place.  When I introduced myself she was minimally verbal in response.  I asked if I can do anything for her and she shook her head no.  She did not try to make a verbal response with the mask on.  I will follow up at a later time.

## 2017-03-27 NOTE — SUBJECTIVE & OBJECTIVE
Review of Systems   Constitution: Negative for chills, diaphoresis, fever, weakness and malaise/fatigue.   Cardiovascular: Positive for dyspnea on exertion, leg swelling and orthopnea. Negative for chest pain, claudication, cyanosis, irregular heartbeat, near-syncope, palpitations, paroxysmal nocturnal dyspnea and syncope.   Respiratory: Positive for shortness of breath. Negative for cough and wheezing.    Gastrointestinal: Negative for abdominal pain, constipation, diarrhea, nausea and vomiting.     Objective:     Vital Signs (Most Recent):  Temp: 97.7 °F (36.5 °C) (03/27/17 0735)  Pulse: 70 (03/27/17 1102)  Resp: (!) 23 (03/27/17 1102)  BP: 130/79 (03/27/17 0900)  SpO2: 100 % (03/27/17 1102) Vital Signs (24h Range):  Temp:  [97.6 °F (36.4 °C)-98.1 °F (36.7 °C)] 97.7 °F (36.5 °C)  Pulse:  [69-91] 70  Resp:  [13-34] 23  SpO2:  [84 %-100 %] 100 %  BP: ()/(54-82) 130/79     Weight: 106.5 kg (234 lb 12.6 oz)  Body mass index is 37.9 kg/(m^2).     SpO2: 100 %  O2 Device (Oxygen Therapy): BiPAP      Intake/Output Summary (Last 24 hours) at 03/27/17 1424  Last data filed at 03/27/17 1118   Gross per 24 hour   Intake           936.73 ml   Output             1225 ml   Net          -288.27 ml       Lines/Drains/Airways     Central Venous Catheter Line                 Percutaneous Central Line Insertion/Assessment - triple lumen  03/23/17 2018 right internal jugular 3 days          Drain                 Urethral Catheter 03/27/17 1333 less than 1 day                Physical Exam   Constitutional: She is oriented to person, place, and time. She appears well-developed and well-nourished. No distress.   Cardiovascular: Normal rate and regular rhythm.  Exam reveals no gallop.    No murmur heard.  Pulmonary/Chest: She has decreased breath sounds in the right lower field and the left lower field. She has rales.   Abdominal: Soft. Bowel sounds are normal. She exhibits no distension. There is no tenderness.    Musculoskeletal: She exhibits edema (4+ BLE edema present wtih anasarca present ).   Neurological: She is alert and oriented to person, place, and time.   Skin: Skin is warm and dry.       Significant Labs:       Recent Labs  Lab 03/27/17  0520   *   K 4.8   CL 88*   CO2 33*   BUN 43*   CREATININE 1.2   MG 1.9         Significant Imaging: Echocardiogram:   2D echo with color flow doppler:   Results for orders placed or performed during the hospital encounter of 03/01/17   2D echo with color flow doppler   Result Value Ref Range    EF 25 (A) 55 - 65    Mitral Valve Regurgitation SEVERE (A)     Diastolic Dysfunction Yes (A)     Est. PA Systolic Pressure 50.28 (A)     Pericardial Effusion NONE     Tricuspid Valve Regurgitation MODERATE TO SEVERE (A)

## 2017-03-28 LAB
ANION GAP SERPL CALC-SCNC: 11 MMOL/L
ANISOCYTOSIS BLD QL SMEAR: SLIGHT
BASOPHILS # BLD AUTO: 0.01 K/UL
BASOPHILS NFR BLD: 0.1 %
BUN SERPL-MCNC: 46 MG/DL
CALCIUM SERPL-MCNC: 9.3 MG/DL
CHLORIDE SERPL-SCNC: 88 MMOL/L
CO2 SERPL-SCNC: 32 MMOL/L
CREAT SERPL-MCNC: 1.2 MG/DL
DIFFERENTIAL METHOD: ABNORMAL
EOSINOPHIL # BLD AUTO: 0 K/UL
EOSINOPHIL NFR BLD: 0.1 %
ERYTHROCYTE [DISTWIDTH] IN BLOOD BY AUTOMATED COUNT: 17.1 %
EST. GFR  (AFRICAN AMERICAN): 53 ML/MIN/1.73 M^2
EST. GFR  (NON AFRICAN AMERICAN): 46 ML/MIN/1.73 M^2
GLUCOSE SERPL-MCNC: 87 MG/DL
HCT VFR BLD AUTO: 26.8 %
HGB BLD-MCNC: 7.7 G/DL
HYPOCHROMIA BLD QL SMEAR: ABNORMAL
LYMPHOCYTES # BLD AUTO: 0.5 K/UL
LYMPHOCYTES NFR BLD: 5.5 %
MAGNESIUM SERPL-MCNC: 2 MG/DL
MCH RBC QN AUTO: 26.5 PG
MCHC RBC AUTO-ENTMCNC: 28.7 %
MCV RBC AUTO: 92 FL
MONOCYTES # BLD AUTO: 0.6 K/UL
MONOCYTES NFR BLD: 7.2 %
NEUTROPHILS # BLD AUTO: 7.1 K/UL
NEUTROPHILS NFR BLD: 87.1 %
PHOSPHATE SERPL-MCNC: 5.1 MG/DL
PLATELET # BLD AUTO: 302 K/UL
PLATELET BLD QL SMEAR: ABNORMAL
PMV BLD AUTO: 10 FL
POIKILOCYTOSIS BLD QL SMEAR: SLIGHT
POLYCHROMASIA BLD QL SMEAR: ABNORMAL
POTASSIUM SERPL-SCNC: 5.5 MMOL/L
RBC # BLD AUTO: 2.91 M/UL
SODIUM SERPL-SCNC: 131 MMOL/L
STOMATOCYTES BLD QL SMEAR: PRESENT
WBC # BLD AUTO: 8.23 K/UL

## 2017-03-28 PROCEDURE — 94664 DEMO&/EVAL PT USE INHALER: CPT

## 2017-03-28 PROCEDURE — 63600175 PHARM REV CODE 636 W HCPCS: Performed by: HOSPITALIST

## 2017-03-28 PROCEDURE — 80048 BASIC METABOLIC PNL TOTAL CA: CPT

## 2017-03-28 PROCEDURE — 97802 MEDICAL NUTRITION INDIV IN: CPT

## 2017-03-28 PROCEDURE — 94761 N-INVAS EAR/PLS OXIMETRY MLT: CPT

## 2017-03-28 PROCEDURE — 99291 CRITICAL CARE FIRST HOUR: CPT | Mod: ,,, | Performed by: INTERNAL MEDICINE

## 2017-03-28 PROCEDURE — 20000000 HC ICU ROOM

## 2017-03-28 PROCEDURE — 25000003 PHARM REV CODE 250: Performed by: HOSPITALIST

## 2017-03-28 PROCEDURE — 99232 SBSQ HOSP IP/OBS MODERATE 35: CPT | Mod: ,,, | Performed by: INTERNAL MEDICINE

## 2017-03-28 PROCEDURE — 83735 ASSAY OF MAGNESIUM: CPT

## 2017-03-28 PROCEDURE — 63600175 PHARM REV CODE 636 W HCPCS: Performed by: NURSE PRACTITIONER

## 2017-03-28 PROCEDURE — 27000221 HC OXYGEN, UP TO 24 HOURS

## 2017-03-28 PROCEDURE — 94640 AIRWAY INHALATION TREATMENT: CPT

## 2017-03-28 PROCEDURE — 84100 ASSAY OF PHOSPHORUS: CPT

## 2017-03-28 PROCEDURE — 85025 COMPLETE CBC W/AUTO DIFF WBC: CPT

## 2017-03-28 PROCEDURE — 25000003 PHARM REV CODE 250: Performed by: NURSE PRACTITIONER

## 2017-03-28 RX ORDER — POTASSIUM CHLORIDE 20 MEQ/1
60 TABLET, EXTENDED RELEASE ORAL DAILY
Status: DISCONTINUED | OUTPATIENT
Start: 2017-03-29 | End: 2017-03-29

## 2017-03-28 RX ORDER — BUMETANIDE 0.25 MG/ML
3 INJECTION INTRAMUSCULAR; INTRAVENOUS ONCE
Status: DISCONTINUED | OUTPATIENT
Start: 2017-03-29 | End: 2017-03-28

## 2017-03-28 RX ORDER — DOBUTAMINE HYDROCHLORIDE 400 MG/100ML
2.5 INJECTION, SOLUTION INTRAVENOUS CONTINUOUS
Status: DISCONTINUED | OUTPATIENT
Start: 2017-03-28 | End: 2017-03-29

## 2017-03-28 RX ORDER — BUMETANIDE 0.25 MG/ML
3 INJECTION INTRAMUSCULAR; INTRAVENOUS ONCE
Status: COMPLETED | OUTPATIENT
Start: 2017-03-28 | End: 2017-03-28

## 2017-03-28 RX ADMIN — AMIODARONE HYDROCHLORIDE 200 MG: 200 TABLET ORAL at 09:03

## 2017-03-28 RX ADMIN — TRAMADOL HYDROCHLORIDE 100 MG: 50 TABLET, COATED ORAL at 06:03

## 2017-03-28 RX ADMIN — SODIUM CHLORIDE, PRESERVATIVE FREE 3 ML: 5 INJECTION INTRAVENOUS at 08:03

## 2017-03-28 RX ADMIN — PRAVASTATIN SODIUM 40 MG: 40 TABLET ORAL at 09:03

## 2017-03-28 RX ADMIN — FERROUS SULFATE TAB EC 325 MG (65 MG FE EQUIVALENT) 325 MG: 325 (65 FE) TABLET DELAYED RESPONSE at 08:03

## 2017-03-28 RX ADMIN — DOBUTAMINE IN DEXTROSE 2.5 MCG/KG/MIN: 200 INJECTION, SOLUTION INTRAVENOUS at 03:03

## 2017-03-28 RX ADMIN — TRAMADOL HYDROCHLORIDE 100 MG: 50 TABLET, COATED ORAL at 08:03

## 2017-03-28 RX ADMIN — BENZONATATE 100 MG: 100 CAPSULE ORAL at 08:03

## 2017-03-28 RX ADMIN — ALLOPURINOL 300 MG: 300 TABLET ORAL at 09:03

## 2017-03-28 RX ADMIN — ENOXAPARIN SODIUM 40 MG: 100 INJECTION SUBCUTANEOUS at 05:03

## 2017-03-28 RX ADMIN — TRAZODONE HYDROCHLORIDE 150 MG: 100 TABLET ORAL at 08:03

## 2017-03-28 RX ADMIN — BUMETANIDE 3 MG: 0.25 INJECTION INTRAMUSCULAR; INTRAVENOUS at 11:03

## 2017-03-28 RX ADMIN — PANTOPRAZOLE SODIUM 40 MG: 40 TABLET, DELAYED RELEASE ORAL at 09:03

## 2017-03-28 RX ADMIN — CLOPIDOGREL BISULFATE 75 MG: 75 TABLET ORAL at 09:03

## 2017-03-28 RX ADMIN — DOBUTAMINE IN DEXTROSE 5 MCG/KG/MIN: 200 INJECTION, SOLUTION INTRAVENOUS at 01:03

## 2017-03-28 RX ADMIN — ASPIRIN 81 MG 81 MG: 81 TABLET ORAL at 08:03

## 2017-03-28 RX ADMIN — ACETAZOLAMIDE 250 MG: 500 INJECTION, POWDER, LYOPHILIZED, FOR SOLUTION INTRAVENOUS at 10:03

## 2017-03-28 RX ADMIN — BENZONATATE 100 MG: 100 CAPSULE ORAL at 06:03

## 2017-03-28 RX ADMIN — FLUTICASONE FUROATE AND VILANTEROL TRIFENATATE 1 PUFF: 200; 25 POWDER RESPIRATORY (INHALATION) at 08:03

## 2017-03-28 RX ADMIN — SODIUM CHLORIDE, PRESERVATIVE FREE 3 ML: 5 INJECTION INTRAVENOUS at 10:03

## 2017-03-28 NOTE — PROGRESS NOTES
[facility]  Adult Nutrition  Progress Note    SUMMARY     Recommendations    Recommendation/Intervention:   1.Continue current diet.   2. Encourage good intake at meals.    Goals:   Pt will consume at least 50% intake at meals  Nutrition Goal Status: new  Communication of RD Recs: discussed on rounds    Continuum of Care Plan  Referral to Outpatient Services:  (pt to d/c on current diet)    Reason for Assessment  Reason for Assessment: length of stay  Diagnosis: cardiac disease  Relevent Medical History: carotid artery occulsion, arthritis, cardiomyopathy, CAD, HTN, COPD, sleep apnea, cholecystectomy, pacemaker      General Information Comments: Pt on 2gm low Na diet with 1500ml fluid restriction. Pt with fair intake at meals. Reports decreased appetite. Reports not addign salt toher foods but does not watch the sodium content in her diet. Pt with inadequate energy intake related to decreased appeite as evidenced by pt with inadequate po intake.    Nutrition Prescription Ordered  Current Diet Order: 2gm low Na Cardiac 1500ml fluid     Nutrition Risk Screen  Nutrition Risk Screen: no indicators present    Nutrition/Diet History  Patient Reported Diet/Restrictions/Preferences: low salt  Food Preferences: no Baptist or cultural food prefs identified  Factors Affecting Nutritional Intake: decreased appetite     Labs/Tests/Procedures/Meds  Pertinent Labs Reviewed: reviewed  Pertinent Labs Comments: Na 133L, BUN 43H, Alb 3.1L  Pertinent Medications Reviewed: reviewed  Pertinent Medications Comments: aspirin, ferrous sulfate, pantoprazole    Physical Findings  Overall Physical Appearance: overweight  Tubes:  (none)  Oral/Mouth Cavity: WDL  Skin:  (Roni 16-leg ulcers)    Anthropometrics  Height (inches): 65.98 in  Weight Method: Bed Scale  Weight (kg): 106.5 kg  Ideal Body Weight (IBW), Female: 129.9 lb  % Ideal Body Weight, Female (lb): 180.75 lb  BMI (kg/m2): 37.91  BMI Grade: 35 - 39.9 - obesity -  grade II     Estimated/Assessed Needs  Weight Used For Calorie Calculations: 59 kg (130 lb 1.1 oz) (IBW)   Height (cm): 167.6 cm  Energy Need Method: Kcal/kg (4846-6012 (30-35 kcal/kg IBW))  RMR (Story-St. Jeor Equation): 1601.12  Weight Used For Protein Calculations: 59 kg (130 lb 1.1 oz) (IBW)  Protein Requirements: 59-71g (1.0-1.2 g/kg)    Monitor and Evaluation  Food and Nutrient Intake: food and beverage intake  Food and Nutrient Adminstration: diet order  Physical Activity and Function: nutrition-related ADLs and IADLs  Anthropometric Measurements: weight  Biochemical Data, Medical Tests and Procedures: electrolyte and renal panel  Nutrition-Focused Physical Findings: overall appearance    Nutrition Risk  Level of Risk:  (1x/week)    Nutrition Follow-Up  RD Follow-up?: Yes    Assessment and Plan  No new Assessment & Plan notes have been filed under this hospital service since the last note was generated.  Service: Nutrition

## 2017-03-28 NOTE — PROGRESS NOTES
Left leg kerlix dressing saturated with yellow drainage. Right leg kerlix C/D/I but loose. Removed both dressing. Right leg intact. Left leg also intact. Bilateral legs/feet remain red/swollen/hardened/shiney. Could see weeping to left lower leg. Re-wrapped left leg with Mepilex AG foam (to weeping area) and kerlix/tape. Pt did not tolerate ace so they have not been used since patient in ICU. Right leg remains without dressing. Will update orders to instruct to wrap legs with kerlix if weeping. Apply lotion daily. Stressed that patient needs to elevate legs as much as possible. Pt was sitting up in chair when I came to assess. Pt agreed to recline in chair. Activity of reclining caused her to become SOB. Pt requested BiPAP. ROB Tovar and family in room. BiPAP applied.

## 2017-03-28 NOTE — PLAN OF CARE
Problem: Nutrition, Imbalanced: Inadequate Oral Intake (Adult)  Goal: Improved Oral Intake  Patient will demonstrate the desired outcomes by discharge/transition of care.  Outcome: Ongoing (interventions implemented as appropriate)  Recommendation/Intervention:   1.Continue current diet.   2. Encourage good intake at meals.     Goals:   Pt will consume at least 50% intake at meals  Nutrition Goal Status: new  Communication of RD Recs: discussed on rounds

## 2017-03-28 NOTE — ASSESSMENT & PLAN NOTE
Last CXR on Rajendra 3/26/2017 with improved from last week but remains worse than admission; will repeat CXR in AM

## 2017-03-28 NOTE — PLAN OF CARE
Pt has been sleeping in between care all morning, but harder to wake her up at present, and when asleep she will talk in her sleep, dreaming?, woke her up , talked with me but feel asleep again, sat's 95% heart rate 88,

## 2017-03-28 NOTE — PROGRESS NOTES
[facility]  Cardiology  Progress Note    Patient Name: Jennifer Prescott  MRN: [id number]  Admission Date: 3/16/2017  Hospital Length of Stay: 12 days  Code Status: Full Code   Attending Physician: Jamel Putnam MD   Primary Care Physician: Lianna Mistry MD  Expected Discharge Date:   Principal Problem:Acute on chronic combined systolic and diastolic heart failure    Subjective:     Hospital Course:   3/16/2017 Presented to the ER with complaints of BAEZ, orthopnea, LE edema and decreased appetite. Admitted to [facility] for management of decompensated HF. 4+ BLE edema present with abdominal distension. Creatinine 1.6 and BNP 3344 with elevated total bilirubin. Continued on current home CHF medication regimen with IV Bumex  Labs pending. Abdominal distension and LE edema remains unchanged. Long discussion by Dr. Braga on 3/16/17 in regards to the severity of her heart failure and the overall concern given her recurrent decompensation despite aggressive treatment. IV diuretics held due to marginal BP 3/17/17 Oral CHF medications held with initiation of IV Bumex with 1.4 liters out. LE edema and orthopnea unchanged 3/18/17-3/19/17 Repeat discussion yesterday with encouragement to determine her wishes if she were to further decompensate ie intubation, CPR.   24 hours failure of diuresis  Likely due to loss of IV access.  Cr stable.  BAEZ and orthopnea persist. Continued on IV Bumex with oral Metolazone given 30minutes prior to AM dose. 3/20/2017 Aggressive diuresis remains in place with Bumex 3mg IV BID with Metolazone yesterday with 3.8 liters out negative 2.7 liters in past 24 hours and negative 4.6 liters since admission. Peripheral edema along with ascites and orthopnea remain. Working with PT/OT to prevent debility. Patient stated discussion with her daughter in the past in regards to her wishes consistent with living will and wishes to continue with aggressive treatment  3/21/2017 Repeat Metolazone yesterday along with IV Bumex BID yesterday with good response and 3.8 liters out overnight and negative 7.1 liters since admission. Orthopnea and BLE edema remains. Will hold off off Metolazone today due to rise in CO2 (36) and decrease in Cl concerning for contraction alkalosis and will continue IV Bumex BID today 3/22/2017 IV Bumex BID yesterday with 1.8 liters out overnight and negative 9 liters since admission. Orthopnea and peripheral edema essentially unchanged. CO2 down to 33 today. 3/23/2017 IV Bumex x 1 yesterday due to elevated CO2. CO2 improved today with repeat CXR with worsening right pleural effusion on comparison to admit CXR. SOB and peripheral edema essentially unchanged. Will give IV Bumex today with strict I&Os and daily weights. Discuss continued aggressive diuresis with staff vs initiation of inotropes for further CHF management 3/24/2017 Transferred to ICU yesterday with initiation of Dobutamine along with Bumex 3mg IV x 1. 3.3 liters out overnight and negative 2.2 in 24 hours and negative 12.7 liters since admission. Creatinine trending down and CO2 up to 36 today from 30. Reports feeling slightly better and able to lie in bed to sleep overnight although unable to lie flat. CVP 23-27 but unable to lie completely flat. Will continue IV Dobutamine drip and given additional dose of Bumex IV for continued diuresis-monitor labs closely 3/25/2017-3/26/2017 Continued on IV Dobutamine drip throughout the weekend with IV Diamox and IV Diuril with total of 4 liters out. Bumex held due to rising CO2. CXR with no marked improvement to pleural effusions 3/27/2017 1.4 liters out overnight with 1.39 liters in and essentially net zero in 24hours; negative 13 liters since admission. LE swelling and SOB persist however states she feels slightly better. Noted to be extremely weak with increased WOB with minimal activity (moving to the edge of the chair). Plans for additional dose of  Diamox today with IV Bumex since CO2 trended down. Will place rivas due to acute distress with activity and use BiPap prn 3/28/2017 Given IV Diamox and Bumex with 2.2 liters out and negative 14.3 liters since admission. Reports feeling slightly better and able to sleep in bed. Repeat IV Diamox and Bumex today with plans to initiate Dobutamine wean. Updated per GI on [facility] results with evidence of AVMs and will need enteroscopy for treatment         Review of Systems   Constitution: Positive for weakness and malaise/fatigue. Negative for chills, diaphoresis and fever.   Cardiovascular: Positive for dyspnea on exertion, leg swelling and orthopnea. Negative for chest pain, claudication, cyanosis, irregular heartbeat, near-syncope, palpitations, paroxysmal nocturnal dyspnea and syncope.   Respiratory: Negative for cough, shortness of breath and wheezing.    Gastrointestinal: Positive for bloating. Negative for abdominal pain, constipation, diarrhea, nausea and vomiting.     Objective:     Vital Signs (Most Recent):  Temp: 98 °F (36.7 °C) (03/28/17 1115)  Pulse: 69 (03/28/17 0820)  Resp: 19 (03/28/17 0820)  BP: (!) 116/57 (03/28/17 0500)  SpO2: 96 % (03/28/17 0820) Vital Signs (24h Range):  Temp:  [97.6 °F (36.4 °C)-98.6 °F (37 °C)] 98 °F (36.7 °C)  Pulse:  [69-72] 69  Resp:  [17-34] 19  SpO2:  [83 %-100 %] 96 %  BP: ()/() 116/57     Weight: 106.5 kg (234 lb 12.6 oz)  Body mass index is 37.9 kg/(m^2).     SpO2: 96 %  O2 Device (Oxygen Therapy): nasal cannula w/ humidification      Intake/Output Summary (Last 24 hours) at 03/28/17 1355  Last data filed at 03/28/17 1300   Gross per 24 hour   Intake          1674.02 ml   Output             2635 ml   Net          -960.98 ml       Lines/Drains/Airways     Central Venous Catheter Line                 Percutaneous Central Line Insertion/Assessment - triple lumen  03/23/17 2018 right internal jugular 4 days          Drain                 Urethral Catheter 03/27/17 9921  1 day                Physical Exam   Constitutional: She is oriented to person, place, and time. She appears well-developed and well-nourished. No distress.   Cardiovascular: Normal rate and regular rhythm.  Exam reveals no gallop.    No murmur heard.  Pulmonary/Chest: Effort normal. She has decreased breath sounds in the right lower field and the left lower field.   Abdominal: Soft. Bowel sounds are normal. She exhibits no distension. There is no tenderness.   Musculoskeletal: She exhibits edema.   Neurological: She is alert and oriented to person, place, and time.   Skin: Skin is warm and dry.       Significant Labs:       Recent Labs  Lab 03/28/17  0555   *   K 5.5*   CL 88*   CO2 32*   BUN 46*   CREATININE 1.2   MG 2.0       Recent Labs  Lab 03/28/17  0942   WBC 8.23   RBC 2.91*   HGB 7.7*   HCT 26.8*      MCV 92   MCH 26.5*   MCHC 28.7*       Significant Imaging: Echocardiogram:   2D echo with color flow doppler:   Results for orders placed or performed during the hospital encounter of 03/01/17   2D echo with color flow doppler   Result Value Ref Range    EF 25 (A) 55 - 65    Mitral Valve Regurgitation SEVERE (A)     Diastolic Dysfunction Yes (A)     Est. PA Systolic Pressure 50.28 (A)     Pericardial Effusion NONE     Tricuspid Valve Regurgitation MODERATE TO SEVERE (A)      Assessment and Plan:     Brief HPI: Seen this morning on AM NP rounds and again this afternoon on rounds with Dr. Robert while sitting in bedside chair. Reports feeling better today compared to yesterday. Discussed POC with patient to include repeat IV Diuresis for volume removal with decrease of Dobutamine with close monitoring-verbalized understanding     * Acute on chronic combined systolic and diastolic heart failure  Echo with severely depressed LV function with EF 25% with severe MR; aggressive diuresis since admission with moderate response with 2.2 liters out with IV Diamox and IV Bumex; orthopnea and LE edema persist;  on IV Dobutamine drip @5mcg/kg/min since last week and will initiate wean today with decrease to 2.5mcg/kg/min; will repeat IV Diamox and Bumex today; strict I&Os and daily weights; will likely continue low dose Dobutamine for now given plan for possible GI procedure    Permanent atrial fibrillation  Paced rhythm with underlying afib; no RVR noted; on DVT prophylaxis only; AC on hold due to GIB and AVMs noted on [facility]    CAD S/P percutaneous coronary angioplasty  S/p LAD PCI and s/p RCA PCI for  2015; stable; continue ASA, Plavix and Pravastatin only      CKD (chronic kidney disease) stage 3, GFR 30-59 ml/min  Creatinine 1.2 this AM; improved since admission; stable at baseline; continue to monitor with IV diuresis     Iron deficiency anemia due to chronic blood loss  H&H 7.7 & 26.8 this AM; slightly lower than previous reading but not significantly lower than new baselines since GIB in January; GI consulted with [facility] last week with verbal report of AVM in [place] with need for enteroscopy- will attempt to do later this week     Pleural effusion, right  Last CXR on Rajendra 3/26/2017 with improved from last week but remains worse than admission; will repeat CXR in AM     Mitral regurgitation  Severe per echo; severity of MR further compromises CO; felt to be functional due to severely depressed LVEF; not a candidate for MVR or mitiral clip; will continue to treat medically and aggressively diurese as tolerated     Acute decompensated heart failure  As detailed above      VTE Risk Mitigation         Ordered     enoxaparin injection 40 mg  Daily     Route:  Subcutaneous        03/16/17 1559     Medium Risk of VTE  Once      03/16/17 1449          SEJAL Strickland, ANP  Cardiology  [facility]

## 2017-03-28 NOTE — SUBJECTIVE & OBJECTIVE
Review of Systems   Constitution: Positive for weakness and malaise/fatigue. Negative for chills, diaphoresis and fever.   Cardiovascular: Positive for dyspnea on exertion, leg swelling and orthopnea. Negative for chest pain, claudication, cyanosis, irregular heartbeat, near-syncope, palpitations, paroxysmal nocturnal dyspnea and syncope.   Respiratory: Negative for cough, shortness of breath and wheezing.    Gastrointestinal: Positive for bloating. Negative for abdominal pain, constipation, diarrhea, nausea and vomiting.     Objective:     Vital Signs (Most Recent):  Temp: 98 °F (36.7 °C) (03/28/17 1115)  Pulse: 69 (03/28/17 0820)  Resp: 19 (03/28/17 0820)  BP: (!) 116/57 (03/28/17 0500)  SpO2: 96 % (03/28/17 0820) Vital Signs (24h Range):  Temp:  [97.6 °F (36.4 °C)-98.6 °F (37 °C)] 98 °F (36.7 °C)  Pulse:  [69-72] 69  Resp:  [17-34] 19  SpO2:  [83 %-100 %] 96 %  BP: ()/() 116/57     Weight: 106.5 kg (234 lb 12.6 oz)  Body mass index is 37.9 kg/(m^2).     SpO2: 96 %  O2 Device (Oxygen Therapy): nasal cannula w/ humidification      Intake/Output Summary (Last 24 hours) at 03/28/17 1355  Last data filed at 03/28/17 1300   Gross per 24 hour   Intake          1674.02 ml   Output             2635 ml   Net          -960.98 ml       Lines/Drains/Airways     Central Venous Catheter Line                 Percutaneous Central Line Insertion/Assessment - triple lumen  03/23/17 2018 right internal jugular 4 days          Drain                 Urethral Catheter 03/27/17 1333 1 day                Physical Exam   Constitutional: She is oriented to person, place, and time. She appears well-developed and well-nourished. No distress.   Cardiovascular: Normal rate and regular rhythm.  Exam reveals no gallop.    No murmur heard.  Pulmonary/Chest: Effort normal. She has decreased breath sounds in the right lower field and the left lower field.   Abdominal: Soft. Bowel sounds are normal. She exhibits no distension. There  is no tenderness.   Musculoskeletal: She exhibits edema.   Neurological: She is alert and oriented to person, place, and time.   Skin: Skin is warm and dry.       Significant Labs:       Recent Labs  Lab 03/28/17  0555   *   K 5.5*   CL 88*   CO2 32*   BUN 46*   CREATININE 1.2   MG 2.0       Recent Labs  Lab 03/28/17  0942   WBC 8.23   RBC 2.91*   HGB 7.7*   HCT 26.8*      MCV 92   MCH 26.5*   MCHC 28.7*       Significant Imaging: Echocardiogram:   2D echo with color flow doppler:   Results for orders placed or performed during the hospital encounter of 03/01/17   2D echo with color flow doppler   Result Value Ref Range    EF 25 (A) 55 - 65    Mitral Valve Regurgitation SEVERE (A)     Diastolic Dysfunction Yes (A)     Est. PA Systolic Pressure 50.28 (A)     Pericardial Effusion NONE     Tricuspid Valve Regurgitation MODERATE TO SEVERE (A)

## 2017-03-28 NOTE — PLAN OF CARE
Problem: Patient Care Overview  Goal: Plan of Care Review  Outcome: Ongoing (interventions implemented as appropriate)  Pt on RA with sats of 96. Will continue to monitor.

## 2017-03-28 NOTE — PLAN OF CARE
Became sob past trying to recline in chair, has been sitting up with legs down since around 1200 when lunch came, had wanted pt to recline, but she felt she could not ,even at times sitting on edge of chair with arms on overbed table, she wanted  The cpap, cpap  6 on, pt's own mask 3l nc fed in, nasal cannula on, pt did not want to take it off, no 02 to cannula, once settled states she feels less sob, sat's holding 93--96%

## 2017-03-28 NOTE — ASSESSMENT & PLAN NOTE
H&H 7.7 & 26.8 this AM; slightly lower than previous reading but not significantly lower than new baselines since GIB in January; GI consulted with [facility] last week with verbal report of AVM in [place] with need for enteroscopy- will attempt to do later this week

## 2017-03-28 NOTE — PLAN OF CARE
Pt drinking shake, brought from daughter, told she should not be drinking so much fluid, only on 1500cc a day, said she won't drink too much

## 2017-03-28 NOTE — ASSESSMENT & PLAN NOTE
Creatinine 1.2 this AM; improved since admission; stable at baseline; continue to monitor with IV diuresis

## 2017-03-28 NOTE — ASSESSMENT & PLAN NOTE
Paced rhythm with underlying afib; no RVR noted; on DVT prophylaxis only; AC on hold due to GIB and AVMs noted on [facility]

## 2017-03-29 PROBLEM — R31.9 HEMATURIA: Status: ACTIVE | Noted: 2017-03-29

## 2017-03-29 LAB
ANION GAP SERPL CALC-SCNC: 11 MMOL/L
ANISOCYTOSIS BLD QL SMEAR: SLIGHT
BACTERIA #/AREA URNS HPF: ABNORMAL /HPF
BASOPHILS # BLD AUTO: 0.02 K/UL
BASOPHILS NFR BLD: 0.2 %
BILIRUB UR QL STRIP: ABNORMAL
BUN SERPL-MCNC: 48 MG/DL
CALCIUM SERPL-MCNC: 9.3 MG/DL
CHLORIDE SERPL-SCNC: 87 MMOL/L
CLARITY UR: ABNORMAL
CO2 SERPL-SCNC: 33 MMOL/L
COLOR UR: ABNORMAL
CREAT SERPL-MCNC: 1.3 MG/DL
DIFFERENTIAL METHOD: ABNORMAL
EOSINOPHIL # BLD AUTO: 0 K/UL
EOSINOPHIL NFR BLD: 0.4 %
ERYTHROCYTE [DISTWIDTH] IN BLOOD BY AUTOMATED COUNT: 17 %
EST. GFR  (AFRICAN AMERICAN): 48 ML/MIN/1.73 M^2
EST. GFR  (NON AFRICAN AMERICAN): 41 ML/MIN/1.73 M^2
GLUCOSE SERPL-MCNC: 76 MG/DL
GLUCOSE UR QL STRIP: ABNORMAL
HCT VFR BLD AUTO: 26.4 %
HGB BLD-MCNC: 7.9 G/DL
HGB UR QL STRIP: ABNORMAL
HYALINE CASTS #/AREA URNS LPF: 0 /LPF
HYPOCHROMIA BLD QL SMEAR: ABNORMAL
KETONES UR QL STRIP: ABNORMAL
LEUKOCYTE ESTERASE UR QL STRIP: ABNORMAL
LYMPHOCYTES # BLD AUTO: 0.7 K/UL
LYMPHOCYTES NFR BLD: 6.3 %
MAGNESIUM SERPL-MCNC: 2 MG/DL
MCH RBC QN AUTO: 27.2 PG
MCHC RBC AUTO-ENTMCNC: 29.9 %
MCV RBC AUTO: 91 FL
MICROSCOPIC COMMENT: ABNORMAL
MONOCYTES # BLD AUTO: 0.7 K/UL
MONOCYTES NFR BLD: 6.7 %
NEUTROPHILS # BLD AUTO: 9.4 K/UL
NEUTROPHILS NFR BLD: 86.4 %
NITRITE UR QL STRIP: NEGATIVE
PH UR STRIP: 7 [PH] (ref 5–8)
PHOSPHATE SERPL-MCNC: 4.7 MG/DL
PLATELET # BLD AUTO: 334 K/UL
PLATELET BLD QL SMEAR: ABNORMAL
PMV BLD AUTO: 10.1 FL
POLYCHROMASIA BLD QL SMEAR: ABNORMAL
POTASSIUM SERPL-SCNC: 4.7 MMOL/L
PROT UR QL STRIP: ABNORMAL
RBC # BLD AUTO: 2.9 M/UL
RBC #/AREA URNS HPF: >100 /HPF (ref 0–4)
SODIUM SERPL-SCNC: 131 MMOL/L
SP GR UR STRIP: 1.01 (ref 1–1.03)
SQUAMOUS #/AREA URNS HPF: 0 /HPF
URN SPEC COLLECT METH UR: ABNORMAL
UROBILINOGEN UR STRIP-ACNC: >=8 EU/DL
WBC # BLD AUTO: 10.94 K/UL
WBC #/AREA URNS HPF: 20 /HPF (ref 0–5)

## 2017-03-29 PROCEDURE — G8982 BODY POS GOAL STATUS: HCPCS | Mod: CJ

## 2017-03-29 PROCEDURE — 87086 URINE CULTURE/COLONY COUNT: CPT

## 2017-03-29 PROCEDURE — 25000242 PHARM REV CODE 250 ALT 637 W/ HCPCS: Performed by: HOSPITALIST

## 2017-03-29 PROCEDURE — 94761 N-INVAS EAR/PLS OXIMETRY MLT: CPT

## 2017-03-29 PROCEDURE — 83735 ASSAY OF MAGNESIUM: CPT

## 2017-03-29 PROCEDURE — 63600175 PHARM REV CODE 636 W HCPCS: Performed by: NURSE PRACTITIONER

## 2017-03-29 PROCEDURE — 80048 BASIC METABOLIC PNL TOTAL CA: CPT

## 2017-03-29 PROCEDURE — G8981 BODY POS CURRENT STATUS: HCPCS | Mod: CL

## 2017-03-29 PROCEDURE — 85025 COMPLETE CBC W/AUTO DIFF WBC: CPT

## 2017-03-29 PROCEDURE — 87088 URINE BACTERIA CULTURE: CPT

## 2017-03-29 PROCEDURE — 94640 AIRWAY INHALATION TREATMENT: CPT

## 2017-03-29 PROCEDURE — 63600175 PHARM REV CODE 636 W HCPCS: Performed by: HOSPITALIST

## 2017-03-29 PROCEDURE — 25000003 PHARM REV CODE 250: Performed by: HOSPITALIST

## 2017-03-29 PROCEDURE — 97164 PT RE-EVAL EST PLAN CARE: CPT

## 2017-03-29 PROCEDURE — 94664 DEMO&/EVAL PT USE INHALER: CPT

## 2017-03-29 PROCEDURE — 99233 SBSQ HOSP IP/OBS HIGH 50: CPT | Mod: ,,, | Performed by: INTERNAL MEDICINE

## 2017-03-29 PROCEDURE — 27000221 HC OXYGEN, UP TO 24 HOURS

## 2017-03-29 PROCEDURE — 25000003 PHARM REV CODE 250: Performed by: NURSE PRACTITIONER

## 2017-03-29 PROCEDURE — 20000000 HC ICU ROOM

## 2017-03-29 PROCEDURE — 84100 ASSAY OF PHOSPHORUS: CPT

## 2017-03-29 PROCEDURE — 87186 SC STD MICRODIL/AGAR DIL: CPT

## 2017-03-29 PROCEDURE — 87077 CULTURE AEROBIC IDENTIFY: CPT

## 2017-03-29 PROCEDURE — 97530 THERAPEUTIC ACTIVITIES: CPT

## 2017-03-29 PROCEDURE — 81000 URINALYSIS NONAUTO W/SCOPE: CPT

## 2017-03-29 RX ORDER — BUMETANIDE 0.25 MG/ML
3 INJECTION INTRAMUSCULAR; INTRAVENOUS DAILY
Status: DISCONTINUED | OUTPATIENT
Start: 2017-03-29 | End: 2017-03-29

## 2017-03-29 RX ORDER — POLYETHYLENE GLYCOL 3350 17 G/17G
17 POWDER, FOR SOLUTION ORAL 2 TIMES DAILY PRN
Status: DISCONTINUED | OUTPATIENT
Start: 2017-03-29 | End: 2017-04-04 | Stop reason: HOSPADM

## 2017-03-29 RX ORDER — BUMETANIDE 0.25 MG/ML
3 INJECTION INTRAMUSCULAR; INTRAVENOUS ONCE
Status: DISCONTINUED | OUTPATIENT
Start: 2017-03-29 | End: 2017-03-29

## 2017-03-29 RX ORDER — BUMETANIDE 0.25 MG/ML
1 INJECTION INTRAMUSCULAR; INTRAVENOUS DAILY
Status: DISCONTINUED | OUTPATIENT
Start: 2017-03-29 | End: 2017-03-29

## 2017-03-29 RX ORDER — BUMETANIDE 0.25 MG/ML
3 INJECTION INTRAMUSCULAR; INTRAVENOUS ONCE
Status: COMPLETED | OUTPATIENT
Start: 2017-03-29 | End: 2017-03-29

## 2017-03-29 RX ORDER — POTASSIUM CHLORIDE 20 MEQ/1
60 TABLET, EXTENDED RELEASE ORAL DAILY
Status: DISCONTINUED | OUTPATIENT
Start: 2017-03-30 | End: 2017-04-04 | Stop reason: HOSPADM

## 2017-03-29 RX ADMIN — PRAVASTATIN SODIUM 40 MG: 40 TABLET ORAL at 08:03

## 2017-03-29 RX ADMIN — TRAMADOL HYDROCHLORIDE 100 MG: 50 TABLET, COATED ORAL at 03:03

## 2017-03-29 RX ADMIN — ENOXAPARIN SODIUM 40 MG: 100 INJECTION SUBCUTANEOUS at 05:03

## 2017-03-29 RX ADMIN — TRAMADOL HYDROCHLORIDE 100 MG: 50 TABLET, COATED ORAL at 08:03

## 2017-03-29 RX ADMIN — ASPIRIN 81 MG 81 MG: 81 TABLET ORAL at 09:03

## 2017-03-29 RX ADMIN — BUMETANIDE 3 MG: 0.25 INJECTION INTRAMUSCULAR; INTRAVENOUS at 11:03

## 2017-03-29 RX ADMIN — AMIODARONE HYDROCHLORIDE 200 MG: 200 TABLET ORAL at 08:03

## 2017-03-29 RX ADMIN — TRAMADOL HYDROCHLORIDE 100 MG: 50 TABLET, COATED ORAL at 01:03

## 2017-03-29 RX ADMIN — FLUTICASONE FUROATE AND VILANTEROL TRIFENATATE 1 PUFF: 200; 25 POWDER RESPIRATORY (INHALATION) at 08:03

## 2017-03-29 RX ADMIN — ACETAZOLAMIDE 250 MG: 500 INJECTION, POWDER, LYOPHILIZED, FOR SOLUTION INTRAVENOUS at 10:03

## 2017-03-29 RX ADMIN — CLOPIDOGREL BISULFATE 75 MG: 75 TABLET ORAL at 08:03

## 2017-03-29 RX ADMIN — CYCLOBENZAPRINE HYDROCHLORIDE 10 MG: 10 TABLET, FILM COATED ORAL at 01:03

## 2017-03-29 RX ADMIN — TRAZODONE HYDROCHLORIDE 150 MG: 100 TABLET ORAL at 09:03

## 2017-03-29 RX ADMIN — BENZONATATE 100 MG: 100 CAPSULE ORAL at 01:03

## 2017-03-29 RX ADMIN — ALLOPURINOL 300 MG: 300 TABLET ORAL at 08:03

## 2017-03-29 RX ADMIN — SODIUM CHLORIDE, PRESERVATIVE FREE 3 ML: 5 INJECTION INTRAVENOUS at 09:03

## 2017-03-29 RX ADMIN — PANTOPRAZOLE SODIUM 40 MG: 40 TABLET, DELAYED RELEASE ORAL at 08:03

## 2017-03-29 RX ADMIN — FERROUS SULFATE TAB EC 325 MG (65 MG FE EQUIVALENT) 325 MG: 325 (65 FE) TABLET DELAYED RESPONSE at 09:03

## 2017-03-29 RX ADMIN — IPRATROPIUM BROMIDE AND ALBUTEROL SULFATE 3 ML: .5; 3 SOLUTION RESPIRATORY (INHALATION) at 04:03

## 2017-03-29 NOTE — PROGRESS NOTES
[facility]  Cardiology  Progress Note    Patient Name: Jennifer Prescott  MRN: [id number]  Admission Date: 3/16/2017  Hospital Length of Stay: 13 days  Code Status: Full Code   Attending Physician: Jamel Putnam MD   Primary Care Physician: Lianna Mistry MD  Expected Discharge Date:   Principal Problem:Acute on chronic combined systolic and diastolic heart failure    Subjective:     Hospital Course:   3/16/2017 Presented to the ER with complaints of BAEZ, orthopnea, LE edema and decreased appetite. Admitted to [facility] for management of decompensated HF. 4+ BLE edema present with abdominal distension. Creatinine 1.6 and BNP 3344 with elevated total bilirubin. Continued on current home CHF medication regimen with IV Bumex  Labs pending. Abdominal distension and LE edema remains unchanged. Long discussion by Dr. Braga on 3/16/17 in regards to the severity of her heart failure and the overall concern given her recurrent decompensation despite aggressive treatment. IV diuretics held due to marginal BP 3/17/17 Oral CHF medications held with initiation of IV Bumex with 1.4 liters out. LE edema and orthopnea unchanged 3/18/17-3/19/17 Repeat discussion yesterday with encouragement to determine her wishes if she were to further decompensate ie intubation, CPR.   24 hours failure of diuresis  Likely due to loss of IV access.  Cr stable.  BAEZ and orthopnea persist. Continued on IV Bumex with oral Metolazone given 30minutes prior to AM dose. 3/20/2017 Aggressive diuresis remains in place with Bumex 3mg IV BID with Metolazone yesterday with 3.8 liters out negative 2.7 liters in past 24 hours and negative 4.6 liters since admission. Peripheral edema along with ascites and orthopnea remain. Working with PT/OT to prevent debility. Patient stated discussion with her daughter in the past in regards to her wishes consistent with living will and wishes to continue with aggressive treatment  3/21/2017 Repeat Metolazone yesterday along with IV Bumex BID yesterday with good response and 3.8 liters out overnight and negative 7.1 liters since admission. Orthopnea and BLE edema remains. Will hold off off Metolazone today due to rise in CO2 (36) and decrease in Cl concerning for contraction alkalosis and will continue IV Bumex BID today 3/22/2017 IV Bumex BID yesterday with 1.8 liters out overnight and negative 9 liters since admission. Orthopnea and peripheral edema essentially unchanged. CO2 down to 33 today. 3/23/2017 IV Bumex x 1 yesterday due to elevated CO2. CO2 improved today with repeat CXR with worsening right pleural effusion on comparison to admit CXR. SOB and peripheral edema essentially unchanged. Will give IV Bumex today with strict I&Os and daily weights. Discuss continued aggressive diuresis with staff vs initiation of inotropes for further CHF management 3/24/2017 Transferred to ICU yesterday with initiation of Dobutamine along with Bumex 3mg IV x 1. 3.3 liters out overnight and negative 2.2 in 24 hours and negative 12.7 liters since admission. Creatinine trending down and CO2 up to 36 today from 30. Reports feeling slightly better and able to lie in bed to sleep overnight although unable to lie flat. CVP 23-27 but unable to lie completely flat. Will continue IV Dobutamine drip and given additional dose of Bumex IV for continued diuresis-monitor labs closely 3/25/2017-3/26/2017 Continued on IV Dobutamine drip throughout the weekend with IV Diamox and IV Diuril with total of 4 liters out. Bumex held due to rising CO2. CXR with no marked improvement to pleural effusions 3/27/2017 1.4 liters out overnight with 1.39 liters in and essentially net zero in 24hours; negative 13 liters since admission. LE swelling and SOB persist however states she feels slightly better. Noted to be extremely weak with increased WOB with minimal activity (moving to the edge of the chair). Plans for additional dose of  Diamox today with IV Bumex since CO2 trended down. Will place rivas due to acute distress with activity and use BiPap prn 3/28/2017 Given IV Diamox and Bumex with 2.2 liters out and negative 14.3 liters since admission. Reports feeling slightly better and able to sleep in bed. Repeat IV Diamox and Bumex today with plans to initiate Dobutamine wean. Updated per GI on VEC results with evidence of AVMs and will need enteroscopy for treatment 3/29/2017 Dobutamine decreased to 2.5mcg/kg/min; Repeat IV Diamox and IV Bumex yesterday with only 1.4 liters out but not net negative overnight. Repeat CXR today with improvement in bilateral pleural effusions. Plan for possible enteroscopy tomorrow for evaluation and treatment of AVMs noted on VEC        Review of Systems   Constitution: Positive for weakness and malaise/fatigue. Negative for chills, diaphoresis and fever.   Cardiovascular: Positive for dyspnea on exertion, leg swelling and orthopnea. Negative for chest pain, claudication, cyanosis, near-syncope, paroxysmal nocturnal dyspnea and syncope.   Respiratory: Positive for cough and shortness of breath.    Gastrointestinal: Positive for bloating and constipation (last BM 5 days ago ). Negative for abdominal pain, diarrhea, nausea and vomiting.     Objective:     Vital Signs (Most Recent):  Temp: 98.4 °F (36.9 °C) (03/29/17 0715)  Pulse: 69 (03/29/17 0900)  Resp: 19 (03/29/17 0900)  BP: 105/64 (03/29/17 0900)  SpO2: 97 % (03/29/17 0900) Vital Signs (24h Range):  Temp:  [97.6 °F (36.4 °C)-98.5 °F (36.9 °C)] 98.4 °F (36.9 °C)  Pulse:  [69-75] 69  Resp:  [12-47] 19  SpO2:  [88 %-100 %] 97 %  BP: ()/(47-77) 105/64     Weight: 106.5 kg (234 lb 12.6 oz)  Body mass index is 37.9 kg/(m^2).     SpO2: 97 %  O2 Device (Oxygen Therapy): nasal cannula      Intake/Output Summary (Last 24 hours) at 03/29/17 1036  Last data filed at 03/29/17 0900   Gross per 24 hour   Intake             1512 ml   Output             1415 ml   Net     "           97 ml       Lines/Drains/Airways     Central Venous Catheter Line                 Percutaneous Central Line Insertion/Assessment - triple lumen  03/23/17 2018 right internal jugular 5 days          Drain                 Urethral Catheter 03/27/17 1333 1 day                Physical Exam   Constitutional: She is oriented to person, place, and time. She has a sickly appearance. She appears ill.   Cardiovascular: Normal rate, regular rhythm, S1 normal and S2 normal.  Exam reveals no gallop.    No murmur heard.  Pulmonary/Chest: Tachypnea (wtih exertion ) noted. She has decreased breath sounds in the right lower field and the left lower field.   Abdominal: Soft. Bowel sounds are normal. She exhibits distension. There is no tenderness.   Musculoskeletal: She exhibits edema.   Neurological: She is alert and oriented to person, place, and time.   Skin: Skin is warm and dry.       Significant Labs:       Recent Labs  Lab 03/29/17  0439   *   K 4.7   CL 87*   CO2 33*   BUN 48*   CREATININE 1.3   MG 2.0       Recent Labs  Lab 03/29/17  0438   WBC 10.94   RBC 2.90*   HGB 7.9*   HCT 26.4*      MCV 91   MCH 27.2   MCHC 29.9*       Significant Imaging: Echocardiogram:   2D echo with color flow doppler:   Results for orders placed or performed during the hospital encounter of 03/01/17   2D echo with color flow doppler   Result Value Ref Range    EF 25 (A) 55 - 65    Mitral Valve Regurgitation SEVERE (A)     Diastolic Dysfunction Yes (A)     Est. PA Systolic Pressure 50.28 (A)     Pericardial Effusion NONE     Tricuspid Valve Regurgitation MODERATE TO SEVERE (A)      Assessment and Plan:     Brief HPI: Seen this AM on NP rounds while sitting in the bedside chair. Denies any complaints currently and states "I definitely feel better than when I came in". Discussed POC with patient to include continuation of Dobutamine IV with repeat IV Diamox and Bumex today with plans for possible GI procedure " tomorrow-verbalizes understanding of POC     * Acute on chronic combined systolic and diastolic heart failure  Echo with severely depressed LV function with EF 25% with severe MR; aggressive diuresis with IV Diamox and IV Bumex yesterday with 1.4 liters out; decreased from previous response of 2.2 liters; net positive overnight; CXR with improvement in pleural effusions; Dobutamine decreased to 2.5mcg/kg/min yesterday with plans to continue for now; will repeat IV Diamox and IV Bumex today; continue to use BiPap nightly and prn; clinically appearing with mild improvement and patient agrees; okay with proceed with GI procedure tomorrow from a cardiac standpoint-will confirm with staff (Dr. Robert)    Permanent atrial fibrillation  Paced rhythm with underlying afib; no RVR noted; on DVT prophylaxis only; AC on hold due to GIB and AVMs noted on VEC; will await enteroscopy and recs in regards to AC from GI     CAD S/P percutaneous coronary angioplasty  S/p LAD PCI and s/p RCA PCI for  2015; stable; continue ASA, Plavix and Pravastatin only      CKD (chronic kidney disease) stage 3, GFR 30-59 ml/min  Creatinine 1.3 this AM;  stable at baseline; continue to monitor with IV diuresis     Iron deficiency anemia due to chronic blood loss  H&H 7.9 & 26.4 this AM; similar to previous reading; GI on board for possible enteroscopy tomorrow for AVMs; follow GI recs     Pleural effusion, right  Repeat CXR today with continued bilateral pleural effusions R>L- improved from CXR on Rajendra 3/26/2017; continue with IV diuresis     Mitral regurgitation  Severe per echo; severity of MR further compromises CO; felt to be functional due to severely depressed LVEF; not a candidate for MVR or mitiral clip; will continue to treat medically and aggressively diurese as tolerated     Acute decompensated heart failure  As detailed above    Hematuria  Blood tinged urine noted to rivas this AM; denies any trauma with rivas; WBCs normal; afebrile;  will hold Lovenox if hematuria worsens or persists; will do urinalysis and urine culture      VTE Risk Mitigation         Ordered     enoxaparin injection 40 mg  Daily     Route:  Subcutaneous        03/16/17 155     Medium Risk of VTE  Once      03/16/17 1441          SEJAL Strickland, ANP  Cardiology  [facility]

## 2017-03-29 NOTE — ASSESSMENT & PLAN NOTE
Echo with severely depressed LV function with EF 25% with severe MR; aggressive diuresis with IV Diamox and IV Bumex yesterday with 1.4 liters out; decreased from previous response of 2.2 liters; net positive overnight; CXR with improvement in pleural effusions; Dobutamine decreased to 2.5mcg/kg/min yesterday with plans to continue for now; will repeat IV Diamox and IV Bumex today; continue to use BiPap nightly and prn; clinically appearing with mild improvement and patient agrees; okay with proceed with GI procedure tomorrow from a cardiac standpoint-will confirm with staff (Dr. Robert)

## 2017-03-29 NOTE — ASSESSMENT & PLAN NOTE
Repeat CXR today with continued bilateral pleural effusions R>L- improved from CXR on Rajendra 3/26/2017; continue with IV diuresis

## 2017-03-29 NOTE — ASSESSMENT & PLAN NOTE
Paced rhythm with underlying afib; no RVR noted; on DVT prophylaxis only; AC on hold due to GIB and AVMs noted on VEC; will await enteroscopy and recs in regards to AC from GI

## 2017-03-29 NOTE — PLAN OF CARE
Problem: Patient Care Overview  Goal: Plan of Care Review  Outcome: Ongoing (interventions implemented as appropriate)  ABCDE bundle  Cam negative, remains on 02 3lnc, fine crackles left post base, rec diamox and bumex today, good diuresis, very weak , sob with exertion, non pod cough, pain in lower legs and feet, dressing change to left leg done , continues to drain serous fluid, large amt, for small bowel egd tomorrow, bleeding small bowel, trying to inc activity , stood up from chair 8 min weak on feet

## 2017-03-29 NOTE — ASSESSMENT & PLAN NOTE
Blood tinged urine noted to rivas this AM; denies any trauma with rivas; WBCs normal; afebrile; will hold Lovenox if hematuria worsens or persists; will do urinalysis and urine culture

## 2017-03-29 NOTE — PLAN OF CARE
Pt stood at bedside with assist of 3 people, held on to a walker, had difficulty getting up from chair, once stood , very weak, and trembly, no breakdown or redness, noted at buttocks, but red post thighs, mepelex to sacral area, placed, pt seated , stood for about 8 min, less hematuria, noted, rivas irrigated with 130cc sterile water until clear, will monitor

## 2017-03-29 NOTE — ASSESSMENT & PLAN NOTE
H&H 7.9 & 26.4 this AM; similar to previous reading; GI on board for possible enteroscopy tomorrow for AVMs; follow GI recs

## 2017-03-29 NOTE — PLAN OF CARE
Problem: Physical Therapy Goal  Goal: Physical Therapy Goal  Goals to be met by: 2017     Patient will increase functional independence with mobility by performin. Supine to sit with Minimal Assistance  2. Sit to supine with Minimal Assistance  3. Sit to stand transfer with Minimal Assistance  4. Bed to chair transfer with Minimal Assistance using Rolling Walker  5. Gait x 15-25ft feet with Minimal Assistance using Rolling Walker.   6. Increased functional strength to 4- to 4 for transfers and amb.  Outcome: Ongoing (interventions implemented as appropriate)   Patient seen for re-eval; pt had been sitting in bedside chair a very long interval; pt very tired; sit to stand and transfer pivoting to bed with total A of dtr +2 persons for safety, knees buckled 2-3x during the step taking 4 steps bed to chair, assist to wt shift in order to take steps; sit to supine with total A x 2; pt very tired after sitting up in chair; increased time to complete tasks;  HR slightly elevated throughout activity; will continue to try to progress pt while in hospital; further recs TBD.

## 2017-03-29 NOTE — PROGRESS NOTES
"Gastroenterology  CC: Anemia    HPI 71 y.o. female here with heart failure exacerbation. No overt bleeding. No BMs in several days. Grupo diet. No chest pain today.       Past Medical History:   Diagnosis Date    *Atrial fibrillation     Anticoagulant long-term use     Arthritis     Cardiomyopathy     Carotid artery occlusion     CHF (congestive heart failure)     COPD (chronic obstructive pulmonary disease)     COPD exacerbation 2/19/2015    Coronary artery disease     RCA occluded with L to R collaterals and normal LAD and LCx    Hypertension     Internal bleeding     Sleep apnea     uses bipap         Review of Systems  General ROS: negative for chills, fever or weight loss  Cardiovascular ROS: no chest pain + dyspnea on exertion  Gastrointestinal ROS: no abdominal pain, change in bowel habits, or black/ bloody stools    Physical Examination  BP (!) 104/52  Pulse 69  Temp 97.6 °F (36.4 °C)  Resp (!) 26  Ht 5' 6" (1.676 m)  Wt 106.5 kg (234 lb 12.6 oz)  LMP  (LMP Unknown)  SpO2 (!) 91%  Breastfeeding? No  BMI 37.9 kg/m2  General appearance: alert, cooperative, no distress  HENT: Normocephalic, atraumatic, neck symmetrical, no nasal discharge   Lungs: decreased at bases, fair air movement, no dullness to percussion bilaterally  Heart: irregular rate, reg rhythm; no displacement of the PMI   Abdomen: soft, non-tender; bowel sounds normoactive; no organomegaly  Extremities: extremities symmetric; no clubbing, cyanosis, +edema naseem  Neurologic: Alert and oriented X 3, normal strength, normal coordination     Labs: hct 26    Imaging: Capsule images reviewed, suspect bleeding small bowel avms    Assessment/Plan: 72yo female with CAD, CHF, COPD with:    1. Anemia/Obscure GI bleed   - s/p capsule endoscopy   - suspect bleeding small bowel avms   - d/w cardiology   - will tentatively plan enteroscopy this week, d/w pt including risks/benefits   - monitor hct    "

## 2017-03-29 NOTE — PLAN OF CARE
Problem: Patient Care Overview  Goal: Plan of Care Review  Outcome: Ongoing (interventions implemented as appropriate)  Pt is responsive to tx interventions.     CNS: Pt unable to sleep, reports that she begins to doze off and dream but then wakes up spontaneously before actually able to sleep. She is extremely uncomfortable, frequent coughing and complains of thirst and body aches, no resolution with prn analgesia or hypnotics. She is becoming frustrated and wearied. Therapeutic presence utilized as well as comfort measures as I am able within order parameters. Pt does not seem actualized regarding her condition, neither does she seem to have a goal as to her treatment outcome. Her affect is melancholic.  CVS: Pt is in an intermittently paced rhythm, wide qrs appears to be ventricular, blood pressure remains consistent  systolic as it has been since her admission. H/H persists in being low, most recently 7.9/26.4. This in addition to pt's compromised cardiac output contribute to a very low tolerance for activity.  Pulmonary: Pt has an incessant, dry, tight, non-productive cough which causes her considerable discomfort. This is neither mitigated nor resolved with breathing treatments, benzonate perles, opioids or other medications although they are brought at pt's request. Oxygen at 3L nasal cannula and pt saturates anywhere from 90-96%, however she remains short of breath at rest, does not tolerate lying down and must support herself in tripod continuously. Activity assistance provided.  GI: Being cleared by cardiology for procedure to repain bleed in small intestine, no bloody bowel movements today  :  Integumentary:  Safety precautions maintained: Bed in low locked position with HOB elevated 30. Pt and family familiarized with nurse call button. Room adjacent to nurses station. Suction and ambu bag in room and functioning properly.   Family updated of changes in plan of care as appropriate. Modifications  made in consideration of pt's privacy and right to confidentiality.  Hourly rounding performed to ensure pt safety and ascertain needs.     ABCDE bundle

## 2017-03-29 NOTE — PLAN OF CARE
Assisted back to bed with Liliana, Pt, daughter and myself, weak on feet, but bed brought very close to bed, and then pivoted to bed, hob up 60-90 deg, co of sob, but none once settled, settled rather quickly this time, partial bath, shahid, and catheter care done, medicated for pain, in lower legs

## 2017-03-29 NOTE — PLAN OF CARE
Problem: Patient Care Overview  Goal: Plan of Care Review  ABCDE bundle  Cam negative, pt with exertional sob. , sometimes minimal exertion  Gets her sob, use cpap 3lnc when she  Really feels sob, consul to ;pt/ot  To inc her activity, even minimal activity, dobutamine being weaned, spent all day in recliner, tried to get her to elevate feet as much as she can yaya, just can't at times due to the sob, continues to weep from left leg, legs red, swollen

## 2017-03-29 NOTE — SUBJECTIVE & OBJECTIVE
Review of Systems   Constitution: Positive for weakness and malaise/fatigue. Negative for chills, diaphoresis and fever.   Cardiovascular: Positive for dyspnea on exertion, leg swelling and orthopnea. Negative for chest pain, claudication, cyanosis, near-syncope, paroxysmal nocturnal dyspnea and syncope.   Respiratory: Positive for cough and shortness of breath.    Gastrointestinal: Positive for bloating and constipation (last BM 5 days ago ). Negative for abdominal pain, diarrhea, nausea and vomiting.     Objective:     Vital Signs (Most Recent):  Temp: 98.4 °F (36.9 °C) (03/29/17 0715)  Pulse: 69 (03/29/17 0900)  Resp: 19 (03/29/17 0900)  BP: 105/64 (03/29/17 0900)  SpO2: 97 % (03/29/17 0900) Vital Signs (24h Range):  Temp:  [97.6 °F (36.4 °C)-98.5 °F (36.9 °C)] 98.4 °F (36.9 °C)  Pulse:  [69-75] 69  Resp:  [12-47] 19  SpO2:  [88 %-100 %] 97 %  BP: ()/(47-77) 105/64     Weight: 106.5 kg (234 lb 12.6 oz)  Body mass index is 37.9 kg/(m^2).     SpO2: 97 %  O2 Device (Oxygen Therapy): nasal cannula      Intake/Output Summary (Last 24 hours) at 03/29/17 1036  Last data filed at 03/29/17 0900   Gross per 24 hour   Intake             1512 ml   Output             1415 ml   Net               97 ml       Lines/Drains/Airways     Central Venous Catheter Line                 Percutaneous Central Line Insertion/Assessment - triple lumen  03/23/17 2018 right internal jugular 5 days          Drain                 Urethral Catheter 03/27/17 1333 1 day                Physical Exam   Constitutional: She is oriented to person, place, and time. She has a sickly appearance. She appears ill.   Cardiovascular: Normal rate, regular rhythm, S1 normal and S2 normal.  Exam reveals no gallop.    No murmur heard.  Pulmonary/Chest: Tachypnea (wtih exertion ) noted. She has decreased breath sounds in the right lower field and the left lower field.   Abdominal: Soft. Bowel sounds are normal. She exhibits distension. There is no  tenderness.   Musculoskeletal: She exhibits edema.   Neurological: She is alert and oriented to person, place, and time.   Skin: Skin is warm and dry.       Significant Labs:       Recent Labs  Lab 03/29/17  0439   *   K 4.7   CL 87*   CO2 33*   BUN 48*   CREATININE 1.3   MG 2.0       Recent Labs  Lab 03/29/17  0438   WBC 10.94   RBC 2.90*   HGB 7.9*   HCT 26.4*      MCV 91   MCH 27.2   MCHC 29.9*       Significant Imaging: Echocardiogram:   2D echo with color flow doppler:   Results for orders placed or performed during the hospital encounter of 03/01/17   2D echo with color flow doppler   Result Value Ref Range    EF 25 (A) 55 - 65    Mitral Valve Regurgitation SEVERE (A)     Diastolic Dysfunction Yes (A)     Est. PA Systolic Pressure 50.28 (A)     Pericardial Effusion NONE     Tricuspid Valve Regurgitation MODERATE TO SEVERE (A)

## 2017-03-29 NOTE — PLAN OF CARE
Up in chair, was up in chair all night, will wait to get  Back in bed past lunch, told her we may get her back sooner, no co of sob at present, 02 3lnc, did not use bipap last night, resp rate 26, very decreased breath sounds ant bases, with fine crackles left  Post base  Hematuria present, no clots, will attempt to flush, but will need to get pt to recline, and she does not want to do it now, could be due to movement of catheter, denies burning, will monitor

## 2017-03-30 ENCOUNTER — ANESTHESIA EVENT (OUTPATIENT)
Dept: ENDOSCOPY | Facility: HOSPITAL | Age: 72
DRG: 291 | End: 2017-03-30
Payer: COMMERCIAL

## 2017-03-30 PROBLEM — R31.9 HEMATURIA: Status: ACTIVE | Noted: 2017-03-30

## 2017-03-30 LAB
ABO + RH BLD: NORMAL
ANION GAP SERPL CALC-SCNC: 9 MMOL/L
APTT BLDCRRT: 28.8 SEC
BASOPHILS # BLD AUTO: 0.01 K/UL
BASOPHILS NFR BLD: 0.1 %
BLD GP AB SCN CELLS X3 SERPL QL: NORMAL
BLOOD GROUP ANTIBODIES SERPL: NORMAL
BUN SERPL-MCNC: 47 MG/DL
CALCIUM SERPL-MCNC: 9.1 MG/DL
CHLORIDE SERPL-SCNC: 89 MMOL/L
CO2 SERPL-SCNC: 35 MMOL/L
CREAT SERPL-MCNC: 1.3 MG/DL
DAT IGG-SP REAG RBC-IMP: NORMAL
DIFFERENTIAL METHOD: ABNORMAL
EOSINOPHIL # BLD AUTO: 0.1 K/UL
EOSINOPHIL NFR BLD: 0.5 %
ERYTHROCYTE [DISTWIDTH] IN BLOOD BY AUTOMATED COUNT: 17.4 %
EST. GFR  (AFRICAN AMERICAN): 48 ML/MIN/1.73 M^2
EST. GFR  (NON AFRICAN AMERICAN): 41 ML/MIN/1.73 M^2
GLUCOSE SERPL-MCNC: 79 MG/DL
HCT VFR BLD AUTO: 26.5 %
HGB BLD-MCNC: 7.8 G/DL
INR PPP: 1.4
LYMPHOCYTES # BLD AUTO: 0.6 K/UL
LYMPHOCYTES NFR BLD: 5.7 %
MAGNESIUM SERPL-MCNC: 2.1 MG/DL
MCH RBC QN AUTO: 26.8 PG
MCHC RBC AUTO-ENTMCNC: 29.4 %
MCV RBC AUTO: 91 FL
MONOCYTES # BLD AUTO: 0.8 K/UL
MONOCYTES NFR BLD: 7.2 %
NEUTROPHILS # BLD AUTO: 9.1 K/UL
NEUTROPHILS NFR BLD: 86.1 %
PHOSPHATE SERPL-MCNC: 5 MG/DL
PLATELET # BLD AUTO: 335 K/UL
PMV BLD AUTO: 10.1 FL
POTASSIUM SERPL-SCNC: 4 MMOL/L
PROTHROMBIN TIME: 14.7 SEC
RBC # BLD AUTO: 2.91 M/UL
SODIUM SERPL-SCNC: 133 MMOL/L
WBC # BLD AUTO: 10.59 K/UL

## 2017-03-30 PROCEDURE — 84100 ASSAY OF PHOSPHORUS: CPT

## 2017-03-30 PROCEDURE — 83735 ASSAY OF MAGNESIUM: CPT

## 2017-03-30 PROCEDURE — 86922 COMPATIBILITY TEST ANTIGLOB: CPT

## 2017-03-30 PROCEDURE — 97530 THERAPEUTIC ACTIVITIES: CPT

## 2017-03-30 PROCEDURE — 27000221 HC OXYGEN, UP TO 24 HOURS

## 2017-03-30 PROCEDURE — 85025 COMPLETE CBC W/AUTO DIFF WBC: CPT

## 2017-03-30 PROCEDURE — 86870 RBC ANTIBODY IDENTIFICATION: CPT

## 2017-03-30 PROCEDURE — 94640 AIRWAY INHALATION TREATMENT: CPT

## 2017-03-30 PROCEDURE — 86900 BLOOD TYPING SEROLOGIC ABO: CPT

## 2017-03-30 PROCEDURE — 63600175 PHARM REV CODE 636 W HCPCS: Performed by: NURSE PRACTITIONER

## 2017-03-30 PROCEDURE — 25000003 PHARM REV CODE 250: Performed by: NURSE PRACTITIONER

## 2017-03-30 PROCEDURE — 25000003 PHARM REV CODE 250: Performed by: HOSPITALIST

## 2017-03-30 PROCEDURE — 86880 COOMBS TEST DIRECT: CPT

## 2017-03-30 PROCEDURE — 97168 OT RE-EVAL EST PLAN CARE: CPT

## 2017-03-30 PROCEDURE — 86901 BLOOD TYPING SEROLOGIC RH(D): CPT

## 2017-03-30 PROCEDURE — 94761 N-INVAS EAR/PLS OXIMETRY MLT: CPT

## 2017-03-30 PROCEDURE — 94660 CPAP INITIATION&MGMT: CPT

## 2017-03-30 PROCEDURE — 97110 THERAPEUTIC EXERCISES: CPT

## 2017-03-30 PROCEDURE — 85730 THROMBOPLASTIN TIME PARTIAL: CPT

## 2017-03-30 PROCEDURE — 80048 BASIC METABOLIC PNL TOTAL CA: CPT

## 2017-03-30 PROCEDURE — 63600175 PHARM REV CODE 636 W HCPCS: Performed by: HOSPITALIST

## 2017-03-30 PROCEDURE — 20000000 HC ICU ROOM

## 2017-03-30 PROCEDURE — 85610 PROTHROMBIN TIME: CPT

## 2017-03-30 PROCEDURE — 99233 SBSQ HOSP IP/OBS HIGH 50: CPT | Mod: ,,, | Performed by: INTERNAL MEDICINE

## 2017-03-30 RX ORDER — BUMETANIDE 0.25 MG/ML
3 INJECTION INTRAMUSCULAR; INTRAVENOUS ONCE
Status: COMPLETED | OUTPATIENT
Start: 2017-03-30 | End: 2017-03-30

## 2017-03-30 RX ORDER — BUMETANIDE 0.25 MG/ML
3 INJECTION INTRAMUSCULAR; INTRAVENOUS ONCE
Status: DISCONTINUED | OUTPATIENT
Start: 2017-03-30 | End: 2017-03-30

## 2017-03-30 RX ORDER — BUMETANIDE 0.25 MG/ML
3 INJECTION INTRAMUSCULAR; INTRAVENOUS DAILY
Status: DISCONTINUED | OUTPATIENT
Start: 2017-03-30 | End: 2017-03-30

## 2017-03-30 RX ADMIN — AMIODARONE HYDROCHLORIDE 200 MG: 200 TABLET ORAL at 08:03

## 2017-03-30 RX ADMIN — POTASSIUM CHLORIDE 60 MEQ: 20 TABLET, EXTENDED RELEASE ORAL at 08:03

## 2017-03-30 RX ADMIN — SODIUM CHLORIDE, PRESERVATIVE FREE 3 ML: 5 INJECTION INTRAVENOUS at 02:03

## 2017-03-30 RX ADMIN — FERROUS SULFATE TAB EC 325 MG (65 MG FE EQUIVALENT) 325 MG: 325 (65 FE) TABLET DELAYED RESPONSE at 09:03

## 2017-03-30 RX ADMIN — ALLOPURINOL 300 MG: 300 TABLET ORAL at 08:03

## 2017-03-30 RX ADMIN — PANTOPRAZOLE SODIUM 40 MG: 40 TABLET, DELAYED RELEASE ORAL at 08:03

## 2017-03-30 RX ADMIN — ACETAMINOPHEN 650 MG: 325 TABLET ORAL at 06:03

## 2017-03-30 RX ADMIN — ACETAZOLAMIDE SODIUM 250 MG: 500 INJECTION, POWDER, LYOPHILIZED, FOR SOLUTION INTRAVENOUS at 12:03

## 2017-03-30 RX ADMIN — FLUTICASONE FUROATE AND VILANTEROL TRIFENATATE 1 PUFF: 200; 25 POWDER RESPIRATORY (INHALATION) at 07:03

## 2017-03-30 RX ADMIN — ASPIRIN 81 MG 81 MG: 81 TABLET ORAL at 09:03

## 2017-03-30 RX ADMIN — PRAVASTATIN SODIUM 40 MG: 40 TABLET ORAL at 08:03

## 2017-03-30 RX ADMIN — SODIUM CHLORIDE, PRESERVATIVE FREE 3 ML: 5 INJECTION INTRAVENOUS at 06:03

## 2017-03-30 RX ADMIN — TRAZODONE HYDROCHLORIDE 150 MG: 100 TABLET ORAL at 09:03

## 2017-03-30 RX ADMIN — SODIUM CHLORIDE, PRESERVATIVE FREE 3 ML: 5 INJECTION INTRAVENOUS at 09:03

## 2017-03-30 RX ADMIN — ENOXAPARIN SODIUM 40 MG: 100 INJECTION SUBCUTANEOUS at 05:03

## 2017-03-30 RX ADMIN — CLOPIDOGREL BISULFATE 75 MG: 75 TABLET ORAL at 08:03

## 2017-03-30 RX ADMIN — BUMETANIDE 3 MG: 0.25 INJECTION INTRAMUSCULAR; INTRAVENOUS at 01:03

## 2017-03-30 NOTE — PROGRESS NOTES
[facility]  Cardiology  Progress Note    Patient Name: Jennifer Prescott  MRN: [id number]  Admission Date: 3/16/2017  Hospital Length of Stay: 14 days  Code Status: Full Code   Attending Physician: Jamel Putnam MD   Primary Care Physician: Lianna Mistry MD  Expected Discharge Date:   Principal Problem:Acute on chronic combined systolic and diastolic heart failure    Subjective:     Hospital Course:   3/16/2017 Presented to the ER with complaints of BAEZ, orthopnea, LE edema and decreased appetite. Admitted to [facility] Medicine for management of decompensated HF. 4+ BLE edema present with abdominal distension. Creatinine 1.6 and BNP 3344 with elevated total bilirubin. Continued on current home CHF medication regimen with IV Bumex  Labs pending. Abdominal distension and LE edema remains unchanged. Long discussion by Dr. Braga on 3/16/17 in regards to the severity of her heart failure and the overall concern given her recurrent decompensation despite aggressive treatment. IV diuretics held due to marginal BP 3/17/17 Oral CHF medications held with initiation of IV Bumex with 1.4 liters out. LE edema and orthopnea unchanged 3/18/17-3/19/17 Repeat discussion yesterday with encouragement to determine her wishes if she were to further decompensate ie intubation, CPR.   24 hours failure of diuresis  Likely due to loss of IV access.  Cr stable.  BAEZ and orthopnea persist. Continued on IV Bumex with oral Metolazone given 30minutes prior to AM dose. 3/20/2017 Aggressive diuresis remains in place with Bumex 3mg IV BID with Metolazone yesterday with 3.8 liters out negative 2.7 liters in past 24 hours and negative 4.6 liters since admission. Peripheral edema along with ascites and orthopnea remain. Working with PT/OT to prevent debility. Patient stated discussion with her daughter in the past in regards to her wishes consistent with living will and wishes to continue with aggressive treatment  3/21/2017 Repeat Metolazone yesterday along with IV Bumex BID yesterday with good response and 3.8 liters out overnight and negative 7.1 liters since admission. Orthopnea and BLE edema remains. Will hold off off Metolazone today due to rise in CO2 (36) and decrease in Cl concerning for contraction alkalosis and will continue IV Bumex BID today 3/22/2017 IV Bumex BID yesterday with 1.8 liters out overnight and negative 9 liters since admission. Orthopnea and peripheral edema essentially unchanged. CO2 down to 33 today. 3/23/2017 IV Bumex x 1 yesterday due to elevated CO2. CO2 improved today with repeat CXR with worsening right pleural effusion on comparison to admit CXR. SOB and peripheral edema essentially unchanged. Will give IV Bumex today with strict I&Os and daily weights. Discuss continued aggressive diuresis with staff vs initiation of inotropes for further CHF management 3/24/2017 Transferred to ICU yesterday with initiation of Dobutamine along with Bumex 3mg IV x 1. 3.3 liters out overnight and negative 2.2 in 24 hours and negative 12.7 liters since admission. Creatinine trending down and CO2 up to 36 today from 30. Reports feeling slightly better and able to lie in bed to sleep overnight although unable to lie flat. CVP 23-27 but unable to lie completely flat. Will continue IV Dobutamine drip and given additional dose of Bumex IV for continued diuresis-monitor labs closely 3/25/2017-3/26/2017 Continued on IV Dobutamine drip throughout the weekend with IV Diamox and IV Diuril with total of 4 liters out. Bumex held due to rising CO2. CXR with no marked improvement to pleural effusions 3/27/2017 1.4 liters out overnight with 1.39 liters in and essentially net zero in 24hours; negative 13 liters since admission. LE swelling and SOB persist however states she feels slightly better. Noted to be extremely weak with increased WOB with minimal activity (moving to the edge of the chair). Plans for additional dose of  Diamox today with IV Bumex since CO2 trended down. Will place rivas due to acute distress with activity and use BiPap prn 3/28/2017 Given IV Diamox and Bumex with 2.2 liters out and negative 14.3 liters since admission. Reports feeling slightly better and able to sleep in bed. Repeat IV Diamox and Bumex today with plans to initiate Dobutamine wean. Updated per GI on VEC results with evidence of AVMs and will need enteroscopy for treatment 3/29/2017 Dobutamine decreased to 2.5mcg/kg/min; Repeat IV Diamox and IV Bumex yesterday with only 1.4 liters out but not net negative overnight. Repeat CXR today with improvement in bilateral pleural effusions. Plan for possible enteroscopy tomorrow for evaluation and treatment of AVMs noted on VEC [id number] Weaned off Dobutamine yesterday evening with repeat IV Diamox and IV Bumex with 2 liters out. Reports SOB improved. Able to lie flat this AM and working with PT to improve conditioning. Plan for enteroscopy tomorrow by GI        Review of Systems   Constitution: Positive for weakness. Negative for chills, diaphoresis and fever.   Cardiovascular: Positive for dyspnea on exertion (improving) and leg swelling (improving). Negative for chest pain, claudication, cyanosis, near-syncope, orthopnea, palpitations, paroxysmal nocturnal dyspnea and syncope.   Respiratory: Negative for cough, shortness of breath and wheezing.    Gastrointestinal: Negative for abdominal pain, constipation, diarrhea, nausea and vomiting.     Objective:     Vital Signs (Most Recent):  Temp: 97.8 °F (36.6 °C) (03/30/17 1136)  Pulse: 69 (03/30/17 1300)  Resp: (!) 25 (03/30/17 1300)  BP: 95/65 (03/30/17 1300)  SpO2: (!) 90 % (03/30/17 1300) Vital Signs (24h Range):  Temp:  [97.4 °F (36.3 °C)-97.8 °F (36.6 °C)] 97.8 °F (36.6 °C)  Pulse:  [69-84] 69  Resp:  [12-32] 25  SpO2:  [81 %-99 %] 90 %  BP: ()/(49-77) 95/65     Weight: 126.9 kg (279 lb 12.2 oz)  Body mass index is 45.16 kg/(m^2).     SpO2: (!) 90  %  O2 Device (Oxygen Therapy): nasal cannula      Intake/Output Summary (Last 24 hours) at 03/30/17 1332  Last data filed at 03/30/17 1300   Gross per 24 hour   Intake              590 ml   Output             1725 ml   Net            -1135 ml       Lines/Drains/Airways     Central Venous Catheter Line                 Percutaneous Central Line Insertion/Assessment - triple lumen  03/23/17 2018 right internal jugular 6 days          Drain                 Urethral Catheter 03/27/17 1333 2 days                Physical Exam   Constitutional: She is oriented to person, place, and time. She appears well-developed and well-nourished. No distress.   Cardiovascular: Normal rate and regular rhythm.  Exam reveals no gallop.    No murmur heard.  Pulmonary/Chest: Effort normal. She has decreased breath sounds in the right lower field and the left lower field.   Abdominal: Soft. Bowel sounds are normal. She exhibits no distension. There is no tenderness.   Musculoskeletal: She exhibits edema.   Neurological: She is alert and oriented to person, place, and time.   Skin: Skin is warm and dry. There is pallor.       Significant Labs:       Recent Labs  Lab 03/30/17  0404 03/30/17  0900   *  --    K 4.0  --    CL 89*  --    CO2 35*  --    BUN 47*  --    CREATININE 1.3  --    MG  --  2.1       Recent Labs  Lab 03/30/17  0900   WBC 10.59   RBC 2.91*   HGB 7.8*   HCT 26.5*      MCV 91   MCH 26.8*   MCHC 29.4*       Significant Imaging: Echocardiogram:   2D echo with color flow doppler:   Results for orders placed or performed during the hospital encounter of 03/01/17   2D echo with color flow doppler   Result Value Ref Range    EF 25 (A) 55 - 65    Mitral Valve Regurgitation SEVERE (A)     Diastolic Dysfunction Yes (A)     Est. PA Systolic Pressure 50.28 (A)     Pericardial Effusion NONE     Tricuspid Valve Regurgitation MODERATE TO SEVERE (A)      Assessment and Plan:     Brief HPI: Seen this morning on NP rounds and again  "later in the morning on rounds with Dr. Robert. Seen lying flat in bed with no tachypnea or acute distress noted and working with PT. States "I feel better and have been able to sleep in the bed". Discussed POC with patient to include continuation of IV diuresis with monitoring since IV Dobutamine discontinued along with PT for strengthening and plans for GI scope in AM-agrees with POC    * Acute on chronic combined systolic and diastolic heart failure  Echo with severely depressed LV function with EF 25% with severe MR; aggressive diuresis with IV Diamox and IV Bumex continued and negative approximately 15 liters since admission; effusion improving as well as peripheral edema; do not expect peripheral edema to fully resolve given severe TR; CVP appears inaccurate and likely skewed given severe TR; will repeat IV Diamox and Bumex today but definitely approaching a euvolemic state; ARB/ACEI held due to marginal BP and elevated creatinine; BB held due to marginal BP as well and Dobutamine use; will monitor BP and attempt to initiate BB in next 24-48 hours depending on BP;  okay to proceed with GI procedure tomorrow from a cardiac standpoint with anesthesia use; HF with improved compensation and improved compared to admission; will remain in the ICU for now and anticipate transfer to the floor after enteroscopy completed     Permanent atrial fibrillation  Paced rhythm with underlying afib; no RVR noted; on DVT prophylaxis only; continue to hold AC; follow up on enteroscopy results tomorrow for AC recs per GI     CAD S/P percutaneous coronary angioplasty  Stable; continue ASA, Plavix and Pravastatin only      CKD (chronic kidney disease) stage 3, GFR 30-59 ml/min  Creatinine 1.3 this AM; stable at baseline; continue to monitor with IV diuresis     Iron deficiency anemia due to chronic blood loss  H&H unchanged from yesteraday; remains 7-8/26-28 since earlier this year; plans for enteroscopy tomorrow; will type and screen " for 2 units today; hold off on transfusion for now but if Hgb goes lower than 7 will need to transfuse     Pleural effusion, right  Repeat CXR yesterday with improvement in bilateral pleural effusions; improved on PE as well; continue with IV diuresis     Mitral regurgitation  Severe per echo;  functional related due to severely depressed LVEF; not a candidate for MVR or mitiral clip; will continue to treat medically and aggressively diurese as tolerated     Acute decompensated heart failure  As detailed above    Hematuria  Blood tinged urine noted to rivas yesterday; improved this AM; urinalysis with 3+ blood, 3+ leukocytes; no nitrites or bacteria; urine culture with presumptive Ecoli; WBCs normal and afebrile; will likely hold off on abx for now; will attempt to discontinue rivas       VTE Risk Mitigation         Ordered     enoxaparin injection 40 mg  Daily     Route:  Subcutaneous        03/16/17 1559     Medium Risk of VTE  Once      03/16/17 1449          SEJAL Strickland, ANP  Cardiology  [facility]

## 2017-03-30 NOTE — PLAN OF CARE
Problem: Physical Therapy Goal  Goal: Physical Therapy Goal  Goals to be met by: 2017     Patient will increase functional independence with mobility by performin. Supine to sit with Minimal Assistance  2. Sit to supine with Minimal Assistance  3. Sit to stand transfer with Minimal Assistance  4. Bed to chair transfer with Minimal Assistance using Rolling Walker  5. Gait x 15-25ft feet with Minimal Assistance using Rolling Walker.   6. Increased functional strength to 4- to 4 for transfers and amb.   Outcome: Ongoing (interventions implemented as appropriate)  Patient tolerated BLE/UE active and minimal resistive exercises; intermittent bouts of tachycardia throughout; occasional SOB with pursed lip breathing encouraged; will cont with POC as tolerated.

## 2017-03-30 NOTE — PT/OT/SLP RE-EVAL
Physical Therapy  Re-evaluation/Treatment    Jennifer Prescott   MRN: [id number]   Admitting Diagnosis: Acute on chronic combined systolic and diastolic heart failure    PT Received On: 03/29/17  PT Start Time: 1440     PT Stop Time: 1503    PT Total Time (min): 23 min       Billable Minutes:  Re-eval 10 minutes and Therapeutic Activity 13 minutes    Diagnosis: Acute on chronic combined systolic and diastolic heart failure  The primary encounter diagnosis was Chronic combined systolic and diastolic congestive heart failure. Diagnoses of SOB (shortness of breath), Congestive heart failure, unspecified congestive heart failure chronicity, unspecified congestive heart failure type, and Acute on chronic combined systolic and diastolic heart failure were also pertinent to this visit.      Past Medical History:   Diagnosis Date    *Atrial fibrillation     Anticoagulant long-term use     Arthritis     Cardiomyopathy     Carotid artery occlusion     CHF (congestive heart failure)     COPD (chronic obstructive pulmonary disease)     COPD exacerbation 2/19/2015    Coronary artery disease     RCA occluded with L to R collaterals and normal LAD and LCx    Hypertension     Internal bleeding     Sleep apnea     uses bipap      Past Surgical History:   Procedure Laterality Date    ABDOMINAL SURGERY      APPENDECTOMY      CARDIAC CATHETERIZATION      CARDIAC DEFIBRILLATOR PLACEMENT  10/9/2012    bivent AICD placed 10/2012 and revised 12/2012    CARDIAC PACEMAKER PLACEMENT  9/21/2010    [facility]    CARDIOVERSION  3/6/2015    CAROTID ENDARTERECTOMY Left     CHOLECYSTECTOMY      COLONOSCOPY N/A 7/11/2016    Procedure: COLONOSCOPY;  Surgeon: Rafael Pérez MD;  Location: [facility] ENDO;  Service: Endoscopy;  Laterality: N/A;    COLONOSCOPY N/A 2/17/2017    Procedure: COLONOSCOPY;  Surgeon: Giovanni Bronson MD;  Location: [facility] ENDO ([address] [place] [address]);  Service: Endoscopy;  Laterality: N/A;    CORONARY ANGIOPLASTY WITH STENT  PLACEMENT  2015    3 JAMAL to RCA    CRT-D implantation      ESOPHAGOGASTRODUODENOSCOPY      GALLBLADDER SURGERY      TONSILLECTOMY      TONSILLECTOMY, ADENOIDECTOMY      VASCULAR SURGERY         General Precautions: Standard, fall, respiratory  Orthopedic Precautions: N/A   Braces: N/A       Do you have any cultural, spiritual, Moravian conflicts, given your current situation?: none    Patient History:  Lives With: child(vanita), adult  Living Arrangements: house  Home Accessibility: stairs to enter home  Number of Stairs to Enter Home: 1  Stair Railings at Home: none  Transportation Available: family or friend will provide  Equipment Currently Used at Home: bedside commode, shower chair, cane, quad, walker, rolling, rollator, wheelchair, power chair      Previous Level of Function:  Ambulation Skills: independent  Transfer Skills: independent  ADL Skills: independent  Work/Leisure Activity: independent    Subjective:  Communicated with nurse prior to session.    Chief Complaint: tired  Patient goals: to get better    Pain Ratin/10               Pain Rating Post-Intervention: 0/10    Objective:   Patient found with: telemetry, peripheral IV     Cognitive Exam:  Oriented to: Person, Place, Time and Situation    Follows Commands/attention: Follows one-step commands  Communication: clear/fluent  Safety awareness/insight to disability: impaired    Physical Exam:  Postural examination/scapula alignment: Rounded shoulder and Head forward    Skin integrity: L leg with bandage   Edema: Moderate edema BLEs    Sensation:   Intact    Upper Extremity Range of Motion:  Right Upper Extremity: WFL  Left Upper Extremity: WFL    Upper Extremity Strength:  Right Upper Extremity: 3+ grossly  Left Upper Extremity: 3+ grossly    Lower Extremity Range of Motion:  Right Lower Extremity: WFL  Left Lower Extremity: WFL    Lower Extremity Strength: through observation  Right Lower Extremity: 2- to 3  Left Lower Extremity: 2- to  3     Gross motor coordination: impaired 2/2 fatigue/weakness    Functional Mobility:  Bed Mobility:  Scooting/bridging: totalA x2 to scoot toward \[facility]\"" in 2 increments with 3rd person stabilizing LEs in hooklying while pt assisted by pushing through LEs during the transfer  Sit to supine: total A x2    Transfers:  Chair to bed: stand pivot  Chair to bed assistance: total A of dtr plus 2 additional persons for safety    Gait: 4 steps from chair to bed with total A to wt shift so pt could move LEs; LEs buckled 2-3 times during the step taking  Device: None  Deviations: decreased velocity of limb, decreased wt shift, short step length, wide LENY      Balance:   Static Sit: FAIR+: Able to take MINIMAL challenges from all directions  Dynamic Sit: FAIR+: Maintains balance through MINIMAL excursions of active trunk motion  Static Stand: 0: Needs MAXIMAL assist to maintain   Dynamic stand: 0: N/A    Therapeutic Activities and Exercises:  sit to stand and transfer pivoting to bed with total A of dtr +2 persons for safety; pt took 4 steps with assist to wt shift; LEs buckled 2-3 x during the step taking; sit to supine with total A x 2; transfer to \[facility]\"" in 2 increments with total Ax2 with a 3rd person to stabilize LEs in hooklying so pt could push through LEs in attempt to assist with transfer; increased time to complete and VCs given throughout for technique    AM-PAC 6 CLICK MOBILITY  How much help from another person does this patient currently need?   1 = Unable, Total/Dependent Assistance  2 = A lot, Maximum/Moderate Assistance  3 = A little, Minimum/Contact Guard/Supervision  4 = None, Modified Noble/Independent    Turning over in bed (including adjusting bedclothes, sheets and blankets)?: 2  Sitting down on and standing up from a chair with arms (e.g., wheelchair, bedside commode, etc.): 2  Moving from lying on back to sitting on the side of the bed?: 2  Moving to and from a bed to a chair (including a wheelchair)?:  2  Need to walk in hospital room?: 1  Climbing 3-5 steps with a railing?: 1  Total Score: 10     AM-PAC Raw Score CMS G-Code Modifier Level of Impairment Assistance   6 % Total / Unable   7 - 9 CM 80 - 100% Maximal Assist   10 - 14 CL 60 - 80% Moderate Assist   15 - 19 CK 40 - 60% Moderate Assist   20 - 22 CJ 20 - 40% Minimal Assist   23 CI 1-20% SBA / CGA   24 CH 0% Independent/ Mod I     Patient left HOB elevated with all lines intact, call button in reach, bed alarm on and nurse and dtr present.    Assessment:   Jennifer Prescott is a 71 y.o. female with a medical diagnosis of Acute on chronic combined systolic and diastolic heart failure Patient seen for re-eval; pt had been sitting in bedside chair a very long interval; pt very tired; sit to stand and transfer pivoting to bed with total A of dtr +2 persons for safety, knees buckled 2-3x during the step taking 4 steps bed to chair, assist to wt shift in order to take steps; sit to supine with total A x 2; pt very tired after sitting up in chair; increased time to complete tasks;  HR slightly elevated throughout activity; will continue to try to progress pt while in hospital; further recs TBD.    Rehab identified problem list/impairments: Rehab identified problem list/impairments: weakness, impaired endurance, gait instability, impaired functional mobilty, impaired self care skills, impaired balance, decreased lower extremity function, decreased upper extremity function, edema, impaired skin, impaired cardiopulmonary response to activity    Rehab potential is fair.    Activity tolerance: Poor    Discharge recommendations: Discharge Facility/Level Of Care Needs:  (TBD)     Barriers to discharge: Barriers to Discharge: None    Equipment recommendations: Equipment Needed After Discharge: none     GOALS:   Physical Therapy Goals        Problem: Physical Therapy Goal    Goal Priority Disciplines Outcome Goal Variances Interventions   Physical Therapy Goal      PT/OT, PT Ongoing (interventions implemented as appropriate)     Description:  Goals to be met by: 4/3/2017     Patient will increase functional independence with mobility by performin. Supine to sit with Stand-by Assistance  2. Sit to stand transfer with Supervision  3. Gait  x 15-25 feet with Supervision using Rolling Walker.              Problem: Physical Therapy Goal    Goal Priority Disciplines Outcome Goal Variances Interventions   Physical Therapy Goal     PT/OT, PT Ongoing (interventions implemented as appropriate)     Description:  Goals to be met by: 2017     Patient will increase functional independence with mobility by performin. Supine to sit with Minimal Assistance  2. Sit to supine with Minimal Assistance  3. Sit to stand transfer with Minimal Assistance  4. Bed to chair transfer with Minimal Assistance using Rolling Walker  5. Gait  x 15-25ft feet with Minimal Assistance using Rolling Walker.   6. Increased functional strength to 4- to 4 for transfers and amb.                PLAN:    Patient to be seen 5 x/week to address the above listed problems via gait training, therapeutic activities, therapeutic exercises  Plan of Care expires: 17  Plan of Care reviewed with: patient    Functional Assessment Tool Used: ampac  Score: 10  Functional Limitation: Changing and maintaining body position  Changing and Maintaining Body Position Current Status (): CL  Changing and Maintaining Body Position Goal Status (): HUGH Cleaning, PT  2017

## 2017-03-30 NOTE — PLAN OF CARE
Problem: Patient Care Overview  Goal: Plan of Care Review  Outcome: Ongoing (interventions implemented as appropriate)  AAO, CAM score negative, no c/o of pain, SOB noted with activity, BLE weeping weeping, RLE lotion applied, LLE dressing changed, UOP adequate, fall precautions maintained, call light within reach, instructed to call for assist.

## 2017-03-30 NOTE — PROGRESS NOTES
Small red petechiae and generalized redness/moisture to pannus. Interdry fabric applied. Right leg appears to have less swelling today but redness persists. Continue daily cleaning/lotion/elevation. Left leg still has weeping. Continue daily kerlix dressing changes/elevation.

## 2017-03-30 NOTE — ASSESSMENT & PLAN NOTE
Severe per echo;  functional related due to severely depressed LVEF; not a candidate for MVR or mitiral clip; will continue to treat medically and aggressively diurese as tolerated

## 2017-03-30 NOTE — ASSESSMENT & PLAN NOTE
Paced rhythm with underlying afib; no RVR noted; on DVT prophylaxis only; continue to hold AC; follow up on enteroscopy results tomorrow for AC recs per GI

## 2017-03-30 NOTE — ASSESSMENT & PLAN NOTE
H&H unchanged from yesteraday; remains 7-8/26-28 since earlier this year; plans for enteroscopy tomorrow; will type and screen for 2 units today; hold off on transfusion for now but if Hgb goes lower than 7 will need to transfuse

## 2017-03-30 NOTE — SUBJECTIVE & OBJECTIVE
Review of Systems   Constitution: Positive for weakness. Negative for chills, diaphoresis and fever.   Cardiovascular: Positive for dyspnea on exertion (improving) and leg swelling (improving). Negative for chest pain, claudication, cyanosis, near-syncope, orthopnea, palpitations, paroxysmal nocturnal dyspnea and syncope.   Respiratory: Negative for cough, shortness of breath and wheezing.    Gastrointestinal: Negative for abdominal pain, constipation, diarrhea, nausea and vomiting.     Objective:     Vital Signs (Most Recent):  Temp: 97.8 °F (36.6 °C) (03/30/17 1136)  Pulse: 69 (03/30/17 1300)  Resp: (!) 25 (03/30/17 1300)  BP: 95/65 (03/30/17 1300)  SpO2: (!) 90 % (03/30/17 1300) Vital Signs (24h Range):  Temp:  [97.4 °F (36.3 °C)-97.8 °F (36.6 °C)] 97.8 °F (36.6 °C)  Pulse:  [69-84] 69  Resp:  [12-32] 25  SpO2:  [81 %-99 %] 90 %  BP: ()/(49-77) 95/65     Weight: 126.9 kg (279 lb 12.2 oz)  Body mass index is 45.16 kg/(m^2).     SpO2: (!) 90 %  O2 Device (Oxygen Therapy): nasal cannula      Intake/Output Summary (Last 24 hours) at 03/30/17 1332  Last data filed at 03/30/17 1300   Gross per 24 hour   Intake              590 ml   Output             1725 ml   Net            -1135 ml       Lines/Drains/Airways     Central Venous Catheter Line                 Percutaneous Central Line Insertion/Assessment - triple lumen  03/23/17 2018 right internal jugular 6 days          Drain                 Urethral Catheter 03/27/17 1333 2 days                Physical Exam   Constitutional: She is oriented to person, place, and time. She appears well-developed and well-nourished. No distress.   Cardiovascular: Normal rate and regular rhythm.  Exam reveals no gallop.    No murmur heard.  Pulmonary/Chest: Effort normal. She has decreased breath sounds in the right lower field and the left lower field.   Abdominal: Soft. Bowel sounds are normal. She exhibits no distension. There is no tenderness.   Musculoskeletal: She  exhibits edema.   Neurological: She is alert and oriented to person, place, and time.   Skin: Skin is warm and dry. There is pallor.       Significant Labs:       Recent Labs  Lab 03/30/17  0404 03/30/17  0900   *  --    K 4.0  --    CL 89*  --    CO2 35*  --    BUN 47*  --    CREATININE 1.3  --    MG  --  2.1       Recent Labs  Lab 03/30/17  0900   WBC 10.59   RBC 2.91*   HGB 7.8*   HCT 26.5*      MCV 91   MCH 26.8*   MCHC 29.4*       Significant Imaging: Echocardiogram:   2D echo with color flow doppler:   Results for orders placed or performed during the hospital encounter of 03/01/17   2D echo with color flow doppler   Result Value Ref Range    EF 25 (A) 55 - 65    Mitral Valve Regurgitation SEVERE (A)     Diastolic Dysfunction Yes (A)     Est. PA Systolic Pressure 50.28 (A)     Pericardial Effusion NONE     Tricuspid Valve Regurgitation MODERATE TO SEVERE (A)

## 2017-03-30 NOTE — CHAPLAIN
Sitting up, not wearing bi-pap but does has O2.  She recognized me from a previous visit and conversed briefly but denies having spiritual needs at this time.

## 2017-03-30 NOTE — PLAN OF CARE
Problem: Patient Care Overview  Goal: Plan of Care Review  Outcome: Ongoing (interventions implemented as appropriate)  Monitor SpO2, oxygen as needed to keep saturation greater than or equal to 88%. Encourage periodic deep breathing.

## 2017-03-30 NOTE — ASSESSMENT & PLAN NOTE
Repeat CXR yesterday with improvement in bilateral pleural effusions; improved on PE as well; continue with IV diuresis

## 2017-03-30 NOTE — ASSESSMENT & PLAN NOTE
Blood tinged urine noted to rivas yesterday; improved this AM; urinalysis with 3+ blood, 3+ leukocytes; no nitrites or bacteria; urine culture with presumptive Ecoli; WBCs normal and afebrile; will likely hold off on abx for now; will attempt to discontinue rivas

## 2017-03-30 NOTE — PLAN OF CARE
Progress notes reviewed; pt not stable for discharge; PT/OT working with pt.  Pt lives with daughter and has home oxygen and bipap. [place] to continue to follow for discharge needs.     03/30/17 1129   Discharge Reassessment   Assessment Type Discharge Planning Reassessment   Can the patient answer the patient profile reliably? Yes, cognitively intact   How does the patient rate their overall health at the present time? Poor   Describe the patient's ability to walk at the present time. Major restrictions/daily assistance from another person   How often would a person be available to care for the patient? Often   Number of comorbid conditions (as recorded on the chart) Five or more   During the past month, has the patient often been bothered by feeling down, depressed or hopeless? No   During the past month, has the patient often been bothered by little interest or pleasure in doing things? No   Discharge plan remains the same: No   Provided patient/caregiver education on the expected discharge date and the discharge plan Yes   Discharge Plan A Home with family;Home Health   Discharge Plan B Skilled Nursing Facility   Change in patient condition or support system Yes   Explained to the the patient/caregiver why the discharge planned changed: Yes   Involved the patient/caregiver in establishing a new discharge plan: Yes

## 2017-03-30 NOTE — PT/OT/SLP PROGRESS
Physical Therapy  Treatment    Jennifer Prescott   MRN: [id number]   Admitting Diagnosis: Acute on chronic combined systolic and diastolic heart failure    PT Received On: 17  PT Start Time: 1542     PT Stop Time: 1602    PT Total Time (min): 20 min       Billable Minutes:  Total Time 20 minutes    Treatment Type: Treatment  PT/PTA: PT     PTA Visit Number: 0       General Precautions: Standard, fall, respiratory  Orthopedic Precautions: N/A   Braces: N/A    Do you have any cultural, spiritual, Worship conflicts, given your current situation?: none    Subjective:  Communicated with nurse prior to session.  Pt feeling pretty good.    Pain Ratin/10              Pain Rating Post-Intervention: 0/10    Objective:   Patient found with: peripheral IV (ICU monitoring)    Functional Mobility:  Bed Mobility:   Scooting/Bridging: Contact Guard Assistance  Supine to Sit: Minimum Assistance    Transfers:  Sit <> Stand Assistance: Minimum Assistance (plus CGA of another for safety)  Sit <> Stand Assistive Device: No Assistive Device    Gait:   Gait Distance: 4-5 steps from bed to chair  Assistance 1: Minimum assistance (CGA of 1 for safety)  Gait Assistive Device: No device  Gait Pattern: reciprocal  Gait Deviation(s): decreased loreto, decreased step length, decreased stride length, decreased velocity of limb motion    Balance:   Static Sit: FAIR: Maintains without assist, but unable to take any challenges   Dynamic Sit: FAIR+: Maintains balance through MINIMAL excursions of active trunk motion  Static Stand: FAIR: Maintains without assist but unable to take challenges  Dynamic stand: FAIR-    Therapeutic Activities and Exercises:  Bed mob, transfer and steptaking from bed to chair as above; VCs for technique throughout     AM-PAC 6 CLICK MOBILITY  How much help from another person does this patient currently need?   1 = Unable, Total/Dependent Assistance  2 = A lot, Maximum/Moderate Assistance  3 = A little,  Minimum/Contact Guard/Supervision  4 = None, Modified [place]/Independent    Turning over in bed (including adjusting bedclothes, sheets and blankets)?: 3  Sitting down on and standing up from a chair with arms (e.g., wheelchair, bedside commode, etc.): 3  Moving from lying on back to sitting on the side of the bed?: 3  Moving to and from a bed to a chair (including a wheelchair)?: 3  Need to walk in hospital room?: 1  Climbing 3-5 steps with a railing?: 1  Total Score: 14    AM-PAC Raw Score CMS G-Code Modifier Level of Impairment Assistance   6 % Total / Unable   7 - 9 CM 80 - 100% Maximal Assist   10 - 14 CL 60 - 80% Moderate Assist   15 - 19 CK 40 - 60% Moderate Assist   20 - 22 CJ 20 - 40% Minimal Assist   23 CI 1-20% SBA / CGA   24 CH 0% Independent/ Mod I     Patient left up in chair with all lines intact, call button in reach and nurse notified.    Assessment:  Jennifer Prescott is a 71 y.o. female with a medical diagnosis of Acute on chronic combined systolic and diastolic heart failure  Patient with improved mobility today; sup to sit with John with HOB elevated and use of rail; sit to stand with John and John to takes steps around to bedside chair; scooted back with CGA and positioned minimally reclined; will cont with POC.  Rehab identified problem list/impairments: Rehab identified problem list/impairments: weakness, impaired endurance, impaired self care skills, impaired balance, gait instability, impaired functional mobilty, decreased lower extremity function, decreased upper extremity function, impaired cardiopulmonary response to activity, edema, impaired skin    Rehab potential is good.    Activity tolerance: fair+    Discharge recommendations: Discharge Facility/Level Of Care Needs:  (TBD)     Barriers to discharge: Barriers to Discharge: None    Equipment recommendations: Equipment Needed After Discharge: none     GOALS:   Physical Therapy Goals        Problem: Physical Therapy Goal     Goal Priority Disciplines Outcome Goal Variances Interventions   Physical Therapy Goal     PT/OT, PT Ongoing (interventions implemented as appropriate)     Description:  Goals to be met by: 4/3/2017     Patient will increase functional independence with mobility by performin. Supine to sit with Stand-by Assistance  2. Sit to stand transfer with Supervision  3. Gait  x 15-25 feet with Supervision using Rolling Walker.              Problem: Physical Therapy Goal    Goal Priority Disciplines Outcome Goal Variances Interventions   Physical Therapy Goal     PT/OT, PT Ongoing (interventions implemented as appropriate)     Description:  Goals to be met by: 2017     Patient will increase functional independence with mobility by performin. Supine to sit with Minimal Assistance  2. Sit to supine with Minimal Assistance  3. Sit to stand transfer with Minimal Assistance  4. Bed to chair transfer with Minimal Assistance using Rolling Walker  5. Gait  x 15-25ft feet with Minimal Assistance using Rolling Walker.   6. Increased functional strength to 4- to 4 for transfers and amb.                PLAN:    Patient to be seen 5 x/week  to address the above listed problems via gait training, therapeutic activities, therapeutic exercises  Plan of Care expires: 17  Plan of Care reviewed with: patient         Jyothi Cleaning, PT  2017

## 2017-03-30 NOTE — PT/OT/SLP PROGRESS
Physical Therapy  Treatment    Jennifer Prescott   MRN: [id number]   Admitting Diagnosis: Acute on chronic combined systolic and diastolic heart failure    PT Received On: 17  PT Start Time: 1000     PT Stop Time: 1030    PT Total Time (min): 30 min       Billable Minutes:  Therapeutic Exercise 30 minutes    Treatment Type: Treatment  PT/PTA: PT     PTA Visit Number: 0       General Precautions: Standard, fall, respiratory  Orthopedic Precautions: N/A   Braces:      Do you have any cultural, spiritual, Restorationist conflicts, given your current situation?: none    Subjective:  Communicated with nurse prior to session.  Pt agreeable to therapy    Pain Ratin/10              Pain Rating Post-Intervention: 0/10    Objective:   Patient found with: peripheral IV, telemetry    Functional Mobility:  n/a    Balance:   n/a    Therapeutic Activities and Exercises:  BUE ex including  10 reps active fist pumps, shld IR/ER, 5 reps alternate reaches forward and upward; minimal resisted forward press, backward pulls  10 reps APs, min resisted df/pfankle circles, heel slides with minimal resistance to ext portion, TKEs, TKEs with minimal resistance, hip abd/add with assist; assisted SLR; in hooklying position  LE pushes x 5 reps  AM-PAC 6 CLICK MOBILITY  How much help from another person does this patient currently need?   1 = Unable, Total/Dependent Assistance  2 = A lot, Maximum/Moderate Assistance  3 = A little, Minimum/Contact Guard/Supervision  4 = None, Modified [place]/Independent    Turning over in bed (including adjusting bedclothes, sheets and blankets)?: 2  Sitting down on and standing up from a chair with arms (e.g., wheelchair, bedside commode, etc.): 2  Moving from lying on back to sitting on the side of the bed?: 2  Moving to and from a bed to a chair (including a wheelchair)?: 2  Need to walk in hospital room?: 1  Climbing 3-5 steps with a railing?: 1  Total Score: 10    AM-PAC Raw Score CMS G-Code Modifier  Level of Impairment Assistance   6 % Total / Unable   7 - 9 CM 80 - 100% Maximal Assist   10 - 14 CL 60 - 80% Moderate Assist   15 - 19 CK 40 - 60% Moderate Assist   20 - 22 CJ 20 - 40% Minimal Assist   23 CI 1-20% SBA / CGA   24 CH 0% Independent/ Mod I     Patient left HOB elevated with all lines intact, call button in reach and nurse notified.    Assessment:  Jennifer Prescott is a 71 y.o. female with a medical diagnosis of Acute on chronic combined systolic and diastolic heart failure   Patient tolerated BLE/UE active and minimal resistive exercises; intermittent bouts of tachycardia throughout; occasional SOB with pursed lip breathing encouraged; will cont with POC as tolerated.  Rehab identified problem list/impairments: Rehab identified problem list/impairments: weakness, impaired endurance, impaired functional mobilty, gait instability, impaired self care skills, impaired balance, decreased lower extremity function, decreased upper extremity function, impaired cardiopulmonary response to activity, impaired skin, edema    Rehab potential is good.    Activity tolerance: Good    Discharge recommendations: Discharge Facility/Level Of Care Needs:  (TBD)     Barriers to discharge: Barriers to Discharge: None    Equipment recommendations: Equipment Needed After Discharge: none     GOALS:   Physical Therapy Goals        Problem: Physical Therapy Goal    Goal Priority Disciplines Outcome Goal Variances Interventions   Physical Therapy Goal     PT/OT, PT Ongoing (interventions implemented as appropriate)     Description:  Goals to be met by: 4/3/2017     Patient will increase functional independence with mobility by performin. Supine to sit with Stand-by Assistance  2. Sit to stand transfer with Supervision  3. Gait  x 15-25 feet with Supervision using Rolling Walker.              Problem: Physical Therapy Goal    Goal Priority Disciplines Outcome Goal Variances Interventions   Physical Therapy Goal      PT/OT, PT Ongoing (interventions implemented as appropriate)     Description:  Goals to be met by: 2017     Patient will increase functional independence with mobility by performin. Supine to sit with Minimal Assistance  2. Sit to supine with Minimal Assistance  3. Sit to stand transfer with Minimal Assistance  4. Bed to chair transfer with Minimal Assistance using Rolling Walker  5. Gait  x 15-25ft feet with Minimal Assistance using Rolling Walker.   6. Increased functional strength to 4- to 4 for transfers and amb.                PLAN:    Patient to be seen 5 x/week  to address the above listed problems via gait training, therapeutic activities, therapeutic exercises  Plan of Care expires: 17  Plan of Care reviewed with: patient         Jyothi GILMAR Cleaning, PT  2017

## 2017-03-30 NOTE — PLAN OF CARE
Problem: Occupational Therapy Goal  Goal: Occupational Therapy Goal  Goals to be met by: 3/27/2017    Patient will increase functional independence with ADLs by performing:    UE Dressing with Set-up Assistance.  LE Dressing with Minimal Assistance   Grooming while standing at sink with Stand-by Assistance   Toileting from bedside commode with Supervision  Toilet transfer to bedside commode with Supervision.   Increased functional strength to WNL for BUE use for ADLS and fx mobility..   Outcome: Ongoing (interventions implemented as appropriate)  Pt would benefit from cont OT services in order to maximize functional independence. Recommendations ongoing at this time.

## 2017-03-30 NOTE — PT/OT/SLP RE-EVAL
Occupational Therapy  Re-evaluation    Jennifer Prescott   MRN: [id number]   Admitting Diagnosis: Acute on chronic combined systolic and diastolic heart failure    OT Date of Treatment: 03/30/17   OT Start Time: 1546  OT Stop Time: 1602  OT Total Time (min): 16 min    Billable Minutes:  Re-eval 16    Diagnosis: Acute on chronic combined systolic and diastolic heart failure   The primary encounter diagnosis was Chronic combined systolic and diastolic congestive heart failure. Diagnoses of SOB (shortness of breath), Congestive heart failure, unspecified congestive heart failure chronicity, unspecified congestive heart failure type, Acute on chronic combined systolic and diastolic heart failure, Iron deficiency anemia due to chronic blood loss, and Gastrointestinal hemorrhage, unspecified gastrointestinal hemorrhage type were also pertinent to this visit.      Past Medical History:   Diagnosis Date    *Atrial fibrillation     Anticoagulant long-term use     Arthritis     Cardiomyopathy     Carotid artery occlusion     CHF (congestive heart failure)     COPD (chronic obstructive pulmonary disease)     COPD exacerbation 2/19/2015    Coronary artery disease     RCA occluded with L to R collaterals and normal LAD and LCx    Hypertension     Internal bleeding     Sleep apnea     uses bipap      Past Surgical History:   Procedure Laterality Date    ABDOMINAL SURGERY      APPENDECTOMY      CARDIAC CATHETERIZATION      CARDIAC DEFIBRILLATOR PLACEMENT  10/9/2012    bivent AICD placed 10/2012 and revised 12/2012    CARDIAC PACEMAKER PLACEMENT  9/21/2010    [facility]    CARDIOVERSION  3/6/2015    CAROTID ENDARTERECTOMY Left     CHOLECYSTECTOMY      COLONOSCOPY N/A 7/11/2016    Procedure: COLONOSCOPY;  Surgeon: Rafael Pérez MD;  Location: [facility];  Service: Endoscopy;  Laterality: N/A;    COLONOSCOPY N/A 2/17/2017    Procedure: COLONOSCOPY;  Surgeon: Giovanni Bronson MD;  Location: [facility] ([address] [place] [address]);   Service: Endoscopy;  Laterality: N/A;    CORONARY ANGIOPLASTY WITH STENT PLACEMENT  2015    3 JAMAL to RCA    CRT-D implantation      ESOPHAGOGASTRODUODENOSCOPY      GALLBLADDER SURGERY      TONSILLECTOMY      TONSILLECTOMY, ADENOIDECTOMY      VASCULAR SURGERY           General Precautions: Standard, fall, respiratory  Orthopedic Precautions: N/A  Braces:      Do you have any cultural, spiritual, Yarsani conflicts, given your current situation?: none     Patient History:  Living Environment  Lives With: child(vanita), adult  Living Arrangements: house  Home Accessibility: stairs to enter home  Number of Stairs to Enter Home: 1  Stair Railings at Home: none  Transportation Available: family or friend will provide  Living Environment Comment: Pt. lives with daughter in [facility] with threshold entry with walk n shower with shower seat and grab bars; Has a BSC but says she does not use it; pt. ambulates without a device but has a rollator and 4PC; uses scooter in community; performs ADLS with assist for socks via daughter;  Daughter does all IADLS and drives.  Pt. works fulltime  as a .  Equipment Currently Used at Home: walker, rolling, power chair, 3-in-1 commode, shower chair, grab bar, rollator, cane, quad, BIPAP, oxygen    Prior level of function:   Bed Mobility/Transfers: needs device  Grooming: independent  Bathing: needs device  Upper Body Dressing: independent  Lower Body Dressing: needs assist  Toileting: needs device  Home Management Skills: needs device and assist  Homemaking Responsibilities: No  Driving License: No  Mode of Transportation: Family  Occupation: Full time employment  Type of Occupation: career speacialist     Dominant hand: right    Subjective:  Communicated with nsg prior to session.    Chief Complaint: none stated  Patient/Family stated goals: none stated     Pain Ratin/10                   Objective:       Cognitive Exam:  Oriented to: Person, Place, Time and  Situation  Follows Commands/attention: Follows multistep  commands  Communication: clear/fluent  Memory:  No Deficits noted  Safety awareness/insight to disability: impaired  Coping skills/emotional control: Appropriate to situation    Visual/perceptual:  Intact    Physical Exam:  Postural examination/scapula alignment: Rounded shoulder and Head forward  Skin integrity: erythema to BLEs  Edema: Moderate BLEs    Sensation:   Intact    Upper Extremity Range of Motion:  Right Upper Extremity: WFL  Left Upper Extremity: WFL    Upper Extremity Strength:  Right Upper Extremity: decreased throughout   Left Upper Extremity: decreased throughout    Strength: good     Fine motor coordination:   Intact      Functional Mobility:  Bed Mobility:  Scooting/Bridging: Minimum Assistance  Supine to Sit: Minimum Assistance    Transfers:  Sit <> Stand Assistance: Minimum Assistance  Sit <> Stand Assistive Device: No Assistive Device  Bed <> Chair Technique: Stand Pivot  Bed <> Chair Transfer Assistance: Minimum Assistance  Bed <> Chair Assistive Device: No Assistive Device    Functional Ambulation: Min A stand pivot/few steps bed-->b/s chair     Activities of Daily Living:     LE Dressing Level of Assistance: Total assistance        Balance:   Static Sit: FAIR+: Able to take MINIMAL challenges from all directions  Dynamic Sit: FAIR+: Maintains balance through MINIMAL excursions of active trunk motion  Static Stand: POOR+: Needs MINIMAL assist to maintain  Dynamic stand: POOR: N/A      AM-PAC 6 CLICK ADL  How much help from another person does this patient currently need?  1 = Unable, Total/Dependent Assistance  2 = A lot, Maximum/Moderate Assistance  3 = A little, Minimum/Contact Guard/Supervision  4 = None, Modified [place]/Independent    Putting on and taking off regular lower body clothing? : 2  Bathing (including washing, rinsing, drying)?: 2  Toileting, which includes using toilet, bedpan, or urinal? : 2  Putting on and  "taking off regular upper body clothing?: 3  Taking care of personal grooming such as brushing teeth?: 3  Eating meals?: 4  Total Score: 16    AM-PAC Raw Score CMS "G-Code Modifier Level of Impairment Assistance   6 % Total / Unable   7 - 9 CM 80 - 100% Maximal Assist   10 - 14 CL 60 - 80% Moderate Assist   15 - 19 CK 40 - 60% Moderate Assist   20 - 22 CJ 20 - 40% Minimal Assist   23 CI 1-20% SBA / CGA   24 CH 0% Independent/ Mod I       Patient left up in chair with all lines intact, call button in reach and nsg notified    Assessment:  Jennifer Prescott is a 71 y.o. female with a medical diagnosis of Acute on chronic combined systolic and diastolic heart failure. Pt would benefit from cont OT services in order to maximize functional independence. Recommendations ongoing at this time.     Rehab identified problem list/impairments: Rehab identified problem list/impairments: weakness, impaired endurance, impaired self care skills, impaired functional mobilty, gait instability, decreased upper extremity function, impaired balance, decreased safety awareness, edema, decreased lower extremity function    Rehab potential is good.    Activity tolerance: Fair    Discharge recommendations: Discharge Facility/Level Of Care Needs:  (TBD)     Barriers to discharge: Barriers to Discharge: None    Equipment recommendations: none     GOALS:   Occupational Therapy Goals        Problem: Occupational Therapy Goal    Goal Priority Disciplines Outcome Interventions   Occupational Therapy Goal     OT, PT/OT Ongoing (interventions implemented as appropriate)    Description:  Goals to be met by: 3/27/2017    Patient will increase functional independence with ADLs by performing:    UE Dressing with Set-up Assistance.  LE Dressing with Minimal Assistance   Grooming while standing at sink with Stand-by Assistance   Toileting from bedside commode with Supervision  Toilet transfer to bedside commode with Supervision.   Increased " functional strength to WNL for BUE use for ADLS and fx mobility..                  PLAN:  Patient to be seen 5 x/week to address the above listed problems via self-care/home management, therapeutic activities, therapeutic exercises  Plan of Care expires: 04/30/17  Plan of Care reviewed with: patient         Kerrie RUELAS Lalo, OT  03/30/2017

## 2017-03-30 NOTE — PLAN OF CARE
Problem: Physical Therapy Goal  Goal: Physical Therapy Goal  Goals to be met by: 2017     Patient will increase functional independence with mobility by performin. Supine to sit with Minimal Assistance  2. Sit to supine with Minimal Assistance  3. Sit to stand transfer with Minimal Assistance  4. Bed to chair transfer with Minimal Assistance using Rolling Walker  5. Gait x 15-25ft feet with Minimal Assistance using Rolling Walker.   6. Increased functional strength to 4- to 4 for transfers and amb.   Outcome: Ongoing (interventions implemented as appropriate)  Patient with improved mobility today; sup to sit with John with HOB elevated and use of rail; sit to stand with John and John to takes steps around to bedside chair; scooted back with CGA and positioned minimally reclined; will cont with POC.

## 2017-03-30 NOTE — PLAN OF CARE
Video capsule study reviewed with Dr. Hudson. Findings include small but clinically significant amounts of bright red blood in the proximal duodenum without a defined bleeding source. Considering this patient's history of chronic anemia attributed to chronic GI blood loss, one or more symptomatic angioectasias in the duodenum is the likely culprit for this finding and her anemia.   Recommend:   Continued medical optimization for CHF exacerbation   Plan EGD with push enteroscopy following recovery for CHF exacerbation. This exam could be supplemented by use of a duodenoscope to if push enteroscopy is unrevealing   Note: APC therapy for angioectasias should be safe on Plavix

## 2017-03-31 ENCOUNTER — SURGERY (OUTPATIENT)
Age: 72
End: 2017-03-31

## 2017-03-31 ENCOUNTER — ANESTHESIA (OUTPATIENT)
Dept: ENDOSCOPY | Facility: HOSPITAL | Age: 72
DRG: 291 | End: 2017-03-31
Payer: COMMERCIAL

## 2017-03-31 LAB
ALBUMIN SERPL BCP-MCNC: 2.9 G/DL
ALP SERPL-CCNC: 171 U/L
ALT SERPL W/O P-5'-P-CCNC: 58 U/L
ANION GAP SERPL CALC-SCNC: 12 MMOL/L
AST SERPL-CCNC: 89 U/L
BACTERIA UR CULT: NORMAL
BASOPHILS # BLD AUTO: 0.01 K/UL
BASOPHILS NFR BLD: 0.1 %
BILIRUB DIRECT SERPL-MCNC: 1 MG/DL
BILIRUB SERPL-MCNC: 1.4 MG/DL
BUN SERPL-MCNC: 52 MG/DL
CALCIUM SERPL-MCNC: 9.1 MG/DL
CHLORIDE SERPL-SCNC: 88 MMOL/L
CO2 SERPL-SCNC: 31 MMOL/L
CREAT SERPL-MCNC: 1.5 MG/DL
DIFFERENTIAL METHOD: ABNORMAL
EOSINOPHIL # BLD AUTO: 0 K/UL
EOSINOPHIL NFR BLD: 0.2 %
ERYTHROCYTE [DISTWIDTH] IN BLOOD BY AUTOMATED COUNT: 17.3 %
EST. GFR  (AFRICAN AMERICAN): 40 ML/MIN/1.73 M^2
EST. GFR  (NON AFRICAN AMERICAN): 35 ML/MIN/1.73 M^2
GLUCOSE SERPL-MCNC: 85 MG/DL
HCT VFR BLD AUTO: 26.9 %
HGB BLD-MCNC: 8 G/DL
LYMPHOCYTES # BLD AUTO: 0.6 K/UL
LYMPHOCYTES NFR BLD: 6.1 %
MCH RBC QN AUTO: 26.9 PG
MCHC RBC AUTO-ENTMCNC: 29.7 %
MCV RBC AUTO: 91 FL
MONOCYTES # BLD AUTO: 0.7 K/UL
MONOCYTES NFR BLD: 7.2 %
NEUTROPHILS # BLD AUTO: 8.6 K/UL
NEUTROPHILS NFR BLD: 86.1 %
PLATELET # BLD AUTO: 350 K/UL
PMV BLD AUTO: 9.9 FL
POTASSIUM SERPL-SCNC: 4.6 MMOL/L
PROT SERPL-MCNC: 6.7 G/DL
RBC # BLD AUTO: 2.97 M/UL
SODIUM SERPL-SCNC: 131 MMOL/L
WBC # BLD AUTO: 9.95 K/UL

## 2017-03-31 PROCEDURE — 37000009 HC ANESTHESIA EA ADD 15 MINS: Performed by: INTERNAL MEDICINE

## 2017-03-31 PROCEDURE — 99291 CRITICAL CARE FIRST HOUR: CPT | Mod: ,,, | Performed by: INTERNAL MEDICINE

## 2017-03-31 PROCEDURE — 63600175 PHARM REV CODE 636 W HCPCS: Performed by: HOSPITALIST

## 2017-03-31 PROCEDURE — 37000008 HC ANESTHESIA 1ST 15 MINUTES: Performed by: INTERNAL MEDICINE

## 2017-03-31 PROCEDURE — 43235 EGD DIAGNOSTIC BRUSH WASH: CPT | Mod: ,,, | Performed by: INTERNAL MEDICINE

## 2017-03-31 PROCEDURE — 80076 HEPATIC FUNCTION PANEL: CPT

## 2017-03-31 PROCEDURE — 25000003 PHARM REV CODE 250: Performed by: NURSE ANESTHETIST, CERTIFIED REGISTERED

## 2017-03-31 PROCEDURE — 85025 COMPLETE CBC W/AUTO DIFF WBC: CPT

## 2017-03-31 PROCEDURE — 36415 COLL VENOUS BLD VENIPUNCTURE: CPT

## 2017-03-31 PROCEDURE — 20000000 HC ICU ROOM

## 2017-03-31 PROCEDURE — 97530 THERAPEUTIC ACTIVITIES: CPT

## 2017-03-31 PROCEDURE — 80048 BASIC METABOLIC PNL TOTAL CA: CPT

## 2017-03-31 PROCEDURE — 25000003 PHARM REV CODE 250: Performed by: HOSPITALIST

## 2017-03-31 PROCEDURE — 43235 EGD DIAGNOSTIC BRUSH WASH: CPT | Mod: 53 | Performed by: INTERNAL MEDICINE

## 2017-03-31 PROCEDURE — 94761 N-INVAS EAR/PLS OXIMETRY MLT: CPT

## 2017-03-31 PROCEDURE — 94640 AIRWAY INHALATION TREATMENT: CPT

## 2017-03-31 PROCEDURE — 0DJ08ZZ INSPECTION OF UPPER INTESTINAL TRACT, VIA NATURAL OR ARTIFICIAL OPENING ENDOSCOPIC: ICD-10-PCS | Performed by: INTERNAL MEDICINE

## 2017-03-31 PROCEDURE — 25000003 PHARM REV CODE 250: Performed by: NURSE PRACTITIONER

## 2017-03-31 PROCEDURE — 63600175 PHARM REV CODE 636 W HCPCS: Performed by: NURSE PRACTITIONER

## 2017-03-31 PROCEDURE — 97535 SELF CARE MNGMENT TRAINING: CPT

## 2017-03-31 RX ORDER — NITROFURANTOIN 25; 75 MG/1; MG/1
100 CAPSULE ORAL EVERY 12 HOURS
Status: DISCONTINUED | OUTPATIENT
Start: 2017-03-31 | End: 2017-04-04 | Stop reason: HOSPADM

## 2017-03-31 RX ORDER — LIDOCAINE HCL/PF 100 MG/5ML
SYRINGE (ML) INTRAVENOUS
Status: DISCONTINUED | OUTPATIENT
Start: 2017-03-31 | End: 2017-03-31

## 2017-03-31 RX ORDER — BISACODYL 10 MG
10 SUPPOSITORY, RECTAL RECTAL DAILY PRN
Status: DISCONTINUED | OUTPATIENT
Start: 2017-03-31 | End: 2017-04-04 | Stop reason: HOSPADM

## 2017-03-31 RX ORDER — ETOMIDATE 2 MG/ML
INJECTION INTRAVENOUS
Status: DISCONTINUED | OUTPATIENT
Start: 2017-03-31 | End: 2017-03-31

## 2017-03-31 RX ORDER — BUMETANIDE 0.25 MG/ML
3 INJECTION INTRAMUSCULAR; INTRAVENOUS ONCE
Status: COMPLETED | OUTPATIENT
Start: 2017-03-31 | End: 2017-03-31

## 2017-03-31 RX ORDER — SODIUM CHLORIDE 9 MG/ML
INJECTION, SOLUTION INTRAVENOUS CONTINUOUS PRN
Status: DISCONTINUED | OUTPATIENT
Start: 2017-03-31 | End: 2017-03-31

## 2017-03-31 RX ADMIN — AMIODARONE HYDROCHLORIDE 200 MG: 200 TABLET ORAL at 08:03

## 2017-03-31 RX ADMIN — POTASSIUM CHLORIDE 60 MEQ: 20 TABLET, EXTENDED RELEASE ORAL at 05:03

## 2017-03-31 RX ADMIN — CLOPIDOGREL BISULFATE 75 MG: 75 TABLET ORAL at 05:03

## 2017-03-31 RX ADMIN — PRAVASTATIN SODIUM 40 MG: 40 TABLET ORAL at 05:03

## 2017-03-31 RX ADMIN — SODIUM CHLORIDE, PRESERVATIVE FREE 3 ML: 5 INJECTION INTRAVENOUS at 06:03

## 2017-03-31 RX ADMIN — ETOMIDATE 2 MG: 2 INJECTION, SOLUTION INTRAVENOUS at 02:03

## 2017-03-31 RX ADMIN — NITROFURANTOIN (MONOHYDRATE/MACROCRYSTALS) 100 MG: 75; 25 CAPSULE ORAL at 09:03

## 2017-03-31 RX ADMIN — FLUTICASONE FUROATE AND VILANTEROL TRIFENATATE 1 PUFF: 200; 25 POWDER RESPIRATORY (INHALATION) at 10:03

## 2017-03-31 RX ADMIN — ACETAZOLAMIDE 250 MG: 500 INJECTION, POWDER, LYOPHILIZED, FOR SOLUTION INTRAVENOUS at 06:03

## 2017-03-31 RX ADMIN — SODIUM CHLORIDE, PRESERVATIVE FREE 3 ML: 5 INJECTION INTRAVENOUS at 09:03

## 2017-03-31 RX ADMIN — TRAZODONE HYDROCHLORIDE 150 MG: 100 TABLET ORAL at 09:03

## 2017-03-31 RX ADMIN — LIDOCAINE HYDROCHLORIDE 50 MG: 20 INJECTION, SOLUTION INTRAVENOUS at 02:03

## 2017-03-31 RX ADMIN — FERROUS SULFATE TAB EC 325 MG (65 MG FE EQUIVALENT) 325 MG: 325 (65 FE) TABLET DELAYED RESPONSE at 09:03

## 2017-03-31 RX ADMIN — NITROFURANTOIN (MONOHYDRATE/MACROCRYSTALS) 100 MG: 75; 25 CAPSULE ORAL at 01:03

## 2017-03-31 RX ADMIN — ASPIRIN 81 MG 81 MG: 81 TABLET ORAL at 09:03

## 2017-03-31 RX ADMIN — ENOXAPARIN SODIUM 40 MG: 100 INJECTION SUBCUTANEOUS at 05:03

## 2017-03-31 RX ADMIN — PANTOPRAZOLE SODIUM 40 MG: 40 TABLET, DELAYED RELEASE ORAL at 05:03

## 2017-03-31 RX ADMIN — SODIUM CHLORIDE, PRESERVATIVE FREE 3 ML: 5 INJECTION INTRAVENOUS at 01:03

## 2017-03-31 RX ADMIN — BUMETANIDE 3 MG: 0.25 INJECTION INTRAMUSCULAR; INTRAVENOUS at 07:03

## 2017-03-31 RX ADMIN — ALLOPURINOL 300 MG: 300 TABLET ORAL at 05:03

## 2017-03-31 RX ADMIN — SODIUM CHLORIDE: 0.9 INJECTION, SOLUTION INTRAVENOUS at 02:03

## 2017-03-31 NOTE — ASSESSMENT & PLAN NOTE
Repeat CXR  with improvement in bilateral pleural effusions; improved on PE as well; will repeat CXR today

## 2017-03-31 NOTE — PLAN OF CARE
Problem: Occupational Therapy Goal  Goal: Occupational Therapy Goal  Goals to be met by: 3/27/2017    Patient will increase functional independence with ADLs by performing:    UE Dressing with Set-up Assistance.  LE Dressing with Minimal Assistance   Grooming while standing at sink with Stand-by Assistance   Toileting from bedside commode with Supervision  Toilet transfer to bedside commode with Supervision.   Increased functional strength to WNL for BUE use for ADLS and fx mobility..   Outcome: Ongoing (interventions implemented as appropriate)  Patient improving with strength for functional transfers. Encouraged to increase OOB ax tolerance. Patient will benefit from continued OT to address functional deficits.

## 2017-03-31 NOTE — ASSESSMENT & PLAN NOTE
Blood tinged urine noted to rivas improved today; urinalysis done with urine culture with presence of Ecoli; will place on Macrodantin; avoid Cipro due to concern for QT prolongation and avoided Bactrim due to renal issues; will discontinue rivas after enteroscopy today; plans to continue Macrodantin for 5-7 days; afebrile; WBCs normal

## 2017-03-31 NOTE — ANESTHESIA PREPROCEDURE EVALUATION
03/30/2017  Jennifer Prescott is a 71 y.o., female with GI bleed/anemia for EGD with push enteroscopy. Admitted 3/15/17 with acute on chronic heart failure. Has sleep apnea, morbid obesity, pulm HTN and   PMH significant for CAD (s/p stents on ASA, plavix), COPD (former smoker with 54 pkyr hx), HFrEF with AICD placed (09/2016 EF 20%), chronic A fib (previously on warfarin, switched to apixaban 01/30/17), and previous hospitalization for GI bleed (07/2016). EGD performed 2/16 showed non-bleeding duodenal diverticulum and portal hypertensive gastropathy; unable to bx d/t Plavix and Eliquis    Past Medical History:   Diagnosis Date    *Atrial fibrillation     Anticoagulant long-term use     Arthritis     Cardiomyopathy     Carotid artery occlusion     CHF (congestive heart failure)     COPD (chronic obstructive pulmonary disease)     COPD exacerbation 2/19/2015    Coronary artery disease     RCA occluded with L to R collaterals and normal LAD and LCx    Hypertension     Internal bleeding     Sleep apnea     uses bipap         [facility] Anesthesia Evaluation     I have reviewed the Nursing Notes.   I have reviewed the Medications.     Review of Systems  Social:  Former Smoker   Hematology/Oncology:         -- Anemia:   Cardiovascular:   Exercise tolerance: poor Pacemaker (pacemaker 9/2010 [facility] to AICD/PM 2012) Hypertension Valvular problems/Murmurs (severe MR and TR), MR CAD  asymptomatic CABG/stent (3 JAMAL to RCA 1/2015) Dysrhythmias atrial fibrillation CHF (last EF 25%, cardiomyopathy) PVD (carotid artery dz) hyperlipidemia ECG has been reviewed.    Pulmonary:   COPD Shortness of breath Sleep Apnea pulm HTN PAS 50   Renal/:   Chronic Renal Disease, CRI    Hepatic/GI:   GI bleed         Physical Exam  General:  Morbid Obesity                 Lab Results   Component Value Date    WBC 9.95 03/31/2017     HGB 8.0 (L) 03/31/2017    HCT 26.9 (L) 03/31/2017     03/31/2017    CHOL 74 (L) 09/24/2016    TRIG 71 09/24/2016    HDL 22 (L) 09/24/2016    ALT 58 (H) 03/31/2017    AST 89 (H) 03/31/2017     (L) 03/31/2017    K 4.6 03/31/2017    CL 88 (L) 03/31/2017    CREATININE 1.5 (H) 03/31/2017    BUN 52 (H) 03/31/2017    CO2 31 (H) 03/31/2017    TSH 2.040 02/20/2015    INR 1.4 (H) 03/30/2017    HGBA1C 6.5 (H) 01/30/2015     CONCLUSIONS     1 - Severely depressed left ventricular systolic function (EF 25-30%).     2 - Severe left atrial enlargement.     3 - Left ventricular diastolic dysfunction.     4 - Eccentric hypertrophy.     5 - Pulmonary hypertension. The estimated PA systolic pressure is 50 mmHg.     6 - Normal right ventricular systolic function .     7 - Severe mitral regurgitation.     8 - Moderate to severe tricuspid regurgitation.     9 - Increased central venous pressure.             This document has been electronically    SIGNED BY: Jamel Putnam MD On: 03/01/2017 16:38    Carotid US  1.  Greater than 70% stenosis of the right common carotid artery in the region of the carotid bulb.  2.  50-69% stenosis of the internal carotid arteries bilaterally.  3.  There is a moderate amount of atherosclerosis in the distal aspect of the right common carotid artery.   Electronically signed by: TORRIE ROCHE MD  Date: 04/05/16  Time: 15:52     Anesthesia Plan  Type of Anesthesia, risks & benefits discussed:  Anesthesia Type:  MAC, general  Patient's Preference:   Intra-op Monitoring Plan:   Intra-op Monitoring Plan Comments:   Post Op Pain Control Plan:   Post Op Pain Control Plan Comments:   Induction:   IV  Beta Blocker:  Patient is not currently on a Beta-Blocker (No further documentation required).       Informed Consent: Patient understands risks and agrees with Anesthesia plan.  Questions answered. Anesthesia consent signed with patient.  ASA Score: 4     Day of Surgery Review of History & Physical:             Ready For Surgery From Anesthesia Perspective.

## 2017-03-31 NOTE — ASSESSMENT & PLAN NOTE
Echo with severely depressed LV function with EF 25% with severe MR; aggressive diuresis with IV Diamox and IV Bumex and negative 15 liters since admission; BAEZ improving; weaned off Dobutamine 48 hours ago; continue to hold BB and ACEI due to marginal BP; held IV diuresis today to trend upward of BUN and creatinine; will repeat CXR today for re evalaution and if worse than 2 days ago then will reconsider; hope to transition to oral diuretics over the weekend; plan to transfer after enteroscopy later today or tomorrow

## 2017-03-31 NOTE — ASSESSMENT & PLAN NOTE
Creatinine 1.5  this AM with BUN up to 52 from 40-47; monitored closely with IV diuresis; will hold off on further IV diuresis for now

## 2017-03-31 NOTE — PROGRESS NOTES
[facility]  Cardiology  Progress Note    Patient Name: Jennifer Prescott  MRN: [id number]  Admission Date: 3/16/2017  Hospital Length of Stay: 15 days  Code Status: Full Code   Attending Physician: Jamel Putnam MD   Primary Care Physician: Lianna Mistry MD  Expected Discharge Date:   Principal Problem:Acute on chronic combined systolic and diastolic heart failure    Subjective:     Hospital Course:   3/16/2017 Presented to the ER with complaints of BAEZ, orthopnea, LE edema and decreased appetite. Admitted to [facility] for management of decompensated HF. 4+ BLE edema present with abdominal distension. Creatinine 1.6 and BNP 3344 with elevated total bilirubin. Continued on current home CHF medication regimen with IV Bumex  Labs pending. Abdominal distension and LE edema remains unchanged. Long discussion by Dr. Braga on 3/16/17 in regards to the severity of her heart failure and the overall concern given her recurrent decompensation despite aggressive treatment. IV diuretics held due to marginal BP 3/17/17 Oral CHF medications held with initiation of IV Bumex with 1.4 liters out. LE edema and orthopnea unchanged 3/18/17-3/19/17 Repeat discussion yesterday with encouragement to determine her wishes if she were to further decompensate ie intubation, CPR.   24 hours failure of diuresis  Likely due to loss of IV access.  Cr stable.  BAEZ and orthopnea persist. Continued on IV Bumex with oral Metolazone given 30minutes prior to AM dose. 3/20/2017 Aggressive diuresis remains in place with Bumex 3mg IV BID with Metolazone yesterday with 3.8 liters out negative 2.7 liters in past 24 hours and negative 4.6 liters since admission. Peripheral edema along with ascites and orthopnea remain. Working with PT/OT to prevent debility. Patient stated discussion with her daughter in the past in regards to her wishes consistent with living will and wishes to continue with aggressive treatment  3/21/2017 Repeat Metolazone yesterday along with IV Bumex BID yesterday with good response and 3.8 liters out overnight and negative 7.1 liters since admission. Orthopnea and BLE edema remains. Will hold off off Metolazone today due to rise in CO2 (36) and decrease in Cl concerning for contraction alkalosis and will continue IV Bumex BID today 3/22/2017 IV Bumex BID yesterday with 1.8 liters out overnight and negative 9 liters since admission. Orthopnea and peripheral edema essentially unchanged. CO2 down to 33 today. 3/23/2017 IV Bumex x 1 yesterday due to elevated CO2. CO2 improved today with repeat CXR with worsening right pleural effusion on comparison to admit CXR. SOB and peripheral edema essentially unchanged. Will give IV Bumex today with strict I&Os and daily weights. Discuss continued aggressive diuresis with staff vs initiation of inotropes for further CHF management 3/24/2017 Transferred to ICU yesterday with initiation of Dobutamine along with Bumex 3mg IV x 1. 3.3 liters out overnight and negative 2.2 in 24 hours and negative 12.7 liters since admission. Creatinine trending down and CO2 up to 36 today from 30. Reports feeling slightly better and able to lie in bed to sleep overnight although unable to lie flat. CVP 23-27 but unable to lie completely flat. Will continue IV Dobutamine drip and given additional dose of Bumex IV for continued diuresis-monitor labs closely 3/25/2017-3/26/2017 Continued on IV Dobutamine drip throughout the weekend with IV Diamox and IV Diuril with total of 4 liters out. Bumex held due to rising CO2. CXR with no marked improvement to pleural effusions 3/27/2017 1.4 liters out overnight with 1.39 liters in and essentially net zero in 24hours; negative 13 liters since admission. LE swelling and SOB persist however states she feels slightly better. Noted to be extremely weak with increased WOB with minimal activity (moving to the edge of the chair). Plans for additional dose of  Diamox today with IV Bumex since CO2 trended down. Will place rivas due to acute distress with activity and use BiPap prn 3/28/2017 Given IV Diamox and Bumex with 2.2 liters out and negative 14.3 liters since admission. Reports feeling slightly better and able to sleep in bed. Repeat IV Diamox and Bumex today with plans to initiate Dobutamine wean. Updated per GI on VEC results with evidence of AVMs and will need enteroscopy for treatment 3/29/2017 Dobutamine decreased to 2.5mcg/kg/min; Repeat IV Diamox and IV Bumex yesterday with only 1.4 liters out but not net negative overnight. Repeat CXR today with improvement in bilateral pleural effusions. Plan for possible enteroscopy tomorrow for evaluation and treatment of AVMs noted on VEC [id number] Weaned off Dobutamine yesterday evening with repeat IV Diamox and IV Bumex with 2 liters out. Reports SOB improved. Able to lie flat this AM and working with PT to improve conditioning. Plan for enteroscopy tomorrow by GI 3/31/2017 NPO today for enteroscopy. Repeat IV Diamox and Bumex yesterday with 1 liter out but net equal. SBP 80s-100s overnight with no arrhythmias noted on monitor. Lying flat in bed with no orthopnea or cough. LE swelling remains but slightly improved. Plan for possible transfer to the floor after the enteroscopy later today vs tomorrow        Review of Systems   Constitution: Negative for chills, diaphoresis and fever.   Cardiovascular: Positive for leg swelling and orthopnea (improving). Negative for chest pain, claudication, cyanosis, dyspnea on exertion, irregular heartbeat, near-syncope, palpitations, paroxysmal nocturnal dyspnea and syncope.   Gastrointestinal: Negative for abdominal pain, constipation, diarrhea, nausea and vomiting.     Objective:     Vital Signs (Most Recent):  Temp: 97.7 °F (36.5 °C) (03/31/17 0736)  Pulse: 69 (03/31/17 1050)  Resp: (!) 37 (03/31/17 1050)  BP: (!) 119/56 (03/31/17 1030)  SpO2: 96 % (03/31/17 1050) Vital Signs (24h  Range):  Temp:  [97.6 °F (36.4 °C)-97.9 °F (36.6 °C)] 97.7 °F (36.5 °C)  Pulse:  [] 69  Resp:  [16-44] 37  SpO2:  [85 %-100 %] 96 %  BP: ()/(44-89) 119/56     Weight: 126.1 kg (278 lb)  Body mass index is 44.87 kg/(m^2).     SpO2: 96 %  O2 Device (Oxygen Therapy): room air      Intake/Output Summary (Last 24 hours) at 03/31/17 1119  Last data filed at 03/31/17 0600   Gross per 24 hour   Intake              800 ml   Output              865 ml   Net              -65 ml       Lines/Drains/Airways     Central Venous Catheter Line                 Percutaneous Central Line Insertion/Assessment - triple lumen  03/23/17 2018 right internal jugular 7 days          Drain                 Urethral Catheter 03/27/17 1333 3 days                Physical Exam    Significant Labs:       Recent Labs  Lab 03/31/17  0506   *   K 4.6   CL 88*   CO2 31*   BUN 52*   CREATININE 1.5*       Recent Labs  Lab 03/31/17  0832   WBC 9.95   RBC 2.97*   HGB 8.0*   HCT 26.9*      MCV 91   MCH 26.9*   MCHC 29.7*       Significant Imaging: Echocardiogram:   2D echo with color flow doppler:   Results for orders placed or performed during the hospital encounter of 03/01/17   2D echo with color flow doppler   Result Value Ref Range    EF 25 (A) 55 - 65    Mitral Valve Regurgitation SEVERE (A)     Diastolic Dysfunction Yes (A)     Est. PA Systolic Pressure 50.28 (A)     Pericardial Effusion NONE     Tricuspid Valve Regurgitation MODERATE TO SEVERE (A)      Assessment and Plan:     Brief HPI: Seen this morning on AM NP rounds with daughter and son at the bedside. Denies any complaints currently and reports was able to sleep in the bed all night. States her SOB is improving and only complains of being hungry and eager to proceed with scope. Discussed POC with patient, daughter and son to include enteroscopy today, initiation of abx due to concern for UTI, discontinuation of rivas, possible blood transfusion later today vs this  weekend, holding of diuretics with repeat CXR due to mildly elevated BUN/creatinine and possible transfer to the floor later today vs tomorrow-all verbalized understanding of POC     * Acute on chronic combined systolic and diastolic heart failure  Echo with severely depressed LV function with EF 25% with severe MR; aggressive diuresis with IV Diamox and IV Bumex and negative 15 liters since admission; BAEZ improving; weaned off Dobutamine 48 hours ago; continue to hold BB and ACEI due to marginal BP; held IV diuresis today to trend upward of BUN and creatinine; will repeat CXR today for re evalaution and if worse than 2 days ago then will reconsider; hope to transition to oral diuretics over the weekend; plan to transfer after enteroscopy later today or tomorrow    Permanent atrial fibrillation  Paced rhythm with underlying afib; no RVR noted; await AC recs once enteroscopy completed     CAD S/P percutaneous coronary angioplasty  Stable; continue ASA, Plavix and Pravastatin only      CKD (chronic kidney disease) stage 3, GFR 30-59 ml/min  Creatinine 1.5  this AM with BUN up to 52 from 40-47; monitored closely with IV diuresis; will hold off on further IV diuresis for now     Iron deficiency anemia due to chronic blood loss  H&H 8.0 & 26.9 this AM essentially unchanged; lower than baseline late last year; plans for enteroscopy today due to bleeding AVMs; type and screen for 2 units and may likely transfuse later today or tomorrow    Pleural effusion, right  Repeat CXR  with improvement in bilateral pleural effusions; improved on PE as well; will repeat CXR today     Acute decompensated heart failure  As detailed above    Hematuria  Blood tinged urine noted to rivas improved today; urinalysis done with urine culture with presence of Ecoli; will place on Macrodantin; avoid Cipro due to concern for QT prolongation and avoided Bactrim due to renal issues; will discontinue rivas after enteroscopy today; plans to continue  Macrodantin for 5-7 days; afebrile; WBCs normal      VTE Risk Mitigation         Ordered     enoxaparin injection 40 mg  Daily     Route:  Subcutaneous        03/16/17 1559     Medium Risk of VTE  Once      03/16/17 1449          SEJAL Strickland, ANP  Cardiology  [facility]          I have seen patient with Nurse Practitioner Augusta De La Vega. I agree with her assessment and plan as written in this note.          71 year old female with end stage CHF from severe CAD and MR  Required dobutamine during this admission  Anemia from GI bleed  ARF on CKD  PAF  Cardiac cachexia          Continue with diuretics  Adjust CHF/CAD regimen  Discuss poor long term prognosis with family and patient

## 2017-03-31 NOTE — ASSESSMENT & PLAN NOTE
H&H 8.0 & 26.9 this AM essentially unchanged; lower than baseline late last year; plans for enteroscopy today due to bleeding AVMs; type and screen for 2 units and may likely transfuse later today or tomorrow

## 2017-03-31 NOTE — ANESTHESIA POSTPROCEDURE EVALUATION
"Anesthesia Post Evaluation    Patient: Jennifer Prescott    Procedure(s) Performed: Procedure(s) (LRB):  ESOPHAGOGASTRODUODENOSCOPY (EGD) with push enteroscopy (N/A)    Final Anesthesia Type: general  Patient location during evaluation: PACU  Patient participation: Yes- Able to Participate  Level of consciousness: awake and alert  Post-procedure vital signs: reviewed and stable  Pain management: adequate  Airway patency: patent  PONV status at discharge: No PONV  Anesthetic complications: no      Cardiovascular status: hemodynamically stable  Respiratory status: unassisted  Hydration status: euvolemic  Follow-up not needed.        Visit Vitals    BP (!) 108/53    Pulse 69    Temp 36.4 °C (97.6 °F) (Oral)    Resp 18    Ht 5' 6" (1.676 m)    Wt 126.1 kg (278 lb)    LMP  (LMP Unknown)    SpO2 95%    Breastfeeding No    BMI 44.87 kg/m2       Pain/Gurpreet Score: Pain Assessment Performed: Yes (3/31/2017  1:15 PM)  Presence of Pain: denies (3/31/2017  1:15 PM)  Pain Rating Prior to Med Admin: 5 (3/30/2017  6:19 AM)  Pain Rating Post Med Admin: 0 (3/30/2017 10:10 PM)      "

## 2017-03-31 NOTE — PT/OT/SLP PROGRESS
"Occupational Therapy  Treatment    Jennifer Prescott   MRN: [id number]   Admitting Diagnosis: Acute on chronic combined systolic and diastolic heart failure    OT Date of Treatment: 03/31/17   OT Start Time: 1038  OT Stop Time: 1137  OT Total Time (min): 59 min    Billable Minutes:  Self Care/Home Management 30 and Therapeutic Activity 29    General Precautions: Standard, fall, respiratory  Orthopedic Precautions: N/A  Braces: N/A    Do you have any cultural, spiritual, Jehovah's witness conflicts, given your current situation?: none    Subjective:  Communicated with nurseAdamaris prior to session.    Pain Rating:  (c/o pain - "I have to pee. It hurts" )    Objective:  Patient found with: rivas catheter, oxygen (ICU monitoring)     Functional Mobility:  Bed Mobility:  Scooting/Bridging: Contact Guard Assistance  Supine to Sit: Minimum Assistance    Transfers:   Sit <> Stand Assistance: Minimum Assistance  Sit <> Stand Assistive Device: No Assistive Device  Bed <> Chair Technique: Stand Pivot  Bed <> Chair Transfer Assistance: Minimum Assistance  Bed <> Chair Assistive Device: No Assistive Device  Toilet Transfer Technique: Stand Pivot  Toilet Transfer Assistance: Minimum Assistance  Toilet Transfer Assistive Device: bedside commode, No Assistive Device    Functional Ambulation: N/A    Activities of Daily Living:  Grooming Position: Seated, EOB  Grooming Level of Assistance: Modified independent  Toileting Where Assessed: Bedside commode  Toileting Level of Assistance:  (Patient unable to have BM)    Balance:   Static Sit: FAIR+: Able to take MINIMAL challenges from all directions  Dynamic Sit: FAIR+: Maintains balance through MINIMAL excursions of active trunk motion  Static Stand: FAIR: Maintains without assist but unable to take challenges  Dynamic stand: FAIR: Needs CONTACT GUARD during gait    Therapeutic Activities and Exercises:  Patient required encouragement to t/f OOB this date. Sup > sit with min (A), VCs, and " increased time. Patient sat EOB x 15 mins with VCs for pursed lip breathing (O2 sats varying from 77%-91%). Patient Min (A) to stand from EOB - step pivot to bedside chair with CGA. Patient sat in bedside chair and required increased time to recover O2 levels. Perseverating on urge to urinate, though patient has rivas catheter. Patient requesting to have BM. CGA to stand from chair and step pivot to BSC. Patient unable to have BM. CGA back to bedside chair. Patient able to scoot in chair with CGA. BLEs elevated in recliner and B heels floated.     AM-PAC 6 CLICK ADL   How much help from another person does this patient currently need?   1 = Unable, Total/Dependent Assistance  2 = A lot, Maximum/Moderate Assistance  3 = A little, Minimum/Contact Guard/Supervision  4 = None, Modified [place]/Independent    Putting on and taking off regular lower body clothing? : 2  Bathing (including washing, rinsing, drying)?: 2  Toileting, which includes using toilet, bedpan, or urinal? : 2  Putting on and taking off regular upper body clothing?: 3  Taking care of personal grooming such as brushing teeth?: 3  Eating meals?: 4  Total Score: 16     AM-PAC Raw Score CMS G-Code Modifier Level of Impairment Assistance   6 % Total / Unable   7 - 9 CM 80 - 100% Maximal Assist   10 - 14 CL 60 - 80% Moderate Assist   15 - 19 CK 40 - 60% Moderate Assist   20 - 22 CJ 20 - 40% Minimal Assist   23 CI 1-20% SBA / CGA   24 CH 0% Independent/ Mod I     Patient left up in chair with all lines intact, call button in reach, nsg notified and daughter present    ASSESSMENT:  Jennifer Prescott is a 71 y.o. female with a medical diagnosis of Acute on chronic combined systolic and diastolic heart failure   Patient improving with strength for functional transfers. Encouraged to increase OOB ax tolerance. Patient will benefit from continued OT to address functional deficits.    Rehab identified problem list/impairments: Rehab identified problem  list/impairments: weakness, impaired endurance, impaired self care skills, impaired functional mobilty, gait instability, impaired balance, impaired skin, edema, pain, decreased upper extremity function, decreased lower extremity function    Rehab potential is good.    Activity tolerance: Fair    Discharge recommendations: Discharge Facility/Level Of Care Needs:  (TBD)     Barriers to discharge: Barriers to Discharge: None    Equipment recommendations: none     GOALS:   Occupational Therapy Goals        Problem: Occupational Therapy Goal    Goal Priority Disciplines Outcome Interventions   Occupational Therapy Goal     OT, PT/OT Ongoing (interventions implemented as appropriate)    Description:  Goals to be met by: 3/27/2017    Patient will increase functional independence with ADLs by performing:    UE Dressing with Set-up Assistance.  LE Dressing with Minimal Assistance   Grooming while standing at sink with Stand-by Assistance   Toileting from bedside commode with Supervision  Toilet transfer to bedside commode with Supervision.   Increased functional strength to WNL for BUE use for ADLS and fx mobility..                  Plan:  Patient to be seen 5 x/week to address the above listed problems via self-care/home management, therapeutic activities, therapeutic exercises  Plan of Care expires: 04/30/17  Plan of Care reviewed with: patient, daughter         Ruthann RUTLEDGE ABBEY Hall  03/31/2017

## 2017-03-31 NOTE — SUBJECTIVE & OBJECTIVE
Review of Systems   Constitution: Negative for chills, diaphoresis and fever.   Cardiovascular: Positive for leg swelling and orthopnea (improving). Negative for chest pain, claudication, cyanosis, dyspnea on exertion, irregular heartbeat, near-syncope, palpitations, paroxysmal nocturnal dyspnea and syncope.   Gastrointestinal: Negative for abdominal pain, constipation, diarrhea, nausea and vomiting.     Objective:     Vital Signs (Most Recent):  Temp: 97.7 °F (36.5 °C) (03/31/17 0736)  Pulse: 69 (03/31/17 1050)  Resp: (!) 37 (03/31/17 1050)  BP: (!) 119/56 (03/31/17 1030)  SpO2: 96 % (03/31/17 1050) Vital Signs (24h Range):  Temp:  [97.6 °F (36.4 °C)-97.9 °F (36.6 °C)] 97.7 °F (36.5 °C)  Pulse:  [] 69  Resp:  [16-44] 37  SpO2:  [85 %-100 %] 96 %  BP: ()/(44-89) 119/56     Weight: 126.1 kg (278 lb)  Body mass index is 44.87 kg/(m^2).     SpO2: 96 %  O2 Device (Oxygen Therapy): room air      Intake/Output Summary (Last 24 hours) at 03/31/17 1119  Last data filed at 03/31/17 0600   Gross per 24 hour   Intake              800 ml   Output              865 ml   Net              -65 ml       Lines/Drains/Airways     Central Venous Catheter Line                 Percutaneous Central Line Insertion/Assessment - triple lumen  03/23/17 2018 right internal jugular 7 days          Drain                 Urethral Catheter 03/27/17 1333 3 days                Physical Exam    Significant Labs:       Recent Labs  Lab 03/31/17  0506   *   K 4.6   CL 88*   CO2 31*   BUN 52*   CREATININE 1.5*       Recent Labs  Lab 03/31/17  0832   WBC 9.95   RBC 2.97*   HGB 8.0*   HCT 26.9*      MCV 91   MCH 26.9*   MCHC 29.7*       Significant Imaging: Echocardiogram:   2D echo with color flow doppler:   Results for orders placed or performed during the hospital encounter of 03/01/17   2D echo with color flow doppler   Result Value Ref Range    EF 25 (A) 55 - 65    Mitral Valve Regurgitation SEVERE (A)     Diastolic  Dysfunction Yes (A)     Est. PA Systolic Pressure 50.28 (A)     Pericardial Effusion NONE     Tricuspid Valve Regurgitation MODERATE TO SEVERE (A)

## 2017-03-31 NOTE — PLAN OF CARE
Problem: Patient Care Overview  Goal: Plan of Care Review  Outcome: Ongoing (interventions implemented as appropriate)  Pt's SpO2 93% on RA. No adverse reactions to MDI. No respiratory distress noted. Continue to monitor SpO2.

## 2017-03-31 NOTE — TRANSFER OF CARE
"Anesthesia Transfer of Care Note    Patient: Jennifer Prescott    Procedure(s) Performed: Procedure(s) (LRB):  ESOPHAGOGASTRODUODENOSCOPY (EGD) with push enteroscopy (N/A)    Patient location: ICU    Anesthesia Type: MAC    Transport from OR: Transported from OR on 2-3 L/min O2 by NC with adequate spontaneous ventilation    Post pain: adequate analgesia    Post assessment: no apparent anesthetic complications    Post vital signs: stable    Level of consciousness: awake and sedated    Nausea/Vomiting: no nausea/vomiting    Complications: none          Last vitals:   Visit Vitals    BP (!) 114/59    Pulse 69    Temp 36.6 °C (97.8 °F) (Oral)    Resp (!) 26    Ht 5' 6" (1.676 m)    Wt 126.1 kg (278 lb)    LMP  (LMP Unknown)    SpO2 (!) 93%    Breastfeeding No    BMI 44.87 kg/m2     "

## 2017-04-01 LAB
BASOPHILS # BLD AUTO: 0 K/UL
BASOPHILS # BLD AUTO: 0.01 K/UL
BASOPHILS NFR BLD: 0 %
BASOPHILS NFR BLD: 0.1 %
DIFFERENTIAL METHOD: ABNORMAL
DIFFERENTIAL METHOD: ABNORMAL
EOSINOPHIL # BLD AUTO: 0 K/UL
EOSINOPHIL # BLD AUTO: 0 K/UL
EOSINOPHIL NFR BLD: 0 %
EOSINOPHIL NFR BLD: 0.1 %
ERYTHROCYTE [DISTWIDTH] IN BLOOD BY AUTOMATED COUNT: 17.2 %
ERYTHROCYTE [DISTWIDTH] IN BLOOD BY AUTOMATED COUNT: 17.5 %
HCT VFR BLD AUTO: 27.4 %
HCT VFR BLD AUTO: 27.9 %
HGB BLD-MCNC: 7.8 G/DL
HGB BLD-MCNC: 8 G/DL
LYMPHOCYTES # BLD AUTO: 0.4 K/UL
LYMPHOCYTES # BLD AUTO: 0.4 K/UL
LYMPHOCYTES NFR BLD: 3.6 %
LYMPHOCYTES NFR BLD: 4 %
MCH RBC QN AUTO: 26 PG
MCH RBC QN AUTO: 26.5 PG
MCHC RBC AUTO-ENTMCNC: 28.5 %
MCHC RBC AUTO-ENTMCNC: 28.7 %
MCV RBC AUTO: 91 FL
MCV RBC AUTO: 93 FL
MONOCYTES # BLD AUTO: 0.8 K/UL
MONOCYTES # BLD AUTO: 0.8 K/UL
MONOCYTES NFR BLD: 7.2 %
MONOCYTES NFR BLD: 7.5 %
NEUTROPHILS # BLD AUTO: 9.5 K/UL
NEUTROPHILS # BLD AUTO: 9.6 K/UL
NEUTROPHILS NFR BLD: 88.7 %
NEUTROPHILS NFR BLD: 88.8 %
PLATELET # BLD AUTO: 351 K/UL
PLATELET # BLD AUTO: 360 K/UL
PMV BLD AUTO: 10.1 FL
PMV BLD AUTO: 10.1 FL
RBC # BLD AUTO: 2.94 M/UL
RBC # BLD AUTO: 3.08 M/UL
WBC # BLD AUTO: 10.69 K/UL
WBC # BLD AUTO: 10.88 K/UL

## 2017-04-01 PROCEDURE — 20000000 HC ICU ROOM

## 2017-04-01 PROCEDURE — 97110 THERAPEUTIC EXERCISES: CPT | Performed by: PHYSICAL THERAPIST

## 2017-04-01 PROCEDURE — 97530 THERAPEUTIC ACTIVITIES: CPT | Performed by: PHYSICAL THERAPIST

## 2017-04-01 PROCEDURE — 25000003 PHARM REV CODE 250: Performed by: NURSE PRACTITIONER

## 2017-04-01 PROCEDURE — 85025 COMPLETE CBC W/AUTO DIFF WBC: CPT | Mod: 91

## 2017-04-01 PROCEDURE — 94660 CPAP INITIATION&MGMT: CPT

## 2017-04-01 PROCEDURE — 25000003 PHARM REV CODE 250: Performed by: HOSPITALIST

## 2017-04-01 PROCEDURE — 63600175 PHARM REV CODE 636 W HCPCS: Performed by: HOSPITALIST

## 2017-04-01 PROCEDURE — 27000221 HC OXYGEN, UP TO 24 HOURS

## 2017-04-01 PROCEDURE — 99291 CRITICAL CARE FIRST HOUR: CPT | Mod: ,,, | Performed by: INTERNAL MEDICINE

## 2017-04-01 PROCEDURE — 94640 AIRWAY INHALATION TREATMENT: CPT

## 2017-04-01 PROCEDURE — 94761 N-INVAS EAR/PLS OXIMETRY MLT: CPT

## 2017-04-01 RX ADMIN — SODIUM CHLORIDE, PRESERVATIVE FREE 3 ML: 5 INJECTION INTRAVENOUS at 01:04

## 2017-04-01 RX ADMIN — POTASSIUM CHLORIDE 60 MEQ: 20 TABLET, EXTENDED RELEASE ORAL at 08:04

## 2017-04-01 RX ADMIN — AMIODARONE HYDROCHLORIDE 200 MG: 200 TABLET ORAL at 08:04

## 2017-04-01 RX ADMIN — FLUTICASONE FUROATE AND VILANTEROL TRIFENATATE 1 PUFF: 200; 25 POWDER RESPIRATORY (INHALATION) at 09:04

## 2017-04-01 RX ADMIN — NITROFURANTOIN (MONOHYDRATE/MACROCRYSTALS) 100 MG: 75; 25 CAPSULE ORAL at 08:04

## 2017-04-01 RX ADMIN — ENOXAPARIN SODIUM 40 MG: 100 INJECTION SUBCUTANEOUS at 06:04

## 2017-04-01 RX ADMIN — PRAVASTATIN SODIUM 40 MG: 40 TABLET ORAL at 08:04

## 2017-04-01 RX ADMIN — PANTOPRAZOLE SODIUM 40 MG: 40 TABLET, DELAYED RELEASE ORAL at 08:04

## 2017-04-01 RX ADMIN — CLOPIDOGREL BISULFATE 75 MG: 75 TABLET ORAL at 08:04

## 2017-04-01 RX ADMIN — SODIUM CHLORIDE, PRESERVATIVE FREE 3 ML: 5 INJECTION INTRAVENOUS at 06:04

## 2017-04-01 RX ADMIN — SODIUM CHLORIDE, PRESERVATIVE FREE 3 ML: 5 INJECTION INTRAVENOUS at 10:04

## 2017-04-01 RX ADMIN — ALLOPURINOL 300 MG: 300 TABLET ORAL at 08:04

## 2017-04-01 NOTE — PT/OT/SLP PROGRESS
Physical Therapy  Treatment    Jennifer Prescott   MRN: [id number]   Admitting Diagnosis: Acute on chronic combined systolic and diastolic heart failure    PT Received On: 17  PT Start Time: 1030     PT Stop Time: 1100    PT Total Time (min): 30 min       Billable Minutes:  Therapeutic Activity 15 and Therapeutic Exercise 15    Treatment Type: Treatment  PT/PTA: PT     PTA Visit Number: 0       General Precautions: Standard, fall  Orthopedic Precautions: Full weight bearing   Braces:      Do you have any cultural, spiritual, Shinto conflicts, given your current situation?: none    Subjective:  Communicated with nurse prior to session.      Pain Ratin/10          Objective:        Functional Mobility:  Bed Mobility:   Rolling/Turning to Left: Moderate assistance  Rolling/Turning Right: Moderate assistance  Scooting/Bridging: With assist of 2, Maximum Assistance  Supine to Sit: Moderate Assistance    Transfers:  Sit <> Stand Assistance:  (Maximum Assistance x 2)  Sit <> Stand Assistive Device: Rolling Walker  Bed <> Chair Technique:  (4 steps)    Gait:        Stairs:  NT    Balance:   Static Sit: GOOD-: Takes MODERATE challenges from all directions but inconsistently  Dynamic Sit: FAIR+: Maintains balance through MINIMAL excursions of active trunk motion  Static Stand: Fair with RW  Dynamic stand: FAIR: Needs CONTACT GUARD during gait with RW    Therapeutic Activities and Exercises:  Sitting LAQ, hip flexion and abduction and AP 10 x 2 BLE's.    AM-PAC 6 CLICK MOBILITY  How much help from another person does this patient currently need?   1 = Unable, Total/Dependent Assistance  2 = A lot, Maximum/Moderate Assistance  3 = A little, Minimum/Contact Guard/Supervision  4 = None, Modified [place]/Independent    Turning over in bed (including adjusting bedclothes, sheets and blankets)?: 2  Sitting down on and standing up from a chair with arms (e.g., wheelchair, bedside commode, etc.): 2  Moving from lying on  back to sitting on the side of the bed?: 2  Moving to and from a bed to a chair (including a wheelchair)?: 2  Need to walk in hospital room?: 2  Climbing 3-5 steps with a railing?: 2  Total Score: 12    AM-PAC Raw Score CMS G-Code Modifier Level of Impairment Assistance   6 % Total / Unable   7 - 9 CM 80 - 100% Maximal Assist   10 - 14 CL 60 - 80% Moderate Assist   15 - 19 CK 40 - 60% Moderate Assist   20 - 22 CJ 20 - 40% Minimal Assist   23 CI 1-20% SBA / CGA   24 CH 0% Independent/ Mod I     Patient left up in chair with all lines intact, call button in reach, nurse notified and nurse present.    Assessment:  Jennifer Prescott is a 71 y.o. female with a medical diagnosis of Acute on chronic combined systolic and diastolic heart failure and presents with decreased ability to go from sit to stand with RW, weakness.    Rehab identified problem list/impairments: Rehab identified problem list/impairments: weakness, gait instability, decreased upper extremity function, impaired endurance, impaired balance, decreased lower extremity function, impaired self care skills, decreased safety awareness, impaired functional mobilty, edema    Rehab potential is fair.    Activity tolerance: Fair    Discharge recommendations: Discharge Facility/Level Of Care Needs:  (TBD)     Barriers to discharge: Barriers to Discharge:  (TBD)    Equipment recommendations: Equipment Needed After Discharge:  (TBD)     GOALS:   Physical Therapy Goals        Problem: Physical Therapy Goal    Goal Priority Disciplines Outcome Goal Variances Interventions   Physical Therapy Goal     PT/OT, PT Ongoing (interventions implemented as appropriate)     Description:  Goals to be met by: 4/3/2017     Patient will increase functional independence with mobility by performin. Supine to sit with Stand-by Assistance  2. Sit to stand transfer with Supervision  3. Gait  x 15-25 feet with Supervision using Rolling Walker.              Problem: Physical  Therapy Goal    Goal Priority Disciplines Outcome Goal Variances Interventions   Physical Therapy Goal     PT/OT, PT Ongoing (interventions implemented as appropriate)     Description:  Goals to be met by: 2017     Patient will increase functional independence with mobility by performin. Supine to sit with Minimal Assistance  2. Sit to supine with Minimal Assistance  3. Sit to stand transfer with Minimal Assistance  4. Bed to chair transfer with Minimal Assistance using Rolling Walker  5. Gait  x 15-25ft feet with Minimal Assistance using Rolling Walker.   6. Increased functional strength to 4- to 4 for transfers and amb.             Problem: Physical Therapy Goal    Goal Priority Disciplines Outcome Goal Variances Interventions   Physical Therapy Goal     PT/OT, PT                PLAN:    Patient to be seen 6 x/week  to address the above listed problems via therapeutic activities, gait training, therapeutic exercises, neuromuscular re-education  Plan of Care expires: 17  Plan of Care reviewed with: patient         Deana E Oma, PT  2017

## 2017-04-01 NOTE — PROGRESS NOTES
Seen this am    Patient is weak and tired  Clinically weak and depressed        /70 with paced rhythm with HR 80  PE:      Weak  Cachectic  + JVD 15 cm  RRR with HSM  Bilateral rales with decreased BS bilaterally  ExT: trace edema      LABS  CBC    Recent Labs  Lab 03/31/17  0832 04/01/17  0630 04/01/17  0817   WBC 9.95 10.69 10.88   RBC 2.97* 2.94* 3.08*   HGB 8.0* 7.8* 8.0*   HCT 26.9* 27.4* 27.9*    351* 360*   MCV 91 93 91   MCH 26.9* 26.5* 26.0*   MCHC 29.7* 28.5* 28.7*     BMP    Recent Labs  Lab 03/29/17  0439 03/30/17  0404 03/31/17  0506   * 133* 131*   K 4.7 4.0 4.6   CO2 33* 35* 31*   CL 87* 89* 88*   BUN 48* 47* 52*   CREATININE 1.3 1.3 1.5*   GLU 76 79 85       POCT-Glucose  No results found for: POCTGLUCOSE      Recent Labs  Lab 03/28/17  0555 03/29/17  0439 03/30/17  0404 03/30/17  0900 03/31/17  0506   CALCIUM 9.3 9.3 9.1  --  9.1   MG 2.0 2.0  --  2.1  --    PHOS 5.1* 4.7*  --  5.0*  --      LFT    Recent Labs  Lab 03/27/17  0520 03/31/17  0506   PROT 7.1 6.7   ALBUMIN 3.1* 2.9*   BILITOT 1.2* 1.4*   AST 24 89*   ALKPHOS 138* 171*   ALT 17 58*       LAST HbA1c  Lab Results   Component Value Date    HGBA1C 6.5 (H) 01/30/2015       Lipid panel  Lab Results   Component Value Date    CHOL 74 (L) 09/24/2016    CHOL 118 (L) 06/25/2014    CHOL 142 08/11/2011     Lab Results   Component Value Date    HDL 22 (L) 09/24/2016    HDL 15 (L) 06/25/2014    HDL 25 (L) 08/11/2011     Lab Results   Component Value Date    LDLCALC 37.8 (L) 09/24/2016    LDLCALC 78.8 06/25/2014    LDLCALC 89.0 08/11/2011     Lab Results   Component Value Date    TRIG 71 09/24/2016    TRIG 121 06/25/2014    TRIG 140 08/11/2011     Lab Results   Component Value Date    CHOLHDL 29.7 09/24/2016    CHOLHDL 12.7 (L) 06/25/2014    CHOLHDL 17.6 (L) 08/11/2011          Imp:      CAD  ICM systolic and diastolic   End stage CHF with cachexia and low output state   Recurrent and persistent decompensation  HTN  HLP  Severe  MR  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

## 2017-04-01 NOTE — PLAN OF CARE
Problem: Physical Therapy Goal  Goal: Physical Therapy Goal  Goals to be met by: 4/3/2017     Patient will increase functional independence with mobility by performin. Supine to sit with Stand-by Assistance  2. Sit to stand transfer with Supervision  3. Gait x 15-25 feet with Supervision using Rolling Walker.    Outcome: Ongoing (interventions implemented as appropriate)  Pt would benefit from PT to progress functional mobility and endurance.

## 2017-04-01 NOTE — PLAN OF CARE
Problem: Patient Care Overview  Goal: Plan of Care Review  Outcome: Ongoing (interventions implemented as appropriate)  Received pt on 3L NC; no distress noted.

## 2017-04-01 NOTE — PLAN OF CARE
Problem: Patient Care Overview  Goal: Plan of Care Review  Outcome: Ongoing (interventions implemented as appropriate)  Pt's SpO2 99% on 2 lpm humidified NC. No adverse reactions to MDI. No respiratory distress noted. Continue to monitor SpO2.

## 2017-04-02 LAB
ANION GAP SERPL CALC-SCNC: 11 MMOL/L
BUN SERPL-MCNC: 52 MG/DL
CALCIUM SERPL-MCNC: 8.9 MG/DL
CHLORIDE SERPL-SCNC: 90 MMOL/L
CO2 SERPL-SCNC: 30 MMOL/L
CREAT SERPL-MCNC: 1.5 MG/DL
EST. GFR  (AFRICAN AMERICAN): 40 ML/MIN/1.73 M^2
EST. GFR  (NON AFRICAN AMERICAN): 35 ML/MIN/1.73 M^2
GLUCOSE SERPL-MCNC: 91 MG/DL
HCO3 UR-SCNC: 29.9 MMOL/L (ref 24–28)
PCO2 BLDA: 32 MMHG (ref 35–45)
PH SMN: 7.58 [PH] (ref 7.35–7.45)
PO2 BLDA: 59 MMHG (ref 80–100)
POC BE: 8 MMOL/L
POC SATURATED O2: 94 % (ref 95–100)
POC TCO2: 31 MMOL/L (ref 23–27)
POTASSIUM SERPL-SCNC: 4.2 MMOL/L
SAMPLE: ABNORMAL
SODIUM SERPL-SCNC: 131 MMOL/L

## 2017-04-02 PROCEDURE — 63600175 PHARM REV CODE 636 W HCPCS: Performed by: HOSPITALIST

## 2017-04-02 PROCEDURE — 82803 BLOOD GASES ANY COMBINATION: CPT

## 2017-04-02 PROCEDURE — 36415 COLL VENOUS BLD VENIPUNCTURE: CPT

## 2017-04-02 PROCEDURE — 20000000 HC ICU ROOM

## 2017-04-02 PROCEDURE — 94640 AIRWAY INHALATION TREATMENT: CPT

## 2017-04-02 PROCEDURE — 25000003 PHARM REV CODE 250: Performed by: NURSE PRACTITIONER

## 2017-04-02 PROCEDURE — 27000221 HC OXYGEN, UP TO 24 HOURS

## 2017-04-02 PROCEDURE — 94660 CPAP INITIATION&MGMT: CPT

## 2017-04-02 PROCEDURE — 25000003 PHARM REV CODE 250: Performed by: INTERNAL MEDICINE

## 2017-04-02 PROCEDURE — 94761 N-INVAS EAR/PLS OXIMETRY MLT: CPT

## 2017-04-02 PROCEDURE — 36600 WITHDRAWAL OF ARTERIAL BLOOD: CPT

## 2017-04-02 PROCEDURE — 25000003 PHARM REV CODE 250: Performed by: HOSPITALIST

## 2017-04-02 PROCEDURE — 63600175 PHARM REV CODE 636 W HCPCS: Performed by: INTERNAL MEDICINE

## 2017-04-02 PROCEDURE — 80048 BASIC METABOLIC PNL TOTAL CA: CPT

## 2017-04-02 RX ORDER — BUMETANIDE 0.25 MG/ML
2 INJECTION INTRAMUSCULAR; INTRAVENOUS ONCE
Status: COMPLETED | OUTPATIENT
Start: 2017-04-02 | End: 2017-04-02

## 2017-04-02 RX ADMIN — ASPIRIN 81 MG 81 MG: 81 TABLET ORAL at 08:04

## 2017-04-02 RX ADMIN — BENZONATATE 100 MG: 100 CAPSULE ORAL at 09:04

## 2017-04-02 RX ADMIN — SODIUM CHLORIDE, PRESERVATIVE FREE 3 ML: 5 INJECTION INTRAVENOUS at 05:04

## 2017-04-02 RX ADMIN — ENOXAPARIN SODIUM 40 MG: 100 INJECTION SUBCUTANEOUS at 04:04

## 2017-04-02 RX ADMIN — CLOPIDOGREL BISULFATE 75 MG: 75 TABLET ORAL at 08:04

## 2017-04-02 RX ADMIN — AMIODARONE HYDROCHLORIDE 200 MG: 200 TABLET ORAL at 08:04

## 2017-04-02 RX ADMIN — FLUTICASONE FUROATE AND VILANTEROL TRIFENATATE 1 PUFF: 200; 25 POWDER RESPIRATORY (INHALATION) at 10:04

## 2017-04-02 RX ADMIN — NITROFURANTOIN (MONOHYDRATE/MACROCRYSTALS) 100 MG: 75; 25 CAPSULE ORAL at 08:04

## 2017-04-02 RX ADMIN — PANTOPRAZOLE SODIUM 40 MG: 40 TABLET, DELAYED RELEASE ORAL at 08:04

## 2017-04-02 RX ADMIN — PRAVASTATIN SODIUM 40 MG: 40 TABLET ORAL at 08:04

## 2017-04-02 RX ADMIN — SODIUM CHLORIDE, PRESERVATIVE FREE 3 ML: 5 INJECTION INTRAVENOUS at 01:04

## 2017-04-02 RX ADMIN — CHLOROTHIAZIDE SODIUM 250 MG: 500 INJECTION, POWDER, LYOPHILIZED, FOR SOLUTION INTRAVENOUS at 09:04

## 2017-04-02 RX ADMIN — SODIUM CHLORIDE, PRESERVATIVE FREE 3 ML: 5 INJECTION INTRAVENOUS at 10:04

## 2017-04-02 RX ADMIN — ALLOPURINOL 300 MG: 300 TABLET ORAL at 08:04

## 2017-04-02 RX ADMIN — POTASSIUM CHLORIDE 60 MEQ: 20 TABLET, EXTENDED RELEASE ORAL at 08:04

## 2017-04-02 RX ADMIN — FERROUS SULFATE TAB EC 325 MG (65 MG FE EQUIVALENT) 325 MG: 325 (65 FE) TABLET DELAYED RESPONSE at 08:04

## 2017-04-02 RX ADMIN — BUMETANIDE 2 MG: 0.25 INJECTION INTRAMUSCULAR; INTRAVENOUS at 08:04

## 2017-04-02 NOTE — PLAN OF CARE
Problem: Patient Care Overview  Goal: Plan of Care Review  Outcome: Ongoing (interventions implemented as appropriate)  Pt was found on 5 lpm humidified NC with SpO2 100%. Decrease to 2 lpm home settings. No adverse reactions to aerosol tx. No respiratory distress noted. Continue to monitor SpO2.

## 2017-04-03 ENCOUNTER — SURGERY (OUTPATIENT)
Age: 72
End: 2017-04-03

## 2017-04-03 PROBLEM — R06.02 SOB (SHORTNESS OF BREATH): Status: ACTIVE | Noted: 2017-04-03

## 2017-04-03 LAB
ALBUMIN SERPL BCP-MCNC: 2.7 G/DL
ALP SERPL-CCNC: 183 U/L
ALT SERPL W/O P-5'-P-CCNC: 82 U/L
ANION GAP SERPL CALC-SCNC: 10 MMOL/L
AST SERPL-CCNC: 91 U/L
BILIRUB DIRECT SERPL-MCNC: 1.2 MG/DL
BILIRUB SERPL-MCNC: 1.6 MG/DL
BLD PROD TYP BPU: NORMAL
BLD PROD TYP BPU: NORMAL
BLOOD UNIT EXPIRATION DATE: NORMAL
BLOOD UNIT EXPIRATION DATE: NORMAL
BLOOD UNIT TYPE CODE: 9500
BLOOD UNIT TYPE CODE: 9500
BLOOD UNIT TYPE: NORMAL
BLOOD UNIT TYPE: NORMAL
BUN SERPL-MCNC: 48 MG/DL
CALCIUM SERPL-MCNC: 9 MG/DL
CHLORIDE SERPL-SCNC: 90 MMOL/L
CO2 SERPL-SCNC: 30 MMOL/L
CODING SYSTEM: NORMAL
CODING SYSTEM: NORMAL
CREAT SERPL-MCNC: 1.3 MG/DL
DISPENSE STATUS: NORMAL
DISPENSE STATUS: NORMAL
EST. GFR  (AFRICAN AMERICAN): 48 ML/MIN/1.73 M^2
EST. GFR  (NON AFRICAN AMERICAN): 41 ML/MIN/1.73 M^2
GLUCOSE SERPL-MCNC: 90 MG/DL
POTASSIUM SERPL-SCNC: 3.9 MMOL/L
PROT SERPL-MCNC: 6.5 G/DL
SODIUM SERPL-SCNC: 130 MMOL/L
TRANS ERYTHROCYTES VOL PATIENT: NORMAL ML
TRANS ERYTHROCYTES VOL PATIENT: NORMAL ML

## 2017-04-03 PROCEDURE — 63600175 PHARM REV CODE 636 W HCPCS: Performed by: NURSE PRACTITIONER

## 2017-04-03 PROCEDURE — 25000003 PHARM REV CODE 250: Performed by: HOSPITALIST

## 2017-04-03 PROCEDURE — 99291 CRITICAL CARE FIRST HOUR: CPT | Mod: ,,, | Performed by: INTERNAL MEDICINE

## 2017-04-03 PROCEDURE — 25000003 PHARM REV CODE 250: Performed by: NURSE PRACTITIONER

## 2017-04-03 PROCEDURE — 80048 BASIC METABOLIC PNL TOTAL CA: CPT

## 2017-04-03 PROCEDURE — 80076 HEPATIC FUNCTION PANEL: CPT

## 2017-04-03 PROCEDURE — 11000001 HC ACUTE MED/SURG PRIVATE ROOM

## 2017-04-03 PROCEDURE — 27000221 HC OXYGEN, UP TO 24 HOURS

## 2017-04-03 PROCEDURE — 97530 THERAPEUTIC ACTIVITIES: CPT

## 2017-04-03 PROCEDURE — 97803 MED NUTRITION INDIV SUBSEQ: CPT

## 2017-04-03 PROCEDURE — 94761 N-INVAS EAR/PLS OXIMETRY MLT: CPT

## 2017-04-03 PROCEDURE — 94660 CPAP INITIATION&MGMT: CPT

## 2017-04-03 PROCEDURE — 97535 SELF CARE MNGMENT TRAINING: CPT

## 2017-04-03 PROCEDURE — 94640 AIRWAY INHALATION TREATMENT: CPT

## 2017-04-03 RX ORDER — BUMETANIDE 0.25 MG/ML
2 INJECTION INTRAMUSCULAR; INTRAVENOUS ONCE
Status: COMPLETED | OUTPATIENT
Start: 2017-04-03 | End: 2017-04-03

## 2017-04-03 RX ADMIN — BENZONATATE 100 MG: 100 CAPSULE ORAL at 02:04

## 2017-04-03 RX ADMIN — CHLOROTHIAZIDE SODIUM 250 MG: 500 INJECTION, POWDER, LYOPHILIZED, FOR SOLUTION INTRAVENOUS at 01:04

## 2017-04-03 RX ADMIN — SODIUM CHLORIDE, PRESERVATIVE FREE 3 ML: 5 INJECTION INTRAVENOUS at 10:04

## 2017-04-03 RX ADMIN — SODIUM CHLORIDE, PRESERVATIVE FREE 3 ML: 5 INJECTION INTRAVENOUS at 02:04

## 2017-04-03 RX ADMIN — TRAMADOL HYDROCHLORIDE 100 MG: 50 TABLET, COATED ORAL at 08:04

## 2017-04-03 RX ADMIN — FERROUS SULFATE TAB EC 325 MG (65 MG FE EQUIVALENT) 325 MG: 325 (65 FE) TABLET DELAYED RESPONSE at 08:04

## 2017-04-03 RX ADMIN — POTASSIUM CHLORIDE 60 MEQ: 20 TABLET, EXTENDED RELEASE ORAL at 09:04

## 2017-04-03 RX ADMIN — BUMETANIDE 2 MG: 0.25 INJECTION INTRAMUSCULAR; INTRAVENOUS at 01:04

## 2017-04-03 RX ADMIN — PANTOPRAZOLE SODIUM 40 MG: 40 TABLET, DELAYED RELEASE ORAL at 09:04

## 2017-04-03 RX ADMIN — FLUTICASONE FUROATE AND VILANTEROL TRIFENATATE 1 PUFF: 200; 25 POWDER RESPIRATORY (INHALATION) at 09:04

## 2017-04-03 RX ADMIN — NITROFURANTOIN (MONOHYDRATE/MACROCRYSTALS) 100 MG: 75; 25 CAPSULE ORAL at 09:04

## 2017-04-03 RX ADMIN — ASPIRIN 81 MG 81 MG: 81 TABLET ORAL at 08:04

## 2017-04-03 RX ADMIN — NITROFURANTOIN (MONOHYDRATE/MACROCRYSTALS) 100 MG: 75; 25 CAPSULE ORAL at 08:04

## 2017-04-03 RX ADMIN — PRAVASTATIN SODIUM 40 MG: 40 TABLET ORAL at 09:04

## 2017-04-03 RX ADMIN — AMIODARONE HYDROCHLORIDE 200 MG: 200 TABLET ORAL at 09:04

## 2017-04-03 RX ADMIN — ALLOPURINOL 300 MG: 300 TABLET ORAL at 09:04

## 2017-04-03 RX ADMIN — CLOPIDOGREL BISULFATE 75 MG: 75 TABLET ORAL at 09:04

## 2017-04-03 NOTE — ASSESSMENT & PLAN NOTE
Continuous BiPap throughout the weekend secondary to hypoxia; pCO2 below baseline; BiPap removed this AM with stable pulse ox-will remain off and use prn and nightly

## 2017-04-03 NOTE — PLAN OF CARE
Problem: Occupational Therapy Goal  Goal: Occupational Therapy Goal  Goals to be met by: 3/27/2017    Patient will increase functional independence with ADLs by performing:    UE Dressing with Set-up Assistance.  LE Dressing with Minimal Assistance   Grooming while standing at sink with Stand-by Assistance   Toileting from bedside commode with Supervision  Toilet transfer to bedside commode with Supervision.   Increased functional strength to WNL for BUE use for ADLS and fx mobility..   Outcome: Ongoing (interventions implemented as appropriate)  Patient with decreased strength and mobility this date. Patient to D/C home with hospice tomorrow.

## 2017-04-03 NOTE — PLAN OF CARE
Problem: Patient Care Overview  Goal: Plan of Care Review  Pt received on nasal cannula at  3 lpm. SPO2  98 %.  Pt in no apparent respiratory distress.  Will continue to monitor.

## 2017-04-03 NOTE — ASSESSMENT & PLAN NOTE
Echo with severely depressed LV function with EF 25% with severe MR; aggressive diuresis with IV Diamox and IV Bumex with conversion to Diuril and Bumex yesterday; 1.8 liters out and now negative 18 liters since admission; remains with peripheral edema and rales; required continuous BiPap over the weekend but currently tolerating NC only; will repeat IV Diuril and Bumex today; overall prognosis appears very poor and discussed with patient and daughter; daughter states wishes to proceed with home hospice but conversation with patient conflicting with desires for continued aggressive treatment; uncertain if denial is a factor or inability to grasp severity of current illness-completely neurologically intact; will discuss with staff on rounds and determine if discussion with HF at main campus reasonable or if family meeting more ideal; weaned off Dobutamine last week with decline in appearance of CXR and volume status likely not tolerating being off Dobutamine-will discuss with staff on rounds

## 2017-04-03 NOTE — PLAN OF CARE
Problem: Physical Therapy Goal  Goal: Physical Therapy Goal  Goals to be met by: 2017     Patient will increase functional independence with mobility by performin. Supine to sit with Minimal Assistance  2. Sit to supine with Minimal Assistance  3. Sit to stand transfer with Minimal Assistance  4. Bed to chair transfer with Minimal Assistance using Rolling Walker  5. Gait x 15-25ft feet with Minimal Assistance using Rolling Walker.   6. Increased functional strength to 4- to 4 for transfers and amb.   Outcome: Ongoing (interventions implemented as appropriate)  Continue working toward goals.

## 2017-04-03 NOTE — SUBJECTIVE & OBJECTIVE
Review of Systems   Constitution: Positive for malaise/fatigue. Negative for chills and diaphoresis.   Cardiovascular: Positive for dyspnea on exertion and leg swelling. Negative for chest pain, claudication, cyanosis, irregular heartbeat, near-syncope, orthopnea, palpitations, paroxysmal nocturnal dyspnea and syncope.   Respiratory: Negative for cough and shortness of breath.    Gastrointestinal: Negative for abdominal pain, constipation, diarrhea, nausea and vomiting.     Objective:     Vital Signs (Most Recent):  Temp: 96.4 °F (35.8 °C) (04/03/17 0710)  Pulse: 71 (04/03/17 1045)  Resp: (!) 23 (04/03/17 1045)  BP: 115/60 (04/03/17 1030)  SpO2: 99 % (04/03/17 1045) Vital Signs (24h Range):  Temp:  [96.4 °F (35.8 °C)-97.8 °F (36.6 °C)] 96.4 °F (35.8 °C)  Pulse:  [69-74] 71  Resp:  [11-33] 23  SpO2:  [89 %-100 %] 99 %  BP: ()/(53-74) 115/60     Weight: 117.9 kg (259 lb 14.8 oz)  Body mass index is 41.95 kg/(m^2).     SpO2: 99 %  O2 Device (Oxygen Therapy): nasal cannula      Intake/Output Summary (Last 24 hours) at 04/03/17 1116  Last data filed at 04/03/17 0600   Gross per 24 hour   Intake              100 ml   Output              939 ml   Net             -839 ml       Lines/Drains/Airways     Central Venous Catheter Line                 Percutaneous Central Line Insertion/Assessment - triple lumen  03/23/17 2018 right internal jugular 10 days          Airway                 Airway - Non-Surgical 03/31/17 1440 Nasal Cannula 2 days                Physical Exam   Constitutional: She appears well-developed. She has a sickly appearance. She appears ill.   Cardiovascular: Normal rate and regular rhythm.  Exam reveals no gallop.    No murmur heard.  Pulmonary/Chest: Tachypnea (wtih activity ) noted. She has rales in the right middle field, the right lower field, the left middle field and the left lower field.   Abdominal: Soft. Bowel sounds are normal. She exhibits no distension. There is no tenderness.    Musculoskeletal: She exhibits edema (4+ BLE edema noted).   Neurological: She is alert.   Skin: Skin is warm and dry.       Significant Labs:       Recent Labs  Lab 04/03/17  0500   *   K 3.9   CL 90*   CO2 30*   BUN 48*   CREATININE 1.3         Significant Imaging: Echocardiogram:   2D echo with color flow doppler:   Results for orders placed or performed during the hospital encounter of 03/01/17   2D echo with color flow doppler   Result Value Ref Range    EF 25 (A) 55 - 65    Mitral Valve Regurgitation SEVERE (A)     Diastolic Dysfunction Yes (A)     Est. PA Systolic Pressure 50.28 (A)     Pericardial Effusion NONE     Tricuspid Valve Regurgitation MODERATE TO SEVERE (A)

## 2017-04-03 NOTE — ASSESSMENT & PLAN NOTE
H&H stable at 8&26; CBC qTu-Thu-Sat; enteroscopy Friday limited secondary to food in stomach; plan for repeat scope today but deferred given decompensation over the weekend; will continue to monitor H&H closely as ordered; discuss goals of care with patient

## 2017-04-03 NOTE — ASSESSMENT & PLAN NOTE
Cleared; urine culture with Ecoli; placed on Macrodantin and will continue for total of 7-10 days; normal WBC on last check and afebrile

## 2017-04-03 NOTE — ASSESSMENT & PLAN NOTE
Paced rhythm with underlying afib; no RVR noted; BB on hold due to marginal BP; no AC due to bleeding AVMs

## 2017-04-03 NOTE — PROGRESS NOTES
[facility]  Adult Nutrition  Progress Note    SUMMARY     Recommendations    Recommendation/Intervention:   1. Add fluid restriction to diet.   2. Encourage good intake at meals.   3. Encourage diet compliance.    Goals:   Pt will consume at least 50% intake at meals  Nutrition Goal Status: progressing towards goal  Communication of RD Recs: discussed on rounds    Continuum of Care Plan  Referral to Outpatient Services:  (pt to d/c on Cardiac diet with fluid restriction)    Reason for Assessment  Reason for Assessment: RD follow-up  Diagnosis: cardiac disease  Relevent Medical History: carotid artery occulsion, arthritis, cardiomyopathy, CAD, HTN, COPD, sleep apnea, cholecystectomy, pacemaker   General Information Comments: Pt on Cardiac diet. Pt with poor-fair po intake. Pt with diet noncmpliance. Pt with inadequate energy intake related to decreased appetite as evidenced by inadequate po intake-continues    Nutrition Prescription Ordered  Current Diet Order: Cardiac     Nutrition Risk Screen  Nutrition Risk Screen: no indicators present    Nutrition/Diet History  Patient Reported Diet/Restrictions/Preferences: low salt  Food Preferences: no Protestant or cultural food prefs identified  Factors Affecting Nutritional Intake: decreased appetite    Labs/Tests/Procedures/Meds  Pertinent Labs Reviewed: reviewed  Pertinent Labs Comments: Na 130L, BUN 48H, ALb 2.7L  Pertinent Medications Reviewed: reviewed  Pertinent Medications Comments: aspirin, ferrous sulfate, pantoprazole    Physical Findings  Overall Physical Appearance: overweight  Tubes:  (none)  Oral/Mouth Cavity: WDL  Skin:  (Roni 13-leg ulcers)    Anthropometrics  Height (inches): 65.98 in  Weight Method: Bed Scale  Weight (kg): 117.9 kg  Ideal Body Weight (IBW), Female: 129.9 lb  % Ideal Body Weight, Female (lb): 200.1 lb  BMI (kg/m2): 41.97  BMI Grade: greater than 40 - morbid obesity    Estimated/Assessed Needs  Weight Used For Calorie  Calculations: 59 kg (130 lb 1.1 oz) (IBW)   Height (cm): 167.6 cm  Energy Need Method: Kcal/kg (4302-9555 (30-35 kcal/kg IBW))  RMR ([place]-St. Jeor Equation): 1715  Weight Used For Protein Calculations: 59 kg (130 lb 1.1 oz) (IBW)  Protein Requirements: 59-71g (1.0-1.2 g/kg)    Monitor and Evaluation  Food and Nutrient Intake: food and beverage intake  Food and Nutrient Adminstration: diet order  Physical Activity and Function: nutrition-related ADLs and IADLs  Anthropometric Measurements: weight  Biochemical Data, Medical Tests and Procedures: electrolyte and renal panel  Nutrition-Focused Physical Findings: overall appearance    Nutrition Risk  Level of Risk:  (1x/week)    Nutrition Follow-Up  RD Follow-up?: Yes    Assessment and Plan  No new Assessment & Plan notes have been filed under this hospital service since the last note was generated.  Service: Nutrition

## 2017-04-03 NOTE — PT/OT/SLP PROGRESS
Physical Therapy  Treatment    Jennifer Prescott   MRN: [id number]   Admitting Diagnosis: Acute on chronic combined systolic and diastolic heart failure    PT Received On: 04/03/17  PT Start Time: 1545     PT Stop Time: 1613    PT Total Time (min): 28 min       Billable Minutes:  Therapeutic Activity 13 Co - tx with O.T.    Treatment Type: Treatment  PT/PTA: PTA     PTA Visit Number: 1       General Precautions: Standard, fall  Orthopedic Precautions: Full weight bearing   Braces:      Do you have any cultural, spiritual, Rastafari conflicts, given your current situation?: none    Subjective:  Communicated with RN  prior to session.  Pt agreed to sit EOB.    Pain Rating:  (c/o buttock pain, but did not rate.  Relieved by sitting EOB.)              Pain Rating Post-Intervention:  (as above)    Objective:   Patient found with: blood pressure cuff, oxygen (full ICU monitoring.)    Functional Mobility:  Bed Mobility:   Supine to Sit: Moderate Assistance, With assist of 2  Sit to Supine: Minimum Assistance, Maximum Assistance (Bobby of 1 to guide trunk and Max of 1 for BLEs)    Transfers:       Gait:        Stairs:      Balance:   Static Sit: FAIR+: Able to take MINIMAL challenges from all directions  Dynamic Sit: FAIR+: Maintains balance through MINIMAL excursions of active trunk motion  Therapeutic Activities and Exercises:  Pt sat EOB x 22 mins with SBA.  Pt stated her back and buttock felt better sitting EOB.  Pt was instructed in pressure relief techniques while lying in bed with HOB elevated and pt in a sitting up position. (weight shifting/leaning forward (trunk flexion).     AM-PAC 6 CLICK MOBILITY  How much help from another person does this patient currently need?   1 = Unable, Total/Dependent Assistance  2 = A lot, Maximum/Moderate Assistance  3 = A little, Minimum/Contact Guard/Supervision  4 = None, Modified [place]/Independent    Turning over in bed (including adjusting bedclothes, sheets and blankets)?:  2  Sitting down on and standing up from a chair with arms (e.g., wheelchair, bedside commode, etc.): 2  Moving from lying on back to sitting on the side of the bed?: 2  Moving to and from a bed to a chair (including a wheelchair)?: 2  Need to walk in hospital room?: 1  Climbing 3-5 steps with a railing?: 1  Total Score: 10    AM-PAC Raw Score CMS G-Code Modifier Level of Impairment Assistance   6 % Total / Unable   7 - 9 CM 80 - 100% Maximal Assist   10 - 14 CL 60 - 80% Moderate Assist   15 - 19 CK 40 - 60% Moderate Assist   20 - 22 CJ 20 - 40% Minimal Assist   23 CI 1-20% SBA / CGA   24 CH 0% Independent/ Mod I     Patient left supine with all lines intact, call button in reach and RN notified.    Assessment:  Jennifer Prescott is a 71 y.o. female with a medical diagnosis of Acute on chronic combined systolic and diastolic heart failure and presents with decreased strength and endurance.  Pt would continue to benefit from P.T. To address impairments listed below.    Rehab identified problem list/impairments: Rehab identified problem list/impairments: weakness, impaired endurance, impaired functional mobilty, impaired self care skills, decreased upper extremity function, decreased lower extremity function, decreased ROM, edema, impaired skin, impaired cardiopulmonary response to activity    Rehab potential is fair.    Activity tolerance: Fair    Discharge recommendations: Discharge Facility/Level Of Care Needs:  (TBD)     Barriers to discharge: Barriers to Discharge: None    Equipment recommendations: Equipment Needed After Discharge:  (TBD)     GOALS:   Physical Therapy Goals        Problem: Physical Therapy Goal    Goal Priority Disciplines Outcome Goal Variances Interventions   Physical Therapy Goal     PT/OT, PT Ongoing (interventions implemented as appropriate)     Description:  Goals to be met by: 4/3/2017     Patient will increase functional independence with mobility by performin. Supine to sit  with Stand-by Assistance  2. Sit to stand transfer with Supervision  3. Gait  x 15-25 feet with Supervision using Rolling Walker.              Problem: Physical Therapy Goal    Goal Priority Disciplines Outcome Goal Variances Interventions   Physical Therapy Goal     PT/OT, PT Ongoing (interventions implemented as appropriate)     Description:  Goals to be met by: 2017     Patient will increase functional independence with mobility by performin. Supine to sit with Minimal Assistance  2. Sit to supine with Minimal Assistance  3. Sit to stand transfer with Minimal Assistance  4. Bed to chair transfer with Minimal Assistance using Rolling Walker  5. Gait  x 15-25ft feet with Minimal Assistance using Rolling Walker.   6. Increased functional strength to 4- to 4 for transfers and amb.             Problem: Physical Therapy Goal    Goal Priority Disciplines Outcome Goal Variances Interventions   Physical Therapy Goal     PT/OT, PT                PLAN:    Patient to be seen 6 x/week  to address the above listed problems via therapeutic activities, gait training, therapeutic exercises, neuromuscular re-education  Plan of Care expires: 17  Plan of Care reviewed with: patient, daughter         Tere Araiza, PTA  2017

## 2017-04-03 NOTE — PROGRESS NOTES
Pt continues to have moisture issues under pannus with redness and excoriation. New [employer] fabric applied and dated. Will write an order to continue to use up to 5 days if clean of urine, blood, and feces. Right lower leg swelling continues to improve. Redness persists however it is mild and without any open wounds. No weeping noted to right leg. Continue with daily lotion application. Left leg with continued weeping. Change kerlix daily and encourage leg elevation. Heels remain intact. Floated from pillows when I came to assess patient. Continue floating heels when patient supine and encourage regular turning while in bed and self-adjusting from side to side while up in chair.

## 2017-04-03 NOTE — PLAN OF CARE
Problem: Cardiac: Heart Failure (Adult)  Goal: Signs and Symptoms of Listed Potential Problems Will be Absent, Minimized or Managed (Cardiac: Heart Failure)  Signs and symptoms of listed potential problems will be absent, minimized or managed by discharge/transition of care (reference Cardiac: Heart Failure (Adult) CPG).   Outcome: Unable to achieve outcome(s) by discharge  Patient has discussed current health status with MD and she has decided to transfer to hospice care. Patient was able to ask concerning questions regarding her current diagnosis and is AA and Ox's 3, no signs of distress noted, daughter is at the bedside and case management has been called to discuss hospice care. Plan is to d/c patient tomorrow morning and transfer patient home on hospice care. Safety protocol in place and will monitor patient closely.

## 2017-04-03 NOTE — PLAN OF CARE
Problem: Patient Care Overview  Goal: Plan of Care Review  Outcome: Ongoing (interventions implemented as appropriate)  Placed pt on BIPAP at night; will cont to monitor.

## 2017-04-03 NOTE — PROGRESS NOTES
[facility]  Cardiology  Progress Note    Patient Name: Jennifer Prescott  MRN: [id number]  Admission Date: 3/16/2017  Hospital Length of Stay: 18 days  Code Status: Full Code   Attending Physician: Jamel Putnam MD   Primary Care Physician: Lianna Mistry MD  Expected Discharge Date:   Principal Problem:Acute on chronic combined systolic and diastolic heart failure    Subjective:     Hospital Course:   3/16/2017 Presented to the ER with complaints of BAEZ, orthopnea, LE edema and decreased appetite. Admitted to [facility] for management of decompensated HF. 4+ BLE edema present with abdominal distension. Creatinine 1.6 and BNP 3344 with elevated total bilirubin. Continued on current home CHF medication regimen with IV Bumex  Labs pending. Abdominal distension and LE edema remains unchanged. Long discussion by Dr. Braga on 3/16/17 in regards to the severity of her heart failure and the overall concern given her recurrent decompensation despite aggressive treatment. IV diuretics held due to marginal BP 3/17/17 Oral CHF medications held with initiation of IV Bumex with 1.4 liters out. LE edema and orthopnea unchanged 3/18/17-3/19/17 Repeat discussion yesterday with encouragement to determine her wishes if she were to further decompensate ie intubation, CPR.   24 hours failure of diuresis  Likely due to loss of IV access.  Cr stable.  BAEZ and orthopnea persist. Continued on IV Bumex with oral Metolazone given 30minutes prior to AM dose. 3/20/2017 Aggressive diuresis remains in place with Bumex 3mg IV BID with Metolazone yesterday with 3.8 liters out negative 2.7 liters in past 24 hours and negative 4.6 liters since admission. Peripheral edema along with ascites and orthopnea remain. Working with PT/OT to prevent debility. Patient stated discussion with her daughter in the past in regards to her wishes consistent with living will and wishes to continue with aggressive treatment  3/21/2017 Repeat Metolazone yesterday along with IV Bumex BID yesterday with good response and 3.8 liters out overnight and negative 7.1 liters since admission. Orthopnea and BLE edema remains. Will hold off off Metolazone today due to rise in CO2 (36) and decrease in Cl concerning for contraction alkalosis and will continue IV Bumex BID today 3/22/2017 IV Bumex BID yesterday with 1.8 liters out overnight and negative 9 liters since admission. Orthopnea and peripheral edema essentially unchanged. CO2 down to 33 today. 3/23/2017 IV Bumex x 1 yesterday due to elevated CO2. CO2 improved today with repeat CXR with worsening right pleural effusion on comparison to admit CXR. SOB and peripheral edema essentially unchanged. Will give IV Bumex today with strict I&Os and daily weights. Discuss continued aggressive diuresis with staff vs initiation of inotropes for further CHF management 3/24/2017 Transferred to ICU yesterday with initiation of Dobutamine along with Bumex 3mg IV x 1. 3.3 liters out overnight and negative 2.2 in 24 hours and negative 12.7 liters since admission. Creatinine trending down and CO2 up to 36 today from 30. Reports feeling slightly better and able to lie in bed to sleep overnight although unable to lie flat. CVP 23-27 but unable to lie completely flat. Will continue IV Dobutamine drip and given additional dose of Bumex IV for continued diuresis-monitor labs closely 3/25/2017-3/26/2017 Continued on IV Dobutamine drip throughout the weekend with IV Diamox and IV Diuril with total of 4 liters out. Bumex held due to rising CO2. CXR with no marked improvement to pleural effusions 3/27/2017 1.4 liters out overnight with 1.39 liters in and essentially net zero in 24hours; negative 13 liters since admission. LE swelling and SOB persist however states she feels slightly better. Noted to be extremely weak with increased WOB with minimal activity (moving to the edge of the chair). Plans for additional dose of  "Diamox today with IV Bumex since CO2 trended down. Will place rivas due to acute distress with activity and use BiPap prn 3/28/2017 Given IV Diamox and Bumex with 2.2 liters out and negative 14.3 liters since admission. Reports feeling slightly better and able to sleep in bed. Repeat IV Diamox and Bumex today with plans to initiate Dobutamine wean. Updated per GI on VEC results with evidence of AVMs and will need enteroscopy for treatment 3/29/2017 Dobutamine decreased to 2.5mcg/kg/min; Repeat IV Diamox and IV Bumex yesterday with only 1.4 liters out but not net negative overnight. Repeat CXR today with improvement in bilateral pleural effusions. Plan for possible enteroscopy tomorrow for evaluation and treatment of AVMs noted on VEC [id number] Weaned off Dobutamine yesterday evening with repeat IV Diamox and IV Bumex with 2 liters out. Reports SOB improved. Able to lie flat this AM and working with PT to improve conditioning. Plan for enteroscopy tomorrow by GI 3/31/2017 NPO today for enteroscopy. Repeat IV Diamox and Bumex yesterday with 1 liter out but net equal. SBP 80s-100s overnight with no arrhythmias noted on monitor. Lying flat in bed with no orthopnea or cough. LE swelling remains but slightly improved. Plan for possible transfer to the floor after the enteroscopy later today vs tomorrow 4/1/2017-4/2/2017 Continue with diuresis through the weekend with Diuril and Lasix with creatinine 1.3 this AM. Marginal response with 1.8 liters out and negative 18 liters since admission. Required continuous BiPap over the weekend due to hypoxia and desaturation 4/3/2017 Taken off continuous BiPap with stable sats and good toleration. Planned for enteroscopy but held off due to CHF. Discussed overall prognosis with daughter who grasps understanding and states "I just want her to be comfortable". Discussed with patient who stated "I want to proceed with GI procedure and be aggressive in treatment" DNR status discussed with " "patient yesterday with Dr. Braga with desire to discuss with family/children however stated "I have not discussed it yet"        Review of Systems   Constitution: Positive for malaise/fatigue. Negative for chills and diaphoresis.   Cardiovascular: Positive for dyspnea on exertion and leg swelling. Negative for chest pain, claudication, cyanosis, irregular heartbeat, near-syncope, orthopnea, palpitations, paroxysmal nocturnal dyspnea and syncope.   Respiratory: Negative for cough and shortness of breath.    Gastrointestinal: Negative for abdominal pain, constipation, diarrhea, nausea and vomiting.     Objective:     Vital Signs (Most Recent):  Temp: 96.4 °F (35.8 °C) (04/03/17 0710)  Pulse: 71 (04/03/17 1045)  Resp: (!) 23 (04/03/17 1045)  BP: 115/60 (04/03/17 1030)  SpO2: 99 % (04/03/17 1045) Vital Signs (24h Range):  Temp:  [96.4 °F (35.8 °C)-97.8 °F (36.6 °C)] 96.4 °F (35.8 °C)  Pulse:  [69-74] 71  Resp:  [11-33] 23  SpO2:  [89 %-100 %] 99 %  BP: ()/(53-74) 115/60     Weight: 117.9 kg (259 lb 14.8 oz)  Body mass index is 41.95 kg/(m^2).     SpO2: 99 %  O2 Device (Oxygen Therapy): nasal cannula      Intake/Output Summary (Last 24 hours) at 04/03/17 1116  Last data filed at 04/03/17 0600   Gross per 24 hour   Intake              100 ml   Output              939 ml   Net             -839 ml       Lines/Drains/Airways     Central Venous Catheter Line                 Percutaneous Central Line Insertion/Assessment - triple lumen  03/23/17 2018 right internal jugular 10 days          Airway                 Airway - Non-Surgical 03/31/17 1440 Nasal Cannula 2 days                Physical Exam   Constitutional: She appears well-developed. She has a sickly appearance. She appears ill.   Cardiovascular: Normal rate and regular rhythm.  Exam reveals no gallop.    No murmur heard.  Pulmonary/Chest: Tachypnea (wtih activity ) noted. She has rales in the right middle field, the right lower field, the left middle field and " "the left lower field.   Abdominal: Soft. Bowel sounds are normal. She exhibits no distension. There is no tenderness.   Musculoskeletal: She exhibits edema (4+ BLE edema noted).   Neurological: She is alert.   Skin: Skin is warm and dry.       Significant Labs:       Recent Labs  Lab 04/03/17  0500   *   K 3.9   CL 90*   CO2 30*   BUN 48*   CREATININE 1.3         Significant Imaging: Echocardiogram:   2D echo with color flow doppler:   Results for orders placed or performed during the hospital encounter of 03/01/17   2D echo with color flow doppler   Result Value Ref Range    EF 25 (A) 55 - 65    Mitral Valve Regurgitation SEVERE (A)     Diastolic Dysfunction Yes (A)     Est. PA Systolic Pressure 50.28 (A)     Pericardial Effusion NONE     Tricuspid Valve Regurgitation MODERATE TO SEVERE (A)      Assessment and Plan:     Brief HPI: Seen this morning on AM rounds with continuous BiPap intact with tolerance with discontinuation. Denies any complaints currently. Long discussion with patient's daughter outside of the room in regards to the severity of her illness with minimal improvement and concern for overall poor prognosis-daughter verbalized understanding and states discussion with her mother with decision for home hospice. However discussed with patient when in the room alone with a different decision and states "I want to proceed with the stomach procedure and go home". Discussed the overall caliber of her current situation and concern for poor prognosis and she states "I want to continue to be aggressive in treatment"-wishes acknowledged and will discuss with staff on rounds     * Acute on chronic combined systolic and diastolic heart failure  Echo with severely depressed LV function with EF 25% with severe MR; aggressive diuresis with IV Diamox and IV Bumex with conversion to Diuril and Bumex yesterday; 1.8 liters out and now negative 18 liters since admission; remains with peripheral edema and rales; " required continuous BiPap over the weekend but currently tolerating NC only; will repeat IV Diuril and Bumex today; overall prognosis appears very poor and discussed with patient and daughter; daughter states wishes to proceed with home hospice but conversation with patient conflicting with desires for continued aggressive treatment; uncertain if denial is a factor or inability to grasp severity of current illness-completely neurologically intact; will discuss with staff on rounds and determine if discussion with HF at main campus reasonable or if family meeting more ideal; weaned off Dobutamine last week with decline in appearance of CXR and volume status likely not tolerating being off Dobutamine-will discuss with staff on rounds     Permanent atrial fibrillation  Paced rhythm with underlying afib; no RVR noted; BB on hold due to marginal BP; no AC due to bleeding AVMs    CAD S/P percutaneous coronary angioplasty  Stable; continue ASA, Plavix and Pravastatin only      Obesity hypoventilation syndrome on BiPAP  Continuous BiPap throughout the weekend secondary to hypoxia; pCO2 below baseline; BiPap removed this AM with stable pulse ox-will remain off and use prn and nightly     CKD (chronic kidney disease) stage 3, GFR 30-59 ml/min  Creatinine 1.3 this AM; slight trend up over the weekend with improvement with IV diuresis     Iron deficiency anemia due to chronic blood loss  H&H stable at 8&26; CBC qTu-Thu-Sat; enteroscopy Friday limited secondary to food in stomach; plan for repeat scope today but deferred given decompensation over the weekend; will continue to monitor H&H closely as ordered; discuss goals of care with patient     Pleural effusion, right  Mild improved with waxing and waning throughout admission; repeat CXR yesterday essentially unchanged from 3/31/17-will continue to attempt to diurese aggressively     Mitral regurgitation  Severe per echo;  functional related due to severely depressed LVEF; not  a candidate for MVR or mitiral clip    Hyponatremia  Na 130 today; more likely related to volume overload; will monitor closely with aggressive diuresis     Acute decompensated heart failure  As detailed above    Hematuria  Cleared; urine culture with Ecoli; placed on Macrodantin and will continue for total of 7-10 days; normal WBC on last check and afebrile       VTE Risk Mitigation         Ordered     enoxaparin injection 40 mg  Daily     Route:  Subcutaneous        03/16/17 1555     Medium Risk of VTE  Once      03/16/17 1449          SEJAL Strickland, ANP  Cardiology  [facility]            I have seen patient with Nurse Practitioner Augusta De La Vega. I agree with her assessment and plan as written in this note.            Patient with end stage CHF with multi-organ failure   Heart   Lungs   Valves   Liver   Kidneys   Muscular   Neurologic          She has agreed to home hospice  She is DNR        Will transfer to the floor and DC in am        Spoke with patient and daughter at the bedside for more than 30 minutes

## 2017-04-03 NOTE — PLAN OF CARE
Problem: Nutrition, Imbalanced: Inadequate Oral Intake (Adult)  Goal: Improved Oral Intake  Patient will demonstrate the desired outcomes by discharge/transition of care.  Outcome: Ongoing (interventions implemented as appropriate)  Recommendation/Intervention:   1. Add fluid restriction to diet.   2. Encourage good intake at meals.   3. Encourage diet compliance.     Goals:   Pt will consume at least 50% intake at meals  Nutrition Goal Status: progressing towards goal  Communication of RD Recs: discussed on rounds

## 2017-04-03 NOTE — PLAN OF CARE
Problem: Patient Care Overview  Goal: Plan of Care Review  Outcome: Ongoing (interventions implemented as appropriate)  Pt lying in bed comfortably, drowzy, awakens to gentle stimulation. Oriented x3. No needs or c/o reported. Denies pain. SATS 93-97% on 2L nasal cannula and bipap. Vitals stable. Tele paced with HR 60's. Adequate urine output. No acute events overnight. Will continue to monitor.

## 2017-04-03 NOTE — PT/OT/SLP PROGRESS
"Occupational Therapy  Treatment    Jennifer Prescott   MRN: [id number]   Admitting Diagnosis: Acute on chronic combined systolic and diastolic heart failure    OT Date of Treatment: 04/03/17   OT Start Time: 1545  OT Stop Time: 1613  OT Total Time (min): 28 min    Billable Minutes:  Self Care/Home Management 14 (Co-tx with PTA 28 mins)    General Precautions: Standard, fall, respiratory  Orthopedic Precautions: N/A  Braces: N/A    Do you have any cultural, spiritual, Lutheran conflicts, given your current situation?: none    Subjective:  Communicated with Snehal/Glo prior to session. "My butt hurts"    Pain Rating:  (did not rate, but c/o pain in buttocks)    Objective:  Patient found with: blood pressure cuff, oxygen, pulse ox (continuous), telemetry     Functional Mobility:  Bed Mobility:  Scooting/Bridging: Maximum Assistance  Supine to Sit: Moderate Assistance, With assist of 2  Sit to Supine: Maximum Assistance, With leg lift (Min (A) at trunk)    Transfers:   Sit <> Stand Assistance: Moderate Assistance (1/4 stand/squat stands, x 3 trials)  Sit <> Stand Assistive Device: No Assistive Device (Assist of 2)    Functional Ambulation: N/A    Activities of Daily Living:  Grooming Position: Seated, EOB  Grooming Level of Assistance: Supervision  Toileting Where Assessed: Bed level  Toileting Level of Assistance: Total assistance    Balance:   Static Sit: FAIR: Maintains without assist, but unable to take any challenges   Dynamic Sit: FAIR: Cannot move trunk without losing balance  Static Stand: POOR: Needs MODERATE assist to maintain  Dynamic stand: POOR: N/A    Therapeutic Activities and Exercises:  Bed mobility noted as above. Requires increased time 2* O2 sats dropping to mid 70's and patient dyspneic. Sat EOB x 22 mins with SBA-CGA. Washed face with s/u. Hand pumps x 10 reps to decrease edema. Patient completed sit <> squat with mod (A) of 2, x 3 trials, to clear buttocks and scoot laterally towards HOB. B heels " floated. BUEs elevated with pillows.     AM-PAC 6 CLICK ADL   How much help from another person does this patient currently need?   1 = Unable, Total/Dependent Assistance  2 = A lot, Maximum/Moderate Assistance  3 = A little, Minimum/Contact Guard/Supervision  4 = None, Modified [place]/Independent    Putting on and taking off regular lower body clothing? : 2  Bathing (including washing, rinsing, drying)?: 2  Toileting, which includes using toilet, bedpan, or urinal? : 2  Putting on and taking off regular upper body clothing?: 3  Taking care of personal grooming such as brushing teeth?: 3  Eating meals?: 4  Total Score: 16     AM-PAC Raw Score CMS G-Code Modifier Level of Impairment Assistance   6 % Total / Unable   7 - 9 CM 80 - 100% Maximal Assist   10 - 14 CL 60 - 80% Moderate Assist   15 - 19 CK 40 - 60% Moderate Assist   20 - 22 CJ 20 - 40% Minimal Assist   23 CI 1-20% SBA / CGA   24 CH 0% Independent/ Mod I     Patient left HOB elevated with all lines intact, call button in reach and nsg notified    ASSESSMENT:  Jennifer Prescott is a 71 y.o. female with a medical diagnosis of Acute on chronic combined systolic and diastolic heart failure   Patient with decreased strength and mobility this date. Patient to D/C home with hospice tomorrow.     Rehab identified problem list/impairments: Rehab identified problem list/impairments: weakness, impaired endurance, impaired self care skills, impaired functional mobilty, gait instability, impaired balance, decreased safety awareness, pain, decreased ROM, edema, impaired skin, impaired cardiopulmonary response to activity    Rehab potential is fair.    Activity tolerance: Poor    Discharge recommendations: Discharge Facility/Level Of Care Needs: home with hospice (Per RN, patient, and daughter)     Barriers to discharge: Barriers to Discharge: None    Equipment recommendations:  (Patient owns DME and will be getting a hospital bed)     GOALS:   Occupational  Therapy Goals        Problem: Occupational Therapy Goal    Goal Priority Disciplines Outcome Interventions   Occupational Therapy Goal     OT, PT/OT Ongoing (interventions implemented as appropriate)    Description:  Goals to be met by: 3/27/2017    Patient will increase functional independence with ADLs by performing:    UE Dressing with Set-up Assistance.  LE Dressing with Minimal Assistance   Grooming while standing at sink with Stand-by Assistance   Toileting from bedside commode with Supervision  Toilet transfer to bedside commode with Supervision.   Increased functional strength to WNL for BUE use for ADLS and fx mobility..                  Plan:  Patient to be seen 5 x/week to address the above listed problems via self-care/home management, therapeutic activities, therapeutic exercises  Plan of Care expires: 04/30/17  Plan of Care reviewed with: patient, daughter         Ruthann RUTLEDGE ABBEY Hall  04/03/2017

## 2017-04-03 NOTE — ASSESSMENT & PLAN NOTE
Na 130 today; more likely related to volume overload; will monitor closely with aggressive diuresis

## 2017-04-03 NOTE — CHAPLAIN
Visited patient and daughter. Pt going home to hospice.  We completed her Advanced Directives, witnessed and place copy on chart.  Patient appears sad but looks forward to being home.

## 2017-04-03 NOTE — PHYSICIAN QUERY
PT Name: Jennifer Prescott  MR #: [id number]     Physician Query Form - Documentation Clarification      CDS/: Margarita Boyd               Contact information:[email or web address]    This form is a permanent document in the medical record.     Query Date: April 3, 2017    By submitting this query, we are merely seeking further clarification of documentation. Please utilize your independent clinical judgment when addressing the question(s) below.    The Medical record reflects the following:    Supporting Clinical Findings Location in Medical Record   Blood tinged urine noted to rivas improved today; urinalysis done with urine culture with presence of Ecoli; will place on  Macrodantin     will discontinue rivas after enteroscopy today; plans to continue Macrodantin for 5-7 days                                     Cardiology PN 3/31                                                   Escherichia coli          Urethral Catheter inserted on 03/27/17 at 1333    Urine cx results 3/31         Adult PCS flowsheet 3/31                                                                            Doctor, Please specify diagnosis or diagnoses associated with above clinical findings.    Provider Use Only      [   ] UTI    [   ]  Other explanations with details_____________________________________                                                                                                                 [x  ] Clinically undetermined

## 2017-04-03 NOTE — ASSESSMENT & PLAN NOTE
Mild improved with waxing and waning throughout admission; repeat CXR yesterday essentially unchanged from 3/31/17-will continue to attempt to diurese aggressively

## 2017-04-03 NOTE — ASSESSMENT & PLAN NOTE
Severe per echo;  functional related due to severely depressed LVEF; not a candidate for MVR or mitiral clip

## 2017-04-03 NOTE — CONSULTS
Received consult for hospice. Met with patient and daughter Peri ([phone]) at bedside. Patient wishes to to go home with home hospice. No preference of hospice company. Called Shirin with [facility]. Added [facility] to [facility], faxed facesheet and hospice consult. Shirin will contact daughter and on her way to meet with daughter and patient to sign hospice paperwork.    DC plan for tomorrow to home with [facility]    Irma Jack RN, [facility], CMSRN  RN Transition Navigator  [phone]

## 2017-04-03 NOTE — PLAN OF CARE
Daughter and patient met with Shirin Cee RN with [facility] and signed hospice paperwork. Hospice equipment including oxygen and BIPAP being delivered to daughter's house this evening.    DC plan to discharge patient to home with home hospice in am. Spoke with MARIBEL De La Vega NP in agreement with dc plan.       04/03/17 1642   Discharge Reassessment   Assessment Type Discharge Planning Reassessment   Can the patient answer the patient profile reliably? Yes, cognitively intact   How does the patient rate their overall health at the present time? Poor   Describe the patient's ability to walk at the present time. Does not walk or unable to take any steps at all   Provided patient/caregiver education on the expected discharge date and the discharge plan No   Discharge Plan A Hospice/home   Change in patient condition or support system Yes   Explained to the the patient/caregiver why the discharge planned changed: Yes   Involved the patient/caregiver in establishing a new discharge plan: Yes     Irma Jack RN, CCM, CMSRN  RN Transition Navigator  [phone]

## 2017-04-04 VITALS
HEIGHT: 66 IN | DIASTOLIC BLOOD PRESSURE: 89 MMHG | WEIGHT: 259.94 LBS | SYSTOLIC BLOOD PRESSURE: 116 MMHG | TEMPERATURE: 98 F | OXYGEN SATURATION: 92 % | BODY MASS INDEX: 41.78 KG/M2 | HEART RATE: 84 BPM | RESPIRATION RATE: 18 BRPM

## 2017-04-04 PROCEDURE — 94640 AIRWAY INHALATION TREATMENT: CPT

## 2017-04-04 PROCEDURE — 27000221 HC OXYGEN, UP TO 24 HOURS

## 2017-04-04 PROCEDURE — 25000003 PHARM REV CODE 250: Performed by: NURSE PRACTITIONER

## 2017-04-04 PROCEDURE — 99291 CRITICAL CARE FIRST HOUR: CPT | Mod: ,,, | Performed by: INTERNAL MEDICINE

## 2017-04-04 PROCEDURE — 97530 THERAPEUTIC ACTIVITIES: CPT

## 2017-04-04 PROCEDURE — 25000003 PHARM REV CODE 250: Performed by: HOSPITALIST

## 2017-04-04 PROCEDURE — 94660 CPAP INITIATION&MGMT: CPT

## 2017-04-04 PROCEDURE — 63600175 PHARM REV CODE 636 W HCPCS: Performed by: NURSE PRACTITIONER

## 2017-04-04 PROCEDURE — 94761 N-INVAS EAR/PLS OXIMETRY MLT: CPT

## 2017-04-04 RX ORDER — BISACODYL 10 MG
10 SUPPOSITORY, RECTAL RECTAL DAILY PRN
Refills: 0 | COMMUNITY
Start: 2017-04-04

## 2017-04-04 RX ORDER — BUMETANIDE 1 MG/1
3 TABLET ORAL ONCE
Status: COMPLETED | OUTPATIENT
Start: 2017-04-04 | End: 2017-04-04

## 2017-04-04 RX ORDER — BENZONATATE 100 MG/1
100 CAPSULE ORAL 3 TIMES DAILY PRN
Qty: 90 CAPSULE | Refills: 3 | Status: SHIPPED | OUTPATIENT
Start: 2017-04-04 | End: 2017-04-14

## 2017-04-04 RX ORDER — POLYETHYLENE GLYCOL 3350 17 G/17G
17 POWDER, FOR SOLUTION ORAL 2 TIMES DAILY PRN
Qty: 1 PACKET | Refills: 0 | Status: SHIPPED | OUTPATIENT
Start: 2017-04-04

## 2017-04-04 RX ORDER — MORPHINE SULFATE 2 MG/ML
1 INJECTION, SOLUTION INTRAMUSCULAR; INTRAVENOUS
Status: DISCONTINUED | OUTPATIENT
Start: 2017-04-04 | End: 2017-04-04 | Stop reason: HOSPADM

## 2017-04-04 RX ORDER — NITROFURANTOIN 25; 75 MG/1; MG/1
100 CAPSULE ORAL EVERY 12 HOURS
Qty: 10 CAPSULE | Refills: 0 | Status: SHIPPED | OUTPATIENT
Start: 2017-04-04 | End: 2017-04-09

## 2017-04-04 RX ORDER — MORPHINE SULFATE 2 MG/ML
1 INJECTION, SOLUTION INTRAMUSCULAR; INTRAVENOUS ONCE
Status: COMPLETED | OUTPATIENT
Start: 2017-04-04 | End: 2017-04-04

## 2017-04-04 RX ADMIN — PRAVASTATIN SODIUM 40 MG: 40 TABLET ORAL at 09:04

## 2017-04-04 RX ADMIN — FLUTICASONE FUROATE AND VILANTEROL TRIFENATATE 1 PUFF: 200; 25 POWDER RESPIRATORY (INHALATION) at 08:04

## 2017-04-04 RX ADMIN — ALLOPURINOL 300 MG: 300 TABLET ORAL at 09:04

## 2017-04-04 RX ADMIN — CHLOROTHIAZIDE SODIUM 250 MG: 500 INJECTION, POWDER, LYOPHILIZED, FOR SOLUTION INTRAVENOUS at 10:04

## 2017-04-04 RX ADMIN — SODIUM CHLORIDE, PRESERVATIVE FREE 3 ML: 5 INJECTION INTRAVENOUS at 09:04

## 2017-04-04 RX ADMIN — MORPHINE SULFATE 1 MG: 2 INJECTION, SOLUTION INTRAMUSCULAR; INTRAVENOUS at 09:04

## 2017-04-04 RX ADMIN — AMIODARONE HYDROCHLORIDE 200 MG: 200 TABLET ORAL at 09:04

## 2017-04-04 RX ADMIN — BUMETANIDE 3 MG: 1 TABLET ORAL at 09:04

## 2017-04-04 RX ADMIN — POTASSIUM CHLORIDE 60 MEQ: 20 TABLET, EXTENDED RELEASE ORAL at 09:04

## 2017-04-04 RX ADMIN — CLOPIDOGREL BISULFATE 75 MG: 75 TABLET ORAL at 09:04

## 2017-04-04 RX ADMIN — MORPHINE SULFATE 1 MG: 2 INJECTION, SOLUTION INTRAMUSCULAR; INTRAVENOUS at 01:04

## 2017-04-04 RX ADMIN — NITROFURANTOIN (MONOHYDRATE/MACROCRYSTALS) 100 MG: 75; 25 CAPSULE ORAL at 09:04

## 2017-04-04 RX ADMIN — MORPHINE SULFATE 1 MG: 2 INJECTION, SOLUTION INTRAMUSCULAR; INTRAVENOUS at 11:04

## 2017-04-04 RX ADMIN — PANTOPRAZOLE SODIUM 40 MG: 40 TABLET, DELAYED RELEASE ORAL at 09:04

## 2017-04-04 NOTE — ASSESSMENT & PLAN NOTE
Echo with severely depressed LV function with EF 25% with severe MR; aggressive treatment with IV Dobutamine as well as IV Diamox/Diuril and Bumex with mild to moderate response; no major improvement with recurrent decompensation; overall prognosis very poor with long discussion yesterday with patient and daughter with decision to proceed with hospice; will given IV Diuril and Bumex today due to orthopnea and BAEZ for symptom relief; reviewed plan with patient today in regards to home with hospice and agrees with proceeding; will arrange for deactivation of AICD prior to discharge;

## 2017-04-04 NOTE — NURSING
Report given to ROB Cheney. Pt transferred to room 481 via bed on tele monitor, 3L of O2 per nasal cannula, and continuous pulse ox monitoring. Vitals remained stable. All belongings, medications, and chart taken with patient. Call made to daughter to update on new room but no answer at this time.

## 2017-04-04 NOTE — PROGRESS NOTES
[facility]  Cardiology  Progress Note    Patient Name: Jennifer Prescott  MRN: [id number]  Admission Date: 3/16/2017  Hospital Length of Stay: 19 days  Code Status: DNR   Attending Physician: Jamel Putnam MD   Primary Care Physician: Lianna Mistry MD  Expected Discharge Date:   Principal Problem:Acute on chronic combined systolic and diastolic heart failure    Subjective:     Hospital Course:   3/16/2017 Presented to the ER with complaints of BAEZ, orthopnea, LE edema and decreased appetite. Admitted to [facility] for management of decompensated HF. 4+ BLE edema present with abdominal distension. Creatinine 1.6 and BNP 3344 with elevated total bilirubin. Continued on current home CHF medication regimen with IV Bumex  Labs pending. Abdominal distension and LE edema remains unchanged. Long discussion by Dr. Braga on 3/16/17 in regards to the severity of her heart failure and the overall concern given her recurrent decompensation despite aggressive treatment. IV diuretics held due to marginal BP 3/17/17 Oral CHF medications held with initiation of IV Bumex with 1.4 liters out. LE edema and orthopnea unchanged 3/18/17-3/19/17 Repeat discussion yesterday with encouragement to determine her wishes if she were to further decompensate ie intubation, CPR.   24 hours failure of diuresis  Likely due to loss of IV access.  Cr stable.  BAEZ and orthopnea persist. Continued on IV Bumex with oral Metolazone given 30minutes prior to AM dose. 3/20/2017 Aggressive diuresis remains in place with Bumex 3mg IV BID with Metolazone yesterday with 3.8 liters out negative 2.7 liters in past 24 hours and negative 4.6 liters since admission. Peripheral edema along with ascites and orthopnea remain. Working with PT/OT to prevent debility. Patient stated discussion with her daughter in the past in regards to her wishes consistent with living will and wishes to continue with aggressive treatment 3/21/2017  Repeat Metolazone yesterday along with IV Bumex BID yesterday with good response and 3.8 liters out overnight and negative 7.1 liters since admission. Orthopnea and BLE edema remains. Will hold off off Metolazone today due to rise in CO2 (36) and decrease in Cl concerning for contraction alkalosis and will continue IV Bumex BID today 3/22/2017 IV Bumex BID yesterday with 1.8 liters out overnight and negative 9 liters since admission. Orthopnea and peripheral edema essentially unchanged. CO2 down to 33 today. 3/23/2017 IV Bumex x 1 yesterday due to elevated CO2. CO2 improved today with repeat CXR with worsening right pleural effusion on comparison to admit CXR. SOB and peripheral edema essentially unchanged. Will give IV Bumex today with strict I&Os and daily weights. Discuss continued aggressive diuresis with staff vs initiation of inotropes for further CHF management 3/24/2017 Transferred to ICU yesterday with initiation of Dobutamine along with Bumex 3mg IV x 1. 3.3 liters out overnight and negative 2.2 in 24 hours and negative 12.7 liters since admission. Creatinine trending down and CO2 up to 36 today from 30. Reports feeling slightly better and able to lie in bed to sleep overnight although unable to lie flat. CVP 23-27 but unable to lie completely flat. Will continue IV Dobutamine drip and given additional dose of Bumex IV for continued diuresis-monitor labs closely 3/25/2017-3/26/2017 Continued on IV Dobutamine drip throughout the weekend with IV Diamox and IV Diuril with total of 4 liters out. Bumex held due to rising CO2. CXR with no marked improvement to pleural effusions 3/27/2017 1.4 liters out overnight with 1.39 liters in and essentially net zero in 24hours; negative 13 liters since admission. LE swelling and SOB persist however states she feels slightly better. Noted to be extremely weak with increased WOB with minimal activity (moving to the edge of the chair). Plans for additional dose of Diamox  "today with IV Bumex since CO2 trended down. Will place rivas due to acute distress with activity and use BiPap prn 3/28/2017 Given IV Diamox and Bumex with 2.2 liters out and negative 14.3 liters since admission. Reports feeling slightly better and able to sleep in bed. Repeat IV Diamox and Bumex today with plans to initiate Dobutamine wean. Updated per GI on VEC results with evidence of AVMs and will need enteroscopy for treatment 3/29/2017 Dobutamine decreased to 2.5mcg/kg/min; Repeat IV Diamox and IV Bumex yesterday with only 1.4 liters out but not net negative overnight. Repeat CXR today with improvement in bilateral pleural effusions. Plan for possible enteroscopy tomorrow for evaluation and treatment of AVMs noted on VEC [id number] Weaned off Dobutamine yesterday evening with repeat IV Diamox and IV Bumex with 2 liters out. Reports SOB improved. Able to lie flat this AM and working with PT to improve conditioning. Plan for enteroscopy tomorrow by GI 3/31/2017 NPO today for enteroscopy. Repeat IV Diamox and Bumex yesterday with 1 liter out but net equal. SBP 80s-100s overnight with no arrhythmias noted on monitor. Lying flat in bed with no orthopnea or cough. LE swelling remains but slightly improved. Plan for possible transfer to the floor after the enteroscopy later today vs tomorrow 4/1/2017-4/2/2017 Continue with diuresis through the weekend with Diuril and Lasix with creatinine 1.3 this AM. Marginal response with 1.8 liters out and negative 18 liters since admission. Required continuous BiPap over the weekend due to hypoxia and desaturation 4/3/2017 Taken off continuous BiPap with stable sats and good toleration. Planned for enteroscopy but held off due to CHF. Discussed overall prognosis with daughter who grasps understanding and states "I just want her to be comfortable". Discussed with patient who stated "I want to proceed with GI procedure and be aggressive in treatment" DNR status discussed with patient " "yesterday with Dr. Braga with desire to discuss with family/children however stated "I have not discussed it yet"4/4/2017 Long discussion with patient and daughter (Peri) by Dr. Braga yesterday in regards to her overall condition and poor prognosis. Discussed the severity of her HF/MR and progression to multiorgan failure with low likelihood of dramatic improvement despite our continuous aggressive treatment. Both verbalized understanding with wishes to proceed with hospice and main goal for patient comfort. Case management consulted with meeting with [facility] yesterday. Transferred to the floor overnight for more privacy. Volume overloaded this morning with SOB/orthopnea and labored breathing and given IV Diuril and Bumex along with IV morphine for symptom treatment. Plans to proceed with discharge to home hospice today once AICD deactivated         Review of Systems   Constitution: Positive for weakness and malaise/fatigue. Negative for chills.   Cardiovascular: Positive for dyspnea on exertion, leg swelling and orthopnea. Negative for chest pain, claudication, cyanosis, near-syncope, paroxysmal nocturnal dyspnea and syncope.   Gastrointestinal: Positive for bloating. Negative for abdominal pain, constipation, diarrhea, nausea and vomiting.     Objective:     Vital Signs (Most Recent):  Temp: 98.3 °F (36.8 °C) (04/04/17 0800)  Pulse: 82 (04/04/17 0848)  Resp: 18 (04/04/17 0848)  BP: 116/89 (04/04/17 0800)  SpO2: (!) 92 % (04/04/17 0848) Vital Signs (24h Range):  Temp:  [97.5 °F (36.4 °C)-98.3 °F (36.8 °C)] 98.3 °F (36.8 °C)  Pulse:  [69-82] 82  Resp:  [11-45] 18  SpO2:  [90 %-100 %] 92 %  BP: (102-123)/(52-90) 116/89     Weight: 117.9 kg (259 lb 14.8 oz)  Body mass index is 41.95 kg/(m^2).     SpO2: (!) 92 %  O2 Device (Oxygen Therapy): nasal cannula      Intake/Output Summary (Last 24 hours) at 04/04/17 1043  Last data filed at 04/04/17 0500   Gross per 24 hour   Intake             1060 ml "   Output             1963 ml   Net             -903 ml       Lines/Drains/Airways     Central Venous Catheter Line                 Percutaneous Central Line Insertion/Assessment - triple lumen  03/23/17 2018 right internal jugular 11 days          Airway                 Airway - Non-Surgical 03/31/17 1440 Nasal Cannula 3 days                Physical Exam   Constitutional: She has a sickly appearance. She appears ill.   Cardiovascular: Normal rate and regular rhythm.    No murmur heard.  Pulmonary/Chest: Tachypnea noted. She has decreased breath sounds. She has rales.   Musculoskeletal: She exhibits edema.       Significant Labs: none     Significant Imaging: Echocardiogram:   2D echo with color flow doppler:   Results for orders placed or performed during the hospital encounter of 03/01/17   2D echo with color flow doppler   Result Value Ref Range    EF 25 (A) 55 - 65    Mitral Valve Regurgitation SEVERE (A)     Diastolic Dysfunction Yes (A)     Est. PA Systolic Pressure 50.28 (A)     Pericardial Effusion NONE     Tricuspid Valve Regurgitation MODERATE TO SEVERE (A)      Assessment and Plan:     Brief HPI: Seen this morning on AM NP rounds. Noted to be SOB with orthopnea and labored breathing laying flat. Improved with sitting and brief application of BiPap. Reviewed plan of care with patient to include proceeding with discharge to hospice today-patient verbalized agreement of POC    * Acute on chronic combined systolic and diastolic heart failure  Echo with severely depressed LV function with EF 25% with severe MR; aggressive treatment with IV Dobutamine as well as IV Diamox/Diuril and Bumex with mild to moderate response; no major improvement with recurrent decompensation; overall prognosis very poor with long discussion yesterday with patient and daughter with decision to proceed with hospice; will given IV Diuril and Bumex today due to orthopnea and BAEZ for symptom relief; reviewed plan with patient today in  regards to home with hospice and agrees with proceeding; will arrange for deactivation of AICD prior to discharge;       VTE Risk Mitigation         Ordered     enoxaparin injection 40 mg  Daily     Route:  Subcutaneous        03/16/17 1556     Medium Risk of VTE  Once      03/16/17 1441          SEJAL Strickland, ANP  Cardiology  [facility]

## 2017-04-04 NOTE — SUBJECTIVE & OBJECTIVE
Review of Systems   Constitution: Positive for weakness and malaise/fatigue. Negative for chills.   Cardiovascular: Positive for dyspnea on exertion, leg swelling and orthopnea. Negative for chest pain, claudication, cyanosis, near-syncope, paroxysmal nocturnal dyspnea and syncope.   Gastrointestinal: Positive for bloating. Negative for abdominal pain, constipation, diarrhea, nausea and vomiting.     Objective:     Vital Signs (Most Recent):  Temp: 98.3 °F (36.8 °C) (04/04/17 0800)  Pulse: 82 (04/04/17 0848)  Resp: 18 (04/04/17 0848)  BP: 116/89 (04/04/17 0800)  SpO2: (!) 92 % (04/04/17 0848) Vital Signs (24h Range):  Temp:  [97.5 °F (36.4 °C)-98.3 °F (36.8 °C)] 98.3 °F (36.8 °C)  Pulse:  [69-82] 82  Resp:  [11-45] 18  SpO2:  [90 %-100 %] 92 %  BP: (102-123)/(52-90) 116/89     Weight: 117.9 kg (259 lb 14.8 oz)  Body mass index is 41.95 kg/(m^2).     SpO2: (!) 92 %  O2 Device (Oxygen Therapy): nasal cannula      Intake/Output Summary (Last 24 hours) at 04/04/17 1043  Last data filed at 04/04/17 0500   Gross per 24 hour   Intake             1060 ml   Output             1963 ml   Net             -903 ml       Lines/Drains/Airways     Central Venous Catheter Line                 Percutaneous Central Line Insertion/Assessment - triple lumen  03/23/17 2018 right internal jugular 11 days          Airway                 Airway - Non-Surgical 03/31/17 1440 Nasal Cannula 3 days                Physical Exam   Constitutional: She has a sickly appearance. She appears ill.   Cardiovascular: Normal rate and regular rhythm.    No murmur heard.  Pulmonary/Chest: Tachypnea noted. She has decreased breath sounds. She has rales.   Musculoskeletal: She exhibits edema.       Significant Labs: none     Significant Imaging: Echocardiogram:   2D echo with color flow doppler:   Results for orders placed or performed during the hospital encounter of 03/01/17   2D echo with color flow doppler   Result Value Ref Range    EF 25 (A) 55 - 65     Mitral Valve Regurgitation SEVERE (A)     Diastolic Dysfunction Yes (A)     Est. PA Systolic Pressure 50.28 (A)     Pericardial Effusion NONE     Tricuspid Valve Regurgitation MODERATE TO SEVERE (A)

## 2017-04-04 NOTE — PLAN OF CARE
Hospice equipment deliverred to patient's home. Per MARIBEL De La Vega, [facility] awaiting AICD rep to deactivate AICD prior to discharge.(rep should be here for 12pm)  Called [facility] and ordered ambulance setup with oxygen for 1:30pm  Spoke to daughter and she will wait for patient at home.    Irma Jack, RN, CCM, CMSRN  RN Transition Navigator  [phone]

## 2017-04-04 NOTE — PLAN OF CARE
Patient d/c home for hospice care d/c instructions given to the patient verbalized understanding.  refer to clinical  IJ  removed tip intact. Telemetry d/c. Waiting for transportation to pick patient up

## 2017-04-04 NOTE — PLAN OF CARE
04/04/17 1113   Final Note   Assessment Type Final Discharge Note   Discharge planning education complete? Yes   Hospital Follow Up  Appt(s) scheduled? No   Discharge plans and expectations educations in teach back method with documentation complete? No   Offered [facility] -- Bedside Delivery? n/a   Discharge/Hospital Encounter Summary to (non-[facility]) PCP No   Referral to Outpatient Case Management complete? No   Referral to / orders for Home Health Complete? No   30 day supply of medicines given at discharge, if documented non-compliance / non-adherence? No   Any social issues identified prior to discharge? No   Did you assess the readiness or willingness of the family or caregiver to support self management of care? Yes   Right Care Referral Info   Post Acute Recommendation Other   Referral Type Hospice   Facility Name [facility]

## 2017-04-04 NOTE — PLAN OF CARE
04/04/17 1100   Transport Information   Transport Diagnosis (end stage heart failure)   Transportation Date 04/04/17   Transported From ([facility], room 481)   Transported To ([address] [place] [address], [place] [address])   Supporting Clinical Information   Unable to sit or travel in a seated position because Postural Instability   Comment (oxygen needed)

## 2017-04-04 NOTE — PLAN OF CARE
Problem: Patient Care Overview  Goal: Plan of Care Review  Outcome: Ongoing (interventions implemented as appropriate)  Patient transferred by hospital Bed. Patient awake and oriented at this time. Patient appears very weak at this time. Remains on oxygen per nasal cannular. Patient denies any pain or discomfort at this time. Patient repositioned and changed,  Placed on BIPAP at this time. Will continue to observe.

## 2017-04-04 NOTE — PLAN OF CARE
AICD turned off.  Patient sitting on the side of the bed.  Per patient she breathes better sitting up. Patient's heart rate running in the upper 60s low 70s. Will continue to monitor

## 2017-04-04 NOTE — PLAN OF CARE
Problem: Physical Therapy Goal  Goal: Physical Therapy Goal  Goals to be met by: 4/3/2017     Patient will increase functional independence with mobility by performin. Supine to sit with Stand-by Assistance  2. Sit to stand transfer with Supervision  3. Gait x 15-25 feet with Supervision using Rolling Walker.    Outcome: Ongoing (interventions implemented as appropriate)  Continue working toward goals.

## 2017-04-04 NOTE — PT/OT/SLP PROGRESS
Occupational Therapy      Jennifer Prescott  MRN: [id number]    Patient not seen today secondary to not feeling well. RN present and providing pt with meds. Pt expected to be discharged home with hospice care this afternoon.  Will follow-up at later time.    ABBEY Monteiro  4/4/2017

## 2017-04-04 NOTE — PLAN OF CARE
[facility]    FACILITY TRANSFER ORDERS      Patient Name: Jennifer Prescott  YOB: 1945    PCP: Lianna Mistry MD   PCP Address: [address] [place] [address] / [place]  PCP Phone Number: [phone]  PCP Fax: [phone]    Encounter Date: 04/04/2017    Ad[facility] to: [facility]    Vital Signs:  Per Hospice recommendations   Diagnoses:   Active Hospital Problems    Diagnosis  POA    *Acute on chronic combined systolic and diastolic heart failure [I50.43]  Yes    SOB (shortness of breath) [R06.02]  Yes    Hematuria [R31.9]  Yes    Acute decompensated heart failure [I50.9]  Yes    Tricuspid valve disease [I07.9]  Yes    Mitral regurgitation [I34.0]  Yes    Hyponatremia [E87.1]  Yes    On home oxygen therapy [Z99.81]  Not Applicable     Chronic    Hypokalemia [E87.6]  Yes    Neuropathic pain, leg, bilateral [G57.91, G57.92]  Yes     Chronic    Chronic combined systolic and diastolic congestive heart failure [I50.42]  Yes     Chronic    Pleural effusion, right [J90]  Yes    Iron deficiency anemia due to chronic blood loss [D50.0]  Yes     Chronic    CKD (chronic kidney disease) stage 3, GFR 30-59 ml/min [N18.3]  Yes     Chronic    Obesity hypoventilation syndrome on BiPAP [E66.2]  Yes     Chronic    CAD S/P percutaneous coronary angioplasty [I25.10, Z98.61]  Not Applicable     Chronic         S/p LAD stent    3 JAMAL to RCA  1/27/15 - 2.5 x 38, 3.0 x 38, and 3.5 x 32 mm Promus JAMAL stent        Permanent atrial fibrillation [I48.2]  Yes     Chronic    COPD (chronic obstructive pulmonary disease) [J44.9]  Yes     Chronic     Erick Peters MD     9/2/2013 11:51 AM  Complete PFT with bronchodilator:     Mild-severe obstruction without air trapping or hyperinflation present. No significant response to bronchodilator, however, this does not preclude a clinical response. Moderate restriction. Severe decrease in diffusing capacity, not corrected for  patient's Hb.    When compared to prior study, dated 3/4/13, there appears to be development of obstruction by GOLD criteria.  Overall however, there has been little change the patient's PFTs.          Resolved Hospital Problems    Diagnosis Date Resolved POA    Congestive heart failure [I50.9] 03/24/2017 Yes       Allergies:  Review of patient's allergies indicates:   Allergen Reactions    Gabapentin        Diet: regular diet and cardiac diet    Activities: Activity as tolerated    Medications: Review discharge medications with patient and family and provide education- Further medication recommendations per Hospice    Current Discharge Medication List      START taking these medications    Details   benzonatate (TESSALON) 100 MG capsule Take 1 capsule (100 mg total) by mouth 3 (three) times daily as needed for Cough.  Qty: 90 capsule, Refills: 3      bisacodyl (DULCOLAX) 10 mg Supp Place 1 suppository (10 mg total) rectally daily as needed.  Refills: 0      nitrofurantoin, macrocrystal-monohydrate, (MACROBID) 100 MG capsule Take 1 capsule (100 mg total) by mouth every 12 (twelve) hours.  Qty: 10 capsule, Refills: 0      polyethylene glycol (GLYCOLAX) 17 gram PwPk Take 17 g by mouth 2 (two) times daily as needed.  Qty: 1 packet, Refills: 0         CONTINUE these medications which have NOT CHANGED    Details   albuterol 90 mcg/actuation inhaler Inhale 2 puffs into the lungs every 6 (six) hours as needed for Wheezing.  Qty: 18 g, Refills: 0    Associated Diagnoses: Pulmonary emphysema, unspecified emphysema type      albuterol-ipratropium 2.5mg-0.5mg/3mL (DUO-NEB) 0.5 mg-3 mg(2.5 mg base)/3 mL nebulizer solution USE 1 VIAL VIA NEBULIZER EVERY 6 HOURS AS NEEDED  Qty: 90 mL, Refills: 3    Comments: This prescription was filled today(3/13/2017). Any refills authorized will be placed on file.      allopurinol (ZYLOPRIM) 300 MG tablet Take 1 tablet (300 mg total) by mouth once daily.  Qty: 90 tablet, Refills: 3     Associated Diagnoses: Acute on chronic congestive heart failure, unspecified congestive heart failure type; Pacemaker; Chronic obstructive pulmonary disease, unspecified COPD type; Coronary artery disease involving native coronary artery without angina pectoris, unspecified whether native or transplanted heart; Congestive cardiomyopathy; Ischemic cardiomyopathy; Acute gout of foot, unspecified cause, unspecified laterality; S/P ICD (internal cardiac defibrillator) procedure      amiodarone (PACERONE) 200 MG Tab Take 200 mg by mouth once daily.      aspirin 81 MG Chew Take 81 mg by mouth every evening.       bumetanide (BUMEX) 1 MG tablet Take 3 tablets (3 mg total) by mouth 2 (two) times daily.  Qty: 180 tablet, Refills: 3    Comments: PLEASE FILL ON 12/16/15  Associated Diagnoses: Ischemic cardiomyopathy; Essential hypertension; Chronic combined systolic and diastolic congestive heart failure      carvedilol (COREG) 3.125 MG tablet Take 1 tablet (3.125 mg total) by mouth 2 (two) times daily with meals.  Qty: 180 tablet, Refills: 3    Comments: PLEASE FILL ON 12/16/15  Associated Diagnoses: Atrial fibrillation status post cardioversion; CAD S/P percutaneous coronary angioplasty; Ischemic cardiomyopathy; Essential hypertension; Chronic combined systolic and diastolic congestive heart failure; Sick sinus syndrome; Acute on chronic combined systolic and diastolic congestive heart failure; Other emphysema; CKD (chronic kidney disease) stage 3, GFR 30-59 ml/min; Pacemaker; Automatic implantable cardioverter-defibrillator in situ; Obesity hypoventilation syndrome      clopidogrel (PLAVIX) 75 mg tablet Take 75 mg by mouth once daily.      cyclobenzaprine (FLEXERIL) 10 MG tablet Take 10 mg by mouth 3 (three) times daily as needed for Muscle spasms.      ferrous sulfate 325 (65 FE) MG EC tablet Take 1 tablet (325 mg total) by mouth every evening.  Refills: 0      fluticasone-vilanterol (BREO) 100-25 mcg/dose diskus inhaler  Inhale 1 puff into the lungs once daily.  Qty: 28 each, Refills: 5    Associated Diagnoses: Pulmonary emphysema, unspecified emphysema type; Wheezing      isosorbide mononitrate (IMDUR) 60 MG 24 hr tablet Take 1 tablet (60 mg total) by mouth once daily.      losartan (COZAAR) 25 MG tablet Take 0.5 tablets (12.5 mg total) by mouth once daily.  Qty: 45 tablet, Refills: 3    Associated Diagnoses: Atrial fibrillation status post cardioversion; CAD S/P percutaneous coronary angioplasty; Ischemic cardiomyopathy; Essential hypertension; Chronic combined systolic and diastolic congestive heart failure; Sick sinus syndrome; Acute on chronic combined systolic and diastolic congestive heart failure; Other emphysema; CKD (chronic kidney disease) stage 3, GFR 30-59 ml/min; Pacemaker; Automatic implantable cardioverter-defibrillator in situ; Obesity hypoventilation syndrome      melatonin 10 mg Cap Take 2 capsules by mouth nightly as needed.      pantoprazole (PROTONIX) 40 MG tablet Take 1 tablet (40 mg total) by mouth once daily.  Qty: 30 tablet, Refills: 11      pravastatin (PRAVACHOL) 40 MG tablet Take 1 tablet (40 mg total) by mouth once daily.  Qty: 90 tablet, Refills: 3      tramadol (ULTRAM) 50 mg tablet TAKE 1 TABLET BY MOUTH EVERY 6 HOURS AS NEEDED FOR PAIN  Qty: 60 tablet, Refills: 3    Comments: This prescription was filled today(3/13/2017). Any refills authorized will be placed on file.      trazodone (DESYREL) 150 MG tablet Take 1 tablet (150 mg total) by mouth nightly.  Qty: 30 tablet, Refills: 3    Associated Diagnoses: Insomnia, unspecified type                  _________________________________  SEJAL Strickland, ANP  04/04/2017

## 2017-04-04 NOTE — PT/OT/SLP DISCHARGE
Physical Therapy Discharge Summary    Jennifer Prescott  MRN: [id number]   Acute on chronic combined systolic and diastolic heart failure   Patient Discharged from acute Physical Therapy on 2017.  Please refer to prior PT notes for functional status.     Assessment:   Patient appropriate for care in another setting.  GOALS:   Physical Therapy Goals     Not on file      Multidisciplinary Problems (Resolved)        Problem: Physical Therapy Goal    Goal Priority Disciplines Outcome Goal Variances Interventions   Physical Therapy Goal   (Resolved)     PT/OT, PT Outcome(s) achieved     Description:  Goals to be met by: 4/3/2017     Patient will increase functional independence with mobility by performin. Supine to sit with Stand-by Assistance  2. Sit to stand transfer with Supervision  3. Gait  x 15-25 feet with Supervision using Rolling Walker.              Problem: Physical Therapy Goal    Goal Priority Disciplines Outcome Goal Variances Interventions   Physical Therapy Goal   (Resolved)     PT/OT, PT Outcome(s) achieved     Description:  Goals to be met by: 2017     Patient will increase functional independence with mobility by performin. Supine to sit with Minimal Assistance  2. Sit to supine with Minimal Assistance  3. Sit to stand transfer with Minimal Assistance  4. Bed to chair transfer with Minimal Assistance using Rolling Walker  5. Gait  x 15-25ft feet with Minimal Assistance using Rolling Walker.   6. Increased functional strength to 4- to 4 for transfers and amb.             Problem: Physical Therapy Goal    Goal Priority Disciplines Outcome Goal Variances Interventions   Physical Therapy Goal   (Resolved)     PT/OT, PT Outcome(s) achieved             Reasons for Discontinuation of Therapy Services  Transfer to alternate level of care.      Plan:   Patient Discharged to: Palliative Care/Hospice.at home

## 2017-04-04 NOTE — PLAN OF CARE
Problem: Patient Care Overview  Goal: Plan of Care Review  Pt placed on BiPAP with 3L O2 in line; Sats 99%; tolerating well; will continue to monitor.

## 2017-04-04 NOTE — PT/OT/SLP PROGRESS
Physical Therapy  Treatment    Jennifer Prescott   MRN: [id number]   Admitting Diagnosis: Acute on chronic combined systolic and diastolic heart failure    PT Received On: 17  PT Start Time: 1315     PT Stop Time: 1325    PT Total Time (min): 10 min       Billable Minutes:  Therapeutic Activity 10    Treatment Type: Treatment  PT/PTA: PTA     PTA Visit Number: 2       General Precautions: Standard, fall  Orthopedic Precautions: Full weight bearing   Braces:      Do you have any cultural, spiritual, Protestant conflicts, given your current situation?: none    Subjective:  Communicated with RN (Ny) prior to session.  Pt sitting EOB upon entering the room.  Pt agreed to return to bed supine (HOB elevated) for nsg care.    Pain Ratin/10 (no c/o pain during tx)              Pain Rating Post-Intervention: 0/10    Objective:   Patient found with: oxygen, telemetry    Functional Mobility:  Bed Mobility:   Sit to Supine: Total Assistance, With assist of  2    Transfers:  Sit <> Stand Assistance: Maximum Assistance (of two x one bout)  Sit <> Stand Assistive Device: No Assistive Device    Gait:        Stairs:  Balance:   Static Sit: FAIR+: Able to take MINIMAL challenges from all directions  Dynamic Sit: FAIR+: Maintains balance through MINIMAL excursions of active trunk motion  Therapeutic Activities and Exercises:  Scooting laterally up to HOB with Max A of 2 and use of drawn sheet for assist.  Sit > supine with Total A of 2.  Scoot up to HOB with Total A of 2  HOB was immediate elevated to 90* with pillows placed behind pt's upper back and head for increased support upright secondary to pt being very SOB from t/fing sit > supine.     AM-PAC 6 CLICK MOBILITY  How much help from another person does this patient currently need?   1 = Unable, Total/Dependent Assistance  2 = A lot, Maximum/Moderate Assistance  3 = A little, Minimum/Contact Guard/Supervision  4 = None, Modified [place]/Independent    Turning over in  bed (including adjusting bedclothes, sheets and blankets)?: 2  Sitting down on and standing up from a chair with arms (e.g., wheelchair, bedside commode, etc.): 2  Moving from lying on back to sitting on the side of the bed?: 2  Moving to and from a bed to a chair (including a wheelchair)?: 2  Need to walk in hospital room?: 1  Climbing 3-5 steps with a railing?: 1  Total Score: 10    AM-PAC Raw Score CMS G-Code Modifier Level of Impairment Assistance   6 % Total / Unable   7 - 9 CM 80 - 100% Maximal Assist   10 - 14 CL 60 - 80% Moderate Assist   15 - 19 CK 40 - 60% Moderate Assist   20 - 22 CJ 20 - 40% Minimal Assist   23 CI 1-20% SBA / CGA   24 CH 0% Independent/ Mod I     Patient left supine with HOB elevated 90*. with RN present..    Assessment:  Jennifer Prescott is a 72 y.o. female with a medical diagnosis of Acute on chronic combined systolic and diastolic heart failure and presents with decreased strenght, endurance and increased SOB with movement.  Pt assisted BTB from sitting EOB and left in nurse's care.    Rehab identified problem list/impairments: Rehab identified problem list/impairments: weakness, impaired endurance, impaired self care skills, impaired functional mobilty, impaired balance, decreased upper extremity function, decreased lower extremity function, decreased ROM, impaired skin, edema, impaired cardiopulmonary response to activity    Rehab potential is poor.    Activity tolerance: Poor    Discharge recommendations: Discharge Facility/Level Of Care Needs:  (TBD)     Barriers to discharge: Barriers to Discharge: None    Equipment recommendations: Equipment Needed After Discharge:  (TBD)     GOALS:   Physical Therapy Goals        Problem: Physical Therapy Goal    Goal Priority Disciplines Outcome Goal Variances Interventions   Physical Therapy Goal     PT/OT, PT Ongoing (interventions implemented as appropriate)     Description:  Goals to be met by: 4/3/2017     Patient will increase  functional independence with mobility by performin. Supine to sit with Stand-by Assistance  2. Sit to stand transfer with Supervision  3. Gait  x 15-25 feet with Supervision using Rolling Walker.              Multidisciplinary Problems (Resolved)        Problem: Physical Therapy Goal    Goal Priority Disciplines Outcome Goal Variances Interventions   Physical Therapy Goal   (Resolved)     PT/OT, PT Outcome(s) achieved     Description:  Goals to be met by: 2017     Patient will increase functional independence with mobility by performin. Supine to sit with Minimal Assistance  2. Sit to supine with Minimal Assistance  3. Sit to stand transfer with Minimal Assistance  4. Bed to chair transfer with Minimal Assistance using Rolling Walker  5. Gait  x 15-25ft feet with Minimal Assistance using Rolling Walker.   6. Increased functional strength to 4- to 4 for transfers and amb.             Problem: Physical Therapy Goal    Goal Priority Disciplines Outcome Goal Variances Interventions   Physical Therapy Goal   (Resolved)     PT/OT, PT Outcome(s) achieved               PLAN:    Patient to be seen 6 x/week  to address the above listed problems via therapeutic activities, gait training, therapeutic exercises, neuromuscular re-education  Plan of Care expires: 17  Plan of Care reviewed with: patient         Tere Jose G, PTA  2017

## 2017-10-11 NOTE — PLAN OF CARE
Problem: Patient Care Overview  Goal: Plan of Care Review  Outcome: Ongoing (interventions implemented as appropriate)  Monitor SpO2, oxygen as needed to keep saturation greater than or equal to 88%. Encourage periodic deep breathing. Continue BIPAP QHS           No

## 2019-05-24 NOTE — ED NOTES
APPEARANCE: awake and alert in NAD.  SKIN: warm, dry and intact. No breakdown, + bruising.  MUSCULOSKELETAL: Patient moving all extremities spontaneously, no obvious swelling or deformities noted. Ambulates with assistance   RESPIRATORY: + shortness of breath. R fields  diminished, L fields clear   CARDIAC: heart tones normal. Paced rhythm; 2+ distal pulses; + peripheral edema  ABDOMEN: S/ND/tender, normoactive bowel sounds present in all four quadrants. Normal stool pattern.  : voids spontaneously without difficulty.  Neurologic: AAO x 4; follows commands equal strength in all extremities; denies numbness/tingling.            
Appearance: Pt awake, alert & oriented to person, place & time. Pt in no acute distress at present time. Pt is clean and well groomed with clothes appropriately fastened.    Skin: Skin warm, dry & intact. Pt jaundiced. Mucous membranes moist. No breakdown noted.  Musculoskeletal: Patient moving all extremities well, no obvious swelling or deformities noted.    Respiratory: Respirations spontaneous, even, and non-labored. Visible chest rise noted. Airway is open and patent. Some accessory muscle use noted.  Neurologic: Sensation is intact. Speech is clear and appropriate. Eyes open spontaneously, follows commands, facial expression symmetrical, bilateral hand grasp equal and even, purposeful motor response noted. Slightly agitated, due to back pain.   Cardiac: All peripheral pulses present. No Bilateral lower extremity edema. Cap refill is <3 seconds.  Abdomen: Abdomen soft, non-tender to palpation.    : Pt reports no dysuria or hematuria.   
Assumed care of patient. Patient resting comfortably. Patient in NAD and offers no complaints at this time. Bed placed in low/locked position, side rails up x 2, call light within reach, plan of care provided to patient/family, alarms set and turned on for monitoring.  Awaiting additional orders and/or results; will continue to monitor.  
Critical care nurse made aware that patient going to Endo procedure first, then will come to room after.   
[facility] called and they are ready for patient. Pt now going to OR 15 from ED. Care handed off to Lizzeth Atwood RN. All belongings with patient.   
I assume care for this patient.             
In and Out cath patient. Pt tolerated well. Pt complains of bilateral feet pain. Will notified MD  
Notified ICU MD of patient bilateral feet pain  
Pt assisted back to bed. Offers no other complaints. Patient resting comfortably. Patient in NAD and offers no complaints at this time. Bed placed in low/locked position, side rails up x 2, call light within reach, plan of care provided to patient/family, alarms set and turned on for monitoring.  Awaiting additional orders and/or results; will continue to monitor.  
Pt complaining of pain to lower back and bilateral feet, reports is a chronic pain issues. Critical care made aware, stated would place new orders.  
Pt requesting to move from bed to bedside chair. Assisted with ambulation. Pt reports much more comfortable in chair. Will continue to monitor. Remains on cardiac monitor.   
Pt resting on stretcher, VSS, no signs or symptoms of distress noted. Pt denying pain at this time. Stretcher low, locked, call bell in reach. Will continue to assess.   
Pt states that she does not want to lay down in the bed. States that she is uncomfortable and requesting to recline in chair instead. Blood still infusing. Pt assisted to chair by RN. Pt offers no other complaints at this time. Will continue to monitor.  
Pt unable to stand for orthostatics  
Report called to Gurvinder Marti for 2088. Pt transported to floor with RN on telemetry and IV pole.   
Respiratory made aware of breathing treatment order.   
Rounding conducted, with no safety concerns identified.  Call bell within reach.  Bed locked in low position.  SR up x 2.  Care plan reviewed with patient and family.  Opportunities provided to ask questions, with answers to meet their needs. Patient pain reassessed.  Bathroom needs addressed.  No new complaints or noted concerns.     
Rounding conducted, with no safety concerns identified.  Call bell within reach.  Bed locked in low position.  SR up x 2.  Care plan reviewed with patient and family.  Opportunities provided to ask questions, with answers to meet their needs. Patient pain reassessed.  Bathroom needs addressed.  No new complaints or noted concerns. Pt appears to be resting comfortably.      
No...

## 2019-09-04 NOTE — PLAN OF CARE
Problem: Patient Care Overview  Goal: Plan of Care Review  Outcome: Ongoing (interventions implemented as appropriate)  Pt on nasal cannula flow as documented in flowsheets.  Pt in no apparent respiratory distress.  Will continue to monitor.        

## 2020-06-12 NOTE — ASSESSMENT & PLAN NOTE
Iron Deficiency Anemia d/t Chronic Blood Loss  -Green/Brown Stool + for OB  -Patient denies bloody or dark stools  -Hgb 9.5, Hct 32.1  Stable  -Continue home Ferrous Sulfate  mg daily and Pantoprazole 40 mg daily  -Monitor Daily CBC     No difficulties

## 2022-07-19 NOTE — ASSESSMENT & PLAN NOTE
S/p LAD PCI and RCA PCI for  in 1/2015; mild troponin due to demand etiology in setting of ADHF; no concern for ACS currently; continue ASA, Plavix, BB, and statin; ARB on hold due to rise in creatinine    No

## 2022-12-22 NOTE — PLAN OF CARE
Problem: Patient Care Overview  Goal: Plan of Care Review  Outcome: Ongoing (interventions implemented as appropriate)  Cont to monitor resp needs       Pt. Called in stating she took a Diflucan, but states she is still having the colorless discharge, wants to know is she should wait to take the second pill or should she come in to be seen

## 2023-03-08 NOTE — ASSESSMENT & PLAN NOTE
Echo with severely depressed LV function with EF 25% with severe MR; aggressive diuresis since admission with addition of Metolazone prior to Bumex- held yesterday due to contraction alkalosis; CO2 down to 30 this AM with CXR with worsening right pleural effusion; will give dose of IV Bumex now; negative 9 liters since admission with no dramatic improvement; will discuss with staff re: POC with continued aggressive IV diuresis vs initiation of inotropic therapy; discussed with patient overall concern given her severe HF    (4) no impairment

## 2025-05-20 NOTE — ASSESSMENT & PLAN NOTE
Order per Dr. Yoselin Umana MD patient is to stop asa in one month. Wife contacted and verbalized understanding.   Paced rhythm; no afib noted in underlying rhythm this AM; no RVR noted on monitor; no BB while on Dobutamine; continue to monitor on telemetry; follow up on VEC results from GI to guide AC use; continue with DVT prophylaxis   none Secondary Intention Text (Leave Blank If You Do Not Want): Due to the superficial nature of the defect and/or patient preference, the wound was allowed to heal by secondary intention.